# Patient Record
Sex: FEMALE | Race: WHITE | NOT HISPANIC OR LATINO | Employment: FULL TIME | URBAN - METROPOLITAN AREA
[De-identification: names, ages, dates, MRNs, and addresses within clinical notes are randomized per-mention and may not be internally consistent; named-entity substitution may affect disease eponyms.]

---

## 2017-01-04 RX ORDER — LORAZEPAM 1 MG/1
TABLET ORAL
Qty: 30 TABLET | Refills: 0 | Status: SHIPPED | OUTPATIENT
Start: 2017-01-04 | End: 2017-03-05 | Stop reason: SDUPTHER

## 2017-03-03 DIAGNOSIS — Z12.31 OTHER SCREENING MAMMOGRAM: ICD-10-CM

## 2017-03-06 RX ORDER — LORAZEPAM 1 MG/1
TABLET ORAL
Qty: 30 TABLET | Refills: 0 | Status: SHIPPED | OUTPATIENT
Start: 2017-03-06 | End: 2017-04-18 | Stop reason: SDUPTHER

## 2017-03-06 RX ORDER — CITALOPRAM 20 MG/1
TABLET, FILM COATED ORAL
Qty: 90 TABLET | Refills: 1 | Status: SHIPPED | OUTPATIENT
Start: 2017-03-06 | End: 2017-05-30 | Stop reason: DRUGHIGH

## 2017-03-09 ENCOUNTER — PATIENT MESSAGE (OUTPATIENT)
Dept: SPINE | Facility: CLINIC | Age: 52
End: 2017-03-09

## 2017-03-13 ENCOUNTER — HOSPITAL ENCOUNTER (OUTPATIENT)
Dept: RADIOLOGY | Facility: HOSPITAL | Age: 52
Discharge: HOME | End: 2017-03-13
Attending: INTERNAL MEDICINE
Payer: COMMERCIAL

## 2017-03-13 DIAGNOSIS — Z12.31 OTHER SCREENING MAMMOGRAM: ICD-10-CM

## 2017-03-13 PROCEDURE — 77067 SCR MAMMO BI INCL CAD: CPT | Mod: TC

## 2017-03-13 PROCEDURE — 77063 BREAST TOMOSYNTHESIS BI: CPT | Mod: 26,,, | Performed by: RADIOLOGY

## 2017-03-13 PROCEDURE — 77067 SCR MAMMO BI INCL CAD: CPT | Mod: 26,,, | Performed by: RADIOLOGY

## 2017-04-18 RX ORDER — LORAZEPAM 1 MG/1
TABLET ORAL
Qty: 30 TABLET | Refills: 0 | Status: SHIPPED | OUTPATIENT
Start: 2017-04-18 | End: 2017-05-30 | Stop reason: SDUPTHER

## 2017-05-01 ENCOUNTER — TELEPHONE (OUTPATIENT)
Dept: PAIN MEDICINE | Facility: CLINIC | Age: 52
End: 2017-05-01

## 2017-05-01 NOTE — TELEPHONE ENCOUNTER
Left voice message rquesting a return call to reschedule her appointment on 5-11-17 with Dr. Seo due to doctor being in  Surgery that afternoon .

## 2017-05-03 ENCOUNTER — PATIENT MESSAGE (OUTPATIENT)
Dept: INTERNAL MEDICINE | Facility: CLINIC | Age: 52
End: 2017-05-03

## 2017-05-03 NOTE — TELEPHONE ENCOUNTER
Ok to fit patient in a 20 min spot of hospital f/u the week of May 15th. Let her know we can discuss changing her medication at that time

## 2017-05-12 ENCOUNTER — OFFICE VISIT (OUTPATIENT)
Dept: PAIN MEDICINE | Facility: CLINIC | Age: 52
End: 2017-05-12
Attending: ANESTHESIOLOGY
Payer: COMMERCIAL

## 2017-05-12 VITALS
SYSTOLIC BLOOD PRESSURE: 158 MMHG | HEART RATE: 72 BPM | HEIGHT: 65 IN | DIASTOLIC BLOOD PRESSURE: 97 MMHG | WEIGHT: 151.44 LBS | BODY MASS INDEX: 25.23 KG/M2

## 2017-05-12 DIAGNOSIS — Z98.1 H/O SPINAL FUSION: ICD-10-CM

## 2017-05-12 DIAGNOSIS — G89.29 CHRONIC LEFT-SIDED LOW BACK PAIN WITH RIGHT-SIDED SCIATICA: Primary | ICD-10-CM

## 2017-05-12 DIAGNOSIS — M54.16 LUMBAR RADICULOPATHY: ICD-10-CM

## 2017-05-12 DIAGNOSIS — M54.41 CHRONIC LEFT-SIDED LOW BACK PAIN WITH RIGHT-SIDED SCIATICA: Primary | ICD-10-CM

## 2017-05-12 PROCEDURE — 3077F SYST BP >= 140 MM HG: CPT | Mod: S$GLB,,, | Performed by: ANESTHESIOLOGY

## 2017-05-12 PROCEDURE — 3080F DIAST BP >= 90 MM HG: CPT | Mod: S$GLB,,, | Performed by: ANESTHESIOLOGY

## 2017-05-12 PROCEDURE — 99214 OFFICE O/P EST MOD 30 MIN: CPT | Mod: S$GLB,,, | Performed by: ANESTHESIOLOGY

## 2017-05-12 PROCEDURE — 99999 PR PBB SHADOW E&M-EST. PATIENT-LVL III: CPT | Mod: PBBFAC,,, | Performed by: ANESTHESIOLOGY

## 2017-05-12 PROCEDURE — 1160F RVW MEDS BY RX/DR IN RCRD: CPT | Mod: S$GLB,,, | Performed by: ANESTHESIOLOGY

## 2017-05-12 NOTE — MR AVS SNAPSHOT
[facility]  [id number]  [facility] [address]  Phone: [phone]  Fax: [phone]                  Twila Roman   2017 3:30 PM   Office Visit    Description:  Female : 1965   Provider:  Daniel Seo MD   Department:  [facility]                To Do List           Future Appointments        Provider Department Dept Phone    2017 11:00 AM Michelle Farias DO [facility] [phone]    2017 3:20 PM Kathy Rodriguez NP [facility] [phone]      Your Future Surgeries/Procedures     May 23, 2017   Surgery with Daniel Seo MD   [facility] ([facility]    [address] [facility] [address] [phone]            2017   Surgery with Daniel Seo MD   [facility] ([facility]    [address]  [facility] [address] [phone]              Goals (5 Years of Data)     None      [facility]     [facility] Nurse Care Line - 24/7 Assistance  Unless otherwise directed by your provider, please contact [facility], our nurse care line that is available for 24/7 assistance.     Registered nurses in the [facility] provide: appointment scheduling, clinical advisement, health education, and other advisory services.  Call: [phone] (toll free)               Medications           Message regarding Medications     Verify the changes and/or additions to your medication regime listed below are the same as discussed with your clinician today.  If any of these changes or additions are incorrect, please notify your healthcare provider.             Verify that the below list of medications is an accurate representation of the medications you are currently taking.  If none reported, the list may be blank. If incorrect, please contact your healthcare provider. Carry this list with you in  "case of emergency.           Current Medications     biotin 5 mg Cap Take 1 capsule by mouth once daily.    cholecalciferol, vitamin D3, (VITAMIN D3) 2,000 unit Tab Take 1 tablet by mouth once daily.    citalopram (CELEXA) 20 MG tablet TAKE 1 TABLET BY MOUTH ONCE DAILY    inulin-sorbitol (FIBER) 2 gram Chew Take 2 each by mouth once daily.    lorazepam (ATIVAN) 1 MG tablet TAKE 1 TABLET BY MOUTH AT BEDTIME AS NEEDED    metoprolol succinate (TOPROL-XL) 25 MG 24 hr tablet Take 1 tablet (25 mg total) by mouth once daily.    amoxicillin (AMOXIL) 875 MG tablet Take 1 tablet (875 mg total) by mouth 2 (two) times daily.    fluticasone (FLONASE) 50 mcg/actuation nasal spray 2 sprays by Each Nare route once daily.    hydrocodone-acetaminophen 5-325mg (NORCO) 5-325 mg per tablet Take 1 tablet by mouth every 6 (six) hours as needed for Pain.    lactobacillus rhamnosus GG (CULTURELLE) 10 billion cell capsule Take 1 capsule by mouth once daily.    multivitamin (THERAGRAN) per tablet Take 1 tablet by mouth once daily.    pregabalin (LYRICA) 50 MG capsule Take 1 capsule (50 mg total) by mouth 3 (three) times daily.           Clinical Reference Information           Your Vitals Were     BP Pulse Height Weight BMI    158/97 72 5' 5" (1.651 m) 68.7 kg (151 lb 7.3 oz) 25.2 kg/m2      Blood Pressure          Most Recent Value    BP  (!)  158/97      Allergies as of 5/12/2017     No Known Allergies      Immunizations Administered on Date of Encounter - 5/12/2017     None      Language Assistance Services     ATTENTION: Language assistance services are available, free of charge. Please call [phone].      ATENCIÓN: Si habla lamar, tiene a motta disposición servicios gratuitos de asistencia lingüística. Llame al [phone].     [facility] Ý: N?u b?n nói Ti?ng Vi?t, có các d?ch v? h? tr? ngôn ng? mi?n phí dành cho b?n. G?i s? [phone].         [facility] complies with applicable Federal civil rights laws and does not " discriminate on the basis of race, color, national origin, age, disability, or sex.

## 2017-05-12 NOTE — PROGRESS NOTES
Chronic Pain - New Consult    Referring Physician: No ref. provider found    Chief Complaint: No chief complaint on file.       SUBJECTIVE: Disclaimer: This note has been generated using voice-recognition software. There may be typographical errors that have been missed during proof-reading    Initial encounter:    Twila Roman presents to the clinic for the evaluation of right lower back pain with radiculopathy. The pain started years ago following disk herniation and symptoms have been improving after surgery, but gradually returning.      Brief history: Patient has a history of transforaminal epidural steroid injections with significant relief in her lower extremity pain although the patient has been having right lower extremity weakness.    Pain Description:    The pain is located in the bilateral lower back area and radiates to the right lower extremity in the L4 and L5 distribution.      At BEST  5/10     At WORST  5/10 on the WORST day.      On average pain is rated as 5/10.     Today the pain is rated as 5/10    The pain is described as burning, shooting, stabbing and tingling      Symptoms interfere with daily activity, sleeping and work.     Exacerbating factors: Standing, Walking, Lifting and Getting out of bed/chair.      Mitigating factors medications, physical therapy and sitting injections.     Patient denies urinary incontinence and bowel incontinence.  Patient denies any suicidal or homicidal ideations    Pain Medications:  Current:      Tried in Past:  NSAIDs -Never  TCA -Never  SNRI -Never  Anti-convulsants -lyrica 50mg TID  Muscle Relaxants -Never  Opioids-Never    Physical Therapy/Home Exercise: no       report:  Reviewed and consistent with medication use as prescribed.    Pain Procedures:   12/2/2015 -right L4 and L5 TFESI  9/21/2016 - right L4 and L5 TFESI    Chiropractor -never  Acupuncture - never  TENS unit -never  Spinal decompression -previous lumbar surgery L4 and L5  Joint  replacement -never    Imaging:  MRI lumbar spine 2015  MRI lumbar spine with and without contrast.    Findings: 6 mL of gadavist IV contrast was administered for today's examination.    The lumbar spinal alignment and vertebral body heights are satisfactorily preserved.  There are disk prostheses identified L4-5 and L5-S1.  The spinal cord ends at L1.  The terminal cord, conus, and cauda equina have a normal appearance.  There is no   intradural abnormality.  There is no evidence of any acute marrow replacement process such as tumor or infection.  There is no fracture.  The visualized surrounding soft tissues and vascular structures are unremarkable.  There is no abnormal enhancement.    L1-2: Unremarkable.    L2-3: Facet hypertrophy.    L3-4: Facet hypertrophy.    L4-5: Facet hypertrophy.    L5-S1: Facet hypertrophy.   Impression    No significant central canal or neural foraminal stenosis.       Xray lumbar spine 7/2015  Back pain.    Comparison: 4/20/2015.    Technique: Five views of the lumbar spine were obtained, including flexion and extension views.      Findings: There is no acute fracture, dislocation or destructive process.  Postsurgical changes from anterior spinal fusion identified at L4-L5 and L5-S1.  There are inter vertebral disk space there is an through the anterior aspect of the vertebral   bodies.  Hardware appears to be in good positioning without evidence for loosening or fracture.  Vertebral body heights and alignment are maintained.  Spaces appear relatively well preserved.  Mild facet arthrosis of the lower lumbar levels is noted   alignment of the facets is maintained.  Spinous processes are unremarkable.  There appears to be atherosclerosis of the aorta.  SI joints appear intact the visualized sacrum demonstrates no abnormality.  Calcifications are seen projected over the renal   shadows which could relate to nonobstructing stones.  Correlation is advised.   Impression    No interval  worsening or acute finding.  No evidence for translational instability.  Postsurgical changes from anterior spinal fusion at L4-L5 and L5-S1.  No evidence for hardware loosening or fracture.             Past Medical History:   Diagnosis Date    Anxiety     Family history of ovarian cancer     Hypertension      Past Surgical History:   Procedure Laterality Date    BACK SURGERY      spinal fusion    TUMOR REMOVAL      right hand     Social History     Social History    Marital status: Single     Spouse name: N/A    Number of children: N/A    Years of education: N/A     Occupational History     [employer]      Social History Main Topics    Smoking status: Never Smoker    Smokeless tobacco: Never Used    Alcohol use 3.0 oz/week     5 Glasses of wine per week      Comment: one a day- red wine    Drug use: No    Sexual activity: Not Currently     Partners: Male     Other Topics Concern    Not on file     Social History Narrative     Family History   Problem Relation Age of Onset    Cancer Mother      ovarian    Ovarian cancer Mother     Heart disease Father      double bypass    Macular degeneration Father     No Known Problems Brother     No Known Problems Brother     Breast cancer Neg Hx     Colon cancer Neg Hx        Review of patient's allergies indicates:  No Known Allergies    Current Outpatient Prescriptions   Medication Sig    biotin 5 mg Cap Take 1 capsule by mouth once daily.    cholecalciferol, vitamin D3, (VITAMIN D3) 2,000 unit Tab Take 1 tablet by mouth once daily.    citalopram (CELEXA) 20 MG tablet TAKE 1 TABLET BY MOUTH ONCE DAILY    inulin-sorbitol (FIBER) 2 gram Chew Take 2 each by mouth once daily.    lorazepam (ATIVAN) 1 MG tablet TAKE 1 TABLET BY MOUTH AT BEDTIME AS NEEDED    metoprolol succinate (TOPROL-XL) 25 MG 24 hr tablet Take 1 tablet (25 mg total) by mouth once daily.    amoxicillin (AMOXIL) 875 MG tablet Take 1 tablet (875 mg total) by mouth 2  "(two) times daily.    fluticasone (FLONASE) 50 mcg/actuation nasal spray 2 sprays by Each Nare route once daily.    hydrocodone-acetaminophen 5-325mg (NORCO) 5-325 mg per tablet Take 1 tablet by mouth every 6 (six) hours as needed for Pain.    lactobacillus rhamnosus GG (CULTURELLE) 10 billion cell capsule Take 1 capsule by mouth once daily.    multivitamin (THERAGRAN) per tablet Take 1 tablet by mouth once daily.    pregabalin (LYRICA) 50 MG capsule Take 1 capsule (50 mg total) by mouth 3 (three) times daily.     No current facility-administered medications for this visit.        REVIEW OF SYSTEMS:    GENERAL:  No weight loss, malaise or fevers.  HEENT:   No recent changes in vision or hearing  NECK:  Negative for lumps, no difficulty with swallowing.  RESPIRATORY:  Negative for cough, wheezing or shortness of breath, patient denies any recent URI.  CARDIOVASCULAR:  Negative for chest pain, leg swelling or palpitations.  GI:  Negative for abdominal discomfort, blood in stools or black stools or change in bowel habits.  MUSCULOSKELETAL:  See HPI.  SKIN:  Negative for lesions, rash, and itching.  PSYCH:  No mood disorder or recent psychosocial stressors.  Patients sleep is not disturbed secondary to pain.  HEMATOLOGY/LYMPHOLOGY:  Negative for prolonged bleeding, bruising easily or swollen nodes.  Patient is not currently taking any anti-coagulants  ENDO: No history of diabetes or thyroid dysfunction  NEURO:   No history of headaches, syncope, paralysis, seizures or tremors.  All other reviewed and negative other than HPI.    OBJECTIVE:    BP (!) 158/97  Pulse 72  Ht 5' 5" (1.651 m)  Wt 68.7 kg (151 lb 7.3 oz)  BMI 25.2 kg/m2    PHYSICAL EXAMINATION:    GENERAL: Well appearing, in no acute distress, alert and oriented x3.  PSYCH:  Mood and affect appropriate.  SKIN: Skin color, texture, turgor normal, no rashes or lesions.  HEAD/FACE:  Normocephalic, atraumatic. Cranial nerves grossly intact.  CV: RRR with " palpation of the radial artery.  PULM: No evidence of respiratory difficulty, symmetric chest rise.  BACK: Straight leg raising in the sitting and supine positions is negative to radicular pain. There is pain with palpation over the facet joints of the lumbar spine bilaterally. There is decreased range of motion with extension to 15 degrees, and facet loading maneuvers cause reproducible pain.    EXTREMITIES: Peripheral joint ROM is full and pain free without obvious instability or laxity in all four extremities. No deformities, edema, or skin discoloration. Good capillary refill.  MUSCULOSKELETAL: Hip, and knee provocative maneuvers are negative.  There is  pain with palpation over the sacroiliac joints bilaterally.  There is no pain to palpation over the greater trochanteric bursa bilaterally.  FABERs test is positive.  FADIRs test is negative.   5/5 strength in right ankle with plantar and 4/5 dorsiflexion, 5/5 strength in left ankle with plantar and dorsiflexion, 5/5 strength with right knee flexion extension, 5/5 strength with knee flexion extension on the left 3/5 EHL on right 5/5 on left .  No atrophy or tone abnormalities are noted.  NEURO: Bilateral lower extremity coordination and muscle stretch reflexes are physiologic and symmetric.  Plantar response are downgoing. No clonus.  No loss of sensation is noted.  GAIT: Antalgic, ambulates without assistance       ASSESSMENT: 51 y.o. year old female with pain, consistent with     Encounter Diagnoses   Name Primary?    Chronic left-sided low back pain with right-sided sciatica Yes    H/O spinal fusion     Lumbar radiculopathy        PLAN:     I'll schedule the patient for repeat right-sided L4 and L5 transforaminal epidural steroid injection by 2, 2 weeks apart with a two-week follow-up    Follow-up 2 weeks after second injection if there is no improvement we will obtain an MRI of the lumbar spine and an decide on a treatment plant based on imaging.    The  above plan and management options were discussed at length with patient. Patient is in agreement with the above and verbalized understanding. It will be communicated with the referring physician via electronic record, fax, or mail.    Daniel Seo  05/12/2017

## 2017-05-16 ENCOUNTER — TELEPHONE (OUTPATIENT)
Dept: INTERNAL MEDICINE | Facility: CLINIC | Age: 52
End: 2017-05-16

## 2017-05-16 NOTE — TELEPHONE ENCOUNTER
----- Message from Adela Lobo sent at 5/15/2017 12:50 PM CDT -----  Contact: Pt [phone]  Pt called to reschedule for IVET for Tomorrow 05-16-17 for a physical and states this is the second time this has happened.  Pt does not want to wait til July for Physical could you please see what you can do for Pt.

## 2017-05-24 ENCOUNTER — HOSPITAL ENCOUNTER (OUTPATIENT)
Facility: OTHER | Age: 52
Discharge: HOME | End: 2017-05-24
Attending: ANESTHESIOLOGY | Admitting: ANESTHESIOLOGY
Payer: COMMERCIAL

## 2017-05-24 VITALS
SYSTOLIC BLOOD PRESSURE: 135 MMHG | HEIGHT: 65 IN | RESPIRATION RATE: 18 BRPM | OXYGEN SATURATION: 99 % | BODY MASS INDEX: 23.32 KG/M2 | DIASTOLIC BLOOD PRESSURE: 73 MMHG | HEART RATE: 71 BPM | TEMPERATURE: 98 F | WEIGHT: 140 LBS

## 2017-05-24 DIAGNOSIS — M54.16 LUMBAR RADICULOPATHY: Primary | ICD-10-CM

## 2017-05-24 PROCEDURE — 64484 NJX AA&/STRD TFRM EPI L/S EA: CPT | Performed by: ANESTHESIOLOGY

## 2017-05-24 PROCEDURE — 25500020 PHARM REV CODE 255: Performed by: ANESTHESIOLOGY

## 2017-05-24 PROCEDURE — 63600175 PHARM REV CODE 636 W HCPCS: Performed by: ANESTHESIOLOGY

## 2017-05-24 PROCEDURE — 64483 NJX AA&/STRD TFRM EPI L/S 1: CPT | Performed by: ANESTHESIOLOGY

## 2017-05-24 PROCEDURE — 99152 MOD SED SAME PHYS/QHP 5/>YRS: CPT | Mod: ,,, | Performed by: ANESTHESIOLOGY

## 2017-05-24 PROCEDURE — 64484 NJX AA&/STRD TFRM EPI L/S EA: CPT | Mod: LT,,, | Performed by: ANESTHESIOLOGY

## 2017-05-24 PROCEDURE — 25000003 PHARM REV CODE 250: Performed by: ANESTHESIOLOGY

## 2017-05-24 PROCEDURE — 64483 NJX AA&/STRD TFRM EPI L/S 1: CPT | Mod: LT,,, | Performed by: ANESTHESIOLOGY

## 2017-05-24 RX ORDER — LIDOCAINE HYDROCHLORIDE 10 MG/ML
INJECTION INFILTRATION; PERINEURAL
Status: DISCONTINUED | OUTPATIENT
Start: 2017-05-24 | End: 2017-05-24 | Stop reason: HOSPADM

## 2017-05-24 RX ORDER — SODIUM CHLORIDE 9 MG/ML
INJECTION, SOLUTION INTRAVENOUS CONTINUOUS
Status: DISCONTINUED | OUTPATIENT
Start: 2017-05-24 | End: 2017-05-24 | Stop reason: HOSPADM

## 2017-05-24 RX ORDER — MIDAZOLAM HYDROCHLORIDE 1 MG/ML
INJECTION INTRAMUSCULAR; INTRAVENOUS
Status: DISCONTINUED | OUTPATIENT
Start: 2017-05-24 | End: 2017-05-24 | Stop reason: HOSPADM

## 2017-05-24 RX ORDER — BUPIVACAINE HYDROCHLORIDE 2.5 MG/ML
INJECTION, SOLUTION EPIDURAL; INFILTRATION; INTRACAUDAL
Status: DISCONTINUED | OUTPATIENT
Start: 2017-05-24 | End: 2017-05-24 | Stop reason: HOSPADM

## 2017-05-24 RX ORDER — METHYLPREDNISOLONE ACETATE 40 MG/ML
INJECTION, SUSPENSION INTRA-ARTICULAR; INTRALESIONAL; INTRAMUSCULAR; SOFT TISSUE
Status: DISCONTINUED | OUTPATIENT
Start: 2017-05-24 | End: 2017-05-24 | Stop reason: HOSPADM

## 2017-05-24 RX ADMIN — SODIUM CHLORIDE: 0.9 INJECTION, SOLUTION INTRAVENOUS at 02:05

## 2017-05-24 NOTE — DISCHARGE SUMMARY
Discharge Note  Short Stay      SUMMARY     Admit Date: 5/24/2017    Attending Physician: Daniel Seo      Discharge Physician: Daniel Seo      Discharge Date: 5/24/2017 2:43 PM     PROCEDURE:    1)  Right  L4 TRANSFORAMINAL EPIDURAL STEROID INJECTION    2)  Right  L5 TRANSFORAMINAL EPIDURAL STEROID INJECTION    Pre Procedure diagnosis:    Right  L4 and L5  transforaminal stenosis with radiculopathy    Disposition: Home or self care    Patient Instructions:   Current Discharge Medication List      CONTINUE these medications which have NOT CHANGED    Details   biotin 5 mg Cap Take 1 capsule by mouth once daily.      cholecalciferol, vitamin D3, (VITAMIN D3) 2,000 unit Tab Take 1 tablet by mouth once daily.      citalopram (CELEXA) 20 MG tablet TAKE 1 TABLET BY MOUTH ONCE DAILY  Qty: 90 tablet, Refills: 1      inulin-sorbitol (FIBER) 2 gram Chew Take 2 each by mouth once daily.      lorazepam (ATIVAN) 1 MG tablet TAKE 1 TABLET BY MOUTH AT BEDTIME AS NEEDED  Qty: 30 tablet, Refills: 0      metoprolol succinate (TOPROL-XL) 25 MG 24 hr tablet Take 1 tablet (25 mg total) by mouth once daily.  Qty: 30 tablet, Refills: 11      multivitamin (THERAGRAN) per tablet Take 1 tablet by mouth once daily.      amoxicillin (AMOXIL) 875 MG tablet Take 1 tablet (875 mg total) by mouth 2 (two) times daily.  Qty: 20 tablet, Refills: 0      fluticasone (FLONASE) 50 mcg/actuation nasal spray 2 sprays by Each Nare route once daily.  Qty: 1 Bottle, Refills: 6    Associated Diagnoses: Acute sinusitis      hydrocodone-acetaminophen 5-325mg (NORCO) 5-325 mg per tablet Take 1 tablet by mouth every 6 (six) hours as needed for Pain.  Qty: 20 tablet, Refills: 0      lactobacillus rhamnosus GG (CULTURELLE) 10 billion cell capsule Take 1 capsule by mouth once daily.      pregabalin (LYRICA) 50 MG capsule Take 1 capsule (50 mg total) by mouth 3 (three) times daily.  Qty: 90 capsule, Refills: 6             Resume home diet and activity

## 2017-05-24 NOTE — OP NOTE
INFORMED CONSENT: The procedure, risks, benefits and options were discussed with patient. There are no contraindications to the procedure. The patient expressed understanding and agreed to proceed. The personnel performing the procedure was discussed.    05/24/2017    Surgeon: Daniel Seo MD    Assistants: None    PROCEDURE:    1)  Right  L4 TRANSFORAMINAL EPIDURAL STEROID INJECTION    2)  Right  L5 TRANSFORAMINAL EPIDURAL STEROID INJECTION    Pre Procedure diagnosis:    Right  L4 and L5  transforaminal stenosis with radiculopathy    Post-Procedure diagnosis:   same    Complications: None    Specimens: None      DESCRIPTION OF PROCEDURE: The patient was brought to the procedure room. IV access was obtained prior to the procedure. The patient was positioned prone on the fluoroscopy table. Continuous hemodynamic monitoring was initiated including blood pressure, EKG, and pulse oximetry. . The skin was prepped with chlorhexidine and draped in a sterile fashion. Skin anesthesia was achieved using a total of 10mL of lidocaine, 5mL over each respective injection site.     The  L4 and L5  transforaminal spaces were identified with fluoroscopy in the  AP, oblique, and lateral views.  A 22 gauge spinal quinke needle was then advanced into the area of the trans foraminal spaces bilaterally with confirmation of proper needle position using AP, oblique, and lateral fluoroscopic views. Once the needle tip was in the area of the transforaminal space, and there was no blood, CSF or paraesthesias,  1.5 mL of Omnipaque 300mg/ml was injected on each side for a total of 3mL.  Fluoroscopic imaging in the AP and lateral views revealed a clear outline of the spinal nerve with proximal spread of agent through the neural foramen into the epidural space. A total combination of 1 mL of Bupivicaine 0.25% and 40 mg depo medrol was injected on each side for a total of 4mL of injected medications with displacement of the contrast dye  confirming that the medication went into the area of the transforaminal spaces bilaterally. A sterile dressing was applied.   Patient tolerated the procedure well.    Conscious sedation provided by M.D    The patient was monitored with continuous pulse oximetry, EKG, and intermittent blood pressure monitors.  The patient was hemodynamically stable throughout the entire process was responsive to voice, and breathing spontaneously.  Supplemental O2 was provided at 2L/min via nasal cannula.  Patient was comfortable for the duration of the procedure.    There was a total of 3 mg IV Midazolam was titrated for the procedure    Patient was taken back to the recovery room for further observation.     The patient was discharged to home in stable condition

## 2017-05-24 NOTE — DISCHARGE INSTRUCTIONS

## 2017-05-24 NOTE — PLAN OF CARE
Pt tolerated procedure well. Pt reports 2/10 pain after procedure. Assisted pt up for first time. Steady on feet. All discharge instructions given.

## 2017-05-30 ENCOUNTER — OFFICE VISIT (OUTPATIENT)
Dept: INTERNAL MEDICINE | Facility: CLINIC | Age: 52
End: 2017-05-30
Payer: COMMERCIAL

## 2017-05-30 ENCOUNTER — TELEPHONE (OUTPATIENT)
Dept: INTERNAL MEDICINE | Facility: CLINIC | Age: 52
End: 2017-05-30

## 2017-05-30 VITALS
HEART RATE: 61 BPM | HEIGHT: 65 IN | SYSTOLIC BLOOD PRESSURE: 141 MMHG | RESPIRATION RATE: 16 BRPM | WEIGHT: 152.56 LBS | DIASTOLIC BLOOD PRESSURE: 90 MMHG | TEMPERATURE: 98 F | BODY MASS INDEX: 25.42 KG/M2

## 2017-05-30 DIAGNOSIS — Z00.00 ANNUAL PHYSICAL EXAM: Primary | ICD-10-CM

## 2017-05-30 DIAGNOSIS — F41.9 ANXIETY: ICD-10-CM

## 2017-05-30 DIAGNOSIS — Z12.11 SCREEN FOR COLON CANCER: ICD-10-CM

## 2017-05-30 DIAGNOSIS — I15.9 SECONDARY HYPERTENSION: ICD-10-CM

## 2017-05-30 PROCEDURE — 99396 PREV VISIT EST AGE 40-64: CPT | Mod: S$GLB,,, | Performed by: INTERNAL MEDICINE

## 2017-05-30 PROCEDURE — 99999 PR PBB SHADOW E&M-EST. PATIENT-LVL III: CPT | Mod: PBBFAC,,, | Performed by: INTERNAL MEDICINE

## 2017-05-30 RX ORDER — CITALOPRAM 10 MG/1
10 TABLET ORAL DAILY
Qty: 30 TABLET | Refills: 11 | Status: SHIPPED | OUTPATIENT
Start: 2017-05-30 | End: 2017-07-06 | Stop reason: ALTCHOICE

## 2017-05-30 RX ORDER — LORAZEPAM 1 MG/1
1 TABLET ORAL NIGHTLY PRN
Qty: 30 TABLET | Refills: 0 | Status: SHIPPED | OUTPATIENT
Start: 2017-05-30 | End: 2017-07-12 | Stop reason: SDUPTHER

## 2017-05-30 NOTE — PROGRESS NOTES
Subjective:       Patient ID: Twila Roman is a 51 y.o. female.    Chief Complaint: Annual Exam    Patient is a 51 y.o.female who presents today for annual.        Cholesterol: (due now)  Vaccines: Influenza (yearly)   Mammogram: march  Gyn exam: up to date  Colonoscopy: due now      Past Medical History:   Diagnosis Date    Anxiety     Family history of ovarian cancer     Hypertension      Past Surgical History:   Procedure Laterality Date    BACK SURGERY      spinal fusion    TUMOR REMOVAL      right hand     Social History     Social History    Marital status: Single     Spouse name: N/A    Number of children: N/A    Years of education: N/A     Occupational History     [employer]      Social History Main Topics    Smoking status: Never Smoker    Smokeless tobacco: Never Used    Alcohol use 3.0 oz/week     5 Glasses of wine per week      Comment: one a day- red wine    Drug use: No    Sexual activity: Not Currently     Partners: Male     Other Topics Concern    Not on file     Social History Narrative    No narrative on file     Review of patient's allergies indicates:  No Known Allergies  Ms. Roman had no medications administered during this visit.    Review of Systems   Constitutional: Negative for appetite change, chills, diaphoresis, fatigue and fever.   HENT: Negative for congestion, dental problem, ear discharge, ear pain, hearing loss, postnasal drip, sinus pressure and sore throat.    Eyes: Negative for discharge, redness and itching.   Respiratory: Negative for cough, chest tightness, shortness of breath and wheezing.    Cardiovascular: Negative for chest pain, palpitations and leg swelling.   Gastrointestinal: Negative for abdominal pain, constipation, diarrhea, nausea and vomiting.   Endocrine: Negative for cold intolerance and heat intolerance.   Genitourinary: Negative for difficulty urinating, frequency, hematuria and urgency.   Musculoskeletal: Negative for  arthralgias, back pain, gait problem, myalgias and neck pain.   Skin: Negative for color change and rash.   Neurological: Negative for dizziness, syncope and headaches.   Hematological: Negative for adenopathy.   Psychiatric/Behavioral: Negative for behavioral problems and sleep disturbance. The patient is not nervous/anxious.        Objective:      Physical Exam   Constitutional: She is oriented to person, place, and time. She appears well-developed and well-nourished. No distress.   HENT:   Head: Normocephalic and atraumatic.   Right Ear: External ear normal.   Left Ear: External ear normal.   Nose: Nose normal.   Mouth/Throat: Oropharynx is clear and moist. No oropharyngeal exudate.   Eyes: Conjunctivae and EOM are normal. Pupils are equal, round, and reactive to light. Right eye exhibits no discharge. Left eye exhibits no discharge. No scleral icterus.   Neck: Normal range of motion. Neck supple. No JVD present. No thyromegaly present.   Cardiovascular: Normal rate, regular rhythm, normal heart sounds and intact distal pulses.  Exam reveals no gallop and no friction rub.    No murmur heard.  Pulmonary/Chest: Effort normal and breath sounds normal. No stridor. No respiratory distress. She has no wheezes. She has no rales. She exhibits no tenderness.   Abdominal: Soft. Bowel sounds are normal. She exhibits no distension. There is no tenderness. There is no rebound.   Musculoskeletal: Normal range of motion. She exhibits no edema or tenderness.   Lymphadenopathy:     She has no cervical adenopathy.   Neurological: She is alert and oriented to person, place, and time. No cranial nerve deficit.   Skin: Skin is warm. No rash noted. She is not diaphoretic. No erythema.   Psychiatric: She has a normal mood and affect. Her behavior is normal.   Nursing note and vitals reviewed.      Assessment and Plan:       1. Annual physical exam  - CBC auto differential; Future  - Comprehensive metabolic panel; Future  - TSH;  Future  - Lipid panel; Future  - Vitamin D; Future  - Urinalysis; Future    2. Screen for colon cancer  - Case request GI: COLONOSCOPY    3. Secondary hypertension  - on metoprolol  - low salt diet  - monitor bp at home and email readings in one week    4. Anxiety  - titrate off celexa; may consider wellbutrin if needed          No Follow-up on file.

## 2017-06-07 ENCOUNTER — HOSPITAL ENCOUNTER (OUTPATIENT)
Facility: OTHER | Age: 52
Discharge: HOME | End: 2017-06-07
Attending: ANESTHESIOLOGY | Admitting: ANESTHESIOLOGY
Payer: COMMERCIAL

## 2017-06-07 ENCOUNTER — SURGERY (OUTPATIENT)
Age: 52
End: 2017-06-07

## 2017-06-07 VITALS
DIASTOLIC BLOOD PRESSURE: 68 MMHG | WEIGHT: 140 LBS | TEMPERATURE: 99 F | RESPIRATION RATE: 18 BRPM | OXYGEN SATURATION: 99 % | BODY MASS INDEX: 23.32 KG/M2 | SYSTOLIC BLOOD PRESSURE: 123 MMHG | HEART RATE: 66 BPM | HEIGHT: 65 IN

## 2017-06-07 DIAGNOSIS — M54.16 LUMBAR RADICULOPATHY: Primary | ICD-10-CM

## 2017-06-07 PROCEDURE — 63600175 PHARM REV CODE 636 W HCPCS: Performed by: ANESTHESIOLOGY

## 2017-06-07 PROCEDURE — 64484 NJX AA&/STRD TFRM EPI L/S EA: CPT | Performed by: ANESTHESIOLOGY

## 2017-06-07 PROCEDURE — 99152 MOD SED SAME PHYS/QHP 5/>YRS: CPT | Mod: ,,, | Performed by: ANESTHESIOLOGY

## 2017-06-07 PROCEDURE — 64484 NJX AA&/STRD TFRM EPI L/S EA: CPT | Mod: RT,,, | Performed by: ANESTHESIOLOGY

## 2017-06-07 PROCEDURE — 25500020 PHARM REV CODE 255: Performed by: ANESTHESIOLOGY

## 2017-06-07 PROCEDURE — 25000003 PHARM REV CODE 250: Performed by: ANESTHESIOLOGY

## 2017-06-07 PROCEDURE — 64483 NJX AA&/STRD TFRM EPI L/S 1: CPT | Mod: RT,,, | Performed by: ANESTHESIOLOGY

## 2017-06-07 PROCEDURE — 64483 NJX AA&/STRD TFRM EPI L/S 1: CPT | Performed by: ANESTHESIOLOGY

## 2017-06-07 RX ORDER — LIDOCAINE HYDROCHLORIDE 10 MG/ML
INJECTION INFILTRATION; PERINEURAL
Status: DISCONTINUED | OUTPATIENT
Start: 2017-06-07 | End: 2017-06-07 | Stop reason: HOSPADM

## 2017-06-07 RX ORDER — METHYLPREDNISOLONE ACETATE 40 MG/ML
INJECTION, SUSPENSION INTRA-ARTICULAR; INTRALESIONAL; INTRAMUSCULAR; SOFT TISSUE
Status: DISCONTINUED | OUTPATIENT
Start: 2017-06-07 | End: 2017-06-07 | Stop reason: HOSPADM

## 2017-06-07 RX ORDER — BUPIVACAINE HYDROCHLORIDE 2.5 MG/ML
INJECTION, SOLUTION EPIDURAL; INFILTRATION; INTRACAUDAL
Status: DISCONTINUED | OUTPATIENT
Start: 2017-06-07 | End: 2017-06-07 | Stop reason: HOSPADM

## 2017-06-07 RX ORDER — MIDAZOLAM HYDROCHLORIDE 1 MG/ML
INJECTION INTRAMUSCULAR; INTRAVENOUS
Status: DISCONTINUED | OUTPATIENT
Start: 2017-06-07 | End: 2017-06-07 | Stop reason: HOSPADM

## 2017-06-07 RX ORDER — SODIUM CHLORIDE 9 MG/ML
INJECTION, SOLUTION INTRAVENOUS CONTINUOUS
Status: DISCONTINUED | OUTPATIENT
Start: 2017-06-07 | End: 2017-06-07 | Stop reason: HOSPADM

## 2017-06-07 RX ADMIN — IOHEXOL 50 ML: 300 INJECTION, SOLUTION INTRAVENOUS at 03:06

## 2017-06-07 RX ADMIN — BUPIVACAINE HYDROCHLORIDE 10 ML: 2.5 INJECTION, SOLUTION EPIDURAL; INFILTRATION; INTRACAUDAL; PERINEURAL at 03:06

## 2017-06-07 RX ADMIN — MIDAZOLAM HYDROCHLORIDE 2 MG: 1 INJECTION, SOLUTION INTRAMUSCULAR; INTRAVENOUS at 03:06

## 2017-06-07 RX ADMIN — METHYLPREDNISOLONE ACETATE 80 MG: 40 INJECTION, SUSPENSION INTRA-ARTICULAR; INTRALESIONAL; INTRAMUSCULAR; SOFT TISSUE at 03:06

## 2017-06-07 RX ADMIN — LIDOCAINE HYDROCHLORIDE 10 ML: 10 INJECTION, SOLUTION INFILTRATION; PERINEURAL at 03:06

## 2017-06-07 RX ADMIN — MIDAZOLAM HYDROCHLORIDE 1 MG: 1 INJECTION, SOLUTION INTRAMUSCULAR; INTRAVENOUS at 03:06

## 2017-06-07 NOTE — DISCHARGE INSTRUCTIONS

## 2017-06-07 NOTE — DISCHARGE SUMMARY
Discharge Note  Short Stay      SUMMARY     Admit Date: 6/7/2017    Attending Physician: Daniel Seo      Discharge Physician: Daniel Seo      Discharge Date: 6/7/2017 4:01 PM     PROCEDURE:    1)  Right  L4 TRANSFORAMINAL EPIDURAL STEROID INJECTION    2)  Right  L5 TRANSFORAMINAL EPIDURAL STEROID INJECTION    Pre Procedure diagnosis:    Right  L4 and L5  transforaminal stenosis with radiculopathy    Disposition: Home or self care    Patient Instructions:   Current Discharge Medication List      CONTINUE these medications which have NOT CHANGED    Details   biotin 5 mg Cap Take 1 capsule by mouth once daily.      cholecalciferol, vitamin D3, (VITAMIN D3) 2,000 unit Tab Take 1 tablet by mouth once daily.      citalopram (CELEXA) 10 MG tablet Take 1 tablet (10 mg total) by mouth once daily.  Qty: 30 tablet, Refills: 11      inulin-sorbitol (FIBER) 2 gram Chew Take 2 each by mouth once daily.      lactobacillus rhamnosus GG (CULTURELLE) 10 billion cell capsule Take 1 capsule by mouth once daily.      lorazepam (ATIVAN) 1 MG tablet Take 1 tablet (1 mg total) by mouth nightly as needed.  Qty: 30 tablet, Refills: 0      multivitamin (THERAGRAN) per tablet Take 1 tablet by mouth once daily.      fluticasone (FLONASE) 50 mcg/actuation nasal spray 2 sprays by Each Nare route once daily.  Qty: 1 Bottle, Refills: 6    Associated Diagnoses: Acute sinusitis      hydrocodone-acetaminophen 5-325mg (NORCO) 5-325 mg per tablet Take 1 tablet by mouth every 6 (six) hours as needed for Pain.  Qty: 20 tablet, Refills: 0      metoprolol succinate (TOPROL-XL) 25 MG 24 hr tablet Take 1 tablet (25 mg total) by mouth once daily.  Qty: 30 tablet, Refills: 11             Resume home diet and activity

## 2017-06-07 NOTE — OP NOTE
INFORMED CONSENT: The procedure, risks, benefits and options were discussed with patient. There are no contraindications to the procedure. The patient expressed understanding and agreed to proceed. The personnel performing the procedure was discussed.    06/07/2017    Surgeon: Daniel Seo MD    Assistants: None    PROCEDURE:    1)  Right  L4 TRANSFORAMINAL EPIDURAL STEROID INJECTION    2)  Right  L5 TRANSFORAMINAL EPIDURAL STEROID INJECTION    Pre Procedure diagnosis:    Right  L4 and L5  transforaminal stenosis with radiculopathy    Post-Procedure diagnosis:   same    Complications: None    Specimens: None      DESCRIPTION OF PROCEDURE: The patient was brought to the procedure room. IV access was obtained prior to the procedure. The patient was positioned prone on the fluoroscopy table. Continuous hemodynamic monitoring was initiated including blood pressure, EKG, and pulse oximetry. . The skin was prepped with chlorhexidine and draped in a sterile fashion. Skin anesthesia was achieved using a total of 10mL of lidocaine, 5mL over each respective injection site.     The  L4 and L5  transforaminal spaces were identified with fluoroscopy in the  AP, oblique, and lateral views.  A 22 gauge spinal quinke needle was then advanced into the area of the trans foraminal spaces bilaterally with confirmation of proper needle position using AP, oblique, and lateral fluoroscopic views. Once the needle tip was in the area of the transforaminal space, and there was no blood, CSF or paraesthesias,  1.5 mL of Omnipaque 300mg/ml was injected on each side for a total of 3mL.  Fluoroscopic imaging in the AP and lateral views revealed a clear outline of the spinal nerve with proximal spread of agent through the neural foramen into the epidural space. A total combination of 1 mL of Bupivicaine 0.25% and 40 mg depo medrol was injected on each side for a total of 4mL of injected medications with displacement of the contrast dye  confirming that the medication went into the area of the transforaminal spaces bilaterally. A sterile dressing was applied.   Patient tolerated the procedure well.    Conscious sedation provided by M.D    The patient was monitored with continuous pulse oximetry, EKG, and intermittent blood pressure monitors.  The patient was hemodynamically stable throughout the entire process was responsive to voice, and breathing spontaneously.  Supplemental O2 was provided at 2L/min via nasal cannula.  Patient was comfortable for the duration of the procedure.    There was a total of 3 mg IV Midazolam was titrated for the procedure    Patient was taken back to the recovery room for further observation.     The patient was discharged to home in stable condition

## 2017-06-08 ENCOUNTER — PATIENT MESSAGE (OUTPATIENT)
Dept: INTERNAL MEDICINE | Facility: CLINIC | Age: 52
End: 2017-06-08

## 2017-06-14 ENCOUNTER — PATIENT MESSAGE (OUTPATIENT)
Dept: INTERNAL MEDICINE | Facility: CLINIC | Age: 52
End: 2017-06-14

## 2017-06-14 RX ORDER — BUPROPION HYDROCHLORIDE 150 MG/1
150 TABLET ORAL DAILY
Qty: 30 TABLET | Refills: 11 | Status: SHIPPED | OUTPATIENT
Start: 2017-06-14 | End: 2017-08-24 | Stop reason: DRUGHIGH

## 2017-06-20 ENCOUNTER — PATIENT MESSAGE (OUTPATIENT)
Dept: INTERNAL MEDICINE | Facility: CLINIC | Age: 52
End: 2017-06-20

## 2017-06-20 RX ORDER — BENZONATATE 200 MG/1
200 CAPSULE ORAL 2 TIMES DAILY PRN
Qty: 20 CAPSULE | Refills: 0 | Status: SHIPPED | OUTPATIENT
Start: 2017-06-20 | End: 2017-06-27

## 2017-06-20 RX ORDER — AZITHROMYCIN 250 MG/1
TABLET, FILM COATED ORAL
Qty: 6 TABLET | Refills: 0 | Status: SHIPPED | OUTPATIENT
Start: 2017-06-20 | End: 2017-07-06

## 2017-07-06 ENCOUNTER — OFFICE VISIT (OUTPATIENT)
Dept: INTERNAL MEDICINE | Facility: CLINIC | Age: 52
End: 2017-07-06
Payer: COMMERCIAL

## 2017-07-06 ENCOUNTER — HOSPITAL ENCOUNTER (OUTPATIENT)
Dept: RADIOLOGY | Facility: HOSPITAL | Age: 52
Discharge: HOME | End: 2017-07-06
Attending: INTERNAL MEDICINE
Payer: COMMERCIAL

## 2017-07-06 VITALS
HEART RATE: 103 BPM | WEIGHT: 151.25 LBS | TEMPERATURE: 99 F | BODY MASS INDEX: 25.2 KG/M2 | OXYGEN SATURATION: 98 % | RESPIRATION RATE: 16 BRPM | SYSTOLIC BLOOD PRESSURE: 138 MMHG | DIASTOLIC BLOOD PRESSURE: 88 MMHG | HEIGHT: 65 IN

## 2017-07-06 DIAGNOSIS — R05.9 COUGH: ICD-10-CM

## 2017-07-06 DIAGNOSIS — Z11.59 NEED FOR HEPATITIS B SCREENING TEST: ICD-10-CM

## 2017-07-06 DIAGNOSIS — Z11.1 SCREENING-PULMONARY TB: ICD-10-CM

## 2017-07-06 DIAGNOSIS — R05.9 COUGH: Primary | ICD-10-CM

## 2017-07-06 PROCEDURE — 71020 XR CHEST PA AND LATERAL: CPT | Mod: TC,PO

## 2017-07-06 PROCEDURE — 71020 XR CHEST PA AND LATERAL: CPT | Mod: 26,,, | Performed by: RADIOLOGY

## 2017-07-06 PROCEDURE — 99214 OFFICE O/P EST MOD 30 MIN: CPT | Mod: S$GLB,,, | Performed by: INTERNAL MEDICINE

## 2017-07-06 PROCEDURE — 99999 PR PBB SHADOW E&M-EST. PATIENT-LVL IV: CPT | Mod: PBBFAC,,, | Performed by: INTERNAL MEDICINE

## 2017-07-06 PROCEDURE — 96372 THER/PROPH/DIAG INJ SC/IM: CPT | Mod: S$GLB,,, | Performed by: INTERNAL MEDICINE

## 2017-07-06 RX ORDER — HYDROCODONE BITARTRATE AND HOMATROPINE METHYLBROMIDE ORAL SOLUTION 5; 1.5 MG/5ML; MG/5ML
5 LIQUID ORAL NIGHTLY PRN
Qty: 180 ML | Refills: 0 | Status: SHIPPED | OUTPATIENT
Start: 2017-07-06 | End: 2018-02-22

## 2017-07-06 RX ORDER — TRIAMCINOLONE ACETONIDE 40 MG/ML
40 INJECTION, SUSPENSION INTRA-ARTICULAR; INTRAMUSCULAR
Status: COMPLETED | OUTPATIENT
Start: 2017-07-06 | End: 2017-07-06

## 2017-07-06 RX ORDER — IPRATROPIUM BROMIDE AND ALBUTEROL SULFATE 2.5; .5 MG/3ML; MG/3ML
3 SOLUTION RESPIRATORY (INHALATION)
Status: COMPLETED | OUTPATIENT
Start: 2017-07-06 | End: 2017-07-06

## 2017-07-06 RX ORDER — LEVOFLOXACIN 750 MG/1
750 TABLET ORAL DAILY
Qty: 7 TABLET | Refills: 0 | Status: SHIPPED | OUTPATIENT
Start: 2017-07-06 | End: 2017-07-13

## 2017-07-06 RX ORDER — ALBUTEROL SULFATE 90 UG/1
1-2 AEROSOL, METERED RESPIRATORY (INHALATION) EVERY 6 HOURS PRN
Qty: 1 EACH | Refills: 11 | Status: SHIPPED | OUTPATIENT
Start: 2017-07-06 | End: 2021-09-30

## 2017-07-06 RX ORDER — METHYLPREDNISOLONE 4 MG/1
TABLET ORAL
Qty: 1 PACKAGE | Refills: 0 | Status: SHIPPED | OUTPATIENT
Start: 2017-07-06 | End: 2017-08-24 | Stop reason: ALTCHOICE

## 2017-07-06 RX ADMIN — TRIAMCINOLONE ACETONIDE 40 MG: 40 INJECTION, SUSPENSION INTRA-ARTICULAR; INTRAMUSCULAR at 03:07

## 2017-07-06 RX ADMIN — IPRATROPIUM BROMIDE AND ALBUTEROL SULFATE 3 ML: 2.5; .5 SOLUTION RESPIRATORY (INHALATION) at 03:07

## 2017-07-06 NOTE — PROGRESS NOTES
Subjective:       Patient ID: Twila Roman is a 51 y.o. female.    Chief Complaint: Immunizations (needs titers for school) and URI (x3 weeks ABX and tessalon perles did not work; pt reports body aches, coughing, low grade fever, nausea, short of breath, gags when coughs)    Patient is a 51 y.o.female who presents today for two things.    1. Cough: took a zpak one week ago. Cough improved but boyfriend got sick and she has a cough again. The cough is productive with white sputum; mild fever at  Home. Causing her to get short of breath due to excessive coughing.     2. 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  Review of Systems   Constitutional: Negative for appetite change, chills, diaphoresis, fatigue and fever.   HENT: Negative for congestion, dental problem, ear discharge, ear pain, hearing loss, postnasal drip, sinus pressure and sore throat.    Eyes: Negative for discharge, redness and itching.   Respiratory: Positive for cough and shortness of breath. Negative for chest tightness and wheezing.    Cardiovascular: Negative for chest pain, palpitations and leg swelling.   Gastrointestinal: Negative for abdominal pain, constipation, diarrhea, nausea and vomiting.   Endocrine: Negative for cold intolerance and heat intolerance.   Genitourinary: Negative for difficulty urinating, frequency, hematuria and urgency.   Musculoskeletal: Negative for arthralgias, back pain, gait problem, myalgias and neck pain.   Skin: Negative for color change and rash.   Neurological: Negative for dizziness, syncope and headaches.   Hematological: Negative for adenopathy.   Psychiatric/Behavioral: Negative for behavioral problems and sleep disturbance. The patient is not nervous/anxious.        Objective:      Physical Exam   Constitutional: She is oriented to person, place, and time. She appears well-developed and well-nourished. No distress.   HENT:   Head: Normocephalic and atraumatic.   Right Ear: External ear  normal.   Left Ear: External ear normal.   Nose: Nose normal.   Mouth/Throat: Oropharynx is clear and moist. No oropharyngeal exudate.   Eyes: Conjunctivae and EOM are normal. Pupils are equal, round, and reactive to light. Right eye exhibits no discharge. Left eye exhibits no discharge. No scleral icterus.   Neck: Normal range of motion. Neck supple. No JVD present. No thyromegaly present.   Cardiovascular: Normal rate, regular rhythm, normal heart sounds and intact distal pulses.  Exam reveals no gallop and no friction rub.    No murmur heard.  Pulmonary/Chest: Effort normal and breath sounds normal. No stridor. No respiratory distress. She has no wheezes. She has no rales. She exhibits no tenderness.   Bilateral coarse breath sounds   Abdominal: Soft. Bowel sounds are normal. She exhibits no distension. There is no tenderness. There is no rebound.   Musculoskeletal: Normal range of motion. She exhibits no edema or tenderness.   Lymphadenopathy:     She has no cervical adenopathy.   Neurological: She is alert and oriented to person, place, and time. No cranial nerve deficit.   Skin: Skin is warm. No rash noted. She is not diaphoretic. No erythema.   Psychiatric: She has a normal mood and affect. Her behavior is normal.   Nursing note and vitals reviewed.      Assessment and Plan:       1. Cough  - X-Ray Chest PA And Lateral; Future  - levoFLOXacin (LEVAQUIN) 750 MG tablet; Take 1 tablet (750 mg total) by mouth once daily.  Dispense: 7 tablet; Refill: 0  - triamcinolone acetonide injection 40 mg; Inject 1 mL (40 mg total) into the muscle one time.  - methylPREDNISolone (MEDROL DOSEPACK) 4 mg tablet; Take as directed  Dispense: 1 Package; Refill: 0; start tomorrow  - hydrocodone-homatropine 5-1.5 mg/5 ml (HYCODAN) 5-1.5 mg/5 mL Syrp; Take 5 mLs by mouth nightly as needed (cough).  Dispense: 180 mL; Refill: 0  - albuterol 90 mcg/actuation inhaler; Inhale 1-2 puffs into the lungs every 6 (six) hours as needed for  Wheezing.  Dispense: 1 each; Refill: 11  - albuterol-ipratropium 2.5mg-0.5mg/3mL nebulizer solution 3 mL; Take 3 mLs by nebulization one time.  - Notify clinic if symptoms change, worsen or do not improve    2. Need for hepatitis B screening test  - Varicella zoster antibody, IgG; Future  - Hepatitis B surface antibody; Future  - Rubeola antibody IgG; Future  - Rubella antibody, IgG; Future  - Mumps Virus Antibodies, IgG and IgM; Future    3. Screening-pulmonary TB  - Varicella zoster antibody, IgG; Future  - Hepatitis B surface antibody; Future  - Rubeola antibody IgG; Future  - Rubella antibody, IgG; Future  - Mumps Virus Antibodies, IgG and IgM; Future          No Follow-up on file.

## 2017-07-12 RX ORDER — LORAZEPAM 1 MG/1
TABLET ORAL
Qty: 30 TABLET | Refills: 0 | Status: SHIPPED | OUTPATIENT
Start: 2017-07-12 | End: 2017-09-11 | Stop reason: SDUPTHER

## 2017-07-14 RX ORDER — METOPROLOL SUCCINATE 25 MG/1
TABLET, EXTENDED RELEASE ORAL
Qty: 90 TABLET | Refills: 3 | Status: SHIPPED | OUTPATIENT
Start: 2017-07-14 | End: 2018-07-06 | Stop reason: SDUPTHER

## 2017-07-26 ENCOUNTER — PATIENT MESSAGE (OUTPATIENT)
Dept: INTERNAL MEDICINE | Facility: CLINIC | Age: 52
End: 2017-07-26

## 2017-07-27 ENCOUNTER — OFFICE VISIT (OUTPATIENT)
Dept: INTERNAL MEDICINE | Facility: CLINIC | Age: 52
End: 2017-07-27
Payer: COMMERCIAL

## 2017-07-27 VITALS
WEIGHT: 148.81 LBS | BODY MASS INDEX: 24.79 KG/M2 | DIASTOLIC BLOOD PRESSURE: 83 MMHG | TEMPERATURE: 99 F | HEART RATE: 97 BPM | SYSTOLIC BLOOD PRESSURE: 122 MMHG | HEIGHT: 65 IN

## 2017-07-27 DIAGNOSIS — J01.90 ACUTE SINUSITIS, RECURRENCE NOT SPECIFIED, UNSPECIFIED LOCATION: Primary | ICD-10-CM

## 2017-07-27 DIAGNOSIS — M94.0 COSTOCHONDRITIS: ICD-10-CM

## 2017-07-27 PROCEDURE — 99999 PR PBB SHADOW E&M-EST. PATIENT-LVL III: CPT | Mod: PBBFAC,,, | Performed by: INTERNAL MEDICINE

## 2017-07-27 PROCEDURE — 99213 OFFICE O/P EST LOW 20 MIN: CPT | Mod: S$GLB,,, | Performed by: INTERNAL MEDICINE

## 2017-07-27 RX ORDER — AMOXICILLIN AND CLAVULANATE POTASSIUM 875; 125 MG/1; MG/1
1 TABLET, FILM COATED ORAL 2 TIMES DAILY
Qty: 14 TABLET | Refills: 0 | Status: SHIPPED | OUTPATIENT
Start: 2017-07-27 | End: 2017-08-03

## 2017-07-27 RX ORDER — MELOXICAM 15 MG/1
15 TABLET ORAL DAILY
Qty: 14 TABLET | Refills: 0 | Status: SHIPPED | OUTPATIENT
Start: 2017-07-27 | End: 2017-08-26

## 2017-07-27 RX ORDER — PROMETHAZINE HYDROCHLORIDE AND DEXTROMETHORPHAN HYDROBROMIDE 6.25; 15 MG/5ML; MG/5ML
SYRUP ORAL
Qty: 180 ML | Refills: 0 | Status: SHIPPED | OUTPATIENT
Start: 2017-07-27 | End: 2018-02-22

## 2017-07-27 NOTE — PROGRESS NOTES
Subjective:       Patient ID: Twila Roman is a 52 y.o. female.    Chief Complaint: Sinus Problem    Patient is a 52 y.o.female who presents today for follow up. She go over the bronchial infection. However, now, she feels like she has a sinus infection. She is blowing out bloody yellow mucus. She is very congested and has low grade fever at home. She did not take anything over the counter. Ribs are hurting from her bronchitis.    Review of Systems   Constitutional: Negative for appetite change, chills, diaphoresis, fatigue and fever.   HENT: Positive for congestion, postnasal drip, rhinorrhea and sinus pressure. Negative for dental problem, ear discharge, ear pain, hearing loss and sore throat.    Eyes: Negative for discharge, redness and itching.   Respiratory: Negative for cough, chest tightness, shortness of breath and wheezing.    Cardiovascular: Negative for chest pain, palpitations and leg swelling.   Gastrointestinal: Negative for abdominal pain, constipation, diarrhea, nausea and vomiting.   Endocrine: Negative for cold intolerance and heat intolerance.   Genitourinary: Negative for difficulty urinating, frequency, hematuria and urgency.   Musculoskeletal: Negative for arthralgias, back pain, gait problem, myalgias and neck pain.   Skin: Negative for color change and rash.   Neurological: Negative for dizziness, syncope and headaches.   Hematological: Negative for adenopathy.   Psychiatric/Behavioral: Negative for behavioral problems and sleep disturbance. The patient is not nervous/anxious.        Objective:      Physical Exam   Constitutional: She is oriented to person, place, and time. She appears well-developed and well-nourished. No distress.   HENT:   Head: Normocephalic and atraumatic.   Right Ear: Tympanic membrane and external ear normal.   Left Ear: Tympanic membrane and external ear normal.   Nose: Mucosal edema and rhinorrhea present.   Mouth/Throat: Uvula is midline, oropharynx is clear and  moist and mucous membranes are normal. No oropharyngeal exudate, posterior oropharyngeal edema, posterior oropharyngeal erythema or tonsillar abscesses.   Eyes: Conjunctivae and EOM are normal. Pupils are equal, round, and reactive to light. Right eye exhibits no discharge. Left eye exhibits no discharge. No scleral icterus.   Neck: Normal range of motion. Neck supple. No JVD present. No thyromegaly present.   Cardiovascular: Normal rate, regular rhythm, normal heart sounds and intact distal pulses.  Exam reveals no gallop and no friction rub.    No murmur heard.  Pulmonary/Chest: Effort normal and breath sounds normal. No stridor. No respiratory distress. She has no wheezes. She has no rales. She exhibits no tenderness.   Abdominal: Soft. Bowel sounds are normal. She exhibits no distension. There is no tenderness. There is no rebound.   Musculoskeletal: Normal range of motion. She exhibits no edema or tenderness.   Lymphadenopathy:     She has no cervical adenopathy.   Neurological: She is alert and oriented to person, place, and time. No cranial nerve deficit.   Skin: Skin is warm. No rash noted. She is not diaphoretic. No erythema.   Psychiatric: She has a normal mood and affect. Her behavior is normal.   Nursing note and vitals reviewed.      Assessment and Plan:       1. Acute sinusitis, recurrence not specified, unspecified location  - augmentin 1 po bid x 7 days      2. Costochondritis  - mobic daily x 2 weeks    Notify clinic if symptoms change, worsen or do not improve          No Follow-up on file.

## 2017-08-08 ENCOUNTER — OFFICE VISIT (OUTPATIENT)
Dept: URGENT CARE | Facility: CLINIC | Age: 52
End: 2017-08-08
Payer: COMMERCIAL

## 2017-08-08 VITALS
WEIGHT: 140 LBS | BODY MASS INDEX: 23.32 KG/M2 | HEIGHT: 65 IN | HEART RATE: 79 BPM | OXYGEN SATURATION: 99 % | SYSTOLIC BLOOD PRESSURE: 126 MMHG | TEMPERATURE: 99 F | DIASTOLIC BLOOD PRESSURE: 93 MMHG | RESPIRATION RATE: 16 BRPM

## 2017-08-08 DIAGNOSIS — S90.32XA CONTUSION OF LEFT FOOT, INITIAL ENCOUNTER: Primary | ICD-10-CM

## 2017-08-08 PROCEDURE — 3080F DIAST BP >= 90 MM HG: CPT | Mod: S$GLB,,, | Performed by: NURSE PRACTITIONER

## 2017-08-08 PROCEDURE — 3008F BODY MASS INDEX DOCD: CPT | Mod: S$GLB,,, | Performed by: NURSE PRACTITIONER

## 2017-08-08 PROCEDURE — 3074F SYST BP LT 130 MM HG: CPT | Mod: S$GLB,,, | Performed by: NURSE PRACTITIONER

## 2017-08-08 PROCEDURE — 99213 OFFICE O/P EST LOW 20 MIN: CPT | Mod: S$GLB,,, | Performed by: NURSE PRACTITIONER

## 2017-08-08 NOTE — PATIENT INSTRUCTIONS
Understanding Bone Bruise (Bone Contusion)  A bone bruise is an injury to a bone that is less severe than a bone fracture. Bone bruises are fairly common. They can happen to people of all ages. Any type of bone in your body can get a bone bruise. Other injuries often happen along with a bone bruise, such as damage to nearby ligaments.  What happens when a bone is bruised?  Bone is made of different kinds of tissue. The periosteum is a thin layer of tissue that covers most of a bone. Where bones come together, there is usually a layer of cartilage at the edges. The bone here is called subchondral bone. Deep inside the bone is an area called the medulla. It contains the bone marrow and fibrous tissue called trabeculae.  With a bone fracture, all of the trabeculae in a region of bone have broken. But with a bone bruise, an injury only damages some of these trabeculae. An injury might cause blood to build up in the area beneath the periosteum. This causes a subperiosteal hematoma, a type of bone bruise. An injury might also cause bleeding and swelling in the area between your cartilage and the bone beneath it. This causes a subchondral bone bruise. Or bleeding and swelling can occur in the medulla of your bone. This is called an interosseous bone bruise.  What causes a bone bruise?  Injury of any kind can cause a bone bruise. Sports injuries, motor vehicle accidents, or falls from a height can cause them. Twisting injuries that cause joint sprains can also cause a bone bruise. Health conditions like arthritis may also lead to a bone bruise. This is because arthritis causes bone surfaces to grind against each other. Child abuse is another cause of bone bruises.  Symptoms of a bone bruise  Symptoms of a bone bruise can include:  · Pain and soreness in the injured area  · Swelling in the area and soft tissues around it  · Change in color of the injured area  · Swelling or stiffness of an injured joint  This pain is often  more severe and lasts longer than a soft tissue injury. How severe your symptoms are and how long they last depends on how severe the bone bruise is.  Diagnosing a bone bruise  Your healthcare provider will ask you about your medical history and symptoms. He or she will ask how you got your injury. Your provider will examine the injured area to check for pain, bruising, and swelling. After the exam, your health care provider may be able to tell if you have a bone bruise.  A bone bruise doesnt show up on an X-ray. But you may be given an X-ray to rule out a bone fracture. A fracture may need a different kind of treatment. An MRI can confirm a bone bruise. But your healthcare provider will likely only give you an MRI if your symptoms dont get better.  Date Last Reviewed: 3/3/2015  © 0956-6386 [employer]. [address] [place] [address]. All rights reserved. This information is not intended as a substitute for professional medical care. Always follow your healthcare professional's instructions.

## 2017-08-08 NOTE — PROGRESS NOTES
"Subjective:       Patient ID: Twila Roman is a 52 y.o. female.    Vitals:  height is 5' 5" (1.651 m) and weight is 63.5 kg (140 lb). Her tympanic temperature is 98.5 °F (36.9 °C). Her blood pressure is 126/93 (abnormal) and her pulse is 79. Her respiration is 16 and oxygen saturation is 99%.     Chief Complaint: Foot Injury    Foot Injury    The incident occurred 3 to 5 days ago. Incident location: [place] bar. The injury mechanism was a fall. The pain is present in the left foot. The quality of the pain is described as stabbing, aching and burning. The pain is at a severity of 6/10. The pain is moderate. The pain has been constant since onset. Associated symptoms include an inability to bear weight. Pertinent negatives include no numbness or tingling. She reports no foreign bodies present. The symptoms are aggravated by movement and weight bearing. She has tried elevation and rest for the symptoms. The treatment provided no relief.     Review of Systems   Constitution: Negative for chills and fever.   HENT: Negative for headaches and sore throat.    Eyes: Negative for blurred vision.   Cardiovascular: Negative for chest pain.   Respiratory: Negative for shortness of breath.    Skin: Negative for rash.   Musculoskeletal: Positive for joint pain and stiffness. Negative for back pain and joint swelling.   Gastrointestinal: Negative for abdominal pain, diarrhea, nausea and vomiting.   Neurological: Negative for numbness and tingling.   Psychiatric/Behavioral: The patient is not nervous/anxious.    All other systems reviewed and are negative.      Objective:      Physical Exam   Constitutional: She is oriented to person, place, and time. She appears well-developed and well-nourished.   HENT:   Head: Normocephalic and atraumatic.   Right Ear: Hearing normal.   Left Ear: Hearing normal.   Nose: Nose normal.   Eyes: EOM are normal.   Neck: Normal range of motion. Neck supple.   Cardiovascular: Normal rate, regular " rhythm, S1 normal, S2 normal and normal heart sounds.    Pulses:       Dorsalis pedis pulses are 2+ on the right side, and 2+ on the left side.   Pulmonary/Chest: Effort normal and breath sounds normal. No respiratory distress.   Musculoskeletal:        Right foot: There is tenderness. There is normal range of motion, no swelling and normal capillary refill.        Feet:    Ecchymosis dorsal aspect, left foot   Neurological: She is alert and oriented to person, place, and time.   Skin: Skin is warm and dry.   Nursing note and vitals reviewed.      Assessment:       1. Contusion of left foot, initial encounter        Plan:         Contusion of left foot, initial encounter  -     X-Ray Foot Complete Left; Future; Expected date: 08/08/2017      Imaging Results    None

## 2017-08-23 ENCOUNTER — PATIENT MESSAGE (OUTPATIENT)
Dept: INTERNAL MEDICINE | Facility: CLINIC | Age: 52
End: 2017-08-23

## 2017-08-24 ENCOUNTER — OFFICE VISIT (OUTPATIENT)
Dept: INTERNAL MEDICINE | Facility: CLINIC | Age: 52
End: 2017-08-24
Payer: COMMERCIAL

## 2017-08-24 VITALS
WEIGHT: 152.31 LBS | HEIGHT: 65 IN | DIASTOLIC BLOOD PRESSURE: 88 MMHG | RESPIRATION RATE: 16 BRPM | HEART RATE: 68 BPM | SYSTOLIC BLOOD PRESSURE: 118 MMHG | BODY MASS INDEX: 25.38 KG/M2 | TEMPERATURE: 99 F

## 2017-08-24 DIAGNOSIS — F41.1 GAD (GENERALIZED ANXIETY DISORDER): ICD-10-CM

## 2017-08-24 DIAGNOSIS — J01.90 ACUTE SINUSITIS, RECURRENCE NOT SPECIFIED, UNSPECIFIED LOCATION: Primary | ICD-10-CM

## 2017-08-24 PROCEDURE — 3074F SYST BP LT 130 MM HG: CPT | Mod: S$GLB,,, | Performed by: INTERNAL MEDICINE

## 2017-08-24 PROCEDURE — 3079F DIAST BP 80-89 MM HG: CPT | Mod: S$GLB,,, | Performed by: INTERNAL MEDICINE

## 2017-08-24 PROCEDURE — 3008F BODY MASS INDEX DOCD: CPT | Mod: S$GLB,,, | Performed by: INTERNAL MEDICINE

## 2017-08-24 PROCEDURE — 99999 PR PBB SHADOW E&M-EST. PATIENT-LVL III: CPT | Mod: PBBFAC,,, | Performed by: INTERNAL MEDICINE

## 2017-08-24 PROCEDURE — 99214 OFFICE O/P EST MOD 30 MIN: CPT | Mod: S$GLB,,, | Performed by: INTERNAL MEDICINE

## 2017-08-24 RX ORDER — MONTELUKAST SODIUM 10 MG/1
10 TABLET ORAL NIGHTLY
Qty: 30 TABLET | Refills: 0 | Status: SHIPPED | OUTPATIENT
Start: 2017-08-24 | End: 2017-09-20 | Stop reason: SDUPTHER

## 2017-08-24 RX ORDER — ALBUTEROL SULFATE 90 UG/1
1-2 AEROSOL, METERED RESPIRATORY (INHALATION) EVERY 6 HOURS PRN
Qty: 1 EACH | Refills: 11 | Status: SHIPPED | OUTPATIENT
Start: 2017-08-24 | End: 2019-06-28 | Stop reason: SDUPTHER

## 2017-08-24 RX ORDER — BUPROPION HYDROCHLORIDE 300 MG/1
300 TABLET ORAL DAILY
Qty: 30 TABLET | Refills: 11 | Status: SHIPPED | OUTPATIENT
Start: 2017-08-24 | End: 2018-08-29 | Stop reason: SDUPTHER

## 2017-08-24 RX ORDER — AZITHROMYCIN 250 MG/1
TABLET, FILM COATED ORAL
Qty: 6 TABLET | Refills: 0 | Status: SHIPPED | OUTPATIENT
Start: 2017-08-24 | End: 2018-02-22

## 2017-08-24 NOTE — PROGRESS NOTES
Subjective:       Patient ID: Twila Roman is a 52 y.o. female.    Chief Complaint: Sinusitis and Sore Throat    Patient is a 52 y.o.female who presents today for follow up. She complains of a sore throat, sinus pressure and nasal congestion. No fever or chills. Feels a tickle in the back of her throat. Some ear discomfort.     She would like to increase her wellbutrin dosage as well.  Review of Systems   Constitutional: Negative for appetite change, chills, diaphoresis, fatigue and fever.   HENT: Positive for congestion, postnasal drip, rhinorrhea and sinus pressure. Negative for dental problem, ear discharge, ear pain, hearing loss and sore throat.    Eyes: Negative for discharge, redness and itching.   Respiratory: Negative for cough, chest tightness, shortness of breath and wheezing.    Cardiovascular: Negative for chest pain, palpitations and leg swelling.   Gastrointestinal: Negative for abdominal pain, constipation, diarrhea, nausea and vomiting.   Endocrine: Negative for cold intolerance and heat intolerance.   Genitourinary: Negative for difficulty urinating, frequency, hematuria and urgency.   Musculoskeletal: Negative for arthralgias, back pain, gait problem, myalgias and neck pain.   Skin: Negative for color change and rash.   Neurological: Negative for dizziness, syncope and headaches.   Hematological: Negative for adenopathy.   Psychiatric/Behavioral: Negative for behavioral problems and sleep disturbance. The patient is not nervous/anxious.        Objective:      Physical Exam   Constitutional: She is oriented to person, place, and time. She appears well-developed and well-nourished. No distress.   HENT:   Head: Normocephalic and atraumatic.   Right Ear: Tympanic membrane and external ear normal.   Left Ear: Tympanic membrane and external ear normal.   Nose: No mucosal edema.   Mouth/Throat: Uvula is midline and mucous membranes are normal. No oropharyngeal exudate, posterior oropharyngeal edema,  posterior oropharyngeal erythema or tonsillar abscesses.   Eyes: Conjunctivae and EOM are normal. Pupils are equal, round, and reactive to light. Right eye exhibits no discharge. Left eye exhibits no discharge. No scleral icterus.   Neck: Normal range of motion. Neck supple. No JVD present. No thyromegaly present.   Cardiovascular: Normal rate, regular rhythm, normal heart sounds and intact distal pulses.  Exam reveals no gallop and no friction rub.    No murmur heard.  Pulmonary/Chest: Effort normal and breath sounds normal. No stridor. No respiratory distress. She has no wheezes. She has no rales. She exhibits no tenderness.   Abdominal: Soft. Bowel sounds are normal. She exhibits no distension. There is no tenderness. There is no rebound.   Musculoskeletal: Normal range of motion. She exhibits no edema or tenderness.   Lymphadenopathy:     She has no cervical adenopathy.   Neurological: She is alert and oriented to person, place, and time. No cranial nerve deficit.   Skin: Skin is warm. No rash noted. She is not diaphoretic. No erythema.   Psychiatric: She has a normal mood and affect. Her behavior is normal.   Nursing note and vitals reviewed.      Assessment and Plan:       1. Acute sinusitis, recurrence not specified, unspecified location  - CT Maxillofacial Without Contrast; Future  - azithromycin (ZITHROMAX Z-BUZZ) 250 MG tablet; Take 2 tabs the first day and 1 tab qd thereafter.  Dispense: 6 tablet; Refill: 0  - montelukast (SINGULAIR) 10 mg tablet; Take 1 tablet (10 mg total) by mouth every evening.  Dispense: 30 tablet; Refill: 0  - albuterol 90 mcg/actuation inhaler; Inhale 1-2 puffs into the lungs every 6 (six) hours as needed for Wheezing.  Dispense: 1 each; Refill: 11    2. WINSTON (generalized anxiety disorder)  - increase wellbutrin to 300 mg daily          No Follow-up on file.

## 2017-08-28 ENCOUNTER — PATIENT MESSAGE (OUTPATIENT)
Dept: INTERNAL MEDICINE | Facility: CLINIC | Age: 52
End: 2017-08-28

## 2017-08-28 ENCOUNTER — HOSPITAL ENCOUNTER (OUTPATIENT)
Dept: RADIOLOGY | Facility: HOSPITAL | Age: 52
Discharge: HOME | End: 2017-08-28
Attending: INTERNAL MEDICINE
Payer: COMMERCIAL

## 2017-08-28 DIAGNOSIS — J01.90 ACUTE SINUSITIS, RECURRENCE NOT SPECIFIED, UNSPECIFIED LOCATION: ICD-10-CM

## 2017-08-28 PROCEDURE — 70486 CT MAXILLOFACIAL W/O DYE: CPT | Mod: TC

## 2017-08-28 PROCEDURE — 70486 CT MAXILLOFACIAL W/O DYE: CPT | Mod: 26,,, | Performed by: RADIOLOGY

## 2017-09-11 RX ORDER — LORAZEPAM 1 MG/1
TABLET ORAL
Qty: 30 TABLET | Refills: 0 | Status: SHIPPED | OUTPATIENT
Start: 2017-09-11 | End: 2017-10-16 | Stop reason: SDUPTHER

## 2017-09-20 DIAGNOSIS — J01.90 ACUTE SINUSITIS, RECURRENCE NOT SPECIFIED, UNSPECIFIED LOCATION: ICD-10-CM

## 2017-09-20 RX ORDER — MONTELUKAST SODIUM 10 MG/1
TABLET ORAL
Qty: 30 TABLET | Refills: 6 | Status: SHIPPED | OUTPATIENT
Start: 2017-09-20 | End: 2018-04-29 | Stop reason: SDUPTHER

## 2017-10-16 RX ORDER — LORAZEPAM 1 MG/1
TABLET ORAL
Qty: 30 TABLET | Refills: 0 | Status: SHIPPED | OUTPATIENT
Start: 2017-10-16 | End: 2017-11-22 | Stop reason: SDUPTHER

## 2017-10-23 ENCOUNTER — PATIENT MESSAGE (OUTPATIENT)
Dept: INTERNAL MEDICINE | Facility: CLINIC | Age: 52
End: 2017-10-23

## 2017-10-24 ENCOUNTER — PATIENT MESSAGE (OUTPATIENT)
Dept: INTERNAL MEDICINE | Facility: CLINIC | Age: 52
End: 2017-10-24

## 2017-10-24 NOTE — TELEPHONE ENCOUNTER
Pt scheduled for nurse apt tomorrow. Please advise if all 3 can be given together or if need to schedule separately.

## 2017-10-25 ENCOUNTER — CLINICAL SUPPORT (OUTPATIENT)
Dept: INTERNAL MEDICINE | Facility: CLINIC | Age: 52
End: 2017-10-25
Payer: COMMERCIAL

## 2017-10-25 PROCEDURE — 90744 HEPB VACC 3 DOSE PED/ADOL IM: CPT | Mod: S$GLB,,, | Performed by: ALLERGY & IMMUNOLOGY

## 2017-10-25 PROCEDURE — 90472 IMMUNIZATION ADMIN EACH ADD: CPT | Mod: S$GLB,,, | Performed by: ALLERGY & IMMUNOLOGY

## 2017-10-25 PROCEDURE — 99999 PR PBB SHADOW E&M-EST. PATIENT-LVL I: CPT | Mod: PBBFAC,,,

## 2017-10-25 PROCEDURE — 90707 MMR VACCINE SC: CPT | Mod: S$GLB,,, | Performed by: ALLERGY & IMMUNOLOGY

## 2017-10-25 PROCEDURE — 90471 IMMUNIZATION ADMIN: CPT | Mod: S$GLB,,, | Performed by: ALLERGY & IMMUNOLOGY

## 2017-10-25 NOTE — PROGRESS NOTES
Pt here for Hep B and MMR vaccines. Injections given, one in each deltoid. Pt advised to wait in lobby for 15 minutes to monitor for a reaction. 1 month apt made for 2nd injection of both. Pt received TD in 2015, not due for another.

## 2017-11-22 RX ORDER — LORAZEPAM 1 MG/1
TABLET ORAL
Qty: 30 TABLET | Refills: 0 | Status: SHIPPED | OUTPATIENT
Start: 2017-11-22 | End: 2017-12-22 | Stop reason: SDUPTHER

## 2017-11-28 ENCOUNTER — CLINICAL SUPPORT (OUTPATIENT)
Dept: INTERNAL MEDICINE | Facility: CLINIC | Age: 52
End: 2017-11-28
Payer: COMMERCIAL

## 2017-11-28 PROCEDURE — 90471 IMMUNIZATION ADMIN: CPT | Mod: S$GLB,,, | Performed by: INTERNAL MEDICINE

## 2017-11-28 PROCEDURE — 99999 PR PBB SHADOW E&M-EST. PATIENT-LVL I: CPT | Mod: PBBFAC,,,

## 2017-11-28 PROCEDURE — 90707 MMR VACCINE SC: CPT | Mod: S$GLB,,, | Performed by: INTERNAL MEDICINE

## 2017-11-28 PROCEDURE — 90744 HEPB VACC 3 DOSE PED/ADOL IM: CPT | Mod: S$GLB,,, | Performed by: INTERNAL MEDICINE

## 2017-11-28 PROCEDURE — 90472 IMMUNIZATION ADMIN EACH ADD: CPT | Mod: S$GLB,,, | Performed by: INTERNAL MEDICINE

## 2017-11-28 NOTE — PROGRESS NOTES
Pt here today for Hep B #2 and MMR #2. Pt tolerated well. Pt waited 15 minutes to monitor for a reaction.

## 2017-12-07 ENCOUNTER — CLINICAL SUPPORT (OUTPATIENT)
Dept: INTERNAL MEDICINE | Facility: CLINIC | Age: 52
End: 2017-12-07
Payer: COMMERCIAL

## 2017-12-07 PROCEDURE — 90715 TDAP VACCINE 7 YRS/> IM: CPT | Mod: S$GLB,,, | Performed by: INTERNAL MEDICINE

## 2017-12-07 PROCEDURE — 90472 IMMUNIZATION ADMIN EACH ADD: CPT | Mod: S$GLB,,, | Performed by: INTERNAL MEDICINE

## 2017-12-07 PROCEDURE — 90471 IMMUNIZATION ADMIN: CPT | Mod: S$GLB,,, | Performed by: INTERNAL MEDICINE

## 2017-12-07 PROCEDURE — 90686 IIV4 VACC NO PRSV 0.5 ML IM: CPT | Mod: S$GLB,,, | Performed by: INTERNAL MEDICINE

## 2017-12-07 NOTE — PROGRESS NOTES
Pt here today for tdap and flu injection. Injections given, pt tolerated well. Pt advised to wait 15 minutes to monitor for a reaction.

## 2017-12-22 RX ORDER — LORAZEPAM 1 MG/1
TABLET ORAL
Qty: 30 TABLET | Refills: 0 | Status: SHIPPED | OUTPATIENT
Start: 2017-12-22 | End: 2018-02-01 | Stop reason: SDUPTHER

## 2018-01-08 ENCOUNTER — PATIENT MESSAGE (OUTPATIENT)
Dept: INTERNAL MEDICINE | Facility: CLINIC | Age: 53
End: 2018-01-08

## 2018-02-01 RX ORDER — LORAZEPAM 1 MG/1
TABLET ORAL
Qty: 30 TABLET | Refills: 0 | Status: SHIPPED | OUTPATIENT
Start: 2018-02-01 | End: 2018-03-08 | Stop reason: SDUPTHER

## 2018-02-22 ENCOUNTER — OFFICE VISIT (OUTPATIENT)
Dept: OBSTETRICS AND GYNECOLOGY | Facility: CLINIC | Age: 53
End: 2018-02-22
Attending: OBSTETRICS & GYNECOLOGY
Payer: COMMERCIAL

## 2018-02-22 VITALS
WEIGHT: 148.56 LBS | HEIGHT: 65 IN | BODY MASS INDEX: 24.75 KG/M2 | SYSTOLIC BLOOD PRESSURE: 110 MMHG | DIASTOLIC BLOOD PRESSURE: 60 MMHG

## 2018-02-22 DIAGNOSIS — Z12.39 BREAST CANCER SCREENING: ICD-10-CM

## 2018-02-22 DIAGNOSIS — Z80.41 FAMILY HISTORY OF OVARIAN CANCER: ICD-10-CM

## 2018-02-22 DIAGNOSIS — Z01.419 WELL FEMALE EXAM WITH ROUTINE GYNECOLOGICAL EXAM: Primary | ICD-10-CM

## 2018-02-22 PROCEDURE — 88175 CYTOPATH C/V AUTO FLUID REDO: CPT

## 2018-02-22 PROCEDURE — 99999 PR PBB SHADOW E&M-EST. PATIENT-LVL IV: CPT | Mod: PBBFAC,,, | Performed by: OBSTETRICS & GYNECOLOGY

## 2018-02-22 PROCEDURE — 99386 PREV VISIT NEW AGE 40-64: CPT | Mod: S$GLB,,, | Performed by: OBSTETRICS & GYNECOLOGY

## 2018-02-22 RX ORDER — CITALOPRAM 10 MG/1
TABLET ORAL
Refills: 11 | COMMUNITY
Start: 2018-02-02 | End: 2019-06-28 | Stop reason: SDUPTHER

## 2018-02-23 NOTE — PROGRESS NOTES
SUBJECTIVE:   52 y.o. female   for routine gyn exam. Patient's last menstrual period was 2015 (approximate)..  She has no unusual complaints.  No PMB.  No HRT.         Past Medical History:   Diagnosis Date    Anxiety     Family history of ovarian cancer     Hypertension      Past Surgical History:   Procedure Laterality Date    BACK SURGERY      spinal fusion    TUMOR REMOVAL      right hand     Social History     Social History    Marital status: Single     Spouse name: N/A    Number of children: N/A    Years of education: N/A     Occupational History     [employer]      Social History Main Topics    Smoking status: Never Smoker    Smokeless tobacco: Never Used    Alcohol use 3.0 oz/week     5 Glasses of wine per week      Comment: one a day- red wine    Drug use: No    Sexual activity: Yes     Partners: Male      Comment: Boyfriend      Other Topics Concern    Not on file     Social History Narrative    No narrative on file     Family History   Problem Relation Age of Onset    Ovarian cancer Mother 66    Heart disease Father      double bypass    Macular degeneration Father     No Known Problems Brother     No Known Problems Brother     Breast cancer Paternal Cousin 20    Colon cancer Neg Hx      OB History    Para Term  AB Living   0 0 0 0 0 0   SAB TAB Ectopic Multiple Live Births   0 0 0 0                 Current Outpatient Prescriptions   Medication Sig Dispense Refill    albuterol 90 mcg/actuation inhaler Inhale 1-2 puffs into the lungs every 6 (six) hours as needed for Wheezing. 1 each 11    albuterol 90 mcg/actuation inhaler Inhale 1-2 puffs into the lungs every 6 (six) hours as needed for Wheezing. 1 each 11    biotin 5,000 mcg TbDL Take 1 capsule by mouth once daily.      buPROPion (WELLBUTRIN XL) 300 MG 24 hr tablet Take 1 tablet (300 mg total) by mouth once daily. 30 tablet 11    cholecalciferol, vitamin D3, (VITAMIN D3) 2,000  unit Tab Take 1 tablet by mouth once daily.      citalopram (CELEXA) 10 MG tablet   11    fluticasone (FLONASE) 50 mcg/actuation nasal spray 2 sprays by Each Nare route once daily. 1 Bottle 6    inulin-sorbitol (FIBER) 2 gram Chew Take 2 each by mouth once daily.      LORazepam (ATIVAN) 1 MG tablet TAKE 1 TABLET BY MOUTH EVERY NIGHT AT BEDTIME AS NEEDED FOR ANXIETY 30 tablet 0    metoprolol succinate (TOPROL-XL) 25 MG 24 hr tablet TAKE 1 TABLET(25 MG) BY MOUTH EVERY DAY 90 tablet 3    montelukast (SINGULAIR) 10 mg tablet TAKE 1 TABLET(10 MG) BY MOUTH EVERY EVENING 30 tablet 6    multivitamin (THERAGRAN) per tablet Take 1 tablet by mouth once daily.       No current facility-administered medications for this visit.      Allergies: Patient has no known allergies.     ROS:  Constitutional: no weight loss, weight gain, fever, fatigue  Eyes:  No vision changes, glasses/contacts  ENT/Mouth: No ulcers, sinus problems, ears ringing, headache  Cardiovascular: No inability to lie flat, chest pain, exercise intolerance, swelling, heart palpitations  Respiratory: No wheezing, coughing blood, shortness of breath, or cough  Gastrointestinal: No diarrhea, bloody stool, nausea/vomiting, constipation, gas, hemorrhoids  Genitourinary: No blood in urine, painful urination, urgency of urination, frequency of urination, incomplete emptying, incontinence, abnormal bleeding, painful periods, heavy periods, vaginal discharge, vaginal odor, painful intercourse, sexual problems, bleeding after intercourse.  Musculoskeletal: No muscle weakness  Skin/Breast: No painful breasts, nipple discharge, masses, rash, ulcers  Neurological: No passing out, seizures, numbness, headache  Endocrine: No diabetes, hypothyroid, hyperthyroid, hot flashes, hair loss, abnormal hair growth, ance  Psychiatric: No depression, crying  Hematologic: No bruises, bleeding, swollen lymph nodes, anemia.      OBJECTIVE:   The patient appears well, alert, oriented x  "3, in no distress.  /60   Ht 5' 5" (1.651 m)   Wt 67.4 kg (148 lb 9.4 oz)   LMP 04/19/2015 (Approximate)   BMI 24.73 kg/m²   NECK: no thyromegaly, trachea midline  SKIN: no acne, striae, hirsutism  CHEST: CTAB  CV: RRR  BREAST EXAM: breasts appear normal, no suspicious masses, no skin or nipple changes or axillary nodes  ABDOMEN: no hernias, masses, or hepatosplenomegaly  GENITALIA: normal external genitalia, no erythema, no discharge  URETHRA: normal urethra, normal urethral meatus  VAGINA: Normal  CERVIX: no lesions or cervical motion tenderness  UTERUS: normal size, contour, position, consistency, mobility, non-tender  ADNEXA: no mass, fullness, tenderness      ASSESSMENT:   1. Well female exam with routine gynecological exam  Liquid-based pap smear, screening   2. Family history of ovarian cancer  Ambulatory Referral to [facility] Survivorship   3. Breast cancer screening  Mammo Digital Screening Bilat with Tomosynthesis_CAD       PLAN:   Orders Placed This Encounter    Mammo Digital Screening Bilat with Tomosynthesis_CAD    Ambulatory Referral to [facility]'s Wellness and Survivorship    Liquid-based pap smear, screening     Return to clinic in 1 year  "

## 2018-03-08 RX ORDER — LORAZEPAM 1 MG/1
TABLET ORAL
Qty: 30 TABLET | Refills: 0 | Status: SHIPPED | OUTPATIENT
Start: 2018-03-08 | End: 2018-04-24 | Stop reason: SDUPTHER

## 2018-03-19 ENCOUNTER — HOSPITAL ENCOUNTER (OUTPATIENT)
Dept: RADIOLOGY | Facility: HOSPITAL | Age: 53
Discharge: HOME | End: 2018-03-19
Attending: OBSTETRICS & GYNECOLOGY
Payer: COMMERCIAL

## 2018-03-19 DIAGNOSIS — Z12.39 BREAST CANCER SCREENING: ICD-10-CM

## 2018-03-19 PROCEDURE — 77063 BREAST TOMOSYNTHESIS BI: CPT | Mod: 26,,, | Performed by: RADIOLOGY

## 2018-03-19 PROCEDURE — 77067 SCR MAMMO BI INCL CAD: CPT | Mod: TC,PO

## 2018-03-19 PROCEDURE — 77067 SCR MAMMO BI INCL CAD: CPT | Mod: 26,,, | Performed by: RADIOLOGY

## 2018-04-04 ENCOUNTER — PATIENT MESSAGE (OUTPATIENT)
Dept: INTERNAL MEDICINE | Facility: CLINIC | Age: 53
End: 2018-04-04

## 2018-04-04 DIAGNOSIS — M25.569 KNEE PAIN, UNSPECIFIED CHRONICITY, UNSPECIFIED LATERALITY: Primary | ICD-10-CM

## 2018-04-09 ENCOUNTER — PATIENT MESSAGE (OUTPATIENT)
Dept: INTERNAL MEDICINE | Facility: CLINIC | Age: 53
End: 2018-04-09

## 2018-04-12 RX ORDER — LORAZEPAM 1 MG/1
TABLET ORAL
Qty: 30 TABLET | Refills: 0 | OUTPATIENT
Start: 2018-04-12

## 2018-04-17 ENCOUNTER — PATIENT MESSAGE (OUTPATIENT)
Dept: INTERNAL MEDICINE | Facility: CLINIC | Age: 53
End: 2018-04-17

## 2018-04-19 ENCOUNTER — OFFICE VISIT (OUTPATIENT)
Dept: ORTHOPEDICS | Facility: CLINIC | Age: 53
End: 2018-04-19
Payer: COMMERCIAL

## 2018-04-19 ENCOUNTER — HOSPITAL ENCOUNTER (OUTPATIENT)
Dept: RADIOLOGY | Facility: HOSPITAL | Age: 53
Discharge: HOME | End: 2018-04-19
Attending: ORTHOPAEDIC SURGERY
Payer: COMMERCIAL

## 2018-04-19 VITALS — WEIGHT: 148 LBS | BODY MASS INDEX: 24.66 KG/M2 | HEIGHT: 65 IN

## 2018-04-19 DIAGNOSIS — M23.301 LATERAL MENISCUS DERANGEMENT, LEFT: Primary | ICD-10-CM

## 2018-04-19 DIAGNOSIS — M25.562 LEFT KNEE PAIN, UNSPECIFIED CHRONICITY: ICD-10-CM

## 2018-04-19 DIAGNOSIS — M25.562 LEFT KNEE PAIN, UNSPECIFIED CHRONICITY: Primary | ICD-10-CM

## 2018-04-19 PROCEDURE — 73560 X-RAY EXAM OF KNEE 1 OR 2: CPT | Mod: TC,PO,RT

## 2018-04-19 PROCEDURE — 99999 PR PBB SHADOW E&M-EST. PATIENT-LVL III: CPT | Mod: PBBFAC,,, | Performed by: ORTHOPAEDIC SURGERY

## 2018-04-19 PROCEDURE — 73562 X-RAY EXAM OF KNEE 3: CPT | Mod: 26,LT,, | Performed by: RADIOLOGY

## 2018-04-19 PROCEDURE — 73560 X-RAY EXAM OF KNEE 1 OR 2: CPT | Mod: 26,59,RT, | Performed by: RADIOLOGY

## 2018-04-19 PROCEDURE — 99203 OFFICE O/P NEW LOW 30 MIN: CPT | Mod: S$GLB,,, | Performed by: ORTHOPAEDIC SURGERY

## 2018-04-19 RX ORDER — NAPROXEN 500 MG/1
500 TABLET ORAL 2 TIMES DAILY WITH MEALS
Qty: 60 TABLET | Refills: 1 | Status: SHIPPED | OUTPATIENT
Start: 2018-04-19 | End: 2018-07-06 | Stop reason: SDUPTHER

## 2018-04-19 NOTE — LETTER
April 19, 2018      Shayy Villegas MD  [address] [facility] [address]           [facility]  [address] [facility] [address]  Phone: [phone]          Patient: Twila Roman   MR Number: [id number]   YOB: 1965   Date of Visit: 4/19/2018       Dear Dr. Shayy Villegas:    Thank you for referring Twila Roman to me for evaluation. Attached you will find relevant portions of my assessment and plan of care.    If you have questions, please do not hesitate to call me. I look forward to following Twila Roman along with you.    Sincerely,    Handy Elkins MD    Enclosure  CC:  No Recipients    If you would like to receive this communication electronically, please contact [email or web address] ([email or web address] or [phone] to request more information on [employer] Link access.    For providers and/or their staff who would like to refer a patient to [facility], please contact us through our one-stop-shop provider referral line, [facility] , at [phone].    If you feel you have received this communication in error or would no longer like to receive these types of communications, please e-mail [email or web address]

## 2018-04-19 NOTE — PROGRESS NOTES
Subjective:      Patient ID: Twila Roman is a 52 y.o. female.    Chief Complaint: Pain of the Left Knee    HPI     They have experienced problems with their left knee over the past 6 weeks. The patient reports relevant history of injury/aggravation.Slip and fall of curb.  Pain is located laterally Associated symptoms include NA. They have been treated with NA.   Symptoms have recently worsened. Ambulation reportedly has not been impaired. Self care ADLs are not painful.     Review of Systems   Constitution: Negative for fever and weight loss.   HENT: Negative for congestion.    Eyes: Negative for visual disturbance.   Cardiovascular: Negative for chest pain.   Respiratory: Negative for shortness of breath.    Hematologic/Lymphatic: Negative for bleeding problem. Does not bruise/bleed easily.   Skin: Negative for poor wound healing.   Musculoskeletal: Positive for joint pain.   Gastrointestinal: Negative for abdominal pain.   Genitourinary: Negative for dysuria.   Neurological: Negative for seizures.   Psychiatric/Behavioral: Negative for altered mental status.   Allergic/Immunologic: Negative for persistent infections.         Objective:            Ortho/SPM Exam        There were no vitals filed for this visit.        Left Knee      There were no vitals filed for this visit.    The patient is not in acute distress.   Body habitus is normal.   The patient walks without a limp.  Resisted SLR negative.   The skin over the knee is intact.  Knee effusion 0  Tendernes is located presentlateral  Range of motion- Flexion 145 deg, Extension 0 deg,   Ligament exam:   MCL intact   Lachman intact              Post sag intact    LCL intact  Patellar apprehension negative.  Popliteal cyst negative  Patellar crepitation absent.  Flexion/pinch positive.  Pulses DP present, PT present.  Motor normal 5/5 strength in all tested muscle groups.   Sensory normal.    I reviewed the relevant radiographic images for the patient's  condition: Recent films show very slight medial narrowing.  No acute abnormalities        Assessment:       No diagnosis found.       Probable low-grade meniscus tear  Plan:       There are no diagnoses linked to this encounter.    I explained my diagnostic impression and the reasoning behind it in detail, using layman's terms.      Natural history explained  Naprosyn with usual precautions  Physiotherapy  Activity modification explained

## 2018-04-21 ENCOUNTER — OFFICE VISIT (OUTPATIENT)
Dept: INTERNAL MEDICINE | Facility: CLINIC | Age: 53
End: 2018-04-21
Payer: COMMERCIAL

## 2018-04-21 VITALS
SYSTOLIC BLOOD PRESSURE: 122 MMHG | BODY MASS INDEX: 24.87 KG/M2 | OXYGEN SATURATION: 98 % | HEIGHT: 65 IN | RESPIRATION RATE: 18 BRPM | WEIGHT: 149.25 LBS | TEMPERATURE: 98 F | DIASTOLIC BLOOD PRESSURE: 80 MMHG | HEART RATE: 63 BPM

## 2018-04-21 DIAGNOSIS — B96.89 ACUTE BACTERIAL BRONCHITIS: ICD-10-CM

## 2018-04-21 DIAGNOSIS — J20.8 ACUTE BACTERIAL BRONCHITIS: ICD-10-CM

## 2018-04-21 DIAGNOSIS — J01.11 ACUTE RECURRENT FRONTAL SINUSITIS: Primary | ICD-10-CM

## 2018-04-21 DIAGNOSIS — J45.21 MILD INTERMITTENT ASTHMATIC BRONCHITIS WITH ACUTE EXACERBATION: ICD-10-CM

## 2018-04-21 PROCEDURE — 99213 OFFICE O/P EST LOW 20 MIN: CPT | Mod: 25,S$GLB,, | Performed by: INTERNAL MEDICINE

## 2018-04-21 PROCEDURE — 96372 THER/PROPH/DIAG INJ SC/IM: CPT | Mod: S$GLB,,, | Performed by: INTERNAL MEDICINE

## 2018-04-21 PROCEDURE — 99999 PR PBB SHADOW E&M-EST. PATIENT-LVL III: CPT | Mod: PBBFAC,,, | Performed by: INTERNAL MEDICINE

## 2018-04-21 PROCEDURE — 3074F SYST BP LT 130 MM HG: CPT | Mod: CPTII,S$GLB,, | Performed by: INTERNAL MEDICINE

## 2018-04-21 PROCEDURE — 3079F DIAST BP 80-89 MM HG: CPT | Mod: CPTII,S$GLB,, | Performed by: INTERNAL MEDICINE

## 2018-04-21 RX ORDER — TRIAMCINOLONE ACETONIDE 40 MG/ML
40 INJECTION, SUSPENSION INTRA-ARTICULAR; INTRAMUSCULAR ONCE
Status: COMPLETED | OUTPATIENT
Start: 2018-04-21 | End: 2018-04-21

## 2018-04-21 RX ORDER — AZITHROMYCIN 250 MG/1
TABLET, FILM COATED ORAL
Qty: 6 TABLET | Refills: 0 | Status: SHIPPED | OUTPATIENT
Start: 2018-04-21 | End: 2018-07-31

## 2018-04-21 RX ADMIN — TRIAMCINOLONE ACETONIDE 40 MG: 40 INJECTION, SUSPENSION INTRA-ARTICULAR; INTRAMUSCULAR at 09:04

## 2018-04-21 NOTE — PROGRESS NOTES
HISTORY OF PRESENT ILLNESS:  This 52-year-old white female patient comes in with   sinus congestion, drainage, sore throat and coughing with some wheezing.  She   has had generalized achiness.  She has had a change in her voice.  The patient   has developed some chest discomfort from her frequent and intense coughing.    PHYSICAL EXAMINATION:  VITAL SIGNS:  Normal including temperature.  SKIN:  Fair and healthy.  HEENT:  Clear.  NECK:  Shows no stiffness or adenopathy.  CHEST:  Clear except for a rare wheeze with deep inspiration.  HEART:  There are no rales and no dullness.    IMPRESSION AND PLAN:  1.  Recurrent sinusitis.  2.  Asthmatic bronchitis.  3.  Sore throat secondary to the above.  The patient was given a Z-BUZZ, Kenalog   40 mg IM shot at her request, told to drink plenty of fluids, flush with nasal   saline, Mucinex and she did not want anything additional for coughing.  She   already had an albuterol inhaler.      JDC/IN  dd: 04/22/2018 22:13:46 (CDT)  td: 04/23/2018 18:42:44 (CDT)  Doc ID   #[id number]  Job ID #[id number]    CC:     Subjective:       Patient ID: Twila Roman is a 52 y.o. female.    Chief Complaint: Sinus Problem; Cough; Sore Throat; and Chest Congestion  Dictation #2  MRN:[id number]  CSN:[id number]  Dict [id number]  HPI  Review of Systems   Constitutional: Negative.    HENT: Positive for congestion, postnasal drip, sinus pressure, sore throat and voice change. Negative for hearing loss and sneezing.    Eyes: Negative for visual disturbance.   Respiratory: Positive for cough and wheezing. Negative for chest tightness and shortness of breath.    Cardiovascular: Negative.  Negative for chest pain, palpitations and leg swelling.   Gastrointestinal: Negative.    Genitourinary: Negative for difficulty urinating, dysuria, flank pain, frequency, hematuria, menstrual problem, pelvic pain, urgency, vaginal bleeding and vaginal discharge.   Musculoskeletal: Positive for myalgias. Negative for neck pain and  neck stiffness.   Skin: Negative.    Neurological: Negative.  Negative for dizziness, seizures, light-headedness, numbness and headaches.   Psychiatric/Behavioral: Negative for agitation, behavioral problems, confusion and sleep disturbance. The patient is not nervous/anxious.        Objective:      Physical Exam   Constitutional: She is oriented to person, place, and time. She appears well-developed and well-nourished.   Eyes: EOM are normal. Pupils are equal, round, and reactive to light.   Neck: Normal range of motion. Neck supple. No JVD present. No thyromegaly present.   Cardiovascular: Normal rate, regular rhythm, normal heart sounds and intact distal pulses.  Exam reveals no gallop.    No murmur heard.  Pulmonary/Chest: She has wheezes. She has no rales.   Abdominal: Soft. Bowel sounds are normal. She exhibits no mass. There is no tenderness.   Musculoskeletal: Normal range of motion.   Lymphadenopathy:     She has no cervical adenopathy.   Neurological: She is alert and oriented to person, place, and time. She has normal reflexes. No cranial nerve deficit.   Skin: No rash noted. No erythema.   Psychiatric: She has a normal mood and affect. Judgment normal.       Assessment:       1. Acute recurrent frontal sinusitis    2. Mild intermittent asthmatic bronchitis with acute exacerbation    3. Acute bacterial bronchitis        Plan:

## 2018-04-24 ENCOUNTER — OFFICE VISIT (OUTPATIENT)
Dept: INTERNAL MEDICINE | Facility: CLINIC | Age: 53
End: 2018-04-24
Payer: COMMERCIAL

## 2018-04-24 VITALS
DIASTOLIC BLOOD PRESSURE: 78 MMHG | WEIGHT: 148.56 LBS | BODY MASS INDEX: 24.75 KG/M2 | OXYGEN SATURATION: 98 % | HEART RATE: 75 BPM | HEIGHT: 65 IN | RESPIRATION RATE: 16 BRPM | TEMPERATURE: 99 F | SYSTOLIC BLOOD PRESSURE: 118 MMHG

## 2018-04-24 DIAGNOSIS — F41.9 ANXIETY: ICD-10-CM

## 2018-04-24 DIAGNOSIS — I15.9 SECONDARY HYPERTENSION: ICD-10-CM

## 2018-04-24 DIAGNOSIS — Z12.11 COLON CANCER SCREENING: ICD-10-CM

## 2018-04-24 DIAGNOSIS — Z09 FOLLOW-UP EXAM, 3-6 MONTHS SINCE PREVIOUS EXAM: Primary | ICD-10-CM

## 2018-04-24 DIAGNOSIS — J01.40 ACUTE PANSINUSITIS, RECURRENCE NOT SPECIFIED: ICD-10-CM

## 2018-04-24 PROCEDURE — 3074F SYST BP LT 130 MM HG: CPT | Mod: CPTII,S$GLB,, | Performed by: FAMILY MEDICINE

## 2018-04-24 PROCEDURE — 99999 PR PBB SHADOW E&M-EST. PATIENT-LVL III: CPT | Mod: PBBFAC,,, | Performed by: FAMILY MEDICINE

## 2018-04-24 PROCEDURE — 99214 OFFICE O/P EST MOD 30 MIN: CPT | Mod: S$GLB,,, | Performed by: FAMILY MEDICINE

## 2018-04-24 PROCEDURE — 3078F DIAST BP <80 MM HG: CPT | Mod: CPTII,S$GLB,, | Performed by: FAMILY MEDICINE

## 2018-04-24 RX ORDER — BENZONATATE 200 MG/1
200 CAPSULE ORAL 3 TIMES DAILY PRN
Qty: 30 CAPSULE | Refills: 0 | Status: SHIPPED | OUTPATIENT
Start: 2018-04-24 | End: 2018-05-04

## 2018-04-24 RX ORDER — LORAZEPAM 1 MG/1
1 TABLET ORAL NIGHTLY PRN
Qty: 30 TABLET | Refills: 0 | Status: SHIPPED | OUTPATIENT
Start: 2018-04-24 | End: 2018-05-31 | Stop reason: SDUPTHER

## 2018-04-24 RX ORDER — PROMETHAZINE HYDROCHLORIDE AND CODEINE PHOSPHATE 6.25; 1 MG/5ML; MG/5ML
5 SOLUTION ORAL NIGHTLY PRN
Qty: 240 ML | Refills: 0 | Status: SHIPPED | OUTPATIENT
Start: 2018-04-24 | End: 2018-05-04

## 2018-04-24 NOTE — PROGRESS NOTES
Subjective:       Patient ID: Twila Roman is a 52 y.o. female.    Chief Complaint: Follow-up (6 month )    HPI 52-year-old white female patient of Dr. Sanon presents to clinic today for follow-up of her general medical conditions also secondary to concerns of continued cough, nasal congestion, and postnasal drip.  She continues to have well-controlled hypertension on metoprolol 25 mg daily.  Anxiety continues to be well-controlled on Wellbutrin 300 mg daily and as needed Ativan.  She has currently been seen for sinusitis and has completed azithromycin.  She is also using Flonase nasal spray and Singulair with mild relief.  She has been using over-the-counter Mucinex but feels that this is making her cough worse.  Review of Systems   Constitutional: Positive for fatigue. Negative for appetite change, chills and fever.   HENT: Positive for congestion, postnasal drip and rhinorrhea. Negative for ear pain, hearing loss, sinus pressure, sore throat and tinnitus.    Eyes: Negative for redness, itching and visual disturbance.   Respiratory: Positive for cough. Negative for chest tightness and shortness of breath.    Cardiovascular: Negative for chest pain and palpitations.   Gastrointestinal: Negative for abdominal pain, constipation, diarrhea, nausea and vomiting.   Genitourinary: Negative for decreased urine volume, difficulty urinating, dysuria, frequency, hematuria and urgency.   Musculoskeletal: Positive for myalgias. Negative for back pain, neck pain and neck stiffness.   Skin: Negative for rash.   Neurological: Negative for dizziness, light-headedness and headaches.   Psychiatric/Behavioral: Negative.        Objective:      Physical Exam   Constitutional: She is oriented to person, place, and time. She appears well-developed and well-nourished. No distress.   HENT:   Head: Normocephalic and atraumatic.   Right Ear: External ear normal. A middle ear effusion is present.   Left Ear: External ear normal. A middle  ear effusion is present.   Nose: Mucosal edema and rhinorrhea present.   Mouth/Throat: Oropharynx is clear and moist. No oropharyngeal exudate.   Eyes: Conjunctivae and EOM are normal. Pupils are equal, round, and reactive to light. Right eye exhibits no discharge. Left eye exhibits no discharge. No scleral icterus.   Neck: Normal range of motion. Neck supple. No JVD present. No tracheal deviation present. No thyromegaly present.   Cardiovascular: Normal rate, regular rhythm, normal heart sounds and intact distal pulses.  Exam reveals no gallop and no friction rub.    No murmur heard.  Pulmonary/Chest: Effort normal and breath sounds normal. No stridor. No respiratory distress. She has no wheezes. She has no rales.   Abdominal: Soft. Bowel sounds are normal. She exhibits no distension and no mass. There is no tenderness. There is no rebound and no guarding.   Musculoskeletal: Normal range of motion. She exhibits no edema or tenderness.   Lymphadenopathy:     She has no cervical adenopathy.   Neurological: She is alert and oriented to person, place, and time.   Skin: Skin is warm and dry. No rash noted. She is not diaphoretic. No erythema. No pallor.   Psychiatric: She has a normal mood and affect. Her behavior is normal. Judgment and thought content normal.   Nursing note and vitals reviewed.      Assessment:       1. Follow-up exam, 3-6 months since previous exam    2. Colon cancer screening    3. Anxiety    4. Secondary hypertension    5. Acute pansinusitis, recurrence not specified        Plan:       1.  Continue metoprolol 25 mg daily.  Hypertension is welcome.  2.  Continue Wellbutrin 300 mg daily and refill of Ativan has been given.  3.  Continue Flonase nasal spray and Singulair as prescribed and start Tessalon Perles 200 mg by mouth 3 times a day when necessary cough and Phenergan with codeine cough syrup at that time.  4.  Screening colonoscopy.  5.  Return to clinic as needed or as previous he scheduled  for physical exam with PCP.

## 2018-04-29 DIAGNOSIS — J01.90 ACUTE SINUSITIS, RECURRENCE NOT SPECIFIED, UNSPECIFIED LOCATION: ICD-10-CM

## 2018-04-29 RX ORDER — MONTELUKAST SODIUM 10 MG/1
TABLET ORAL
Qty: 30 TABLET | Refills: 0 | Status: SHIPPED | OUTPATIENT
Start: 2018-04-29 | End: 2018-05-31 | Stop reason: SDUPTHER

## 2018-05-07 ENCOUNTER — CLINICAL SUPPORT (OUTPATIENT)
Dept: REHABILITATION | Facility: HOSPITAL | Age: 53
End: 2018-05-07
Attending: ORTHOPAEDIC SURGERY
Payer: COMMERCIAL

## 2018-05-07 DIAGNOSIS — M25.669 DECREASED RANGE OF MOTION (ROM) OF KNEE: ICD-10-CM

## 2018-05-07 DIAGNOSIS — R29.898 DECREASED STRENGTH OF LOWER EXTREMITY: ICD-10-CM

## 2018-05-07 PROCEDURE — 97161 PT EVAL LOW COMPLEX 20 MIN: CPT | Mod: PO

## 2018-05-07 PROCEDURE — 97110 THERAPEUTIC EXERCISES: CPT | Mod: PO

## 2018-05-07 NOTE — PATIENT INSTRUCTIONS
Strengthening: Quadriceps Set    Tighten muscles on top of thighs by pushing knees down into surface. Hold 5 seconds.  Repeat 10 times per set each side. Do 2 sets per session. Do 2 sessions per day.      Heel Slides    With towel around left heel, gently pull knee up with towel until stretch is felt. Hold 30 seconds.  Repeat 2 times per set each leg. Do 2 sets per session. Do 2 sessions per day.

## 2018-05-07 NOTE — PLAN OF CARE
OUTPATIENT PHYSICAL THERAPY  PHYSICAL THERAPY EVALUATION    Name: Twila Roman  Clinic Number: [id number]    Evaluation Date: 05/07/2018  Visit #: 1 / 20  Authorization period Expiration: 12/31/2018  Plan of Care Expiration: 07/07/2018  Precautions: Standard     Diagnosis:   Encounter Diagnoses   Name Primary?    Decreased range of motion (ROM) of knee     Decreased strength of lower extremity      Physician: Handy Elkins MD  Treatment Orders: PT Eval and Treat  Past Medical History:   Diagnosis Date    Anxiety     Family history of ovarian cancer     Hypertension      Current Outpatient Prescriptions   Medication Sig    albuterol 90 mcg/actuation inhaler Inhale 1-2 puffs into the lungs every 6 (six) hours as needed for Wheezing.    albuterol 90 mcg/actuation inhaler Inhale 1-2 puffs into the lungs every 6 (six) hours as needed for Wheezing.    azithromycin (Z-BUZZ) 250 MG tablet Take 2 tablets by mouth on day 1; Take 1 tablet by mouth on days 2-5    biotin 5,000 mcg TbDL Take 1 capsule by mouth once daily.    buPROPion (WELLBUTRIN XL) 300 MG 24 hr tablet Take 1 tablet (300 mg total) by mouth once daily.    cholecalciferol, vitamin D3, (VITAMIN D3) 2,000 unit Tab Take 1 tablet by mouth once daily.    citalopram (CELEXA) 10 MG tablet     fluticasone (FLONASE) 50 mcg/actuation nasal spray 2 sprays by Each Nare route once daily.    inulin-sorbitol (FIBER) 2 gram Chew Take 2 each by mouth once daily.    LORazepam (ATIVAN) 1 MG tablet Take 1 tablet (1 mg total) by mouth nightly as needed.    metoprolol succinate (TOPROL-XL) 25 MG 24 hr tablet TAKE 1 TABLET(25 MG) BY MOUTH EVERY DAY    montelukast (SINGULAIR) 10 mg tablet TAKE 1 TABLET(10 MG) BY MOUTH EVERY EVENING    multivitamin (THERAGRAN) per tablet Take 1 tablet by mouth once daily.    naproxen (NAPROSYN) 500 MG tablet Take 1 tablet (500 mg total) by mouth 2 (two) times daily with meals.     No current facility-administered medications for  "this visit.      Review of patient's allergies indicates:  No Known Allergies    History   Prior Therapy: Prior PT follow back surgery   Social History: Lives in [facility] with boyfriend, 2 dogs   Previous functional status: Prior to incident, pt independent in all ADLs and physical activity   Current functional status: Difficulty bending over lifting objects from floor   Work: [employer]     Subjective   History of Present Illness: Pt presents to [facility] with complaints of L knee pain after a fall approximately 2 - 3 months ago. Pt reports she fell off of a curb and landed on her L knee. Pt reports she did not hear any popping, but was preoccupied by a laceration to her knee. Pt reports she knelt down following the injury and felt a burning pain in her knee. Pt reports symptoms are gradually becoming worse. Pt unable to squat down, kneel, or go down stairs without pain. Pt denies any sensations of buckling, catching, or joint sounds. Pt with RHD.      DOI: 2 - 3 months ago   Imaging, bone scan films: 04/19/2018 "There is slight loss of joint space in the medial tibial femoral compartment of each knee on AP standing view.  There is mild spurring of intra-articular eminences.  Alignment is maintained."  Pain: current 2/10, worst 10/10, best 2/10, Burning and Hot, constant  Radicular symptoms: none   Aggravating factors: kneeling, squatting, bending knee to get in bed   Easing factors: activity modification , rest, medication   Pts goals: Reduce pain and return to PLOF    Objective   Mental status: alert, interactive, oriented x3  Posture/ Alignment: Good    Movement Analysis: Pt negotiates stairs using reciprocal stepping and 1 HR on descent. Pt with decreased weight bearing to LLE throughout stair climbing. Pt with increased guarding to L knee throughout static and dynamic activity.     GAIT DEVIATIONS: Twila amb with decreased ernie and decreased weight-shifting ability.    ROM:     PROM Right Left Comment   Knee " "Extension: 4 - 0 degrees 4-0 degrees    Knee Flexion: 140 degrees 130 degrees    *pain        Strength:      Right Left Comment   Hip Flexion: 5/5 5/5    Knee Extension: 5/5 4/5    Knee Flexion: 5/5 4+/5    Ankle DF: 5/5 5/5        Special Tests:  - Anterior Drawer: left negative  - Posterior Drawer: left negative  - Varus: left positive for pain (-) for laxity   - Valgus: left negative  - Skylers: left positive    Neurovascular:  - Sensation: Grossly intact to light touch across BLE    Palpation:  Pt TTP along lateral L tibiofemoral joint line. No signs of swelling, erythema or warmth.     Joint Play:  Normal L tibiofemoral mobility in AP and PA direction   Normal L patellofemoral mobility in all planes       Pt/family was provided educational information, including: role of PT, goals for PT, scheduling - pt verbalized understanding. Discussed insurance plan with pt.     TREATMENT   Time In: 9:18 AM  Time Out: 10:00 AM    Discussed Plan of Care with patient: Yes    Twila received 10 minutes of therapeutic exercises including:   Tibial distraction with 4# and knee flex/ext x 3 min   Quad sets 20 x 5" hold   Heel slides to tolerance x 20     (next visit: tibial distraction, SAQ, SLR, clams, upright bike)         Written Home Exercises Provided: See Patient Instructions   Exercises were reviewed and Twila was able to demonstrate them prior to the end of the session. Pt received a written copy of exercises to perform at home. Twila demonstrated good  understanding of the education provided.     Assessment   Twila is a 52 y.o. female referred to outpatient physical therapy with a medical diagnosis of left lateral meniscus derangement. Pt demonstrates decreased strength and AROM to L knee, increased joint line tenderness, and positive tissue provocation testing. Pt demonstrates signs and symptoms consistent with L lateral meniscus pathology. Pt is a good candidate for skilled therapy services at this time to " restore strength and AROM to R knee, restore normal gait, and improve L tibiofemoral joint integrity; so she may return to PLOF. Pt prognosis is Fair. Positive prognostic factors include motivation for therapy services, active lifestyle. Negative prognostic factors include severity of symptoms. Pt will benefit from skilled outpatient physical therapy to address the above stated deficits, provide pt/family education, and to maximize pt's level of independence.     History  Co-morbidities and personal factors that may impact the plan of care Examination  Body Structures and Functions, activity limitations and participation restrictions that may impact the plan of care    Clinical Presentation   Co-morbidities:   prior lumbar surgery        Personal Factors:   no deficits Body Regions:   lower extremities    Body Systems:   ROM  strength  gait        Participation Restrictions:   Pt unable to squat or go down stairs      Activity limitations:   Learning and applying knowledge  no deficits    General Tasks and Commands  no deficits    Communication  no deficits    Mobility  no deficits    Self care  no deficits    Domestic Life  no deficits    Interactions/Relationships  no deficits    Life Areas  no deficits    Community and Social Life  no deficits         stable and uncomplicated                      low   moderate  low Decision Making/ Complexity Score:  low     Pt's spiritual, cultural and educational needs considered and pt agreeable to plan of care and goals as stated below:     Anticipated Barriers for physical therapy: none     Short Term GOALS:  In 4 weeks, pt. will:  Report decreased L knee pain < / = 4 /10 at worst to increase tolerance for walking  Pt will be able to perform 10 mini squats without increase in B knee pain to improve ability to perform ADLs  Pt to report > / = 20% reduction in difficulty w stair climbing in order to improve functional activities.  Pt to tolerate HEP to improve independence  with ADL's    Long Term GOALS:  In 8 weeks, pt. Will:  Report decreased L knee pain < / = 2 /10 at worst to increase tolerance for walking  Pt will ascend/descend 4 steps with 1 rail, reciprocal pattern, mod I without increased pain.  Pt will increase L knee flexion AROM > / = R knee in order to improve ability to ambulate community distances   Pt will demonstrate minimal to no gait deviations with ambulation in order to tolerate functional exercises.   - be independent with HEP and SX management     Plan   Outpatient physical therapy 1 - 2 times weekly to include: pt ed, HEP, therapeutic exercises, therapeutic activities, neuromuscular re-education/ balance exercises, manual therapy, dry needling, and modalities prn. Cont PT for 6 -8 weeks. Pt may be seen by PTA as part of the rehabilitation team.     I certify the need for these services furnished under this plan of treatment and while under my care.    Suma Ledesma, PT

## 2018-05-14 ENCOUNTER — CLINICAL SUPPORT (OUTPATIENT)
Dept: REHABILITATION | Facility: HOSPITAL | Age: 53
End: 2018-05-14
Attending: ORTHOPAEDIC SURGERY
Payer: COMMERCIAL

## 2018-05-14 DIAGNOSIS — M25.669 DECREASED RANGE OF MOTION (ROM) OF KNEE: ICD-10-CM

## 2018-05-14 DIAGNOSIS — R29.898 DECREASED STRENGTH OF LOWER EXTREMITY: ICD-10-CM

## 2018-05-14 PROCEDURE — 97110 THERAPEUTIC EXERCISES: CPT | Mod: PO

## 2018-05-14 NOTE — PROGRESS NOTES
"OUTPATIENT PHYSICAL THERAPY  PHYSICAL THERAPY PROGRESS NOTE     Name: Twila Roman  Clinic Number: [id number]     Evaluation Date: 05/07/2018  Visit #: 2 / 20  Authorization period Expiration: 12/31/2018  Plan of Care Expiration: 07/07/2018  Precautions: Standard      Diagnosis:        Encounter Diagnoses   Name Primary?    Decreased range of motion (ROM) of knee      Decreased strength of lower extremity        Physician: Handy Elkins MD  Treatment Orders: PT Eval and Treat       Past Medical History:   Diagnosis Date    Anxiety      Family history of ovarian cancer      Hypertension             History     History of Present Illness: Pt presents to [facility] with complaints of L knee pain after a fall approximately 2 - 3 months ago. Pt reports she fell off of a curb and landed on her L knee. Pt reports she did not hear any popping, but was preoccupied by a laceration to her knee. Pt reports she knelt down following the injury and felt a burning pain in her knee. Pt reports symptoms are gradually becoming worse. Pt unable to squat down, kneel, or go down stairs without pain. Pt denies any sensations of buckling, catching, or joint sounds. Pt with RHD.     Subjective     Pt reports her pain is always the lateral knee.  Pt reports during the week last week, her dog was sick and threw up so she had to kneel for awhile and since then, her pain has been consistently more since then.  Pt reports the knee actually felt better following distraction.     Pain: 2/10 L knee     Objective        Time In: 2:12 PM * pt with late arrivial  Time Out: 3:00 PM       Twila received 48 minutes of therapeutic exercises including:     Upright Bike x 3 minutes, halted secondary to pain  Tibial distraction with 4# and knee flex/ext x 3 min x 2 trials  Quad sets 20 x 5" hold   Heel slides to tolerance x 20   SAQ x 20  Clamshells with orange theraband, 20 reps  SLR, 20 reps  Prone hip extension, 20 reps  SL hip abduction, " 20 reps  LAQ, 20 reps         Written Home Exercises Provided: prone hip extension, SL hip abduction, SL clamshells, SLR  Exercises were reviewed and Twila was able to demonstrate them prior to the end of the session. Pt received a written copy of exercises to perform at home. Twila demonstrated good  understanding of the education provided.      Assessment     Pt tolerated treatment well with no increase in pain.  Pt was unable to perform the bike secondary to pain.  Will try and progress within limits of pain. Pt will benefit from skilled outpatient physical therapy to address the above stated deficits, provide pt/family education, and to maximize pt's level of independence.              History  Co-morbidities and personal factors that may impact the plan of care Examination  Body Structures and Functions, activity limitations and participation restrictions that may impact the plan of care      Clinical Presentation   Co-morbidities:   prior lumbar surgery           Personal Factors:   no deficits Body Regions:   lower extremities     Body Systems:   ROM  strength  gait           Participation Restrictions:   Pt unable to squat or go down stairs        Activity limitations:   Learning and applying knowledge  no deficits     General Tasks and Commands  no deficits     Communication  no deficits     Mobility  no deficits     Self care  no deficits     Domestic Life  no deficits     Interactions/Relationships  no deficits     Life Areas  no deficits     Community and Social Life  no deficits             stable and uncomplicated                                low   moderate   low Decision Making/ Complexity Score:  low      Pt's spiritual, cultural and educational needs considered and pt agreeable to plan of care and goals as stated below:      Anticipated Barriers for physical therapy: none      Short Term GOALS:  In 4 weeks, pt. will:  Report decreased L knee pain < / = 4 /10 at worst to increase tolerance for  walking  Pt will be able to perform 10 mini squats without increase in B knee pain to improve ability to perform ADLs  Pt to report > / = 20% reduction in difficulty w stair climbing in order to improve functional activities.  Pt to tolerate HEP to improve independence with ADL's     Long Term GOALS:  In 8 weeks, pt. Will:  Report decreased L knee pain < / = 2 /10 at worst to increase tolerance for walking  Pt will ascend/descend 4 steps with 1 rail, reciprocal pattern, mod I without increased pain.  Pt will increase L knee flexion AROM > / = R knee in order to improve ability to ambulate community distances   Pt will demonstrate minimal to no gait deviations with ambulation in order to tolerate functional exercises.   - be independent with HEP and SX management      Plan   Outpatient physical therapy 1 - 2 times weekly to include: pt ed, HEP, therapeutic exercises, therapeutic activities, neuromuscular re-education/ balance exercises, manual therapy, dry needling, and modalities prn. Cont PT for 6 -8 weeks. Pt may be seen by PTA as part of the rehabilitation team.      I certify the need for these services furnished under this plan of treatment and while under my care.

## 2018-05-16 ENCOUNTER — CLINICAL SUPPORT (OUTPATIENT)
Dept: REHABILITATION | Facility: HOSPITAL | Age: 53
End: 2018-05-16
Attending: ORTHOPAEDIC SURGERY
Payer: COMMERCIAL

## 2018-05-16 DIAGNOSIS — R29.898 DECREASED STRENGTH OF LOWER EXTREMITY: ICD-10-CM

## 2018-05-16 DIAGNOSIS — M25.669 DECREASED RANGE OF MOTION (ROM) OF KNEE: ICD-10-CM

## 2018-05-16 PROCEDURE — 97110 THERAPEUTIC EXERCISES: CPT | Mod: PO

## 2018-05-16 NOTE — PROGRESS NOTES
"Name: Twila Roman  Clinic Number: [id number]  Date of Treatment: 05/16/2018   Diagnosis:   Encounter Diagnoses   Name Primary?    Decreased range of motion (ROM) of knee     Decreased strength of lower extremity        Physician: Handy Elkins MD    Time in: 2:05 PM  Time Out: 2:55  Total Treatment Time: 50 min   Last PN: NA  Evaluation Date: 05/07/2018  Visit #: 3 / 20  Authorization period Expiration: 12/31/2018  Plan of Care Expiration: 07/07/2018  Precautions: Standard        Subjective     Twila reports she is feeling about the same overall. Pt reports she was sore after last treatment session.  Patient reports their pain to be 3/10 on a 0-10 scale with 0 being no pain and 10 being the worst pain imaginable.    Objective     Twila received therapeutic exercises to develop strength, endurance and ROM for 45 minutes including:   Upright Bike x 3 minutes, halted secondary to pain (NP)  Tibial distraction with 4# and knee flex/ext x 3 min x 2 trials  Quad sets 20 x 5" hold   Heel slides to tolerance x 20 (NP)  SAQ 1# 2 x 10   Clamshells with orange theraband, 20 reps  SLR 1# 2 x 10   Prone hip extension 1# 2 x 10   SL hip abduction 1# 2 x 10   LAQ, 20 reps (NP)  Shuttle 1.5 bands 2 x 10   Step ups 4" step x 20   Lateral walks YTB 2 x 10 ft     Twila received the following manual therapy techniques: Joint mobilizations were applied to the: L knee for 5 minutes.   Tibial Distraction Gr II   PA tibiofemoral mobilization Gr II  Patellofemoral mobilization all planes Gr II    Pt received cold pack to L knee following treatment for pain control x 5 minutes. Pt skin intact following removal of ice.     Written Home Exercises Provided: No, pt instructed to continue with previously issued HEP     Pt educated on new therex, soreness after therapy. Pt demo good understanding of the education provided. Twila demonstrated good return demonstration of activities.     Assessment   Twila participated in today's " treatment session with min  symptom provocation or adverse effects. Pt demonstrated good quad activation with SLR and quad sets. Pt with improved tolerance to therex compared to previous treatment session. Pt able to progress therex slightly to include CKC exercises. Pt with mild discomfort during lateral walking exercise, and it was modified to include decreased knee flexion. Will continue to monitor and progress as tolerated. Pt will continue to benefit from skilled PT intervention. Medical Necessity is demonstrated by:  Pain limits function of effected part for some activities, Unable to participate fully in daily activities and Weakness.    Patient is making good progress towards established goals.    New/Revised goals: none at this time    Short Term GOALS:  In 4 weeks, pt. will:  Report decreased L knee pain < / = 4 /10 at worst to increase tolerance for walking  Pt will be able to perform 10 mini squats without increase in B knee pain to improve ability to perform ADLs  Pt to report > / = 20% reduction in difficulty w stair climbing in order to improve functional activities.  Pt to tolerate HEP to improve independence with ADL's     Long Term GOALS:  In 8 weeks, pt. Will:  Report decreased L knee pain < / = 2 /10 at worst to increase tolerance for walking  Pt will ascend/descend 4 steps with 1 rail, reciprocal pattern, mod I without increased pain.  Pt will increase L knee flexion AROM > / = R knee in order to improve ability to ambulate community distances   Pt will demonstrate minimal to no gait deviations with ambulation in order to tolerate functional exercises.   - be independent with HEP and SX management      Plan   Continue with established Plan of Care towards PT goals.     Suma Ledesma, PT

## 2018-05-21 ENCOUNTER — CLINICAL SUPPORT (OUTPATIENT)
Dept: REHABILITATION | Facility: HOSPITAL | Age: 53
End: 2018-05-21
Attending: ORTHOPAEDIC SURGERY
Payer: COMMERCIAL

## 2018-05-21 DIAGNOSIS — R29.898 DECREASED STRENGTH OF LOWER EXTREMITY: ICD-10-CM

## 2018-05-21 DIAGNOSIS — M25.669 DECREASED RANGE OF MOTION (ROM) OF KNEE: ICD-10-CM

## 2018-05-21 PROCEDURE — 97110 THERAPEUTIC EXERCISES: CPT | Mod: PO

## 2018-05-21 NOTE — PROGRESS NOTES
"Name: Twila Roman  Clinic Number: [id number]  Date of Treatment: 05/21/2018   Diagnosis:   Encounter Diagnoses   Name Primary?    Decreased range of motion (ROM) of knee     Decreased strength of lower extremity        Physician: Handy Elkins MD    Time in: 2:05 PM  Time Out: 2:55  Total Treatment Time: 50 min   Last PN: NA  Evaluation Date: 05/07/2018  Visit #: 4 / 20  Authorization period Expiration: 12/31/2018  Plan of Care Expiration: 07/07/2018  Precautions: Standard        Subjective     Twila reports she spent a lot of time walking around yesterday, and is now feeling it today. Pt reports she wasn't very sore following previous treatment session. Patient reports their pain to be 3/10 on a 0-10 scale with 0 being no pain and 10 being the worst pain imaginable.    Objective     Twila received therapeutic exercises to develop strength, endurance and ROM for 45 minutes including:   Upright Bike x 3 minutes, halted secondary to pain (NP)  Tibial distraction with 4# and knee flex/ext x 3 min x 2 trials  Quad sets 20 x 5" hold   Heel slides to tolerance x 20 (NP)  SAQ 1# 2 x 10   Clamshells with orange theraband, 20 reps  SLR 1# 2 x 10   Prone hip extension 1# 2 x 10   SL hip abduction 1# 2 x 10   LAQ, 20 reps (NP)  Shuttle 1.5 bands 2 x 10   Uni Shuttle .5 band x 20   Step ups 4" step x 20   Lateral walks YTB 2 x 10 ft (NP)  Mini wall squats with ball x 10   TKE GTB x 20       Twila received the following manual therapy techniques: Joint mobilizations were applied to the: L knee for 5 minutes.   Tibial Distraction Gr II   PA tibiofemoral mobilization Gr II  Patellofemoral mobilization all planes Gr II    Pt received cold pack to L knee following treatment for pain control x 5 minutes. Pt skin intact following removal of ice.     Written Home Exercises Provided: No, pt instructed to continue with previously issued HEP     Pt educated on new therex, soreness after therapy. Pt demo good understanding of " the education provided. Twila demonstrated good return demonstration of activities.     Assessment   Twila participated in today's treatment session with min  symptom provocation or adverse effects. Pt continues to demonstrate discomfort with closed chain activities, as expected. However, pt demonstrates good technique and tolerance to CKC exercises.  Will continue to monitor and progress as tolerated. Pt will continue to benefit from skilled PT intervention. Medical Necessity is demonstrated by:  Pain limits function of effected part for some activities, Unable to participate fully in daily activities and Weakness.    Patient is making good progress towards established goals.    New/Revised goals: none at this time    Short Term GOALS:  In 4 weeks, pt. will:  Report decreased L knee pain < / = 4 /10 at worst to increase tolerance for walking  Pt will be able to perform 10 mini squats without increase in B knee pain to improve ability to perform ADLs  Pt to report > / = 20% reduction in difficulty w stair climbing in order to improve functional activities.  Pt to tolerate HEP to improve independence with ADL's     Long Term GOALS:  In 8 weeks, pt. Will:  Report decreased L knee pain < / = 2 /10 at worst to increase tolerance for walking  Pt will ascend/descend 4 steps with 1 rail, reciprocal pattern, mod I without increased pain.  Pt will increase L knee flexion AROM > / = R knee in order to improve ability to ambulate community distances   Pt will demonstrate minimal to no gait deviations with ambulation in order to tolerate functional exercises.   - be independent with HEP and SX management      Plan   Continue with established Plan of Care towards PT goals.     Suma Ledesma, PT

## 2018-05-23 ENCOUNTER — CLINICAL SUPPORT (OUTPATIENT)
Dept: REHABILITATION | Facility: HOSPITAL | Age: 53
End: 2018-05-23
Attending: ORTHOPAEDIC SURGERY
Payer: COMMERCIAL

## 2018-05-23 DIAGNOSIS — M25.669 DECREASED RANGE OF MOTION (ROM) OF KNEE: ICD-10-CM

## 2018-05-23 DIAGNOSIS — R29.898 DECREASED STRENGTH OF LOWER EXTREMITY: ICD-10-CM

## 2018-05-23 PROCEDURE — 97110 THERAPEUTIC EXERCISES: CPT | Mod: PO

## 2018-05-23 NOTE — PROGRESS NOTES
"Name: Twila Roman  Clinic Number: [id number]  Date of Treatment: 05/23/2018   Diagnosis:   Encounter Diagnoses   Name Primary?    Decreased range of motion (ROM) of knee     Decreased strength of lower extremity        Physician: Handy Elkins MD    Time in: 2:05 PM  Time Out: 2:55  Total Treatment Time: 50 min   Last PN: NA  Evaluation Date: 05/07/2018  Visit #: 5 / 20  Authorization period Expiration: 12/31/2018  Plan of Care Expiration: 07/07/2018  Precautions: Standard        Subjective     Twila reports she was very sore following previous treatment session. Pt reports minimal change in symptoms since beginning therapy services. Patient reports their pain to be 3/10 on a 0-10 scale with 0 being no pain and 10 being the worst pain imaginable.    Objective     Twila received therapeutic exercises to develop strength, endurance and ROM for 45 minutes including:   Upright Bike x 4 minutes   Tibial distraction with 4# and knee flex/ext x 4  Quad sets 20 x 5" hold   Heel slides to tolerance x 20 (NP)  SAQ 1# 2 x 10   Clamshells with orange theraband, 20 reps  SLR 1# 2 x 10   Prone HS curls 1# x 20   Prone hip extension 1# 2 x 10   SL hip abduction 1# 2 x 10   LAQ, 20 reps   Shuttle 1.5 bands 2 x 10   Uni Shuttle .5 band x 20 (NP)  Step ups 4" step x 20  (NP)  Lateral walks YTB 2 x 10 ft (NP)  Mini wall squats with ball x 10 (NP)  TKE GTB x 20 (NP)    Twila received the following manual therapy techniques: Joint mobilizations were applied to the: L knee for 5 minutes.   Tibial Distraction Gr II   PA tibiofemoral mobilization Gr II  Patellofemoral mobilization all planes Gr II    Pt received cold pack to L knee following treatment for pain control x 5 minutes. Pt skin intact following removal of ice.     Written Home Exercises Provided: No, pt instructed to continue with previously issued HEP     Pt educated on new therex, soreness after therapy. Pt demo good understanding of the education provided. Twila " demonstrated good return demonstration of activities.     Assessment   Twila participated in today's treatment session with min  symptom provocation or adverse effects. Pt unable to tolerate full 5 minutes on bike due to increased pain in R knee. Pt's CKC therex avoided today due to increased symptoms following prior treatment session. Pt without significant change in symptoms since beginning therapy services.   Will continue to monitor and progress as tolerated. Pt will continue to benefit from skilled PT intervention. Medical Necessity is demonstrated by:  Pain limits function of effected part for some activities, Unable to participate fully in daily activities and Weakness.    Patient is making good progress towards established goals.    New/Revised goals: none at this time    Short Term GOALS:  In 4 weeks, pt. will:  Report decreased L knee pain < / = 4 /10 at worst to increase tolerance for walking  Pt will be able to perform 10 mini squats without increase in B knee pain to improve ability to perform ADLs  Pt to report > / = 20% reduction in difficulty w stair climbing in order to improve functional activities.  Pt to tolerate HEP to improve independence with ADL's     Long Term GOALS:  In 8 weeks, pt. Will:  Report decreased L knee pain < / = 2 /10 at worst to increase tolerance for walking  Pt will ascend/descend 4 steps with 1 rail, reciprocal pattern, mod I without increased pain.  Pt will increase L knee flexion AROM > / = R knee in order to improve ability to ambulate community distances   Pt will demonstrate minimal to no gait deviations with ambulation in order to tolerate functional exercises.   - be independent with HEP and SX management      Plan   Continue with established Plan of Care towards PT goals.     Suma Ledesma, PT

## 2018-05-31 ENCOUNTER — OFFICE VISIT (OUTPATIENT)
Dept: ORTHOPEDICS | Facility: CLINIC | Age: 53
End: 2018-05-31
Payer: COMMERCIAL

## 2018-05-31 VITALS
WEIGHT: 148 LBS | SYSTOLIC BLOOD PRESSURE: 124 MMHG | DIASTOLIC BLOOD PRESSURE: 76 MMHG | BODY MASS INDEX: 24.66 KG/M2 | HEIGHT: 65 IN

## 2018-05-31 DIAGNOSIS — M23.92 INTERNAL DERANGEMENT OF LEFT KNEE: Primary | ICD-10-CM

## 2018-05-31 DIAGNOSIS — J01.90 ACUTE SINUSITIS, RECURRENCE NOT SPECIFIED, UNSPECIFIED LOCATION: ICD-10-CM

## 2018-05-31 PROCEDURE — 3008F BODY MASS INDEX DOCD: CPT | Mod: CPTII,S$GLB,, | Performed by: ORTHOPAEDIC SURGERY

## 2018-05-31 PROCEDURE — 99213 OFFICE O/P EST LOW 20 MIN: CPT | Mod: 25,S$GLB,, | Performed by: ORTHOPAEDIC SURGERY

## 2018-05-31 PROCEDURE — 99999 PR PBB SHADOW E&M-EST. PATIENT-LVL III: CPT | Mod: PBBFAC,,, | Performed by: ORTHOPAEDIC SURGERY

## 2018-05-31 PROCEDURE — 3074F SYST BP LT 130 MM HG: CPT | Mod: CPTII,S$GLB,, | Performed by: ORTHOPAEDIC SURGERY

## 2018-05-31 PROCEDURE — 20610 DRAIN/INJ JOINT/BURSA W/O US: CPT | Mod: LT,S$GLB,, | Performed by: ORTHOPAEDIC SURGERY

## 2018-05-31 PROCEDURE — 3078F DIAST BP <80 MM HG: CPT | Mod: CPTII,S$GLB,, | Performed by: ORTHOPAEDIC SURGERY

## 2018-05-31 RX ORDER — MONTELUKAST SODIUM 10 MG/1
TABLET ORAL
Qty: 30 TABLET | Refills: 0 | Status: SHIPPED | OUTPATIENT
Start: 2018-05-31 | End: 2018-06-29 | Stop reason: SDUPTHER

## 2018-05-31 RX ORDER — TRIAMCINOLONE ACETONIDE 40 MG/ML
40 INJECTION, SUSPENSION INTRA-ARTICULAR; INTRAMUSCULAR
Status: COMPLETED | OUTPATIENT
Start: 2018-05-31 | End: 2018-05-31

## 2018-05-31 RX ADMIN — TRIAMCINOLONE ACETONIDE 40 MG: 40 INJECTION, SUSPENSION INTRA-ARTICULAR; INTRAMUSCULAR at 01:05

## 2018-05-31 NOTE — LETTER
May 31, 2018      Shayy Villegas MD  [address] [address]           [facility]  [address] [address]  Phone: [phone]          Patient: Twila Roman   MR Number: [id number]   YOB: 1965   Date of Visit: 5/31/2018       Dear Dr. Shayy Villegas:    Thank you for referring Twila Roman to me for evaluation. Attached you will find relevant portions of my assessment and plan of care.    If you have questions, please do not hesitate to call me. I look forward to following Twila Roman along with you.    Sincerely,    Handy Elkins MD    Enclosure  CC:  No Recipients    If you would like to receive this communication electronically, please contact [email or web address] or [phone] to request more information on [employer] Link access.    For providers and/or their staff who would like to refer a patient to [facility], please contact us through our one-stop-shop provider referral line, [facility] , at [phone].    If you feel you have received this communication in error or would no longer like to receive these types of communications, please e-mail [email or web address]

## 2018-05-31 NOTE — PROCEDURES
Procedures     After obtaining verbal informed consent the patient's left knee was prepped aseptically and injected through an inferior lateral approach using 40 mg of triamcinolone and 1 cc of 1% plain Xylocaine.  The patient was warned about postinjection flare and how to manage it with ice, rest and over-the-counter analgesics.  They're advised to contact me for any severe, uncontrolled pain.

## 2018-05-31 NOTE — PROGRESS NOTES
Subjective:      Patient ID: Twila Roman is a 52 y.o. female.    Chief Complaint: Pain of the Left Knee    HPI     Follow-up for left knee pain. Treated with oral medication physiotherapy.  Patient denies any real change.  She has joint line pain with any vigorous activity.  Mechanical symptoms denied    Review of Systems   Constitution: Negative for fever and weight loss.   HENT: Negative for congestion.    Eyes: Negative for visual disturbance.   Cardiovascular: Negative for chest pain.   Respiratory: Negative for shortness of breath.    Hematologic/Lymphatic: Negative for bleeding problem. Does not bruise/bleed easily.   Skin: Negative for poor wound healing.   Musculoskeletal: Positive for joint pain.   Gastrointestinal: Negative for abdominal pain.   Genitourinary: Negative for dysuria.   Neurological: Negative for seizures.   Psychiatric/Behavioral: Negative for altered mental status.   Allergic/Immunologic: Negative for persistent infections.         Objective:            Ortho/SPM Exam          Vitals:    05/31/18 1311   BP: 124/76         Left Knee      Vitals:    05/31/18 1311   BP: 124/76       The patient is not in acute distress.   Body habitus is normal.   The patient walks without a limp.  Resisted SLR negative.   The skin over the knee is intact.  Knee effusion 0  Tendernes is located absent.  Range of motion- Flexion full, Extension full.   Ligament exam:   MCL intact   Lachman intact              Post sag intact    LCL intact  Patellar apprehension negative.  Popliteal cyst negative  Patellar crepitation absent.  Flexion/pinch negative.  Pulses DP present, PT present.  Motor normal 5/5 strength in all tested muscle groups.   Sensory normal.        Assessment:       Encounter Diagnosis   Name Primary?    Internal derangement of left knee Yes          Cannot rule out low-grade osteoarthritis  Plan:       Twila was seen today for pain.    Diagnoses and all orders for this visit:    Internal  derangement of left knee     I explained my diagnostic impression and the reasoning behind it in detail, using layman's terms.  Models and/or pictures were used to help in the explanation.    Injection recommended and consent given    If symptoms are not controlled MRI would be recommended.

## 2018-06-01 RX ORDER — LORAZEPAM 1 MG/1
TABLET ORAL
Qty: 30 TABLET | Refills: 0 | Status: SHIPPED | OUTPATIENT
Start: 2018-06-01 | End: 2018-07-06 | Stop reason: SDUPTHER

## 2018-06-11 RX ORDER — CITALOPRAM 10 MG/1
TABLET ORAL
Qty: 30 TABLET | Refills: 0 | Status: SHIPPED | OUTPATIENT
Start: 2018-06-11 | End: 2018-07-06 | Stop reason: SDUPTHER

## 2018-06-21 ENCOUNTER — OFFICE VISIT (OUTPATIENT)
Dept: ORTHOPEDICS | Facility: CLINIC | Age: 53
End: 2018-06-21
Payer: COMMERCIAL

## 2018-06-21 VITALS
BODY MASS INDEX: 24.66 KG/M2 | HEIGHT: 65 IN | SYSTOLIC BLOOD PRESSURE: 112 MMHG | WEIGHT: 148 LBS | DIASTOLIC BLOOD PRESSURE: 71 MMHG

## 2018-06-21 DIAGNOSIS — M23.92 INTERNAL DERANGEMENT OF LEFT KNEE: Primary | ICD-10-CM

## 2018-06-21 PROCEDURE — 3074F SYST BP LT 130 MM HG: CPT | Mod: CPTII,S$GLB,, | Performed by: ORTHOPAEDIC SURGERY

## 2018-06-21 PROCEDURE — 3008F BODY MASS INDEX DOCD: CPT | Mod: CPTII,S$GLB,, | Performed by: ORTHOPAEDIC SURGERY

## 2018-06-21 PROCEDURE — 99213 OFFICE O/P EST LOW 20 MIN: CPT | Mod: S$GLB,,, | Performed by: ORTHOPAEDIC SURGERY

## 2018-06-21 PROCEDURE — 99999 PR PBB SHADOW E&M-EST. PATIENT-LVL III: CPT | Mod: PBBFAC,,, | Performed by: ORTHOPAEDIC SURGERY

## 2018-06-21 PROCEDURE — 3078F DIAST BP <80 MM HG: CPT | Mod: CPTII,S$GLB,, | Performed by: ORTHOPAEDIC SURGERY

## 2018-06-21 NOTE — PROGRESS NOTES
Subjective:      Patient ID: Twila Roman is a 52 y.o. female.    Chief Complaint: Pain of the Left Knee    HPI     Follow-up for left knee pain. The patient has been treated for internal derangement with injection, oral medication and modification of activity.  She still has intermittent episodes of significant pain at the lateral joint line. She denies locking  Review of Systems   Constitution: Negative for fever and weight loss.   HENT: Negative for congestion.    Eyes: Negative for visual disturbance.   Cardiovascular: Negative for chest pain.   Respiratory: Negative for shortness of breath.    Hematologic/Lymphatic: Negative for bleeding problem. Does not bruise/bleed easily.   Skin: Negative for poor wound healing.   Musculoskeletal: Positive for joint pain.   Gastrointestinal: Negative for abdominal pain.   Genitourinary: Negative for dysuria.   Neurological: Negative for seizures.   Psychiatric/Behavioral: Negative for altered mental status.   Allergic/Immunologic: Negative for persistent infections.         Objective:            Ortho/SPM Exam    Left Knee    Vitals:    06/21/18 1514   BP: 112/71       The patient is not in acute distress.   Body habitus is normal.   The patient walks without a limp.  Resisted SLR negative.   The skin over the knee is intact.  Knee effusion 0  Tendernes is located presentlateral  Range of motion- Flexion 145 deg, Extension 0 deg,   Ligament exam:   MCL intact   Lachman intact              Post sag intact    LCL intact  Patellar apprehension negative.  Popliteal cyst negative  Patellar crepitation present.  Flexion/pinch positive.  Pulses DP present, PT present.  Motor normal 5/5 strength in all tested muscle groups.   Sensory normal.      Vitals:    06/21/18 1514   BP: 112/71               Assessment:       Encounter Diagnosis   Name Primary?    Internal derangement of left knee Yes    possible bucket-handle tear      Plan:       Twila was seen today for  pain.    Diagnoses and all orders for this visit:    Internal derangement of left knee      I explained my diagnostic impression and the reasoning behind it in detail, using layman's terms.  Models and/or pictures were used to help in the explanation.    MRI recommended.  Process of explained and patient agrees-further treatment will be based on MRI result

## 2018-06-27 ENCOUNTER — HOSPITAL ENCOUNTER (OUTPATIENT)
Dept: RADIOLOGY | Facility: HOSPITAL | Age: 53
Discharge: HOME | End: 2018-06-27
Attending: ORTHOPAEDIC SURGERY
Payer: COMMERCIAL

## 2018-06-27 DIAGNOSIS — M23.92 INTERNAL DERANGEMENT OF LEFT KNEE: ICD-10-CM

## 2018-06-27 PROCEDURE — 73721 MRI JNT OF LWR EXTRE W/O DYE: CPT | Mod: 26,LT,, | Performed by: RADIOLOGY

## 2018-06-27 PROCEDURE — 73721 MRI JNT OF LWR EXTRE W/O DYE: CPT | Mod: TC,LT

## 2018-06-29 DIAGNOSIS — J01.90 ACUTE SINUSITIS, RECURRENCE NOT SPECIFIED, UNSPECIFIED LOCATION: ICD-10-CM

## 2018-06-29 RX ORDER — MONTELUKAST SODIUM 10 MG/1
TABLET ORAL
Qty: 30 TABLET | Refills: 0 | Status: SHIPPED | OUTPATIENT
Start: 2018-06-29 | End: 2018-07-26 | Stop reason: SDUPTHER

## 2018-07-06 DIAGNOSIS — M23.301 LATERAL MENISCUS DERANGEMENT, LEFT: ICD-10-CM

## 2018-07-06 RX ORDER — LORAZEPAM 1 MG/1
TABLET ORAL
Qty: 30 TABLET | Refills: 0 | Status: SHIPPED | OUTPATIENT
Start: 2018-07-06 | End: 2018-08-06 | Stop reason: SDUPTHER

## 2018-07-06 RX ORDER — METOPROLOL SUCCINATE 25 MG/1
TABLET, EXTENDED RELEASE ORAL
Qty: 90 TABLET | Refills: 1 | Status: SHIPPED | OUTPATIENT
Start: 2018-07-06 | End: 2019-01-25 | Stop reason: SDUPTHER

## 2018-07-06 RX ORDER — CITALOPRAM 10 MG/1
TABLET ORAL
Qty: 30 TABLET | Refills: 3 | Status: SHIPPED | OUTPATIENT
Start: 2018-07-06 | End: 2018-10-27 | Stop reason: SDUPTHER

## 2018-07-09 RX ORDER — NAPROXEN 500 MG/1
TABLET ORAL
Qty: 60 TABLET | Refills: 0 | Status: SHIPPED | OUTPATIENT
Start: 2018-07-09 | End: 2021-03-05

## 2018-07-17 ENCOUNTER — OFFICE VISIT (OUTPATIENT)
Dept: ORTHOPEDICS | Facility: CLINIC | Age: 53
End: 2018-07-17
Payer: COMMERCIAL

## 2018-07-17 VITALS — WEIGHT: 148 LBS | BODY MASS INDEX: 24.66 KG/M2 | HEIGHT: 65 IN

## 2018-07-17 DIAGNOSIS — M23.92 INTERNAL DERANGEMENT OF LEFT KNEE: Primary | ICD-10-CM

## 2018-07-17 PROCEDURE — 3008F BODY MASS INDEX DOCD: CPT | Mod: CPTII,S$GLB,, | Performed by: ORTHOPAEDIC SURGERY

## 2018-07-17 PROCEDURE — 99214 OFFICE O/P EST MOD 30 MIN: CPT | Mod: S$GLB,,, | Performed by: ORTHOPAEDIC SURGERY

## 2018-07-17 PROCEDURE — 99999 PR PBB SHADOW E&M-EST. PATIENT-LVL III: CPT | Mod: PBBFAC,,, | Performed by: ORTHOPAEDIC SURGERY

## 2018-07-17 RX ORDER — MUPIROCIN 20 MG/G
OINTMENT TOPICAL
Status: CANCELLED | OUTPATIENT
Start: 2018-07-17

## 2018-07-17 RX ORDER — SODIUM CHLORIDE 0.9 % (FLUSH) 0.9 %
3 SYRINGE (ML) INJECTION
Status: DISCONTINUED | OUTPATIENT
Start: 2018-07-17 | End: 2018-08-15 | Stop reason: HOSPADM

## 2018-07-17 NOTE — PROGRESS NOTES
Subjective:      Patient ID: Twila Roman is a 52 y.o. female.    Chief Complaint: Pain of the Left Knee    HPI     The patient has been treated with NSAIDs, physical therapy and injection for her left knee pain.  Symptoms are worsening.  She reports catching pain and anterior pain with flexion loading.     Review of Systems   Constitution: Negative for fever and weight loss.   HENT: Negative for congestion.    Eyes: Negative for visual disturbance.   Cardiovascular: Negative for chest pain.   Respiratory: Negative for shortness of breath.    Hematologic/Lymphatic: Negative for bleeding problem. Does not bruise/bleed easily.   Skin: Negative for poor wound healing.   Musculoskeletal: Positive for joint pain.   Gastrointestinal: Negative for abdominal pain.   Genitourinary: Negative for dysuria.   Neurological: Negative for seizures.   Psychiatric/Behavioral: Negative for altered mental status.   Allergic/Immunologic: Negative for persistent infections.         Objective:            Ortho/SPM Exam      There were no vitals filed for this visit.        Left Knee      There were no vitals filed for this visit.    The patient is not in acute distress.   Body habitus is normal.   The patient walks without a limp.  Resisted SLR negative.   The skin over the knee is intact.  Knee effusion 0  Tendernes is located:  NA  Range of motion- Flexion 145  deg, Extension 0 deg,   Ligament exam:   MCL intact   Lachman intact              Post sag intact    LCL intact  Patellar apprehension negative.  Popliteal cyst negative  Patellar crepitation absent.  Flexion/pinch negative.  Pulses DP present, PT present.  Motor normal 5/5 strength in all tested muscle groups.   Sensory normal    I reviewed the relevant radiographic images for the patient's condition:  There is a medial meniscus tear based on MRI findings as well as mild to moderate chondromalacia patella.            Assessment:       Encounter Diagnosis   Name Primary?     Internal derangement of left knee Yes        there is a mixture of meniscal and patellofemoral symptoms.  There is no improvement with exhaustive nonsurgical measures  Plan:       Twila was seen today for pain.    Diagnoses and all orders for this visit:    Internal derangement of left knee  -     Vital signs; Standing  -     Cleanse with Chlorhexidine (CHG); Standing  -     Diet NPO; Standing  -     Place BALWINDER hose; Standing  -     Place sequential compression device; Standing  -     Chlorohexidine Gluconate Bath; Standing  -     Case Request Operating Room: ARTHROSCOPY, KNEE  -     Place in Outpatient; Standing  -     Cleanse with Chlorhexidine (CHG)  -     Diet NPO  -     Place BALWINDER hose  -     Place sequential compression device    Other orders  -     sodium chloride 0.9% flush 3 mL; Inject 3 mLs into the vein as needed for Line Care.  -     IP VTE LOW RISK PATIENT; Standing  -     mupirocin 2 % ointment; by Nasal route On call Procedure (surgery).  -     ceFAZolin (ANCEF) 2 g in dextrose 5 % 50 mL IVPB; Inject 2 g into the vein On call Procedure (Surgery).  -     IP VTE LOW RISK PATIENT        I explained my diagnostic impression and the reasoning behind it in detail, using layman's terms.  Models and/or pictures were used to help in the explanation.    Arthroscopic partial meniscectomy was discussed.  The risks including serious anesthetic reactions, thromboembolic events, infection, and failure to relieve symptoms were all discussed. The possibility of nonsurgical management was also reviewed.  The patient understands and wishes to proceed with arthroscopic surgery

## 2018-07-26 DIAGNOSIS — J01.90 ACUTE SINUSITIS, RECURRENCE NOT SPECIFIED, UNSPECIFIED LOCATION: ICD-10-CM

## 2018-07-26 RX ORDER — MONTELUKAST SODIUM 10 MG/1
TABLET ORAL
Qty: 30 TABLET | Refills: 0 | Status: SHIPPED | OUTPATIENT
Start: 2018-07-26 | End: 2018-08-29 | Stop reason: SDUPTHER

## 2018-07-31 ENCOUNTER — OFFICE VISIT (OUTPATIENT)
Dept: ORTHOPEDICS | Facility: CLINIC | Age: 53
End: 2018-07-31
Payer: COMMERCIAL

## 2018-07-31 VITALS — BODY MASS INDEX: 24.66 KG/M2 | HEIGHT: 65 IN | WEIGHT: 148 LBS

## 2018-07-31 DIAGNOSIS — M23.92 INTERNAL DERANGEMENT OF LEFT KNEE: Primary | ICD-10-CM

## 2018-07-31 PROCEDURE — 99499 UNLISTED E&M SERVICE: CPT | Mod: S$GLB,,, | Performed by: ORTHOPAEDIC SURGERY

## 2018-07-31 PROCEDURE — 99999 PR PBB SHADOW E&M-EST. PATIENT-LVL II: CPT | Mod: PBBFAC,,, | Performed by: ORTHOPAEDIC SURGERY

## 2018-07-31 NOTE — H&P (VIEW-ONLY)
Subjective:       Patient ID: Twila Roman is a 53 y.o. female.    Chief Complaint: Pre-op Exam of the Left Knee      Twila Roman is a 53 y.o. female with PMH significant for hypertension, anxiety, history of spinal fusion, and adult onset asthma.  She is here today for a pre-operative visit in preparation for a Left knee arthroscopy and partial meniscectomy be performed by  Dr. Elkins on 08/15/2018.  Twila Roman has a 5 months history of Left knee pain which is progressing.  Pain and internal clicking is worse with activity and kneeling.  Associated symptoms include gelling.  Patient has experienced interference of ADLs due to increased pain and decreased range of motion. Patient has failed non-operative treatment including NSAIDs, activity modification, corticosteroid injections, and physical therapy. Twila Roman ambulates independently.  There has been no significant change in medical status since last visit.      Past Medical History:   Diagnosis Date    Anxiety     Family history of ovarian cancer     Hypertension      Past Surgical History:   Procedure Laterality Date    BACK SURGERY      spinal fusion    TUMOR REMOVAL      right hand     Family History   Problem Relation Age of Onset    Ovarian cancer Mother 66    Heart disease Father         double bypass    Macular degeneration Father     No Known Problems Brother     No Known Problems Brother     Breast cancer Paternal Cousin 20    Colon cancer Neg Hx      Social History     Social History    Marital status: Single     Spouse name: N/A    Number of children: N/A    Years of education: N/A     Occupational History     [employer]      Social History Main Topics    Smoking status: Never Smoker    Smokeless tobacco: Never Used    Alcohol use 3.0 oz/week     5 Glasses of wine per week      Comment: one a day- red wine    Drug use: No    Sexual activity: Yes     Partners: Male      Comment: Boyfriend       Other Topics Concern    None     Social History Narrative    None       Current Outpatient Prescriptions   Medication Sig Dispense Refill    albuterol 90 mcg/actuation inhaler Inhale 1-2 puffs into the lungs every 6 (six) hours as needed for Wheezing. 1 each 11    albuterol 90 mcg/actuation inhaler Inhale 1-2 puffs into the lungs every 6 (six) hours as needed for Wheezing. 1 each 11    biotin 5,000 mcg TbDL Take 1 capsule by mouth once daily.      buPROPion (WELLBUTRIN XL) 300 MG 24 hr tablet Take 1 tablet (300 mg total) by mouth once daily. 30 tablet 11    cholecalciferol, vitamin D3, (VITAMIN D3) 2,000 unit Tab Take 1 tablet by mouth once daily.      citalopram (CELEXA) 10 MG tablet   11    citalopram (CELEXA) 10 MG tablet TAKE 1 TABLET BY MOUTH EVERY DAY 30 tablet 3    LORazepam (ATIVAN) 1 MG tablet TAKE 1 TABLET BY MOUTH EVERY NIGHT AS NEEDED 30 tablet 0    metoprolol succinate (TOPROL-XL) 25 MG 24 hr tablet TAKE 1 TABLET(25 MG) BY MOUTH EVERY DAY 90 tablet 1    montelukast (SINGULAIR) 10 mg tablet TAKE 1 TABLET BY MOUTH EVERY EVENING 30 tablet 0    multivitamin (THERAGRAN) per tablet Take 1 tablet by mouth once daily.      naproxen (NAPROSYN) 500 MG tablet TAKE 1 TABLET BY MOUTH TWICE DAILY WITH MEALS 60 tablet 0     Current Facility-Administered Medications   Medication Dose Route Frequency Provider Last Rate Last Dose    sodium chloride 0.9% flush 3 mL  3 mL Intravenous PRN Handy Elkins MD         Review of patient's allergies indicates:  No Known Allergies    Review of Systems   Constitutional: Negative for appetite change, chills, fatigue and fever.   HENT: Negative for tinnitus and trouble swallowing.    Respiratory: Negative for chest tightness and shortness of breath.    Cardiovascular: Negative for chest pain, palpitations and leg swelling.   Gastrointestinal: Negative for abdominal distention, blood in stool, diarrhea and nausea.   Genitourinary: Negative for dysuria and  "hematuria.   Musculoskeletal: Positive for arthralgias. Negative for joint swelling.   Skin: Negative for pallor, rash and wound.   Allergic/Immunologic: Negative for immunocompromised state.   Neurological: Negative for dizziness, seizures, syncope and weakness.   Psychiatric/Behavioral: Negative for agitation and behavioral problems.       Objective:      Vitals:    07/31/18 1525   Weight: 67.1 kg (148 lb)   Height: 5' 5" (1.651 m)     Physical Exam   Constitutional: She is oriented to person, place, and time. She appears well-developed and well-nourished.   HENT:   Head: Normocephalic and atraumatic.   Eyes: Pupils are equal, round, and reactive to light.   Cardiovascular: Normal rate.  Exam reveals no gallop and no friction rub.    No murmur heard.  Pulmonary/Chest: Effort normal. No respiratory distress. She has no wheezes. She has no rales.   Abdominal: Soft. She exhibits no distension. There is no tenderness.   Neurological: She is alert and oriented to person, place, and time.   Skin: Skin is warm. Capillary refill takes less than 2 seconds. No rash noted.   Psychiatric: Her behavior is normal.       Left KNEE:  The patient walks with nonantalgic gait  Resisted SLR negative.  The skin over the knee is intact.  Knee effusion none.  Tendernes is located medial and over patella tendon.  Range of motion- Flexion 140 deg, Extension 0 deg,   Ligament exam:              MCL 1+ laxity              Lachman intact              Post sag intact              LCL intact  Patellar crepitation present.  Pulses DP present, PT present  Motor normal 5/5  strength in all tested muscle groups.   Sensory normal  Lab Review:   CBC:   Lab Results   Component Value Date    WBC 8.19 06/07/2017    RBC 4.53 06/07/2017    HGB 14.2 06/07/2017    HCT 43.1 06/07/2017     06/07/2017     BMP:   Lab Results   Component Value Date    GLU 97 06/07/2017     06/07/2017    K 4.0 06/07/2017     06/07/2017    CO2 27 06/07/2017    " BUN 27 (H) 06/07/2017    CREATININE 0.9 06/07/2017    CALCIUM 9.5 06/07/2017     Diagnostics Review: X-Ray: Reviewed     Assessment:       1. Internal derangement of left knee        Plan:       Healthy female, planned for left knee arthroscopy and partial meniscectomy on 08/15/2018     Pre, mary, and post operative procedures and expectations discussed. All questions were answered.   Twila Roman will contact us if there are any questions, concerns, or changes in medical status prior to surgery.

## 2018-08-03 ENCOUNTER — DOCUMENTATION ONLY (OUTPATIENT)
Dept: REHABILITATION | Facility: HOSPITAL | Age: 53
End: 2018-08-03

## 2018-08-03 NOTE — PROGRESS NOTES
PHYSICAL THERAPY DISCHARGE SUMMARY     Name: Twila Roman  Clinic Number: [id number]    Diagnosis:   Encounter Diagnoses   Name Primary?    Decreased range of motion (ROM) of knee      Decreased strength of lower extremity           Physician: Handy Elkins MD    Treatment Orders: PT Eval and Treat  Past Medical History:   Diagnosis Date    Anxiety     Family history of ovarian cancer     Hypertension        Initial visit: 05/07/2018  Date of Last visit: 05/23/2018  Date of Discharge Note:  08/03/2018  Total Visits Received: 5  Missed Visits: 2  ASSESSMENT   Status Towards Goals Met:  Pt did not return to clinic for re-assessment. Pt did not attend therapy for appropriate frequency to achieve goals.     Goals Not achieved and why:  Pt has not scheduled any additional visits and has not returned to therapy.     Discharge reason : No benefit from P.T. noted and Pt has not re-scheduled further follow-up sessions    Short Term GOALS:  In 4 weeks, pt. will:  Report decreased L knee pain < / = 4 /10 at worst to increase tolerance for walking  Pt will be able to perform 10 mini squats without increase in B knee pain to improve ability to perform ADLs  Pt to report > / = 20% reduction in difficulty w stair climbing in order to improve functional activities.  Pt to tolerate HEP to improve independence with ADL's     Long Term GOALS:  In 8 weeks, pt. Will:  Report decreased L knee pain < / = 2 /10 at worst to increase tolerance for walking  Pt will ascend/descend 4 steps with 1 rail, reciprocal pattern, mod I without increased pain.  Pt will increase L knee flexion AROM > / = R knee in order to improve ability to ambulate community distances   Pt will demonstrate minimal to no gait deviations with ambulation in order to tolerate functional exercises.   - be independent with HEP and SX management       PLAN   This patient is discharged from Physical Therapy Services.       Suma Ledesma, PT

## 2018-08-06 RX ORDER — LORAZEPAM 1 MG/1
TABLET ORAL
Qty: 30 TABLET | Refills: 0 | Status: SHIPPED | OUTPATIENT
Start: 2018-08-06 | End: 2018-09-11 | Stop reason: SDUPTHER

## 2018-08-08 ENCOUNTER — TELEPHONE (OUTPATIENT)
Dept: ORTHOPEDICS | Facility: CLINIC | Age: 53
End: 2018-08-08

## 2018-08-09 ENCOUNTER — ANESTHESIA EVENT (OUTPATIENT)
Dept: SURGERY | Facility: HOSPITAL | Age: 53
End: 2018-08-09
Payer: COMMERCIAL

## 2018-08-09 ENCOUNTER — HOSPITAL ENCOUNTER (OUTPATIENT)
Dept: PREADMISSION TESTING | Facility: HOSPITAL | Age: 53
Discharge: HOME | End: 2018-08-09
Attending: ORTHOPAEDIC SURGERY
Payer: COMMERCIAL

## 2018-08-09 VITALS
BODY MASS INDEX: 25.38 KG/M2 | OXYGEN SATURATION: 98 % | HEART RATE: 70 BPM | WEIGHT: 152.31 LBS | SYSTOLIC BLOOD PRESSURE: 144 MMHG | TEMPERATURE: 100 F | DIASTOLIC BLOOD PRESSURE: 86 MMHG | RESPIRATION RATE: 20 BRPM | HEIGHT: 65 IN

## 2018-08-09 RX ORDER — SODIUM CHLORIDE, SODIUM LACTATE, POTASSIUM CHLORIDE, CALCIUM CHLORIDE 600; 310; 30; 20 MG/100ML; MG/100ML; MG/100ML; MG/100ML
INJECTION, SOLUTION INTRAVENOUS CONTINUOUS
Status: CANCELLED | OUTPATIENT
Start: 2018-08-09

## 2018-08-09 RX ORDER — LIDOCAINE HYDROCHLORIDE 10 MG/ML
1 INJECTION, SOLUTION EPIDURAL; INFILTRATION; INTRACAUDAL; PERINEURAL ONCE
Status: CANCELLED | OUTPATIENT
Start: 2018-08-09 | End: 2018-08-09

## 2018-08-09 NOTE — DISCHARGE INSTRUCTIONS
· Arrive on 8/15/2018  at  07:15 AM .  · Report to the 2nd floor Procedural Check In Room .   · Nothing to eat or drink after midnight the night before your procedure.  · Take the  medications the morning of surgery with a small sip of water.                                                         · Please remove all body piercings and leave all your jewelery and valuables at home .  · Please remove your contact lenses.   · Wear loose comfortable clothing.  · You will not be able to drive that day, please make arrangement for transportation to and from your procedure.  · You cannot be alone for 24 hours after anesthesia. Make arrangements as needed.  · Shower the night before your procedure and the morning of your procedure with Hibiclens antibacterial solution.  · No lotions, creams or powders  · Stop Aspirin, Ibuprofen, Advil, Motrin, Aleve, Nuprin, Naprosyn (all NSAID Medication) or any other blood thinners 5 days before your procedure unless directed by your physician.  Also hold all over the counter vitamins and herbal supplements for 5 days prior to your procedure.  · You may take Tylenol or Acetaminophen up the day of surgery for any pain.        Call [phone] with any questions.        Pre-Op Bathing Instructions    Before surgery, you can play an important role in your own health.    Because skin is not sterile, we need to be sure that your skin is as free of germs as possible. By following the instructions below, you can reduce the number of germs on your skin before surgery.    IMPORTANT: You will need to shower with a special soap called Hibiclens*, available at any pharmacy, over the counter usually in the first aid isle.  If you are allergic to Chlorhexidine (the antiseptic in Hibiclens), use an antibacterial soap such as Dial Soap for your preoperative showers.  You will shower with Hibiclens the night before and the morning of surgery. Both the night before your surgery and the morning of your  surgery see steps 2 and 3.   Do not use Hibiclens on the head, face or genitals to avoid injury to those areas.    STEP #1  1.  Shower with Hibiclens solution night before and the morning of surgery.      STEP #2: THE NIGHT BEFORE YOUR SURGERY     1. Do not shave the area of your body where your surgery will be performed.  2. Wash your hair as usual with your normal  Shampoo. Wash body shoulder to toes with your normal soap.  3. Squeeze Hibiclens into hand apply to surgical site.   4. Wash the site gently for five (5) minutes. Do not scrub your skin too hard.   5. Do not wash with your regular soap after Hibiclens is used.  6. Rinse your body thoroughly.  7. Pat yourself dry with a clean, soft towel.  8. Do not use lotion, cream, or powder.  9. Wear clean clothes.    STEP #3: THE MORNING OF YOUR SURGERY     1. Repeat Step #2.    * Not to be used by people allergic to Chlorhexidine.              Anesthesia: General Anesthesia     You are watched continuously during your procedure by your anesthesia provider.     Youre due to have surgery. During surgery, youll be given medicine called anesthesia or anesthetic. This will keep you comfortable and pain-free. Your anesthesia provider will use general anesthesia.  What is general anesthesia?  General anesthesia puts you into a state like deep sleep. It goes into the bloodstream (IV anesthetics), into the lungs (gas anesthetics), or both. You feel nothing during the procedure. You will not remember it. During the procedure, the anesthesia provider monitors you continuously. He or she checks your heart rate and rhythm, blood pressure, breathing, and blood oxygen.  · IV anesthetics. IV anesthetics are given through an IV line in your arm. Theyre often given first. This is so you are asleep before a gas anesthetic is started. Some kinds of IV anesthetics relieve pain. Others relax you. Your doctor will decide which kind is best in your case.  · Gas anesthetics. Gas  anesthetics are breathed into the lungs. They are often used to keep you asleep. They can be given through a facemask or a tube placed in your larynx or trachea (breathing tube).  ¨ If you have a facemask, your anesthesia provider will most likely place it over your nose and mouth while youre still awake. Youll breathe oxygen through the mask as your IV anesthetic is started. Gas anesthetic may be added through the mask.  ¨ If you have a tube in the larynx or trachea, it will be inserted into your throat after youre asleep.  Anesthesia tools and medicines  You will likely have:  · IV anesthetics. These are put into an IV line into your bloodstream.  · Gas anesthetics. You breathe these anesthetics into your lungs, where they pass into your bloodstream.  · Pulse oximeter. This is a small clip that is attached to the end of your finger. This measures your blood oxygen level.  · Electrocardiography leads (electrodes). These are small sticky pads that are placed on your chest. They record your heart rate and rhythm.  · Blood pressure cuff. This reads your blood pressure.  Risks and possible complications  General anesthesia has some risks. These include:  · Breathing problems  · Nausea and vomiting  · Sore throat or hoarseness (usually temporary)  · Allergic reaction to the anesthetic  · Irregular heartbeat (rare)  · Cardiac arrest (rare)   Anesthesia safety  · Follow all instructions you are given for how long not to eat or drink before your procedure.  · Be sure your doctor knows what medicines and drugs you take. This includes over-the-counter medicines, herbs, supplements, alcohol or other drugs. You will be asked when those were last taken.  · Have an adult family member or friend drive you home after the procedure.  · For the first 24 hours after your surgery:  ¨ Do not drive or use heavy equipment.  ¨ Do not make important decisions or sign legal documents. If important decisions or signing legal documents is  necessary during the first 24 hours after surgery, have a trusted family member or spouse act on your behalf.  ¨ Avoid alcohol.  ¨ Have a responsible adult stay with you. He or she can watch for problems and help keep you safe.  Date Last Reviewed: 12/1/2016  © 6425-3371 [employer]. [address] [place] [address], [place] [address]. All rights reserved. This information is not intended as a substitute for professional medical care. Always follow your healthcare professional's instructions.
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
## 2018-08-15 ENCOUNTER — HOSPITAL ENCOUNTER (OUTPATIENT)
Facility: HOSPITAL | Age: 53
Discharge: HOME | End: 2018-08-15
Attending: ORTHOPAEDIC SURGERY | Admitting: ORTHOPAEDIC SURGERY
Payer: COMMERCIAL

## 2018-08-15 ENCOUNTER — ANESTHESIA (OUTPATIENT)
Dept: SURGERY | Facility: HOSPITAL | Age: 53
End: 2018-08-15
Payer: COMMERCIAL

## 2018-08-15 DIAGNOSIS — I10 HYPERTENSION: ICD-10-CM

## 2018-08-15 DIAGNOSIS — M23.92 INTERNAL DERANGEMENT OF LEFT KNEE: ICD-10-CM

## 2018-08-15 PROCEDURE — 27201423 OPTIME MED/SURG SUP & DEVICES STERILE SUPPLY: Performed by: ORTHOPAEDIC SURGERY

## 2018-08-15 PROCEDURE — G8979 MOBILITY GOAL STATUS: HCPCS | Mod: CJ

## 2018-08-15 PROCEDURE — G8980 MOBILITY D/C STATUS: HCPCS | Mod: CJ

## 2018-08-15 PROCEDURE — S0020 INJECTION, BUPIVICAINE HYDRO: HCPCS | Performed by: ORTHOPAEDIC SURGERY

## 2018-08-15 PROCEDURE — 37000008 HC ANESTHESIA 1ST 15 MINUTES: Performed by: ORTHOPAEDIC SURGERY

## 2018-08-15 PROCEDURE — 36000711: Performed by: ORTHOPAEDIC SURGERY

## 2018-08-15 PROCEDURE — 63600175 PHARM REV CODE 636 W HCPCS: Performed by: ANESTHESIOLOGY

## 2018-08-15 PROCEDURE — 25000003 PHARM REV CODE 250: Performed by: ORTHOPAEDIC SURGERY

## 2018-08-15 PROCEDURE — G8978 MOBILITY CURRENT STATUS: HCPCS | Mod: CJ

## 2018-08-15 PROCEDURE — 36000710: Performed by: ORTHOPAEDIC SURGERY

## 2018-08-15 PROCEDURE — 71000016 HC POSTOP RECOV ADDL HR: Performed by: ORTHOPAEDIC SURGERY

## 2018-08-15 PROCEDURE — 71000015 HC POSTOP RECOV 1ST HR: Performed by: ORTHOPAEDIC SURGERY

## 2018-08-15 PROCEDURE — 97116 GAIT TRAINING THERAPY: CPT

## 2018-08-15 PROCEDURE — 71000033 HC RECOVERY, INTIAL HOUR: Performed by: ORTHOPAEDIC SURGERY

## 2018-08-15 PROCEDURE — 93005 ELECTROCARDIOGRAM TRACING: CPT

## 2018-08-15 PROCEDURE — 93010 ELECTROCARDIOGRAM REPORT: CPT | Mod: ,,, | Performed by: INTERNAL MEDICINE

## 2018-08-15 PROCEDURE — 25000003 PHARM REV CODE 250: Performed by: STUDENT IN AN ORGANIZED HEALTH CARE EDUCATION/TRAINING PROGRAM

## 2018-08-15 PROCEDURE — 63600175 PHARM REV CODE 636 W HCPCS: Performed by: STUDENT IN AN ORGANIZED HEALTH CARE EDUCATION/TRAINING PROGRAM

## 2018-08-15 PROCEDURE — 63600175 PHARM REV CODE 636 W HCPCS: Performed by: ORTHOPAEDIC SURGERY

## 2018-08-15 PROCEDURE — 29881 ARTHRS KNE SRG MNISECTMY M/L: CPT | Mod: LT,,, | Performed by: ORTHOPAEDIC SURGERY

## 2018-08-15 PROCEDURE — 37000009 HC ANESTHESIA EA ADD 15 MINS: Performed by: ORTHOPAEDIC SURGERY

## 2018-08-15 PROCEDURE — 97161 PT EVAL LOW COMPLEX 20 MIN: CPT

## 2018-08-15 RX ORDER — CEFAZOLIN SODIUM 2 G/50ML
2 SOLUTION INTRAVENOUS
Status: DISCONTINUED | OUTPATIENT
Start: 2018-08-15 | End: 2018-08-15 | Stop reason: HOSPADM

## 2018-08-15 RX ORDER — CEFAZOLIN SODIUM 1 G/3ML
INJECTION, POWDER, FOR SOLUTION INTRAMUSCULAR; INTRAVENOUS
Status: DISCONTINUED | OUTPATIENT
Start: 2018-08-15 | End: 2018-08-15

## 2018-08-15 RX ORDER — SODIUM CHLORIDE, SODIUM LACTATE, POTASSIUM CHLORIDE, CALCIUM CHLORIDE 600; 310; 30; 20 MG/100ML; MG/100ML; MG/100ML; MG/100ML
INJECTION, SOLUTION INTRAVENOUS CONTINUOUS
Status: DISCONTINUED | OUTPATIENT
Start: 2018-08-15 | End: 2018-08-15 | Stop reason: HOSPADM

## 2018-08-15 RX ORDER — ROCURONIUM BROMIDE 10 MG/ML
INJECTION, SOLUTION INTRAVENOUS
Status: DISCONTINUED | OUTPATIENT
Start: 2018-08-15 | End: 2018-08-15

## 2018-08-15 RX ORDER — LIDOCAINE HYDROCHLORIDE 10 MG/ML
1 INJECTION, SOLUTION EPIDURAL; INFILTRATION; INTRACAUDAL; PERINEURAL ONCE
Status: DISCONTINUED | OUTPATIENT
Start: 2018-08-15 | End: 2018-08-15 | Stop reason: HOSPADM

## 2018-08-15 RX ORDER — ACETAMINOPHEN 10 MG/ML
1000 INJECTION, SOLUTION INTRAVENOUS ONCE
Status: COMPLETED | OUTPATIENT
Start: 2018-08-15 | End: 2018-08-15

## 2018-08-15 RX ORDER — HYDROCODONE BITARTRATE AND ACETAMINOPHEN 5; 325 MG/1; MG/1
1 TABLET ORAL EVERY 6 HOURS PRN
Qty: 20 TABLET | Refills: 0 | Status: SHIPPED | OUTPATIENT
Start: 2018-08-15 | End: 2019-05-10 | Stop reason: ALTCHOICE

## 2018-08-15 RX ORDER — SODIUM CHLORIDE 0.9 % (FLUSH) 0.9 %
3 SYRINGE (ML) INJECTION
Status: DISCONTINUED | OUTPATIENT
Start: 2018-08-15 | End: 2018-08-15 | Stop reason: HOSPADM

## 2018-08-15 RX ORDER — HYDROMORPHONE HYDROCHLORIDE 2 MG/ML
0.5 INJECTION, SOLUTION INTRAMUSCULAR; INTRAVENOUS; SUBCUTANEOUS EVERY 5 MIN PRN
Status: DISCONTINUED | OUTPATIENT
Start: 2018-08-15 | End: 2018-08-15 | Stop reason: HOSPADM

## 2018-08-15 RX ORDER — NEOSTIGMINE METHYLSULFATE 1 MG/ML
INJECTION, SOLUTION INTRAVENOUS
Status: DISCONTINUED | OUTPATIENT
Start: 2018-08-15 | End: 2018-08-15

## 2018-08-15 RX ORDER — KETOROLAC TROMETHAMINE 30 MG/ML
15 INJECTION, SOLUTION INTRAMUSCULAR; INTRAVENOUS ONCE
Status: COMPLETED | OUTPATIENT
Start: 2018-08-15 | End: 2018-08-15

## 2018-08-15 RX ORDER — ONDANSETRON 2 MG/ML
INJECTION INTRAMUSCULAR; INTRAVENOUS
Status: DISCONTINUED | OUTPATIENT
Start: 2018-08-15 | End: 2018-08-15

## 2018-08-15 RX ORDER — LIDOCAINE HCL/PF 100 MG/5ML
SYRINGE (ML) INTRAVENOUS
Status: DISCONTINUED | OUTPATIENT
Start: 2018-08-15 | End: 2018-08-15

## 2018-08-15 RX ORDER — PROPOFOL 10 MG/ML
VIAL (ML) INTRAVENOUS
Status: DISCONTINUED | OUTPATIENT
Start: 2018-08-15 | End: 2018-08-15

## 2018-08-15 RX ORDER — ONDANSETRON 2 MG/ML
INJECTION INTRAMUSCULAR; INTRAVENOUS
Status: DISCONTINUED
Start: 2018-08-15 | End: 2018-08-15 | Stop reason: HOSPADM

## 2018-08-15 RX ORDER — ONDANSETRON 2 MG/ML
4 INJECTION INTRAMUSCULAR; INTRAVENOUS ONCE
Status: COMPLETED | OUTPATIENT
Start: 2018-08-15 | End: 2018-08-15

## 2018-08-15 RX ORDER — HYDROCODONE BITARTRATE AND ACETAMINOPHEN 5; 325 MG/1; MG/1
1 TABLET ORAL EVERY 4 HOURS PRN
Status: DISCONTINUED | OUTPATIENT
Start: 2018-08-15 | End: 2018-08-15 | Stop reason: HOSPADM

## 2018-08-15 RX ORDER — MIDAZOLAM HYDROCHLORIDE 1 MG/ML
INJECTION, SOLUTION INTRAMUSCULAR; INTRAVENOUS
Status: DISCONTINUED | OUTPATIENT
Start: 2018-08-15 | End: 2018-08-15

## 2018-08-15 RX ORDER — BUPIVACAINE HYDROCHLORIDE 2.5 MG/ML
INJECTION, SOLUTION INFILTRATION; PERINEURAL
Status: DISCONTINUED | OUTPATIENT
Start: 2018-08-15 | End: 2018-08-15 | Stop reason: HOSPADM

## 2018-08-15 RX ORDER — ONDANSETRON 2 MG/ML
4 INJECTION INTRAMUSCULAR; INTRAVENOUS DAILY PRN
Status: DISCONTINUED | OUTPATIENT
Start: 2018-08-15 | End: 2018-08-15 | Stop reason: HOSPADM

## 2018-08-15 RX ORDER — TRIAMCINOLONE ACETONIDE 40 MG/ML
INJECTION, SUSPENSION INTRA-ARTICULAR; INTRAMUSCULAR
Status: DISCONTINUED | OUTPATIENT
Start: 2018-08-15 | End: 2018-08-15 | Stop reason: HOSPADM

## 2018-08-15 RX ORDER — HYDROCODONE BITARTRATE AND ACETAMINOPHEN 5; 325 MG/1; MG/1
2 TABLET ORAL EVERY 4 HOURS PRN
Status: DISCONTINUED | OUTPATIENT
Start: 2018-08-15 | End: 2018-08-15 | Stop reason: HOSPADM

## 2018-08-15 RX ORDER — FENTANYL CITRATE 50 UG/ML
INJECTION, SOLUTION INTRAMUSCULAR; INTRAVENOUS
Status: DISCONTINUED | OUTPATIENT
Start: 2018-08-15 | End: 2018-08-15

## 2018-08-15 RX ORDER — GLYCOPYRROLATE 0.2 MG/ML
INJECTION INTRAMUSCULAR; INTRAVENOUS
Status: DISCONTINUED | OUTPATIENT
Start: 2018-08-15 | End: 2018-08-15

## 2018-08-15 RX ORDER — MUPIROCIN 20 MG/G
OINTMENT TOPICAL
Status: DISCONTINUED | OUTPATIENT
Start: 2018-08-15 | End: 2018-08-15 | Stop reason: HOSPADM

## 2018-08-15 RX ORDER — DIPHENHYDRAMINE HYDROCHLORIDE 50 MG/ML
12.5 INJECTION INTRAMUSCULAR; INTRAVENOUS EVERY 6 HOURS PRN
Status: DISCONTINUED | OUTPATIENT
Start: 2018-08-15 | End: 2018-08-15 | Stop reason: HOSPADM

## 2018-08-15 RX ADMIN — PROPOFOL 160 MG: 10 INJECTION, EMULSION INTRAVENOUS at 09:08

## 2018-08-15 RX ADMIN — HYDROMORPHONE HYDROCHLORIDE 0.5 MG: 2 INJECTION INTRAMUSCULAR; INTRAVENOUS; SUBCUTANEOUS at 11:08

## 2018-08-15 RX ADMIN — GLYCOPYRROLATE 0.6 MG: 0.2 INJECTION, SOLUTION INTRAMUSCULAR; INTRAVENOUS at 10:08

## 2018-08-15 RX ADMIN — SODIUM CHLORIDE, SODIUM LACTATE, POTASSIUM CHLORIDE, AND CALCIUM CHLORIDE: .6; .31; .03; .02 INJECTION, SOLUTION INTRAVENOUS at 09:08

## 2018-08-15 RX ADMIN — ONDANSETRON 4 MG: 2 INJECTION INTRAMUSCULAR; INTRAVENOUS at 11:08

## 2018-08-15 RX ADMIN — CEFAZOLIN 2 G: 330 INJECTION, POWDER, FOR SOLUTION INTRAMUSCULAR; INTRAVENOUS at 09:08

## 2018-08-15 RX ADMIN — ROCURONIUM BROMIDE 35 MG: 10 INJECTION, SOLUTION INTRAVENOUS at 09:08

## 2018-08-15 RX ADMIN — LIDOCAINE HYDROCHLORIDE 80 MG: 20 INJECTION, SOLUTION INTRAVENOUS at 09:08

## 2018-08-15 RX ADMIN — FENTANYL CITRATE 100 MCG: 50 INJECTION, SOLUTION INTRAMUSCULAR; INTRAVENOUS at 09:08

## 2018-08-15 RX ADMIN — KETOROLAC TROMETHAMINE 15 MG: 30 INJECTION, SOLUTION INTRAMUSCULAR at 01:08

## 2018-08-15 RX ADMIN — ACETAMINOPHEN 1000 MG: 10 INJECTION, SOLUTION INTRAVENOUS at 01:08

## 2018-08-15 RX ADMIN — ONDANSETRON 4 MG: 2 INJECTION, SOLUTION INTRAMUSCULAR; INTRAVENOUS at 10:08

## 2018-08-15 RX ADMIN — NEOSTIGMINE METHYLSULFATE 4 MG: 1 INJECTION INTRAVENOUS at 10:08

## 2018-08-15 RX ADMIN — MIDAZOLAM 2 MG: 1 INJECTION INTRAMUSCULAR; INTRAVENOUS at 09:08

## 2018-08-15 RX ADMIN — MUPIROCIN: 20 OINTMENT TOPICAL at 08:08

## 2018-08-15 NOTE — OP NOTE
DATE OF PROCEDURE:  08/15/2018.    PREOPERATIVE DIAGNOSIS:  Internal derangement of the left knee.    POSTOPERATIVE DIAGNOSIS:  Internal derangement of the left knee.    PROCEDURE:  Arthroscopic partial medial meniscectomy of the left knee.    SURGEON:  Handy Elkins M.D.    INDICATIONS:  The patient has persistent mechanical symptoms refractory to   intensive nonsurgical care.    FINDINGS AT THE TIME OF SURGERY:  The patient was found to have one area of   limited grade II to III chondromalacia of the lateral facet of her patella.    There was a degenerative horizontal cleavage tear of the posterior horn of the   medial meniscus.    DESCRIPTION OF OPERATION:  The patient was taken to the Operating Room and given   general anesthesia.  Intravenous Ancef was given.  A tourniquet was placed on   the left thigh, but not inflated.  The left lower extremity was placed in a leg   ferrer.  Limbs were carefully padded and supported.  The left knee was prepped   and draped in the usual sterile fashion.  A standard inferolateral portal was   established and the scope was introduced into the patellofemoral joint.  The   joint was distended with the pump.  Suprapatellar pouch and gutters had no   synovitis or loose bodies.  The articular surface of the patella was inspected.    There was a 5 to 10 mm area of grade II to III chondromalacia of the lateral   facet of the patella.  The trochlea was intact.  Tracking was central.  The   scope was swung to the medial compartment and a needle was used to localize a   medial portal which was then established.  A probe was introduced.  Probing the medial meniscus revealed   that there was moderately extensive horizontal cleavage tear of the posterior   horn of the medial meniscus.  The articular surface had minimal chondromalacia   in the medial compartment.  The notch was inspected.  The ACL was normal.  The   scope was swung to the lateral compartment.  The articular cartilage was  intact   and the meniscus was inspected and probed and found to be intact.  The scope was   then swung back to the medial compartment.  Using hand punches and a shaver,   partial medial meniscectomy was performed back to a smooth stable rim, which was   balanced.  This was inspected with the probe and found to be satisfactory.  The   joint was irrigated and suctioned dry.  It was injected with Marcaine and   Kenalog.  The instruments were removed.  The portals were closed with Dermabond   and Steri-Strips.  Tegaderm was placed over the portals and Ace wrap dressing   was applied.  The patient was awakened and transported to Recovery Room in   stable condition.  There was no blood loss and no known complications.      MALIA/IN  dd: 08/15/2018 10:51:10 (CDT)  td: 08/15/2018 12:53:22 (CD)  Doc ID   #[id number]  Job ID #[id number]    CC:

## 2018-08-15 NOTE — TRANSFER OF CARE
"Anesthesia Transfer of Care Note    Patient: Twila Roman    Procedure(s) Performed: Procedure(s) (LRB):  ARTHROSCOPY, KNEE (Left)    Patient location: PACU    Anesthesia Type: general    Transport from OR: Transported from OR on 2-3 L/min O2 by NC with adequate spontaneous ventilation. Upon arrival to PACU/ICU, patient attached to 100% O2 by T-piece with adequate spontaneous ventilation    Post pain: adequate analgesia    Post assessment: no apparent anesthetic complications    Post vital signs: stable    Level of consciousness: awake and alert    Nausea/Vomiting: no nausea/vomiting    Complications: none    Transfer of care protocol was followed      Last vitals:   Visit Vitals  /77 (BP Location: Right arm, Patient Position: Lying)   Pulse 77   Temp 36.4 °C (97.5 °F) (Skin)   Resp 18   Ht 5' 5" (1.651 m)   Wt 68 kg (150 lb)   LMP 04/19/2015 (Approximate)   SpO2 98%   Breastfeeding? No   BMI 24.96 kg/m²     "

## 2018-08-15 NOTE — BRIEF OP NOTE
[facility]  Brief Operative Note     SUMMARY     Surgery Date: 8/15/2018     Surgeon(s) and Role:     * Handy Elkins MD - Primary    Assisting Surgeon: None    Pre-op Diagnosis:  Internal derangement of left knee [M23.92]    Post-op Diagnosis:  Post-Op Diagnosis Codes:     * Internal derangement of left knee [M23.92]    Procedure(s) (LRB):  ARTHROSCOPY, KNEE (Left)    Anesthesia: Choice    Description of the findings of the procedure: MM tear. DJD patella    Findings/Key Components: Partial medial menisectomy    Estimated Blood Loss: * No values recorded between 8/15/2018 10:04 AM and 8/15/2018 10:46 AM *         Specimens:   Specimen (12h ago, onward)    None          Discharge Note    SUMMARY     Admit Date: 8/15/2018    Discharge Date and Time:  08/15/2018 10:46 AM    Hospital Course (synopsis of major diagnoses, care, treatment, and services provided during the course of the hospital stay): Standard arthroscopic partial MM     Final Diagnosis: Post-Op Diagnosis Codes:     * Internal derangement of left knee [M23.92]    Disposition: Home or Self Care    Follow Up/Patient Instructions:     Medications:  Reconciled Home Medications:      Medication List      ASK your doctor about these medications    * albuterol 90 mcg/actuation inhaler  Inhale 1-2 puffs into the lungs every 6 (six) hours as needed for Wheezing.     * albuterol 90 mcg/actuation inhaler  Inhale 1-2 puffs into the lungs every 6 (six) hours as needed for Wheezing.     biotin 5,000 mcg Tbdl  Take 1 capsule by mouth once daily.     buPROPion 300 MG 24 hr tablet  Commonly known as:  WELLBUTRIN XL  Take 1 tablet (300 mg total) by mouth once daily.     * citalopram 10 MG tablet  Commonly known as:  CELEXA     * citalopram 10 MG tablet  Commonly known as:  CELEXA  TAKE 1 TABLET BY MOUTH EVERY DAY     FLAXSEED OIL MISC  by Misc.(Non-Drug; Combo Route) route.     LORazepam 1 MG tablet  Commonly known as:  ATIVAN  TAKE 1 TABLET BY MOUTH  EVERY NIGHT AS NEEDED     metoprolol succinate 25 MG 24 hr tablet  Commonly known as:  TOPROL-XL  TAKE 1 TABLET(25 MG) BY MOUTH EVERY DAY     montelukast 10 mg tablet  Commonly known as:  SINGULAIR  TAKE 1 TABLET BY MOUTH EVERY EVENING     multivitamin per tablet  Commonly known as:  THERAGRAN  Take 1 tablet by mouth once daily.     naproxen 500 MG tablet  Commonly known as:  NAPROSYN  TAKE 1 TABLET BY MOUTH TWICE DAILY WITH MEALS     VITAMIN D3 2,000 unit Tab  Generic drug:  cholecalciferol (vitamin D3)  Take 1 tablet by mouth once daily.         * This list has 4 medication(s) that are the same as other medications prescribed for you. Read the directions carefully, and ask your doctor or other care provider to review them with you.              No discharge procedures on file.  Follow-up Information     Follow up In 2 weeks.

## 2018-08-15 NOTE — PLAN OF CARE
"VSS. States pain getting better @ this time. Denies nausea. "Ok to release to room", per Dr Caraballo. Report given to SIERRA Dickerson.   "

## 2018-08-15 NOTE — DISCHARGE INSTRUCTIONS
May remove dressing in 48 hours and shower normally  May bear weight with crutches as needed  Ice and elevate left knee until swelling resolves    Discharge Instructions for Knee Arthroscopy  You had knee arthroscopy. This surgical procedure uses small incisions to locate, identify, and treat problems inside the knee. These problems include loose bodies, meniscal tears, bone spurs, osteochondritis dissecans (OCD), and synovitis. Below are tips to help speed your recovery from surgery.  Activity  · Dont drive until your doctor says its OK. And never drive while taking opioid pain medicine.  · Remember to take pain medicines as directed; dont wait for the pain to get bad. And don't drink alcohol while taking pain medicines.  · Follow weight-bearing instructions given by your doctor. He or she may require you to use crutches to keep weight off your knee.  · Unless your doctor tells you otherwise, begin using the affected knee as much as you can tolerate 3 days after surgery.  · Slowly bend and straighten your affected leg as far as you can, unless your doctor tells you otherwise. Do this several times a day.  · Rest your knee by lying down and putting pillows under it for the first 3 days after surgery. Keep your ankle elevated above the level of your heart. This helps keep swelling down.  · Follow your doctors instructions about wearing and caring for a brace, immobilizer, or elastic dressing.  · Point and flex your foot, and rotate your ankle as much as possible during the first few weeks following surgery. Also, wiggle your toes as much as possible.  Incision care  · Check your incision daily for redness, tenderness, or drainage.  · Dont be alarmed if there is some bruising, slight swelling of the knee, or a small amount of blood on the bandage.  · Adjust the bandage or brace as needed. It should feel supportive on your knee, but not too tight.   · Dont soak your incision in water (no hot tubs, bathtubs,  swimming pools) until your doctor says its OK.  · Wait 2 day(s) after your surgery to begin showering. Then shower as needed. Cover your knee with plastic to keep the dressing or brace dry. Once your dressing is removed, follow your doctors instructions for care of the wound. And sit on a shower stool so that you dont fall while showering.  · Use an ice pack or bag of frozen peas--or something similar--wrapped in a thin towel to reduce the swelling. Keep the foot elevated while you ice the knee. Apply the ice pack for 20 minutes; then remove it for 20 minutes. Repeat as needed. Icing helps reduce swelling.  Other precautions  · Arrange your household to keep the items you need within reach.  · Remove throw rugs, electrical cords, and anything else that may cause you to fall.  · Use nonslip bath mats, grab bars, an elevated toilet seat, and a shower chair in your bathroom.  · Use a cane, crutches, a walker, or handrails until your balance, flexibility, and strength improve, and you can put weight on your leg. And remember to ask for help from others when you need it.  · Free up your hands so that you can use them to keep balance. Use a nan pack, apron, or pockets to carry things.  Follow-up  Make a follow-up appointment as directed by your doctor.     When to seek medical attention  Call 911 right away if you have any of the following:  · Chest pain  · Shortness of breath  · Severe nausea  Otherwise, call your doctor immediately if you have any of the following:  · Pain that is not relieved by medicine or rest  · Continued bleeding through the bandage  · Tingling, numbness, or coldness in your foot or leg  · Fever above 100.4°F (38.0°C) or shaking chills  · Excessive swelling, increased redness, or any drainage around the incision  · Swelling, tenderness, or pain in your leg      Date Last Reviewed: 11/16/2015  © 1782-8023 [employer]. [address] [place] [address], [place] [address]. All rights reserved.  This information is not intended as a substitute for professional medical care. Always follow your healthcare professional's instructions.  ANESTHESIA  -For the first 24 hours after surgery:  Do not drive, use heavy equipment, make important decisions, or drink alcohol  -It is normal to feel sleepy for several hours.  Rest until you are more awake.  -Have someone stay with you, if needed.  They can watch for problems and help keep you safe.  -Some possible post anesthesia side effects include: nausea and vomiting, sore throat and hoarseness, sleepiness, and dizziness.    PAIN  -If you have pain after surgery, pain medicine will help you feel better.  Take it as directed, before pain becomes severe.  Most pain relievers taken by mouth need at least 20-30 minutes to start working.  -Do not drive or drink alcohol while taking pain medicine.  -Pain medication can upset your stomach.  Taking them with a little food may help.  -Other ways to help control pain: elevation, ice, and relaxation  -Call your surgeon if still having unmanageable pain an hour after taking pain medicine.  -Pain medicine can cause constipation.  Taking an over-the counter stool softener while on prescription pain medicine and drinking plenty of fluids can prevent this side effect.  -Call your surgeon if you have severe side effects like: breathing problems, trouble waking up, dizziness, confusion, or severe constipation.    NAUSEA  -Some people have nausea after surgery.  This is often because of anesthesia, pain, pain medicine, or the stress of surgery.  -Do not push yourself to eat.  Start off with clear liquids and soup.  Slowly move to solid foods.  Don't eat fatty, rich, spicy foods at first.  Eat smaller amounts.  -If you develop persistent nausea and vomiting please notify your surgeon immediately.    BLEEDING  -Different types of surgery require different types of care and dressing changes.  It is important to follow all instructions and  advice from your surgeon.  Change dressing as directed.  Call your surgeon for any concerns regarding postop bleeding.    SIGNS OF INFECTION  -Signs of infection include: fever, swelling, drainage, and redness  -Notify your surgeon if you have a fever of 100.4 F (38.0 C) or higher.  -Notify your surgeon if you notice redness, swelling, increased pain, pus, or a foul smell at the incision site.               Managing Your Opioid Medication at Home         At [facility] we want to provide you with quality care and to help you feel comfortable. You have an important role in managing your pain. With your health care provider, pain may be reduced safely and effectively. Opioid medication may help reduce your pain but it is important to understand the benefits and risks. Please talk to your health care provider about the questions you have concerning opioids.          What are Opioids?    Opioids are a type of narcotic medication that help reduce pain. Opioids are typically prescribed for short term pain management after surgery or injury.   Common names of opioids are:   Hydrocodone (e.g.Vicodin and Norco)   Hydromorphone(e.g. Dilaudid)    Oxycodone(e.g. Percocet and OxyContin)   Morphine Sulfate(e.g. MS Contin)   Tramadol (e.g. Ultram)   Fentanyl Patch(e.g. Duragesic patch)    Side effects may include:Constipation, severe sleepiness, nausea, respiratory problems or slow breathing, sweating, itching.  Opioids are called controlled substances because their use is highly regulated by laws. [place] law only allows up to 7 days of opioid medicine to be prescribed for acute pain.   [facility] will obey all laws for prescribing opioids. [facility] follows the Center for Disease Control and Prevention (CDC) Guidelines for prescribing opioids. To learn more about these guidelines, visit www.CDC.gov and search opioid.       Benefits and Risks with Opioids     Opioids may reduce pain in the short term.      When acute pain is well controlled, you can be more active, sleep and eat better, feel more positive, and recover faster.     There are serious risks of drug misuse, opioid use disorder, and overdose with prolonged use of opioid medications. Opioid medications can cause addiction and/or death if not used properly. Risks may also include the following:    o Tolerance is the need for higher doses to achieve the same amount of pain control.    o Dependence means you have withdrawals when the medication is suddenly stopped. Withdrawal symptoms include sweating, watery eyes, runny nose, anxiety, tremors, achy muscles, hot and cold flashes, gooseflesh, abdominal cramping, or diarrhea.    o Addiction happens when taking the medication without a medical reason causing harm to self. Addiction is a drug-induced brain disease that can happen with repeated, irregular opioid use.    o Drug misuse is the use of prescription drugs without a prescription or in a manner other than as directed by a health care provider.        Short term use of opioids is not likely to induce addiction and long periods of abstinence reduce your risk of addiction. Long-term opioid use often begins with treatment of acute pain.     Carefully Follow These Instructions     Only take the opioid medications provided by your health care provider.     Take opioid medication only as prescribed. DO NOT increase your dose. If your pain increases, you must report this to your provider. It is important to follow the medication dosage and times prescribed by your health care provider.     Get your opioid medication prescription filled at only one pharmacy.     Protect your prescription. Your opioid prescription will not be replaced if lost or stolen.     Provide a complete list of all medications you take. Tell your health care provider if you take sedatives, Xanax, Ativan, Valium, seizure medications, muscle relaxers, or psychiatric medications.  Combining these medications with opioids can cause life threatening side effects when not monitored properly by your health care provider.     It is important for your safety to tell your provider if you are pregnant, 65 years or older, have a history of or currently have a substance use disorder, medication overdose, obstructive sleep apnea, or mental health conditions such as depression. Tell your provider of current or past abuse of prescription drugs, illegal drugs, or alcohol.     DO NOT use alcohol, sleeping pills, or illegal drugs (including marijuana) with opioids as they may cause serious harm to your body.     DO NOT cut pills or tablets in half or chew them in your mouth, especially long-acting opioids.     Your body may undergo physical and/or mental changes from your surgery, anesthesia, bedrest and/or change in medication after a hospital stay. Opioids may impair the ability to drive or operate machinery. Do not perform these activities until you become familiar with how this medication affects you.     It is not safe to take opioid medications while pregnant. If you are pregnant while taking opioids, your unborn child may experience withdrawal symptoms after birth. It is important to take precautions against becoming pregnant when taking opioids. Talk to your health care provider about additional precautions to prevent pregnancy while taking opioids.     Inform your primary care provider immediately if you become pregnant while taking opioids.     Let your primary care provider know of any side effects from your pain medication.      Other Important Information about Opioids     Make an appointment with your primary care provider if your opioid pain medication is not managing your pain and if you think you need a different medication.     It is against the law to give or sell opioids to another person.     Keep this medicine safely locked and away from children.     If you need  to get a refill of an opioid medication, you must have one primary care provider, sign a pain contract with your primary care provider, and be willing to have periodic urine testing at your expense.     Are There Other Ways You Can Reduce Pain?   There are a variety of methods that do not involve medicine that can be used in combination with medicine or instead of medicine to help reduce pain, such as distracting activities (like watching TV or actively listening to music), deep breathing, relaxation exercises, meditation, hot and cold therapy.   Ask your nurse and health care provider for more information about managing pain.     Acetaminophen; Hydrocodone tablets or capsules  What is this medicine?  ACETAMINOPHEN; HYDROCODONE (a set a DANY vale fen; melita droe KOE done) is a pain reliever. It is used to treat moderate to severe pain.  How should I use this medicine?  Take this medicine by mouth with a glass of water. Follow the directions on the prescription label. You can take it with or without food. If it upsets your stomach, take it with food. Do not take your medicine more often than directed.  A special MedGuide will be given to you by the pharmacist with each prescription and refill. Be sure to read this information carefully each time.  Talk to your pediatrician regarding the use of this medicine in children. Special care may be needed.  What side effects may I notice from receiving this medicine?  Side effects that you should report to your doctor or health care professional as soon as possible:  · allergic reactions like skin rash, itching or hives, swelling of the face, lips, or tongue  · breathing problems  · confusion  · redness, blistering, peeling or loosening of the skin, including inside the mouth  · signs and symptoms of low blood pressure like dizziness; feeling faint or lightheaded, falls; unusually weak or tired  · trouble passing urine or change in the amount of urine  · yellowing of the eyes or  skin  Side effects that usually do not require medical attention (report to your doctor or health care professional if they continue or are bothersome):  · constipation  · dry mouth  · nausea, vomiting  · tiredness  What may interact with this medicine?  This medicine may interact with the following medications:  · alcohol  · antiviral medicines for HIV or AIDS  · atropine  · antihistamines for allergy, cough and cold  · certain antibiotics like erythromycin, clarithromycin  · certain medicines for anxiety or sleep  · certain medicines for bladder problems like oxybutynin, tolterodine  · certain medicines for depression like amitriptyline, fluoxetine, sertraline  · certain medicines for fungal infections like ketoconazole and itraconazole  · certain medicines for Parkinson's disease like benztropine, trihexyphenidyl  · certain medicines for seizures like carbamazepine, phenobarbital, phenytoin, primidone  · certain medicines for stomach problems like dicyclomine, hyoscyamine  · certain medicines for travel sickness like scopolamine  · general anesthetics like halothane, isoflurane, methoxyflurane, propofol  · ipratropium  · local anesthetics like lidocaine, pramoxine, tetracaine  · MAOIs like Carbex, Eldepryl, Marplan, Nardil, and Parnate  · medicines that relax muscles for surgery  · other medicines with acetaminophen  · other narcotic medicines for pain or cough  · phenothiazines like chlorpromazine, mesoridazine, prochlorperazine, thioridazine  · rifampin  What if I miss a dose?  If you miss a dose, take it as soon as you can. If it is almost time for your next dose, take only that dose. Do not take double or extra doses.  Where should I keep my medicine?  Keep out of the reach of children. This medicine can be abused. Keep your medicine in a safe place to protect it from theft. Do not share this medicine with anyone. Selling or giving away this medicine is dangerous and against the law.  This medicine may cause  accidental overdose and death if it taken by other adults, children, or pets. Mix any unused medicine with a substance like cat litter or coffee grounds. Then throw the medicine away in a sealed container like a sealed bag or a coffee can with a lid. Do not use the medicine after the expiration date.  Store at room temperature between 15 and 30 degrees C (59 and 86 degrees F).  What should I tell my health care provider before I take this medicine?  They need to know if you have any of these conditions:  · brain tumor  · Crohn's disease, inflammatory bowel disease, or ulcerative colitis  · drug abuse or addiction  · head injury  · heart or circulation problems  · if you often drink alcohol  · kidney disease or problems going to the bathroom  · liver disease  · lung disease, asthma, or breathing problems  · an unusual or allergic reaction to acetaminophen, hydrocodone, other opioid analgesics, other medicines, foods, dyes, or preservatives  · pregnant or trying to get pregnant  · breast-feeding  What should I watch for while using this medicine?  Tell your doctor or health care professional if your pain does not go away, if it gets worse, or if you have new or a different type of pain. You may develop tolerance to the medicine. Tolerance means that you will need a higher dose of the medicine for pain relief. Tolerance is normal and is expected if you take the medicine for a long time.  Do not suddenly stop taking your medicine because you may develop a severe reaction. Your body becomes used to the medicine. This does NOT mean you are addicted. Addiction is a behavior related to getting and using a drug for a non-medical reason. If you have pain, you have a medical reason to take pain medicine. Your doctor will tell you how much medicine to take. If your doctor wants you to stop the medicine, the dose will be slowly lowered over time to avoid any side effects.  There are different types of narcotic medicines  (opiates). If you take more than one type at the same time or if you are taking another medicine that also causes drowsiness, you may have more side effects. Give your health care provider a list of all medicines you use. Your doctor will tell you how much medicine to take. Do not take more medicine than directed. Call emergency for help if you have problems breathing or unusual sleepiness.  Do not take other medicines that contain acetaminophen with this medicine. Always read labels carefully. If you have questions, ask your doctor or pharmacist.  If you take too much acetaminophen get medical help right away. Too much acetaminophen can be very dangerous and cause liver damage. Even if you do not have symptoms, it is important to get help right away.  You may get drowsy or dizzy. Do not drive, use machinery, or do anything that needs mental alertness until you know how this medicine affects you. Do not stand or sit up quickly, especially if you are an older patient. This reduces the risk of dizzy or fainting spells. Alcohol may interfere with the effect of this medicine. Avoid alcoholic drinks.  The medicine will cause constipation. Try to have a bowel movement at least every 2 to 3 days. If you do not have a bowel movement for 3 days, call your doctor or health care professional.  Your mouth may get dry. Chewing sugarless gum or sucking hard candy, and drinking plenty of water may help. Contact your doctor if the problem does not go away or is severe.  NOTE:This sheet is a summary. It may not cover all possible information. If you have questions about this medicine, talk to your doctor, pharmacist, or health care provider. Copyright© 2017 [employer]
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
## 2018-08-15 NOTE — PLAN OF CARE
VSS. Pt nauseated. Dr Caraballo notified. States to order zofran 4mg IV x1 now. TORB. Cont to monitor.

## 2018-08-15 NOTE — INTERVAL H&P NOTE
The patient has been examined and the H&P has been reviewed:    I concur with the findings and no changes have occurred since H&P was written.    Anesthesia/Surgery risks, benefits and alternative options discussed and understood by patient/family.          Active Hospital Problems    Diagnosis  POA    Internal derangement of left knee [M23.92]  Yes      Resolved Hospital Problems   No resolved problems to display.

## 2018-08-15 NOTE — ANESTHESIA POSTPROCEDURE EVALUATION
"Anesthesia Post Evaluation    Patient: Twila Roman    Procedure(s) Performed: Procedure(s) (LRB):  ARTHROSCOPY, KNEE (Left)    Final Anesthesia Type: general  Patient location during evaluation: PACU  Patient participation: Yes- Able to Participate  Level of consciousness: awake and alert  Post-procedure vital signs: reviewed and stable  Pain management: adequate  Airway patency: patent  PONV status at discharge: No PONV  Anesthetic complications: no      Cardiovascular status: blood pressure returned to baseline  Respiratory status: unassisted  Hydration status: euvolemic  Follow-up not needed.        Visit Vitals  /71   Pulse 68   Temp 36.4 °C (97.6 °F) (Temporal)   Resp 15   Ht 5' 5" (1.651 m)   Wt 68 kg (150 lb)   LMP 04/19/2015 (Approximate)   SpO2 95%   Breastfeeding? No   BMI 24.96 kg/m²       Pain/Micah Score: Pain Assessment Performed: Yes (8/15/2018 11:37 AM)  Presence of Pain: complains of pain/discomfort (8/15/2018 11:37 AM)  Pain Rating Prior to Med Admin: 6 (8/15/2018 11:32 AM)  Micah Score: 10 (8/15/2018 11:37 AM)        "

## 2018-08-15 NOTE — PLAN OF CARE
Problem: Physical Therapy Goal  Goal: Physical Therapy Goal  Outcome: Outcome(s) achieved Date Met: 08/15/18  PT evaluation, gait training, and crutch training completed. Pt ambulates with B axillary crutches at supervision/mod I level. Plan to d/c home today.

## 2018-08-16 VITALS
HEIGHT: 65 IN | HEART RATE: 74 BPM | BODY MASS INDEX: 24.99 KG/M2 | OXYGEN SATURATION: 97 % | SYSTOLIC BLOOD PRESSURE: 107 MMHG | WEIGHT: 150 LBS | TEMPERATURE: 98 F | DIASTOLIC BLOOD PRESSURE: 69 MMHG | RESPIRATION RATE: 18 BRPM

## 2018-08-29 DIAGNOSIS — J01.90 ACUTE SINUSITIS, RECURRENCE NOT SPECIFIED, UNSPECIFIED LOCATION: ICD-10-CM

## 2018-08-29 RX ORDER — MONTELUKAST SODIUM 10 MG/1
TABLET ORAL
Qty: 30 TABLET | Refills: 6 | Status: SHIPPED | OUTPATIENT
Start: 2018-08-29 | End: 2019-04-16 | Stop reason: SDUPTHER

## 2018-08-29 RX ORDER — BUPROPION HYDROCHLORIDE 300 MG/1
TABLET ORAL
Qty: 30 TABLET | Refills: 6 | Status: SHIPPED | OUTPATIENT
Start: 2018-08-29 | End: 2019-04-16 | Stop reason: SDUPTHER

## 2018-08-31 ENCOUNTER — OFFICE VISIT (OUTPATIENT)
Dept: ORTHOPEDICS | Facility: CLINIC | Age: 53
End: 2018-08-31
Payer: COMMERCIAL

## 2018-08-31 VITALS — HEIGHT: 65 IN | WEIGHT: 150 LBS | BODY MASS INDEX: 24.99 KG/M2

## 2018-08-31 DIAGNOSIS — M23.92 INTERNAL DERANGEMENT OF LEFT KNEE: Primary | ICD-10-CM

## 2018-08-31 PROCEDURE — 99999 PR PBB SHADOW E&M-EST. PATIENT-LVL III: CPT | Mod: PBBFAC,,, | Performed by: ORTHOPAEDIC SURGERY

## 2018-08-31 PROCEDURE — 99024 POSTOP FOLLOW-UP VISIT: CPT | Mod: S$GLB,,, | Performed by: ORTHOPAEDIC SURGERY

## 2018-08-31 NOTE — PROGRESS NOTES
"Subjective:      Patient ID: Twila Roman is a 53 y.o. female.    Chief Complaint: Pain of the Left Knee      HPI:  Follow-up for left knee arthroscopy, patient reports that she is pretty comfortable.  No specific complaints      Current Outpatient Medications:     albuterol 90 mcg/actuation inhaler, Inhale 1-2 puffs into the lungs every 6 (six) hours as needed for Wheezing., Disp: 1 each, Rfl: 11    albuterol 90 mcg/actuation inhaler, Inhale 1-2 puffs into the lungs every 6 (six) hours as needed for Wheezing., Disp: 1 each, Rfl: 11    biotin 5,000 mcg TbDL, Take 1 capsule by mouth once daily., Disp: , Rfl:     buPROPion (WELLBUTRIN XL) 300 MG 24 hr tablet, TAKE 1 TABLET BY MOUTH ONCE DAILY, Disp: 30 tablet, Rfl: 6    cholecalciferol, vitamin D3, (VITAMIN D3) 2,000 unit Tab, Take 1 tablet by mouth once daily., Disp: , Rfl:     citalopram (CELEXA) 10 MG tablet, , Disp: , Rfl: 11    citalopram (CELEXA) 10 MG tablet, TAKE 1 TABLET BY MOUTH EVERY DAY, Disp: 30 tablet, Rfl: 3    FLAXSEED OIL MISC, by Misc.(Non-Drug; Combo Route) route., Disp: , Rfl:     HYDROcodone-acetaminophen (NORCO) 5-325 mg per tablet, Take 1 tablet by mouth every 6 (six) hours as needed for Pain., Disp: 20 tablet, Rfl: 0    LORazepam (ATIVAN) 1 MG tablet, TAKE 1 TABLET BY MOUTH EVERY NIGHT AS NEEDED, Disp: 30 tablet, Rfl: 0    metoprolol succinate (TOPROL-XL) 25 MG 24 hr tablet, TAKE 1 TABLET(25 MG) BY MOUTH EVERY DAY, Disp: 90 tablet, Rfl: 1    montelukast (SINGULAIR) 10 mg tablet, TAKE 1 TABLET BY MOUTH EVERY EVENING, Disp: 30 tablet, Rfl: 6    multivitamin (THERAGRAN) per tablet, Take 1 tablet by mouth once daily., Disp: , Rfl:     naproxen (NAPROSYN) 500 MG tablet, TAKE 1 TABLET BY MOUTH TWICE DAILY WITH MEALS, Disp: 60 tablet, Rfl: 0  Review of patient's allergies indicates:  No Known Allergies    Ht 5' 5" (1.651 m)   Wt 68 kg (150 lb)   LMP 04/19/2015 (Approximate)   BMI 24.96 kg/m²     ROS        Objective:          "     Appears comfortable  No limp  Portals healed nicely  Minimal swelling of the left knee  Left knee is nontender with full range of motion  Neurovascular exam is normal    Assessment:     Imaging:  I reviewed the patient's arthroscopic images and explained them in detail        1. Internal derangement of left knee        Status post partial meniscectomy        Plan:          No Follow-up on file.    I explained my diagnostic impression and the reasoning behind it in detail, using layman's terms.  Models and/or pictures were used to help in the explanation.    Appropriate activities explained    Physical therapy

## 2018-08-31 NOTE — LETTER
08/31/2018    To whom it may concern:    Twila Roman is under my medical care. She was seen today and may return to work/school on September 10, 2018, with the following restrictions: full duty.    Yours truly,        Handy Elkins MD

## 2018-09-11 RX ORDER — LORAZEPAM 1 MG/1
TABLET ORAL
Qty: 30 TABLET | Refills: 0 | Status: SHIPPED | OUTPATIENT
Start: 2018-09-11 | End: 2018-10-27 | Stop reason: SDUPTHER

## 2018-10-28 RX ORDER — LORAZEPAM 1 MG/1
TABLET ORAL
Qty: 30 TABLET | Refills: 0 | Status: SHIPPED | OUTPATIENT
Start: 2018-10-28 | End: 2018-11-30 | Stop reason: SDUPTHER

## 2018-10-28 RX ORDER — CITALOPRAM 10 MG/1
TABLET ORAL
Qty: 30 TABLET | Refills: 6 | Status: SHIPPED | OUTPATIENT
Start: 2018-10-28 | End: 2019-07-11 | Stop reason: SDUPTHER

## 2018-11-15 ENCOUNTER — PATIENT MESSAGE (OUTPATIENT)
Dept: PAIN MEDICINE | Facility: CLINIC | Age: 53
End: 2018-11-15

## 2018-11-16 ENCOUNTER — OFFICE VISIT (OUTPATIENT)
Dept: URGENT CARE | Facility: CLINIC | Age: 53
End: 2018-11-16
Payer: COMMERCIAL

## 2018-11-16 VITALS
DIASTOLIC BLOOD PRESSURE: 93 MMHG | OXYGEN SATURATION: 97 % | HEIGHT: 65 IN | TEMPERATURE: 98 F | BODY MASS INDEX: 24.99 KG/M2 | WEIGHT: 150 LBS | SYSTOLIC BLOOD PRESSURE: 134 MMHG | HEART RATE: 67 BPM | RESPIRATION RATE: 19 BRPM

## 2018-11-16 DIAGNOSIS — S01.112A LEFT EYELID LACERATION, INITIAL ENCOUNTER: Primary | ICD-10-CM

## 2018-11-16 PROCEDURE — 3008F BODY MASS INDEX DOCD: CPT | Mod: CPTII,S$GLB,, | Performed by: NURSE PRACTITIONER

## 2018-11-16 PROCEDURE — 99214 OFFICE O/P EST MOD 30 MIN: CPT | Mod: 25,S$GLB,, | Performed by: NURSE PRACTITIONER

## 2018-11-16 PROCEDURE — 12011 RPR F/E/E/N/L/M 2.5 CM/<: CPT | Mod: S$GLB,,, | Performed by: NURSE PRACTITIONER

## 2018-11-16 PROCEDURE — 3075F SYST BP GE 130 - 139MM HG: CPT | Mod: CPTII,S$GLB,, | Performed by: NURSE PRACTITIONER

## 2018-11-16 PROCEDURE — 3080F DIAST BP >= 90 MM HG: CPT | Mod: CPTII,S$GLB,, | Performed by: NURSE PRACTITIONER

## 2018-11-16 NOTE — PROGRESS NOTES
"Subjective:       Patient ID: Twila Roman is a 53 y.o. female.    Vitals:  height is 5' 5" (1.651 m) and weight is 68 kg (150 lb). Her oral temperature is 97.7 °F (36.5 °C). Her blood pressure is 134/93 (abnormal) and her pulse is 67. Her respiration is 19 and oxygen saturation is 97%.     Chief Complaint: Eye Injury (left)      The patient presents to clinic today complaints an upper left eyelid laceration that occurred this morning.  She claims that she was trying to get the removed from her upper night seen her directly days.  Denies any LOC.  Any headaches.  Denies nausea or vomiting.       Eye Injury    The left eye is affected. This is a new problem. The current episode started today. The problem occurs constantly. The problem has been unchanged. The injury mechanism was a direct trauma. The pain is at a severity of 7/10. The pain is moderate. There is no known exposure to pink eye. She does not wear contacts. Pertinent negatives include no blurred vision, eye discharge, double vision, eye redness, fever, foreign body sensation, itching, nausea, photophobia, recent URI, vomiting or weakness. She has tried nothing for the symptoms. The treatment provided no relief.       Constitution: Negative for fatigue and fever.   HENT: Negative for facial swelling and facial trauma.    Neck: Negative for neck stiffness.   Cardiovascular: Negative for chest trauma.   Eyes: Negative for eye trauma, eye discharge, eye itching, eye redness, photophobia, double vision and blurred vision.   Gastrointestinal: Negative for abdominal trauma, abdominal pain, nausea, vomiting and rectal bleeding.   Genitourinary: Negative for hematuria, missed menses, genital trauma and pelvic pain.   Musculoskeletal: Negative for pain, trauma, joint swelling and abnormal ROM of joint.   Skin: Negative for color change, wound, abrasion, laceration, erythema and bruising.   Neurological: Negative for dizziness, history of vertigo, " light-headedness, coordination disturbances, altered mental status and loss of consciousness.   Hematologic/Lymphatic: Negative for history of bleeding disorder.   Psychiatric/Behavioral: Negative for altered mental status.       Objective:      Physical Exam   Constitutional: She is oriented to person, place, and time. She appears well-developed and well-nourished.   HENT:   Head: Normocephalic and atraumatic. Head is without abrasion, without contusion and without laceration.   Right Ear: External ear normal.   Left Ear: External ear normal.   Nose: Nose normal.   Mouth/Throat: Oropharynx is clear and moist.   Eyes: Conjunctivae, EOM and lids are normal. Pupils are equal, round, and reactive to light.   Neck: Trachea normal, full passive range of motion without pain and phonation normal. Neck supple.   Cardiovascular: Normal rate, regular rhythm and normal heart sounds.   Pulmonary/Chest: Effort normal and breath sounds normal. No stridor. No respiratory distress.   Musculoskeletal: Normal range of motion.   Neurological: She is alert and oriented to person, place, and time.   Skin: Skin is warm and dry. Capillary refill takes less than 2 seconds. Laceration (0.5 cm superfical small laceration to the left eyelid) noted. No abrasion, no bruising, no burn, no ecchymosis, no lesion and no rash noted. No erythema.     Small superficial laceration to the left upper eyelid.  Bleeding is controlled this time.  No foreign bodies present.  Cleaned with sterile water.   Psychiatric: She has a normal mood and affect. Her speech is normal and behavior is normal. Judgment and thought content normal. Cognition and memory are normal.   Nursing note and vitals reviewed.        Laceration Repair  Date/Time: 11/16/2018 10:26 AM  Performed by: Mary Rogel NP  Authorized by: Mary Rogel NP   Consent Done: Yes  Consent: Verbal consent obtained.  Risks and benefits: risks, benefits and alternatives were  discussed  Consent given by: patient  Required items: required blood products, implants, devices, and special equipment available  Body area: head/neck  Location details: left eyelid  Laceration length: 1 cm  Foreign bodies: no foreign bodies  Tendon involvement: none  Nerve involvement: none  Irrigation solution: tap water  Irrigation method: syringe  Amount of cleaning: standard  Debridement: none  Degree of undermining: none  Skin closure: glue  Approximation: close  Patient tolerance: Patient tolerated the procedure well with no immediate complications  Comments:  Dermabond applied to small superficial laceration to the left eyelid.          Assessment:       1. Left eyelid laceration, initial encounter        Plan:         Left eyelid laceration, initial encounter  -     Laceration Repair      Patient Instructions     Laceration: Skin Adhesive  A laceration is a cut through the skin. You have a laceration that your healthcare provider has closed with skin adhesive, a type of skin glue.  Home care  You may take acetaminophen or ibuprofen for pain, unless your provider prescribed another pain medicine.  If you have chronic liver or kidney disease or ever had a stomach ulcer or gastrointestinal bleeding, talk with your healthcare provider before using these medicines.  General care  · Keep the wound clean and dry. You may shower or bathe as usual, but do not use soaps, lotions, or ointments on the wound area. Do not scrub the wound. After bathing, pat the wound dry with a soft towel.  · Do not scratch, rub, or pick at the adhesive film. Do not place tape directly over the film.  · Do not apply liquids (such as peroxide), ointments, or creams to the wound while the film is in place.  · Most skin wounds heal without problems. However, an infection sometimes occurs despite proper treatment. Therefore, watch for the signs of infection listed below.  Follow-up care  Follow up with your healthcare provider, or as  advised. The adhesive film will fall off in 5 to 7 days.  When to seek medical advice  Call your healthcare provider right away if any of these occur:  · Signs of infection:  ¨ Fever of 100.4ºF (38ºC) or higher, or as advised by your healthcare provider  ¨ Increasing pain in the wound  ¨ Increasing redness or swelling  ¨ Pus coming from the wound  · Wound bleeds more than a small amount or bleeding doesnt stop  · Glue comes off earlier than expected and the wound edges come apart  · You feel numbness or weakness in the wound area that doesnt go away  Date Last Reviewed: 6/1/2016 © 2000-2017 [employer]. [address] [place] [address], [place] [address]. All rights reserved. This information is not intended as a substitute for professional medical care. Always follow your healthcare professional's instructions.        Face Laceration: Skin Glue  A laceration is a cut through the skin. A laceration on your face has been closed with skin glue. This is used on cuts that have smooth edges and are not infected. In some cases, a lower layer of skin may be sutured before skin glue is put on. The skin glue closes the cut within a few minutes. It also provides a water-resistant cover. No bandage is needed. Skin glue peels off on its own within 5 to 10 days.     Home care  · Your healthcare provider may prescribe an antibiotic. This is to help prevent infection. Follow all instructions for taking this medicine. Take the medicine every day until it is gone or you are told to stop. You should not have any left over.  · The healthcare provider may prescribe medicines for pain. Follow instructions for taking them.  · Follow the healthcare providers instructions on how to care for the cut.  · Keep the wound clean and dry. You may shower or bathe as usual, but do not use soaps, lotions, or ointments on the wound area. Do not scrub the wound. After bathing, pat the wound dry with a soft towel. Avoid soaking the cut in  water.  · Do not scratch, rub, or pick at the film. Do not place tape directly over the film.  · Do not apply liquids (such as peroxide), ointments, or creams to the wound while the film is in place.  · Most facial skin wounds heal without problems. However, an infection sometimes occurs despite proper treatment. Therefore, watch for the signs of infection listed below.  Follow-up care  Follow up with your health care provider as advised. Stitches should be removed from the face within 5 days. Stitches and staples should be removed from other parts of the body within 7-14 days. If dissolving stitches were used in the mouth, these will fall out or dissolve without the need for removal. If tape closures were used, remove them yourself if they have not fallen off after 7 days. If skin glue was used, the film will fall off by itself in 5-10 days. Notify your healthcare provider if you notice persistent numbness or weakness in the injured hand.  When to seek medical advice  Call your health care provider right away if any of these occur:  · Wound bleeds more than a small amount or bleeding doesn't stop  · Signs of infection:  ¨ Increasing pain in the wound  ¨ Increasing wound redness or swelling  ¨ Pus or bad odor coming from the wound  ¨ Fever of 100.4°F (38.ºC) or as directed by your healthcare provider  · Wound edges re-open  Date Last Reviewed: 6/14/2015  © 3001-4825 [employer]. [address] [place] [address], [place] [address]. All rights reserved. This information is not intended as a substitute for professional medical care. Always follow your healthcare professional's instructions.      -Keep the site clean and dry.  -See above education.  -Do not apply any ointments to the site.  -The glue will fall off in 10-14 days.  Please follow up with your Primary care provider within 2-5 days if your signs and symptoms have not resolved or worsen.     If your condition worsens or fails to improve we recommend that  you receive another evaluation at the emergency room immediately or contact your primary medical clinic to discuss your concerns.   You must understand that you have received an Urgent Care treatment only and that you may be released before all of your medical problems are known or treated. You, the patient, will arrange for follow up care as instructed.

## 2018-11-16 NOTE — PATIENT INSTRUCTIONS
Laceration: Skin Adhesive  A laceration is a cut through the skin. You have a laceration that your healthcare provider has closed with skin adhesive, a type of skin glue.  Home care  You may take acetaminophen or ibuprofen for pain, unless your provider prescribed another pain medicine.  If you have chronic liver or kidney disease or ever had a stomach ulcer or gastrointestinal bleeding, talk with your healthcare provider before using these medicines.  General care  · Keep the wound clean and dry. You may shower or bathe as usual, but do not use soaps, lotions, or ointments on the wound area. Do not scrub the wound. After bathing, pat the wound dry with a soft towel.  · Do not scratch, rub, or pick at the adhesive film. Do not place tape directly over the film.  · Do not apply liquids (such as peroxide), ointments, or creams to the wound while the film is in place.  · Most skin wounds heal without problems. However, an infection sometimes occurs despite proper treatment. Therefore, watch for the signs of infection listed below.  Follow-up care  Follow up with your healthcare provider, or as advised. The adhesive film will fall off in 5 to 7 days.  When to seek medical advice  Call your healthcare provider right away if any of these occur:  · Signs of infection:  ¨ Fever of 100.4ºF (38ºC) or higher, or as advised by your healthcare provider  ¨ Increasing pain in the wound  ¨ Increasing redness or swelling  ¨ Pus coming from the wound  · Wound bleeds more than a small amount or bleeding doesnt stop  · Glue comes off earlier than expected and the wound edges come apart  · You feel numbness or weakness in the wound area that doesnt go away  Date Last Reviewed: 6/1/2016  © 8189-4579 [employer]. [address] [place] [address], [place] [address]. All rights reserved. This information is not intended as a substitute for professional medical care. Always follow your healthcare professional's  instructions.        Face Laceration: Skin Glue  A laceration is a cut through the skin. A laceration on your face has been closed with skin glue. This is used on cuts that have smooth edges and are not infected. In some cases, a lower layer of skin may be sutured before skin glue is put on. The skin glue closes the cut within a few minutes. It also provides a water-resistant cover. No bandage is needed. Skin glue peels off on its own within 5 to 10 days.     Home care  · Your healthcare provider may prescribe an antibiotic. This is to help prevent infection. Follow all instructions for taking this medicine. Take the medicine every day until it is gone or you are told to stop. You should not have any left over.  · The healthcare provider may prescribe medicines for pain. Follow instructions for taking them.  · Follow the healthcare providers instructions on how to care for the cut.  · Keep the wound clean and dry. You may shower or bathe as usual, but do not use soaps, lotions, or ointments on the wound area. Do not scrub the wound. After bathing, pat the wound dry with a soft towel. Avoid soaking the cut in water.  · Do not scratch, rub, or pick at the film. Do not place tape directly over the film.  · Do not apply liquids (such as peroxide), ointments, or creams to the wound while the film is in place.  · Most facial skin wounds heal without problems. However, an infection sometimes occurs despite proper treatment. Therefore, watch for the signs of infection listed below.  Follow-up care  Follow up with your health care provider as advised. Stitches should be removed from the face within 5 days. Stitches and staples should be removed from other parts of the body within 7-14 days. If dissolving stitches were used in the mouth, these will fall out or dissolve without the need for removal. If tape closures were used, remove them yourself if they have not fallen off after 7 days. If skin glue was used, the film will  fall off by itself in 5-10 days. Notify your healthcare provider if you notice persistent numbness or weakness in the injured hand.  When to seek medical advice  Call your health care provider right away if any of these occur:  · Wound bleeds more than a small amount or bleeding doesn't stop  · Signs of infection:  ¨ Increasing pain in the wound  ¨ Increasing wound redness or swelling  ¨ Pus or bad odor coming from the wound  ¨ Fever of 100.4°F (38.ºC) or as directed by your healthcare provider  · Wound edges re-open  Date Last Reviewed: 6/14/2015  © 8134-6510 [employer]. [address] [place] [address], [place] [address]. All rights reserved. This information is not intended as a substitute for professional medical care. Always follow your healthcare professional's instructions.      -Keep the site clean and dry.  -See above education.  -Do not apply any ointments to the site.  -The glue will fall off in 10-14 days.  Please follow up with your Primary care provider within 2-5 days if your signs and symptoms have not resolved or worsen.     If your condition worsens or fails to improve we recommend that you receive another evaluation at the emergency room immediately or contact your primary medical clinic to discuss your concerns.   You must understand that you have received an Urgent Care treatment only and that you may be released before all of your medical problems are known or treated. You, the patient, will arrange for follow up care as instructed.

## 2018-11-19 ENCOUNTER — OFFICE VISIT (OUTPATIENT)
Dept: PAIN MEDICINE | Facility: CLINIC | Age: 53
End: 2018-11-19
Payer: COMMERCIAL

## 2018-11-19 VITALS
SYSTOLIC BLOOD PRESSURE: 134 MMHG | HEIGHT: 65 IN | HEART RATE: 65 BPM | DIASTOLIC BLOOD PRESSURE: 88 MMHG | TEMPERATURE: 98 F | BODY MASS INDEX: 26.3 KG/M2 | WEIGHT: 157.88 LBS

## 2018-11-19 DIAGNOSIS — Z98.1 H/O SPINAL FUSION: ICD-10-CM

## 2018-11-19 DIAGNOSIS — M54.16 LUMBAR RADICULOPATHY: Primary | ICD-10-CM

## 2018-11-19 PROCEDURE — 3075F SYST BP GE 130 - 139MM HG: CPT | Mod: CPTII,S$GLB,, | Performed by: NURSE PRACTITIONER

## 2018-11-19 PROCEDURE — 99213 OFFICE O/P EST LOW 20 MIN: CPT | Mod: S$GLB,,, | Performed by: NURSE PRACTITIONER

## 2018-11-19 PROCEDURE — 3008F BODY MASS INDEX DOCD: CPT | Mod: CPTII,S$GLB,, | Performed by: NURSE PRACTITIONER

## 2018-11-19 PROCEDURE — 99999 PR PBB SHADOW E&M-EST. PATIENT-LVL III: CPT | Mod: PBBFAC,,, | Performed by: NURSE PRACTITIONER

## 2018-11-19 PROCEDURE — 3079F DIAST BP 80-89 MM HG: CPT | Mod: CPTII,S$GLB,, | Performed by: NURSE PRACTITIONER

## 2018-11-19 NOTE — H&P (VIEW-ONLY)
Chronic Pain - Established Visit    Referring Physician: No ref. provider found    Chief Complaint:   Chief Complaint   Patient presents with    Follow-up        SUBJECTIVE: Disclaimer: This note has been generated using voice-recognition software. There may be typographical errors that have been missed during proof-reading    Interval History 11/19/2018:  The patient presents for follow up of back and right leg pain.  Since her last OV, she underwent right L4 and L5 TF JEROMY x2 completed on 6/7/17 with 90% pain relief for 16 months.  Her pain started to return last month and has been worsening.  She has been trying home PT and OTC medications without benefit.  She is interested in a repeat procedure.  The pain starts across the lower back with radiation down the side of the right leg to her anterior foot.  She has numbness to the right foot.  She has some left sided back pain but denies radiation.  Her pain today is 5/10.    Initial encounter:    Twila Roman presents to the clinic for the evaluation of right lower back pain with radiculopathy. The pain started years ago following disk herniation and symptoms have been improving after surgery, but gradually returning.      Brief history: Patient has a history of transforaminal epidural steroid injections with significant relief in her lower extremity pain although the patient has been having right lower extremity weakness.    Pain Description:    The pain is located in the bilateral lower back area and radiates to the right lower extremity in the L4 and L5 distribution.      At BEST  5/10     At WORST  5/10 on the WORST day.      On average pain is rated as 5/10.     Today the pain is rated as 5/10    The pain is described as burning, shooting, stabbing and tingling      Symptoms interfere with daily activity, sleeping and work.     Exacerbating factors: Standing, Walking, Lifting and Getting out of bed/chair.      Mitigating factors medications, physical  therapy and sitting injections.     Patient denies urinary incontinence and bowel incontinence.  Patient denies any suicidal or homicidal ideations    Pain Medications:  Current:  Naproxen    Tried in Past:  NSAIDs - Naproxen  TCA -Never  SNRI -Never  Anti-convulsants -lyrica 50mg TID  Muscle Relaxants -Never  Opioids- [place]    Physical Therapy/Home Exercise: no       report:  Reviewed and consistent with medication use as prescribed.    Pain Procedures:   12/2/2015 -right L4 and L5 TFESI  9/21/2016 - right L4 and L5 TFESI  5/24/17 right L4 and L5 TFESI  6/7/17 right L4 and L5 TFESI- 90% relief    Chiropractor -never  Acupuncture - never  TENS unit -never  Spinal decompression -previous lumbar surgery L4 and L5  Joint replacement -never    Imaging:  MRI lumbar spine 2015  MRI lumbar spine with and without contrast.    Findings: 6 mL of gadavist IV contrast was administered for today's examination.    The lumbar spinal alignment and vertebral body heights are satisfactorily preserved.  There are disk prostheses identified L4-5 and L5-S1.  The spinal cord ends at L1.  The terminal cord, conus, and cauda equina have a normal appearance.  There is no   intradural abnormality.  There is no evidence of any acute marrow replacement process such as tumor or infection.  There is no fracture.  The visualized surrounding soft tissues and vascular structures are unremarkable.  There is no abnormal enhancement.    L1-2: Unremarkable.    L2-3: Facet hypertrophy.    L3-4: Facet hypertrophy.    L4-5: Facet hypertrophy.    L5-S1: Facet hypertrophy.   Impression    No significant central canal or neural foraminal stenosis.       Xray lumbar spine 7/2015  Back pain.    Comparison: 4/20/2015.    Technique: Five views of the lumbar spine were obtained, including flexion and extension views.      Findings: There is no acute fracture, dislocation or destructive process.  Postsurgical changes from anterior spinal fusion identified at  L4-L5 and L5-S1.  There are inter vertebral disk space there is an through the anterior aspect of the vertebral   bodies.  Hardware appears to be in good positioning without evidence for loosening or fracture.  Vertebral body heights and alignment are maintained.  Spaces appear relatively well preserved.  Mild facet arthrosis of the lower lumbar levels is noted   alignment of the facets is maintained.  Spinous processes are unremarkable.  There appears to be atherosclerosis of the aorta.  SI joints appear intact the visualized sacrum demonstrates no abnormality.  Calcifications are seen projected over the renal   shadows which could relate to nonobstructing stones.  Correlation is advised.   Impression    No interval worsening or acute finding.  No evidence for translational instability.  Postsurgical changes from anterior spinal fusion at L4-L5 and L5-S1.  No evidence for hardware loosening or fracture.             Past Medical History:   Diagnosis Date    Anxiety     Bronchitis     Family history of ovarian cancer     Hypertension      Past Surgical History:   Procedure Laterality Date    ARTHROSCOPY, KNEE, WITH MENISCECTOMY Left 8/15/2018    [facility]    BACK SURGERY      spinal fusion    JEROMY-TRANSFORAMINAL Right 9/21/2016    [facility]    JEROMY-TRANSFORAMINAL Right 12/2/2015    [facility]    INJECTION-STEROID-EPIDURAL-TRANSFORAMINAL Right 6/7/2017    [facility]    INJECTION-STEROID-EPIDURAL-TRANSFORAMINAL Right 5/24/2017    [facility]    KNEE ARTHROSCOPY W/ MENISCECTOMY Left 8/15/2018    Procedure: ARTHROSCOPY, KNEE, WITH MENISCECTOMY;  Surgeon: Handy Elkins MD;  Location: [facility] OR;  Service: Orthopedics;  Laterality: Left;  Video    TUMOR REMOVAL      right hand     Social History     Socioeconomic  History    Marital status: Single     Spouse name: Not on file    Number of children: Not on file    Years of education: Not on file    Highest education level: Not on file   Social Needs    Financial resource strain: Not on file    Food insecurity - worry: Not on file    Food insecurity - inability: Not on file    Transportation needs - medical: Not on file    Transportation needs - non-medical: Not on file   Occupational History    Occupation:      Employer: [employer]    Tobacco Use    Smoking status: Never Smoker    Smokeless tobacco: Never Used   Substance and Sexual Activity    Alcohol use: Yes     Alcohol/week: 3.0 oz     Types: 5 Glasses of wine per week     Comment: one a day- red wine    Drug use: No    Sexual activity: Yes     Partners: Male     Comment: Boyfriend    Other Topics Concern    Not on file   Social History Narrative    Not on file     Family History   Problem Relation Age of Onset    Ovarian cancer Mother 66    Heart disease Father         double bypass    Macular degeneration Father     No Known Problems Brother     No Known Problems Brother     Breast cancer Paternal Cousin 20    Colon cancer Neg Hx        Review of patient's allergies indicates:  No Known Allergies    Current Outpatient Medications   Medication Sig    biotin 5,000 mcg TbDL Take 1 capsule by mouth once daily.    buPROPion (WELLBUTRIN XL) 300 MG 24 hr tablet TAKE 1 TABLET BY MOUTH ONCE DAILY    cholecalciferol, vitamin D3, (VITAMIN D3) 2,000 unit Tab Take 1 tablet by mouth once daily.    citalopram (CELEXA) 10 MG tablet     citalopram (CELEXA) 10 MG tablet TAKE 1 TABLET BY MOUTH EVERY DAY    FLAXSEED OIL MISC by Misc.(Non-Drug; Combo Route) route.    LORazepam (ATIVAN) 1 MG tablet TAKE 1 TABLET BY MOUTH EVERY NIGHT AS NEEDED    metoprolol succinate (TOPROL-XL) 25 MG 24 hr tablet TAKE 1 TABLET(25 MG) BY MOUTH EVERY DAY    montelukast (SINGULAIR) 10 mg tablet TAKE 1 TABLET BY  "MOUTH EVERY EVENING    multivitamin (THERAGRAN) per tablet Take 1 tablet by mouth once daily.    naproxen (NAPROSYN) 500 MG tablet TAKE 1 TABLET BY MOUTH TWICE DAILY WITH MEALS    albuterol 90 mcg/actuation inhaler Inhale 1-2 puffs into the lungs every 6 (six) hours as needed for Wheezing.    albuterol 90 mcg/actuation inhaler Inhale 1-2 puffs into the lungs every 6 (six) hours as needed for Wheezing.    HYDROcodone-acetaminophen (NORCO) 5-325 mg per tablet Take 1 tablet by mouth every 6 (six) hours as needed for Pain.     No current facility-administered medications for this visit.        REVIEW OF SYSTEMS:    GENERAL:  No weight loss, malaise or fevers.  HEENT:   No recent changes in vision or hearing  NECK:  Negative for lumps, no difficulty with swallowing.  RESPIRATORY:  Negative for cough, wheezing or shortness of breath, patient denies any recent URI.  CARDIOVASCULAR:  Negative for chest pain, leg swelling or palpitations. Hypertension.  GI:  Negative for abdominal discomfort, blood in stools or black stools or change in bowel habits.  MUSCULOSKELETAL:  See HPI.  SKIN:  Negative for lesions, rash, and itching.  PSYCH:  No mood disorder or recent psychosocial stressors.  Patients sleep is not disturbed secondary to pain.  HEMATOLOGY/LYMPHOLOGY:  Negative for prolonged bleeding, bruising easily or swollen nodes.  Patient is not currently taking any anti-coagulants  ENDO: No history of diabetes or thyroid dysfunction  NEURO:   No history of headaches, syncope, paralysis, seizures or tremors.  All other reviewed and negative other than HPI.    OBJECTIVE:    /88   Pulse 65   Temp 98.2 °F (36.8 °C)   Ht 5' 5" (1.651 m)   Wt 71.6 kg (157 lb 13.6 oz)   LMP 04/19/2015 (Approximate)   BMI 26.27 kg/m²     PHYSICAL EXAMINATION:    GENERAL: Well appearing, in no acute distress, alert and oriented x3.  PSYCH:  Mood and affect appropriate.  SKIN: Skin color, texture, turgor normal, no rashes or " lesions.  HEAD/FACE:  Normocephalic, atraumatic. Cranial nerves grossly intact.  CV: RRR with palpation of the radial artery.  PULM: No evidence of respiratory difficulty, symmetric chest rise.  BACK: Straight leg raising in the sitting and supine positions is positive to radicular pain at right L5 distribution. There is pain with palpation over the facet joints of the lumbar spine bilaterally. Negative facet loading.  EXTREMITIES: Peripheral joint ROM is full and pain free without obvious instability or laxity in all four extremities. No deformities, edema, or skin discoloration. Good capillary refill.  MUSCULOSKELETAL: Hip, and knee provocative maneuvers are negative.  There is pain with palpation over the sacroiliac joints bilaterally.  There is no pain to palpation over the greater trochanteric bursa bilaterally.  FABERs test is negative.  5/5 strength in right ankle with plantar and 4/5 dorsiflexion, 5/5 strength in left ankle with plantar and dorsiflexion, 5/5 strength with right knee flexion extension, 5/5 strength with knee flexion and extension; left EHL is 5/5 and right is 4/5.  No atrophy or tone abnormalities are noted.  NEURO: Bilateral lower extremity coordination and muscle stretch reflexes are physiologic and symmetric.  Plantar response are downgoing. No clonus.  Decreased sensation to right L4 and L5 distribution.  GAIT: Antalgic, ambulates without assistance       ASSESSMENT: 53 y.o. year old female with lower back and right leg pain, consistent with the following diagnoses:    Encounter Diagnoses   Name Primary?    Lumbar radiculopathy Yes    H/O spinal fusion        PLAN:     - Previous imaging was reviewed and discussed with the patient today.    - I will schedule the patient for repeat right-sided L4 and L5 TF JEROMY.      - Consider repeat MRI if limited improvement.    - The patient will continue a home exercise routine to help with pain and strengthening.      - RTC 3 weeks after  procedure.      The above plan and management options were discussed at length with patient. Patient is in agreement with the above and verbalized understanding.    Jory Taylor  11/19/2018

## 2018-11-19 NOTE — PROGRESS NOTES
Chronic Pain - Established Visit    Referring Physician: No ref. provider found    Chief Complaint:   Chief Complaint   Patient presents with    Follow-up        SUBJECTIVE: Disclaimer: This note has been generated using voice-recognition software. There may be typographical errors that have been missed during proof-reading    Interval History 11/19/2018:  The patient presents for follow up of back and right leg pain.  Since her last OV, she underwent right L4 and L5 TF JEROMY x2 completed on 6/7/17 with 90% pain relief for 16 months.  Her pain started to return last month and has been worsening.  She has been trying home PT and OTC medications without benefit.  She is interested in a repeat procedure.  The pain starts across the lower back with radiation down the side of the right leg to her anterior foot.  She has numbness to the right foot.  She has some left sided back pain but denies radiation.  Her pain today is 5/10.    Initial encounter:    Twila Roman presents to the clinic for the evaluation of right lower back pain with radiculopathy. The pain started years ago following disk herniation and symptoms have been improving after surgery, but gradually returning.      Brief history: Patient has a history of transforaminal epidural steroid injections with significant relief in her lower extremity pain although the patient has been having right lower extremity weakness.    Pain Description:    The pain is located in the bilateral lower back area and radiates to the right lower extremity in the L4 and L5 distribution.      At BEST  5/10     At WORST  5/10 on the WORST day.      On average pain is rated as 5/10.     Today the pain is rated as 5/10    The pain is described as burning, shooting, stabbing and tingling      Symptoms interfere with daily activity, sleeping and work.     Exacerbating factors: Standing, Walking, Lifting and Getting out of bed/chair.      Mitigating factors medications, physical  therapy and sitting injections.     Patient denies urinary incontinence and bowel incontinence.  Patient denies any suicidal or homicidal ideations    Pain Medications:  Current:  Naproxen    Tried in Past:  NSAIDs - Naproxen  TCA -Never  SNRI -Never  Anti-convulsants -lyrica 50mg TID  Muscle Relaxants -Never  Opioids- [place]    Physical Therapy/Home Exercise: no       report:  Reviewed and consistent with medication use as prescribed.    Pain Procedures:   12/2/2015 -right L4 and L5 TFESI  9/21/2016 - right L4 and L5 TFESI  5/24/17 right L4 and L5 TFESI  6/7/17 right L4 and L5 TFESI- 90% relief    Chiropractor -never  Acupuncture - never  TENS unit -never  Spinal decompression -previous lumbar surgery L4 and L5  Joint replacement -never    Imaging:  MRI lumbar spine 2015  MRI lumbar spine with and without contrast.    Findings: 6 mL of gadavist IV contrast was administered for today's examination.    The lumbar spinal alignment and vertebral body heights are satisfactorily preserved.  There are disk prostheses identified L4-5 and L5-S1.  The spinal cord ends at L1.  The terminal cord, conus, and cauda equina have a normal appearance.  There is no   intradural abnormality.  There is no evidence of any acute marrow replacement process such as tumor or infection.  There is no fracture.  The visualized surrounding soft tissues and vascular structures are unremarkable.  There is no abnormal enhancement.    L1-2: Unremarkable.    L2-3: Facet hypertrophy.    L3-4: Facet hypertrophy.    L4-5: Facet hypertrophy.    L5-S1: Facet hypertrophy.   Impression    No significant central canal or neural foraminal stenosis.       Xray lumbar spine 7/2015  Back pain.    Comparison: 4/20/2015.    Technique: Five views of the lumbar spine were obtained, including flexion and extension views.      Findings: There is no acute fracture, dislocation or destructive process.  Postsurgical changes from anterior spinal fusion identified at  L4-L5 and L5-S1.  There are inter vertebral disk space there is an through the anterior aspect of the vertebral   bodies.  Hardware appears to be in good positioning without evidence for loosening or fracture.  Vertebral body heights and alignment are maintained.  Spaces appear relatively well preserved.  Mild facet arthrosis of the lower lumbar levels is noted   alignment of the facets is maintained.  Spinous processes are unremarkable.  There appears to be atherosclerosis of the aorta.  SI joints appear intact the visualized sacrum demonstrates no abnormality.  Calcifications are seen projected over the renal   shadows which could relate to nonobstructing stones.  Correlation is advised.   Impression    No interval worsening or acute finding.  No evidence for translational instability.  Postsurgical changes from anterior spinal fusion at L4-L5 and L5-S1.  No evidence for hardware loosening or fracture.             Past Medical History:   Diagnosis Date    Anxiety     Bronchitis     Family history of ovarian cancer     Hypertension      Past Surgical History:   Procedure Laterality Date    ARTHROSCOPY, KNEE, WITH MENISCECTOMY Left 8/15/2018    [facility]    BACK SURGERY      spinal fusion    JEROMY-TRANSFORAMINAL Right 9/21/2016    [facility]    JEROMY-TRANSFORAMINAL Right 12/2/2015    [facility]    INJECTION-STEROID-EPIDURAL-TRANSFORAMINAL Right 6/7/2017    [facility]    INJECTION-STEROID-EPIDURAL-TRANSFORAMINAL Right 5/24/2017    [facility]    KNEE ARTHROSCOPY W/ MENISCECTOMY Left 8/15/2018    Procedure: ARTHROSCOPY, KNEE, WITH MENISCECTOMY;  Surgeon: Handy Elkins MD;  Location: [facility] OR;  Service: Orthopedics;  Laterality: Left;  Video    TUMOR REMOVAL      right hand     Social History     Socioeconomic  History    Marital status: Single     Spouse name: Not on file    Number of children: Not on file    Years of education: Not on file    Highest education level: Not on file   Social Needs    Financial resource strain: Not on file    Food insecurity - worry: Not on file    Food insecurity - inability: Not on file    Transportation needs - medical: Not on file    Transportation needs - non-medical: Not on file   Occupational History    Occupation:      Employer: [employer]    Tobacco Use    Smoking status: Never Smoker    Smokeless tobacco: Never Used   Substance and Sexual Activity    Alcohol use: Yes     Alcohol/week: 3.0 oz     Types: 5 Glasses of wine per week     Comment: one a day- red wine    Drug use: No    Sexual activity: Yes     Partners: Male     Comment: Boyfriend    Other Topics Concern    Not on file   Social History Narrative    Not on file     Family History   Problem Relation Age of Onset    Ovarian cancer Mother 66    Heart disease Father         double bypass    Macular degeneration Father     No Known Problems Brother     No Known Problems Brother     Breast cancer Paternal Cousin 20    Colon cancer Neg Hx        Review of patient's allergies indicates:  No Known Allergies    Current Outpatient Medications   Medication Sig    biotin 5,000 mcg TbDL Take 1 capsule by mouth once daily.    buPROPion (WELLBUTRIN XL) 300 MG 24 hr tablet TAKE 1 TABLET BY MOUTH ONCE DAILY    cholecalciferol, vitamin D3, (VITAMIN D3) 2,000 unit Tab Take 1 tablet by mouth once daily.    citalopram (CELEXA) 10 MG tablet     citalopram (CELEXA) 10 MG tablet TAKE 1 TABLET BY MOUTH EVERY DAY    FLAXSEED OIL MISC by Misc.(Non-Drug; Combo Route) route.    LORazepam (ATIVAN) 1 MG tablet TAKE 1 TABLET BY MOUTH EVERY NIGHT AS NEEDED    metoprolol succinate (TOPROL-XL) 25 MG 24 hr tablet TAKE 1 TABLET(25 MG) BY MOUTH EVERY DAY    montelukast (SINGULAIR) 10 mg tablet TAKE 1 TABLET BY  "MOUTH EVERY EVENING    multivitamin (THERAGRAN) per tablet Take 1 tablet by mouth once daily.    naproxen (NAPROSYN) 500 MG tablet TAKE 1 TABLET BY MOUTH TWICE DAILY WITH MEALS    albuterol 90 mcg/actuation inhaler Inhale 1-2 puffs into the lungs every 6 (six) hours as needed for Wheezing.    albuterol 90 mcg/actuation inhaler Inhale 1-2 puffs into the lungs every 6 (six) hours as needed for Wheezing.    HYDROcodone-acetaminophen (NORCO) 5-325 mg per tablet Take 1 tablet by mouth every 6 (six) hours as needed for Pain.     No current facility-administered medications for this visit.        REVIEW OF SYSTEMS:    GENERAL:  No weight loss, malaise or fevers.  HEENT:   No recent changes in vision or hearing  NECK:  Negative for lumps, no difficulty with swallowing.  RESPIRATORY:  Negative for cough, wheezing or shortness of breath, patient denies any recent URI.  CARDIOVASCULAR:  Negative for chest pain, leg swelling or palpitations. Hypertension.  GI:  Negative for abdominal discomfort, blood in stools or black stools or change in bowel habits.  MUSCULOSKELETAL:  See HPI.  SKIN:  Negative for lesions, rash, and itching.  PSYCH:  No mood disorder or recent psychosocial stressors.  Patients sleep is not disturbed secondary to pain.  HEMATOLOGY/LYMPHOLOGY:  Negative for prolonged bleeding, bruising easily or swollen nodes.  Patient is not currently taking any anti-coagulants  ENDO: No history of diabetes or thyroid dysfunction  NEURO:   No history of headaches, syncope, paralysis, seizures or tremors.  All other reviewed and negative other than HPI.    OBJECTIVE:    /88   Pulse 65   Temp 98.2 °F (36.8 °C)   Ht 5' 5" (1.651 m)   Wt 71.6 kg (157 lb 13.6 oz)   LMP 04/19/2015 (Approximate)   BMI 26.27 kg/m²     PHYSICAL EXAMINATION:    GENERAL: Well appearing, in no acute distress, alert and oriented x3.  PSYCH:  Mood and affect appropriate.  SKIN: Skin color, texture, turgor normal, no rashes or " lesions.  HEAD/FACE:  Normocephalic, atraumatic. Cranial nerves grossly intact.  CV: RRR with palpation of the radial artery.  PULM: No evidence of respiratory difficulty, symmetric chest rise.  BACK: Straight leg raising in the sitting and supine positions is positive to radicular pain at right L5 distribution. There is pain with palpation over the facet joints of the lumbar spine bilaterally. Negative facet loading.  EXTREMITIES: Peripheral joint ROM is full and pain free without obvious instability or laxity in all four extremities. No deformities, edema, or skin discoloration. Good capillary refill.  MUSCULOSKELETAL: Hip, and knee provocative maneuvers are negative.  There is pain with palpation over the sacroiliac joints bilaterally.  There is no pain to palpation over the greater trochanteric bursa bilaterally.  FABERs test is negative.  5/5 strength in right ankle with plantar and 4/5 dorsiflexion, 5/5 strength in left ankle with plantar and dorsiflexion, 5/5 strength with right knee flexion extension, 5/5 strength with knee flexion and extension; left EHL is 5/5 and right is 4/5.  No atrophy or tone abnormalities are noted.  NEURO: Bilateral lower extremity coordination and muscle stretch reflexes are physiologic and symmetric.  Plantar response are downgoing. No clonus.  Decreased sensation to right L4 and L5 distribution.  GAIT: Antalgic, ambulates without assistance       ASSESSMENT: 53 y.o. year old female with lower back and right leg pain, consistent with the following diagnoses:    Encounter Diagnoses   Name Primary?    Lumbar radiculopathy Yes    H/O spinal fusion        PLAN:     - Previous imaging was reviewed and discussed with the patient today.    - I will schedule the patient for repeat right-sided L4 and L5 TF JEROMY.      - Consider repeat MRI if limited improvement.    - The patient will continue a home exercise routine to help with pain and strengthening.      - RTC 3 weeks after  procedure.      The above plan and management options were discussed at length with patient. Patient is in agreement with the above and verbalized understanding.    Jory Taylor  11/19/2018

## 2018-11-30 RX ORDER — LORAZEPAM 1 MG/1
TABLET ORAL
Qty: 30 TABLET | Refills: 0 | Status: SHIPPED | OUTPATIENT
Start: 2018-11-30 | End: 2018-12-28 | Stop reason: SDUPTHER

## 2018-12-04 ENCOUNTER — HOSPITAL ENCOUNTER (OUTPATIENT)
Facility: OTHER | Age: 53
Discharge: HOME | End: 2018-12-04
Attending: ANESTHESIOLOGY | Admitting: ANESTHESIOLOGY
Payer: COMMERCIAL

## 2018-12-04 ENCOUNTER — TELEPHONE (OUTPATIENT)
Dept: ENDOSCOPY | Facility: HOSPITAL | Age: 53
End: 2018-12-04

## 2018-12-04 VITALS
HEART RATE: 77 BPM | RESPIRATION RATE: 16 BRPM | HEIGHT: 65 IN | SYSTOLIC BLOOD PRESSURE: 123 MMHG | DIASTOLIC BLOOD PRESSURE: 79 MMHG | BODY MASS INDEX: 24.16 KG/M2 | WEIGHT: 145 LBS | OXYGEN SATURATION: 100 % | TEMPERATURE: 98 F

## 2018-12-04 DIAGNOSIS — G89.29 CHRONIC PAIN: ICD-10-CM

## 2018-12-04 DIAGNOSIS — Z12.11 SPECIAL SCREENING FOR MALIGNANT NEOPLASMS, COLON: Primary | ICD-10-CM

## 2018-12-04 DIAGNOSIS — M54.16 LUMBAR RADICULOPATHY: Primary | ICD-10-CM

## 2018-12-04 PROCEDURE — 99152 MOD SED SAME PHYS/QHP 5/>YRS: CPT | Mod: ,,, | Performed by: ANESTHESIOLOGY

## 2018-12-04 PROCEDURE — 64483 NJX AA&/STRD TFRM EPI L/S 1: CPT | Mod: RT,,, | Performed by: ANESTHESIOLOGY

## 2018-12-04 PROCEDURE — 25000003 PHARM REV CODE 250: Performed by: STUDENT IN AN ORGANIZED HEALTH CARE EDUCATION/TRAINING PROGRAM

## 2018-12-04 PROCEDURE — 64484 NJX AA&/STRD TFRM EPI L/S EA: CPT | Mod: RT,,, | Performed by: ANESTHESIOLOGY

## 2018-12-04 PROCEDURE — 25500020 PHARM REV CODE 255: Performed by: ANESTHESIOLOGY

## 2018-12-04 PROCEDURE — 63600175 PHARM REV CODE 636 W HCPCS: Performed by: ANESTHESIOLOGY

## 2018-12-04 PROCEDURE — 64484 NJX AA&/STRD TFRM EPI L/S EA: CPT | Performed by: ANESTHESIOLOGY

## 2018-12-04 PROCEDURE — 25000003 PHARM REV CODE 250: Performed by: ANESTHESIOLOGY

## 2018-12-04 PROCEDURE — 64483 NJX AA&/STRD TFRM EPI L/S 1: CPT | Performed by: ANESTHESIOLOGY

## 2018-12-04 RX ORDER — METHYLPREDNISOLONE ACETATE 40 MG/ML
INJECTION, SUSPENSION INTRA-ARTICULAR; INTRALESIONAL; INTRAMUSCULAR; SOFT TISSUE
Status: DISCONTINUED | OUTPATIENT
Start: 2018-12-04 | End: 2018-12-04 | Stop reason: HOSPADM

## 2018-12-04 RX ORDER — SODIUM, POTASSIUM,MAG SULFATES 17.5-3.13G
SOLUTION, RECONSTITUTED, ORAL ORAL
Qty: 1 KIT | Refills: 0 | Status: SHIPPED | OUTPATIENT
Start: 2018-12-04 | End: 2019-05-10

## 2018-12-04 RX ORDER — FENTANYL CITRATE 50 UG/ML
INJECTION, SOLUTION INTRAMUSCULAR; INTRAVENOUS
Status: DISCONTINUED | OUTPATIENT
Start: 2018-12-04 | End: 2018-12-04 | Stop reason: HOSPADM

## 2018-12-04 RX ORDER — BUPIVACAINE HYDROCHLORIDE 2.5 MG/ML
INJECTION, SOLUTION EPIDURAL; INFILTRATION; INTRACAUDAL
Status: DISCONTINUED | OUTPATIENT
Start: 2018-12-04 | End: 2018-12-04 | Stop reason: HOSPADM

## 2018-12-04 RX ORDER — MIDAZOLAM HYDROCHLORIDE 1 MG/ML
INJECTION INTRAMUSCULAR; INTRAVENOUS
Status: DISCONTINUED | OUTPATIENT
Start: 2018-12-04 | End: 2018-12-04 | Stop reason: HOSPADM

## 2018-12-04 RX ORDER — SODIUM CHLORIDE 9 MG/ML
500 INJECTION, SOLUTION INTRAVENOUS CONTINUOUS
Status: DISCONTINUED | OUTPATIENT
Start: 2018-12-04 | End: 2018-12-04 | Stop reason: HOSPADM

## 2018-12-04 RX ORDER — LIDOCAINE HYDROCHLORIDE 10 MG/ML
INJECTION INFILTRATION; PERINEURAL
Status: DISCONTINUED | OUTPATIENT
Start: 2018-12-04 | End: 2018-12-04 | Stop reason: HOSPADM

## 2018-12-04 NOTE — OP NOTE
INFORMED CONSENT: The procedure, risks, benefits and options were discussed with patient. There are no contraindications to the procedure. The patient expressed understanding and agreed to proceed. The personnel performing the procedure was discussed.    12/04/2018    Surgeon: Daniel Seo MD    Assistants: Zoraida Bermudez DO      PROCEDURE:    1)  Right  L4/5 TRANSFORAMINAL EPIDURAL STEROID INJECTION    2)  Right  L5/S1 TRANSFORAMINAL EPIDURAL STEROID INJECTION    Pre Procedure diagnosis:    Right  L4 and L5  Lumbar radiculopathy [M54.16]    Post-Procedure diagnosis:   same    Complications: None    Specimens: None      Sedation: Versed 2mg; Fentanyl 50mcg    DESCRIPTION OF PROCEDURE: The patient was brought to the procedure room. IV access was obtained prior to the procedure. The patient was positioned prone on the fluoroscopy table. Continuous hemodynamic monitoring was initiated including blood pressure, EKG, and pulse oximetry. . The skin was prepped with chlorhexidine and draped in a sterile fashion. Skin anesthesia was achieved using a total of 10mL of lidocaine, 5mL over each respective injection site.     The  L4 and L5  transforaminal spaces were identified with fluoroscopy in the  AP, oblique, and lateral views.  A 22 gauge 5 inch spinal quinke needle was then advanced into the area of the trans foraminal spaces at the above levels with confirmation of proper needle position using AP, oblique, and lateral fluoroscopic views. Once the needle tip was in the area of the transforaminal space, and there was no blood, CSF or paraesthesias,  1 mL of Omnipaque 300mg/ml was injected on each side for a total of 2 mL.  Fluoroscopic imaging in the AP and lateral views revealed a clear outline of the spinal nerve with proximal spread of agent through the neural foramen into the epidural space. A total combination of 2 mL of Bupivicaine 0.25% and 40 mg depo medrol was injected into each level for a total of 4mL  of injected medications with displacement of the contrast dye confirming that the medication went into the area of the transforaminal spaces at each level. A sterile dressing was applied.   Patient tolerated the procedure well.    Patient was taken back to the recovery room for further observation.     The patient was discharged to home in stable condition  Attending physician was present throughout the entire case and all critical portions.

## 2018-12-04 NOTE — DISCHARGE SUMMARY
Discharge Note  Short Stay      SUMMARY     Admit Date: 12/4/2018    Attending Physician: Zoraida Bermudez      Discharge Physician: Zoraida Bermudez      Discharge Date: 12/4/2018 2:57 PM    Procedure(s) (LRB):  Injection,steroid,epidural,transforaminal approach RIGHT L4 AND L5 TF JEROMY (Right)    Final Diagnosis: Lumbar radiculopathy [M54.16]    Disposition: Home or self care    Patient Instructions:   Current Discharge Medication List      CONTINUE these medications which have NOT CHANGED    Details   !! albuterol 90 mcg/actuation inhaler Inhale 1-2 puffs into the lungs every 6 (six) hours as needed for Wheezing.  Qty: 1 each, Refills: 11    Associated Diagnoses: Cough      !! albuterol 90 mcg/actuation inhaler Inhale 1-2 puffs into the lungs every 6 (six) hours as needed for Wheezing.  Qty: 1 each, Refills: 11    Associated Diagnoses: Acute sinusitis, recurrence not specified, unspecified location      biotin 5,000 mcg TbDL Take 1 capsule by mouth once daily.      buPROPion (WELLBUTRIN XL) 300 MG 24 hr tablet TAKE 1 TABLET BY MOUTH ONCE DAILY  Qty: 30 tablet, Refills: 6      cholecalciferol, vitamin D3, (VITAMIN D3) 2,000 unit Tab Take 1 tablet by mouth once daily.      !! citalopram (CELEXA) 10 MG tablet Refills: 11      !! citalopram (CELEXA) 10 MG tablet TAKE 1 TABLET BY MOUTH EVERY DAY  Qty: 30 tablet, Refills: 6      FLAXSEED OIL MISC by Misc.(Non-Drug; Combo Route) route.      HYDROcodone-acetaminophen (NORCO) 5-325 mg per tablet Take 1 tablet by mouth every 6 (six) hours as needed for Pain.  Qty: 20 tablet, Refills: 0      LORazepam (ATIVAN) 1 MG tablet TAKE 1 TABLET BY MOUTH EVERY NIGHT AS NEEDED  Qty: 30 tablet, Refills: 0      metoprolol succinate (TOPROL-XL) 25 MG 24 hr tablet TAKE 1 TABLET(25 MG) BY MOUTH EVERY DAY  Qty: 90 tablet, Refills: 1      montelukast (SINGULAIR) 10 mg tablet TAKE 1 TABLET BY MOUTH EVERY EVENING  Qty: 30 tablet, Refills: 6    Associated Diagnoses: Acute sinusitis, recurrence  not specified, unspecified location      multivitamin (THERAGRAN) per tablet Take 1 tablet by mouth once daily.      naproxen (NAPROSYN) 500 MG tablet TAKE 1 TABLET BY MOUTH TWICE DAILY WITH MEALS  Qty: 60 tablet, Refills: 0    Associated Diagnoses: Lateral meniscus derangement, left      sodium,potassium,mag sulfates (SUPREP BOWEL PREP KIT) 17.5-3.13-1.6 gram SolR As directed.  Dispense 1 kit.  Qty: 1 kit, Refills: 0    Associated Diagnoses: Special screening for malignant neoplasms, colon       !! - Potential duplicate medications found. Please discuss with provider.              Discharge Diagnosis: Lumbar radiculopathy [M54.16]  Condition on Discharge: Stable with no complications to procedure   Diet on Discharge: Same as before.  Activity: as per instruction sheet.  Discharge to: Home with a responsible adult.  Follow up: 2-4 weeks

## 2018-12-04 NOTE — DISCHARGE INSTRUCTIONS
Thank you for allowing us to care for you today. You may receive a survey about the care we provided. Your feedback is valuable and helps us provide excellent care throughout the community.     Home Care Instructions for Pain Management:    1. DIET:   You may resume your normal diet today.   2. BATHING:   You may shower with luke warm water. No tub baths or anything that will soak injection sites under water for the next 24 hours.  3. DRESSING:   You may remove your bandage today.   4. ACTIVITY LEVEL:   You may resume your normal activities 24 hrs after your procedure. Nothing strenuous today.  5. MEDICATIONS:   You may resume your normal medications today. To restart blood thinners, ask your doctor.  6. DRIVING    If you have received any sedatives by mouth today, you may not drive for 12 hours.    If you have received any sedation through your IV, you may not drive for 24 hrs.   7. SPECIAL INSTRUCTIONS:   No heat to the injection site for 24 hrs including, hot bath or shower, heating pad, moist heat, or hot tubs.    Use ice pack to injection site for any pain or discomfort.  Apply ice packs for 20 minute intervals as needed.    IF you have diabetes, be sure to monitor your blood sugar more closely. IF your injection contained steroids your blood sugar levels may become higher than normal.    If you are still having pain upon discharge:  Your pain may improve over the next 48 hours. The anesthetic (numbing medication) works immediately to 48 hours. IF your injection contained a steroid (anti-inflammatory medication), it takes approximately 3 days to start feeling relief and 7-10 days to see your greatest results from the medication. It is possible you may need subsequent injections. This would be discussed at your follow up appointment with pain management or your referring doctor.      PLEASE CALL YOUR DOCTOR IF:  1. Redness or swelling around the injection site.  2. Fever of 101 degrees or more  3. Drainage  (pus) from the injection site.  4. For any continuous bleeding (some dried blood over the incision is normal.)    FOR EMERGENCIES:   If any unusual problems or difficulties occur during clinic hours, call [phone] or 329. Adult Procedural Sedation Instructions    Recovery After Procedural Sedation (Adult)  You have been given medicine by vein to make you sleep during your surgery. This may have included both a pain medicine and sleeping medicine. Most of the effects have worn off. But you may still have some drowsiness for the next 6 to 8 hours.  Home care  Follow these guidelines when you get home:  · For the next 8 hours, you should be watched by a responsible adult. This person should make sure your condition is not getting worse.  · Don't drink any alcohol for the next 24 hours.  · Don't drive, operate dangerous machinery, or make important business or personal decisions during the next 24 hours.  Note: Your healthcare provider may tell you not to take any medicine by mouth for pain or sleep in the next 4 hours. These medicines may react with the medicines you were given in the hospital. This could cause a much stronger response than usual.  Follow-up care  Follow up with your healthcare provider if you are not alert and back to your usual level of activity within 12 hours.  When to seek medical advice  Call your healthcare provider right away if any of these occur:  · Drowsiness gets worse  · Weakness or dizziness gets worse  · Repeated vomiting  · You can't be awakened   Date Last Reviewed: 10/18/2016  © 4262-4650 [employer]. [address] [place] [address], [place] [address]. All rights reserved. This information is not intended as a substitute for professional medical care. Always follow your healthcare professional's instructions.

## 2018-12-14 ENCOUNTER — PATIENT MESSAGE (OUTPATIENT)
Dept: INTERNAL MEDICINE | Facility: CLINIC | Age: 53
End: 2018-12-14

## 2018-12-15 ENCOUNTER — OFFICE VISIT (OUTPATIENT)
Dept: URGENT CARE | Facility: CLINIC | Age: 53
End: 2018-12-15
Payer: COMMERCIAL

## 2018-12-15 VITALS
HEIGHT: 65 IN | DIASTOLIC BLOOD PRESSURE: 88 MMHG | OXYGEN SATURATION: 96 % | RESPIRATION RATE: 18 BRPM | BODY MASS INDEX: 24.83 KG/M2 | SYSTOLIC BLOOD PRESSURE: 132 MMHG | TEMPERATURE: 99 F | WEIGHT: 149 LBS | HEART RATE: 80 BPM

## 2018-12-15 DIAGNOSIS — R09.81 SINUS CONGESTION: ICD-10-CM

## 2018-12-15 DIAGNOSIS — R05.9 COUGH: ICD-10-CM

## 2018-12-15 DIAGNOSIS — J06.9 UPPER RESPIRATORY TRACT INFECTION, UNSPECIFIED TYPE: Primary | ICD-10-CM

## 2018-12-15 LAB
CTP QC/QA: YES
FLUAV AG NPH QL: NEGATIVE
FLUBV AG NPH QL: NEGATIVE

## 2018-12-15 PROCEDURE — 3008F BODY MASS INDEX DOCD: CPT | Mod: CPTII,S$GLB,, | Performed by: FAMILY MEDICINE

## 2018-12-15 PROCEDURE — 99214 OFFICE O/P EST MOD 30 MIN: CPT | Mod: 25,S$GLB,, | Performed by: FAMILY MEDICINE

## 2018-12-15 PROCEDURE — 3075F SYST BP GE 130 - 139MM HG: CPT | Mod: CPTII,S$GLB,, | Performed by: FAMILY MEDICINE

## 2018-12-15 PROCEDURE — 3079F DIAST BP 80-89 MM HG: CPT | Mod: CPTII,S$GLB,, | Performed by: FAMILY MEDICINE

## 2018-12-15 PROCEDURE — 87804 INFLUENZA ASSAY W/OPTIC: CPT | Mod: 59,QW,S$GLB, | Performed by: FAMILY MEDICINE

## 2018-12-15 PROCEDURE — 96372 THER/PROPH/DIAG INJ SC/IM: CPT | Mod: S$GLB,,, | Performed by: FAMILY MEDICINE

## 2018-12-15 RX ORDER — CODEINE PHOSPHATE AND GUAIFENESIN 10; 100 MG/5ML; MG/5ML
5 SOLUTION ORAL 3 TIMES DAILY PRN
Qty: 120 ML | Refills: 0 | Status: SHIPPED | OUTPATIENT
Start: 2018-12-15 | End: 2018-12-22

## 2018-12-15 RX ORDER — AZELASTINE 1 MG/ML
1 SPRAY, METERED NASAL 2 TIMES DAILY
Qty: 30 ML | Refills: 0 | Status: SHIPPED | OUTPATIENT
Start: 2018-12-15 | End: 2019-06-13

## 2018-12-15 RX ORDER — BETAMETHASONE SODIUM PHOSPHATE AND BETAMETHASONE ACETATE 3; 3 MG/ML; MG/ML
9 INJECTION, SUSPENSION INTRA-ARTICULAR; INTRALESIONAL; INTRAMUSCULAR; SOFT TISSUE
Status: COMPLETED | OUTPATIENT
Start: 2018-12-15 | End: 2018-12-15

## 2018-12-15 RX ADMIN — BETAMETHASONE SODIUM PHOSPHATE AND BETAMETHASONE ACETATE 9 MG: 3; 3 INJECTION, SUSPENSION INTRA-ARTICULAR; INTRALESIONAL; INTRAMUSCULAR; SOFT TISSUE at 05:12

## 2018-12-15 NOTE — PATIENT INSTRUCTIONS
Preventing Common Respiratory Infections  Respiratory infections such as colds and influenza (the flu) are common in winter. These infections are often caused by viruses. They may share some symptoms, but not all respiratory infections are the same. Some make you more sick than others. You can take steps to prevent common respiratory infections. And if you get sick, you can take care of yourself to keep the infection from getting worse.    What is a cold?  · Symptoms include runny nose, coughing and sneezing, and sore throat. Cold symptoms tend to be milder than flu symptoms.  · Symptoms tend to come on slowly. They last for a few days to about a week.  · With a cold, you can still do most of the things you usually do.  What is the flu?  · Symptoms include fever, headache, fatigue, cough, sore throat, runny nose, and muscle aches. Children may have upset stomach and vomiting, but adults usually dont.  · Symptoms tend to come on quickly. Some, such as fatigue and cough, can last a few weeks.  · With the flu, you may feel worn out and not able to do normal activities.  · Its most likely NOT the flu if an adult has vomiting or diarrhea for a day or two. This so-called stomach flu is probably a GI (gastrointestinal) infection.  When the infection gets worse  Without proper care, a respiratory infection can get worse. It can lead to serious complications and death. If you arent getting better, call your healthcare provider. Complications can include:  · Bronchitis (infection of the airways that leads to shortness of breath and coughing up thick yellow or green mucus)  · Pneumonia (infection of the lungs in which fluid and mucus settle in the lungs, making breathing difficult)  · Worsening of chronic conditions such as heart failure, chronic lung disease, asthma, or diabetes  · Severe dehydration (loss of fluids)  · Sinus problems  · Ear infections   Get a flu vaccine  A flu vaccine protects you from influenza  (but not other colds or infections). Get a vaccine each fall, before flu season starts. This can be done at a clinic, healthcare providers office, drugstore, senior center, or through your workplace.  Get pneumococcal vaccines  Pneumonia can be a complication of influenza. There are 2 pneumococcal pneumonia vaccines that protect against many types of pneumonia. Talk with your healthcare provider about these important vaccines.   Keep germs from spreading  No one likes getting sick. To protect yourself and others from cold and flu germs:  · Wash your hands often. Use alcohol-based hand  when you dont have access to soap and water.  · Dont touch your eyes, nose, and mouth. This may help you keep germs out of your body.  · Try to avoid people with respiratory infections. You may want to stay out of crowds during flu season (winter).  · Ask your healthcare provider if you should get a pneumonia vaccination.  How to wash your hands  · Use warm water and plenty of soap. Work up a good lather.  · Clean your whole hand, under your nails, between your fingers, and up your wrists. Wash for at least 15 to 20 seconds. Dont just wipe--rub well.  · Rinse. Let the water run down your fingertips, not up your wrists.  · In a public restroom, use a paper towel to turn off the faucet and open the door.   Date Last Reviewed: 12/1/2016  © 6705-7002 [employer]. [address] [place] [address], [place] [address]. All rights reserved. This information is not intended as a substitute for professional medical care. Always follow your healthcare professional's instructions.    Follow up with your doctor in a few days as needed.  Return to the urgent care or go to the ER if symptoms get worse.    Chaz Sarkar MD

## 2018-12-15 NOTE — PROGRESS NOTES
"Subjective:       Patient ID: Twila Roman is a 53 y.o. female.    Vitals:  height is 5' 5" (1.651 m) and weight is 67.6 kg (149 lb). Her oral temperature is 98.5 °F (36.9 °C). Her blood pressure is 132/88 and her pulse is 80. Her respiration is 18 and oxygen saturation is 96%.     Chief Complaint: URI    URI    This is a new problem. The current episode started in the past 7 days (started 3 days ago). The problem has been gradually worsening. There has been no fever. Associated symptoms include congestion, coughing, ear pain, headaches, a plugged ear sensation, rhinorrhea, sinus pain, sneezing, a sore throat and swollen glands. Pertinent negatives include no abdominal pain, chest pain, diarrhea, dysuria, joint pain, joint swelling, nausea, neck pain, rash, vomiting or wheezing. Treatments tried: Dayquil, Nyquil, Mucinex, Vitamin c. The treatment provided no relief.       Constitution: Negative for chills, sweating, fatigue and fever.   HENT: Positive for ear pain, congestion, postnasal drip, sinus pain, sinus pressure, sore throat and voice change. Negative for trouble swallowing.    Neck: Negative for neck pain and painful lymph nodes.   Cardiovascular: Negative for chest pain.   Eyes: Negative for eye redness.   Respiratory: Positive for cough and sputum production. Negative for chest tightness, bloody sputum, COPD, shortness of breath, stridor, wheezing and asthma.    Gastrointestinal: Negative for abdominal pain, nausea, vomiting, constipation and diarrhea.   Genitourinary: Negative for dysuria.   Musculoskeletal: Negative for muscle ache.   Skin: Negative for rash and erythema.   Allergic/Immunologic: Positive for sneezing. Negative for seasonal allergies and asthma.   Neurological: Positive for headaches.   Hematologic/Lymphatic: Negative for swollen lymph nodes.       Objective:      Physical Exam   Constitutional: She is oriented to person, place, and time. She appears well-developed and well-nourished. "   HENT:   Head: Normocephalic and atraumatic.   Right Ear: External ear normal.   Left Ear: External ear normal.   Mouth/Throat: Oropharynx is clear and moist.   Eyes: EOM are normal. Pupils are equal, round, and reactive to light.   Neck: Normal range of motion. Neck supple. No JVD present. No tracheal deviation present. No thyromegaly present.   Cardiovascular: Normal rate, regular rhythm and normal heart sounds. Exam reveals no gallop and no friction rub.   No murmur heard.  Pulmonary/Chest: Breath sounds normal. No respiratory distress. She has no wheezes. She has no rales. She exhibits no tenderness.   Abdominal: Soft. Bowel sounds are normal. She exhibits no distension and no mass. There is no tenderness. There is no rebound and no guarding. No hernia.   Musculoskeletal: Normal range of motion. She exhibits no edema, tenderness or deformity.   Lymphadenopathy:     She has no cervical adenopathy.   Neurological: She is alert and oriented to person, place, and time. She displays normal reflexes. No cranial nerve deficit. She exhibits normal muscle tone. Coordination normal.   Skin: Skin is warm. Capillary refill takes less than 2 seconds. No rash noted. No erythema. No pallor.   Psychiatric: She has a normal mood and affect. Her behavior is normal. Judgment and thought content normal.   Vitals reviewed.      Assessment:       1. Upper respiratory tract infection, unspecified type    2. Cough    3. Sinus congestion        Plan:         Upper respiratory tract infection, unspecified type    Cough  -     POCT Influenza A/B  -     betamethasone acetate-betamethasone sodium phosphate injection 9 mg  -     guaifenesin-codeine 100-10 mg/5 ml (CHERATUSSIN AC)  mg/5 mL syrup; Take 5 mLs by mouth 3 (three) times daily as needed for Cough.  Dispense: 120 mL; Refill: 0    Sinus congestion  -     betamethasone acetate-betamethasone sodium phosphate injection 9 mg  -     azelastine (ASTELIN) 137 mcg (0.1 %) nasal spray;  1 spray (137 mcg total) by Nasal route 2 (two) times daily.  Dispense: 30 mL; Refill: 0          Patient Instructions       Preventing Common Respiratory Infections  Respiratory infections such as colds and influenza (the flu) are common in winter. These infections are often caused by viruses. They may share some symptoms, but not all respiratory infections are the same. Some make you more sick than others. You can take steps to prevent common respiratory infections. And if you get sick, you can take care of yourself to keep the infection from getting worse.    What is a cold?  · Symptoms include runny nose, coughing and sneezing, and sore throat. Cold symptoms tend to be milder than flu symptoms.  · Symptoms tend to come on slowly. They last for a few days to about a week.  · With a cold, you can still do most of the things you usually do.  What is the flu?  · Symptoms include fever, headache, fatigue, cough, sore throat, runny nose, and muscle aches. Children may have upset stomach and vomiting, but adults usually dont.  · Symptoms tend to come on quickly. Some, such as fatigue and cough, can last a few weeks.  · With the flu, you may feel worn out and not able to do normal activities.  · Its most likely NOT the flu if an adult has vomiting or diarrhea for a day or two. This so-called stomach flu is probably a GI (gastrointestinal) infection.  When the infection gets worse  Without proper care, a respiratory infection can get worse. It can lead to serious complications and death. If you arent getting better, call your healthcare provider. Complications can include:  · Bronchitis (infection of the airways that leads to shortness of breath and coughing up thick yellow or green mucus)  · Pneumonia (infection of the lungs in which fluid and mucus settle in the lungs, making breathing difficult)  · Worsening of chronic conditions such as heart failure, chronic lung disease, asthma, or diabetes  · Severe  dehydration (loss of fluids)  · Sinus problems  · Ear infections   Get a flu vaccine  A flu vaccine protects you from influenza (but not other colds or infections). Get a vaccine each fall, before flu season starts. This can be done at a clinic, healthcare providers office, drugstore, senior center, or through your workplace.  Get pneumococcal vaccines  Pneumonia can be a complication of influenza. There are 2 pneumococcal pneumonia vaccines that protect against many types of pneumonia. Talk with your healthcare provider about these important vaccines.   Keep germs from spreading  No one likes getting sick. To protect yourself and others from cold and flu germs:  · Wash your hands often. Use alcohol-based hand  when you dont have access to soap and water.  · Dont touch your eyes, nose, and mouth. This may help you keep germs out of your body.  · Try to avoid people with respiratory infections. You may want to stay out of crowds during flu season (winter).  · Ask your healthcare provider if you should get a pneumonia vaccination.  How to wash your hands  · Use warm water and plenty of soap. Work up a good lather.  · Clean your whole hand, under your nails, between your fingers, and up your wrists. Wash for at least 15 to 20 seconds. Dont just wipe--rub well.  · Rinse. Let the water run down your fingertips, not up your wrists.  · In a public restroom, use a paper towel to turn off the faucet and open the door.   Date Last Reviewed: 12/1/2016  © 4810-0857 [employer]. [address] [place] [address], [place] [address]. All rights reserved. This information is not intended as a substitute for professional medical care. Always follow your healthcare professional's instructions.    Follow up with your doctor in a few days as needed.  Return to the urgent care or go to the ER if symptoms get worse.    Chaz Sarkar MD

## 2018-12-17 ENCOUNTER — PATIENT MESSAGE (OUTPATIENT)
Dept: INTERNAL MEDICINE | Facility: CLINIC | Age: 53
End: 2018-12-17

## 2018-12-17 RX ORDER — AMOXICILLIN AND CLAVULANATE POTASSIUM 875; 125 MG/1; MG/1
1 TABLET, FILM COATED ORAL 2 TIMES DAILY
Qty: 14 TABLET | Refills: 0 | Status: SHIPPED | OUTPATIENT
Start: 2018-12-17 | End: 2018-12-24

## 2018-12-28 RX ORDER — LORAZEPAM 1 MG/1
TABLET ORAL
Qty: 30 TABLET | Refills: 0 | Status: SHIPPED | OUTPATIENT
Start: 2018-12-28 | End: 2019-02-07 | Stop reason: SDUPTHER

## 2019-01-14 ENCOUNTER — TELEPHONE (OUTPATIENT)
Dept: ENDOSCOPY | Facility: HOSPITAL | Age: 54
End: 2019-01-14

## 2019-01-14 ENCOUNTER — PATIENT MESSAGE (OUTPATIENT)
Dept: ENDOSCOPY | Facility: HOSPITAL | Age: 54
End: 2019-01-14

## 2019-01-25 RX ORDER — METOPROLOL SUCCINATE 25 MG/1
TABLET, EXTENDED RELEASE ORAL
Qty: 90 TABLET | Refills: 3 | Status: SHIPPED | OUTPATIENT
Start: 2019-01-25 | End: 2019-12-29

## 2019-02-07 ENCOUNTER — TELEPHONE (OUTPATIENT)
Dept: INTERNAL MEDICINE | Facility: CLINIC | Age: 54
End: 2019-02-07

## 2019-02-07 RX ORDER — LORAZEPAM 0.5 MG/1
1 TABLET ORAL NIGHTLY PRN
Qty: 60 TABLET | Refills: 0 | Status: SHIPPED | OUTPATIENT
Start: 2019-02-07 | End: 2019-03-09

## 2019-02-07 RX ORDER — LORAZEPAM 1 MG/1
TABLET ORAL
Qty: 30 TABLET | Refills: 0 | Status: SHIPPED | OUTPATIENT
Start: 2019-02-07 | End: 2019-04-16 | Stop reason: SDUPTHER

## 2019-02-07 NOTE — TELEPHONE ENCOUNTER
----- Message from Beatris Palacios sent at 2/7/2019  8:52 AM CST -----  Contact: [facility] [id number] [place] [facility] - [address] [facility] [address] [phone] (Phone)  Pharmacy is calling to clarify an RX.  RX name:  LORazepam (ATIVAN) 1 MG tablet 30 tablet   What do they need to clarify:    Comments: Pharmacy says that the 1MG is on back order and would like to know if they can change to .05MG or 2 MG, please call to advise.    [facility] [id number] - [place] - [address] [facility] AT [facility] [phone] (Phone)  [phone] (Fax)

## 2019-04-16 DIAGNOSIS — J01.90 ACUTE SINUSITIS, RECURRENCE NOT SPECIFIED, UNSPECIFIED LOCATION: ICD-10-CM

## 2019-04-17 RX ORDER — LORAZEPAM 1 MG/1
TABLET ORAL
Qty: 30 TABLET | Refills: 0 | Status: SHIPPED | OUTPATIENT
Start: 2019-04-17 | End: 2019-06-05 | Stop reason: SDUPTHER

## 2019-04-17 RX ORDER — BUPROPION HYDROCHLORIDE 300 MG/1
TABLET ORAL
Qty: 30 TABLET | Refills: 11 | Status: SHIPPED | OUTPATIENT
Start: 2019-04-17 | End: 2020-02-13

## 2019-04-17 RX ORDER — MONTELUKAST SODIUM 10 MG/1
TABLET ORAL
Qty: 30 TABLET | Refills: 11 | Status: SHIPPED | OUTPATIENT
Start: 2019-04-17 | End: 2020-05-03

## 2019-05-10 ENCOUNTER — TELEPHONE (OUTPATIENT)
Dept: SURGERY | Facility: CLINIC | Age: 54
End: 2019-05-10

## 2019-05-10 ENCOUNTER — OFFICE VISIT (OUTPATIENT)
Dept: OBSTETRICS AND GYNECOLOGY | Facility: CLINIC | Age: 54
End: 2019-05-10
Payer: COMMERCIAL

## 2019-05-10 VITALS
BODY MASS INDEX: 26.23 KG/M2 | HEIGHT: 65 IN | SYSTOLIC BLOOD PRESSURE: 110 MMHG | DIASTOLIC BLOOD PRESSURE: 70 MMHG | WEIGHT: 157.44 LBS

## 2019-05-10 DIAGNOSIS — Z01.419 WELL FEMALE EXAM WITH ROUTINE GYNECOLOGICAL EXAM: Primary | ICD-10-CM

## 2019-05-10 DIAGNOSIS — Z80.41 FAMILY HISTORY OF OVARIAN CANCER: ICD-10-CM

## 2019-05-10 DIAGNOSIS — Z12.39 BREAST CANCER SCREENING: ICD-10-CM

## 2019-05-10 PROCEDURE — 3078F DIAST BP <80 MM HG: CPT | Mod: CPTII,S$GLB,, | Performed by: OBSTETRICS & GYNECOLOGY

## 2019-05-10 PROCEDURE — 99396 PR PREVENTIVE VISIT,EST,40-64: ICD-10-PCS | Mod: S$GLB,,, | Performed by: OBSTETRICS & GYNECOLOGY

## 2019-05-10 PROCEDURE — 99396 PREV VISIT EST AGE 40-64: CPT | Mod: S$GLB,,, | Performed by: OBSTETRICS & GYNECOLOGY

## 2019-05-10 PROCEDURE — 3078F PR MOST RECENT DIASTOLIC BLOOD PRESSURE < 80 MM HG: ICD-10-PCS | Mod: CPTII,S$GLB,, | Performed by: OBSTETRICS & GYNECOLOGY

## 2019-05-10 PROCEDURE — 99999 PR PBB SHADOW E&M-EST. PATIENT-LVL III: CPT | Mod: PBBFAC,,, | Performed by: OBSTETRICS & GYNECOLOGY

## 2019-05-10 PROCEDURE — 3074F PR MOST RECENT SYSTOLIC BLOOD PRESSURE < 130 MM HG: ICD-10-PCS | Mod: CPTII,S$GLB,, | Performed by: OBSTETRICS & GYNECOLOGY

## 2019-05-10 PROCEDURE — 99999 PR PBB SHADOW E&M-EST. PATIENT-LVL III: ICD-10-PCS | Mod: PBBFAC,,, | Performed by: OBSTETRICS & GYNECOLOGY

## 2019-05-10 PROCEDURE — 3074F SYST BP LT 130 MM HG: CPT | Mod: CPTII,S$GLB,, | Performed by: OBSTETRICS & GYNECOLOGY

## 2019-05-10 NOTE — PROGRESS NOTES
SUBJECTIVE:   53 y.o. female   for routine gyn exam. Patient's last menstrual period was 2015 (approximate)..  She has no unusual complaints. No PMB.  No HRT.  Has vaginal dryness.         Past Medical History:   Diagnosis Date    Anxiety     Bronchitis     Family history of ovarian cancer     Hypertension      Past Surgical History:   Procedure Laterality Date    ARTHROSCOPY, KNEE, WITH MENISCECTOMY Left 8/15/2018    Performed by Handy Elkins MD at [facility]    BACK SURGERY      spinal fusion    JEROMY-TRANSFORAMINAL Right 2016    [facility]formed by [facility]o [facility] at [facility]    JEROMY-TRANSFORAMINAL Right 2015    [facility] at [facility]    Injection,steroid,epidural,transforaminal approach RIGHT L4 AND L5 TF JEROMY Right 2018    [facility] at [facility]    INJECTION-STEROID-EPIDURAL-TRANSFORAMINAL Right 2017    [facility]    INJECTION-STEROID-EPIDURAL-TRANSFORAMINAL Right 2017    [facility] at [facility]    TUMOR REMOVAL      right hand     Social History     Socioeconomic History    Marital status: Single     Spouse name: Not on file    Number of children: Not on file    Years of education: Not on file    Highest education level: Not on file   Occupational History    Occupation:      Employer: [employer]    Social [employer]    Financial resource strain: Not on file    Food insecurity:     Worry: Not on file     Inability: Not on file    Transportation needs:     Medical: Not on file     Non-medical: Not on file   Tobacco Use    Smoking status: Never Smoker    Smokeless tobacco: Never Used   Substance and Sexual Activity    Alcohol use: Yes     Alcohol/week: 3.0 oz     Types: 5 Glasses of wine per week     Comment: one a day- red wine    Drug use: No    Sexual activity: Yes     Partners: Male     Comment: Boyfriend     Lifestyle    Physical activity:     Days per week: Not on file     Minutes per session: Not on file    Stress: Not on file   Relationships    Social connections:     Talks on phone: Not on file     Gets together: Not on file     Attends Synagogue service: Not on file     Active member of club or organization: Not on file     Attends meetings of clubs or organizations: Not on file     Relationship status: Not on file   Other Topics Concern    Not on file   Social History Narrative    Not on file     Family History   Problem Relation Age of Onset    Ovarian cancer Mother 66    Heart disease Father         double bypass    Macular degeneration Father     No Known Problems Brother     No Known Problems Brother     Breast cancer Paternal Cousin 20    Colon cancer Neg Hx      OB History    Para Term  AB Living   0 0 0 0 0 0   SAB TAB Ectopic Multiple Live Births   0 0 0 0           Current Outpatient Medications   Medication Sig Dispense Refill    albuterol 90 mcg/actuation inhaler Inhale 1-2 puffs into the lungs every 6 (six) hours as needed for Wheezing. 1 each 11    albuterol 90 mcg/actuation inhaler Inhale 1-2 puffs into the lungs every 6 (six) hours as needed for Wheezing. 1 each 11    azelastine (ASTELIN) 137 mcg (0.1 %) nasal spray 1 spray (137 mcg total) by Nasal route 2 (two) times daily. 30 mL 0    biotin 5,000 mcg TbDL Take 1 capsule by mouth once daily.      buPROPion (WELLBUTRIN XL) 300 MG 24 hr tablet TAKE 1 TABLET BY MOUTH EVERY DAY 30 tablet 11    cholecalciferol, vitamin D3, (VITAMIN D3) 2,000 unit Tab Take 1 tablet by mouth once daily.      citalopram (CELEXA) 10 MG tablet   11    citalopram (CELEXA) 10 MG tablet TAKE 1 TABLET BY MOUTH EVERY DAY 30 tablet 6    FLAXSEED OIL MISC by Misc.(Non-Drug; Combo Route) route.      LORazepam (ATIVAN) 1 MG tablet TAKE 1 TABLET BY MOUTH EVERY NIGHT AS NEEDED 30 tablet 0    metoprolol succinate (TOPROL-XL) 25 MG 24 hr tablet TAKE  "1 TABLET(25 MG) BY MOUTH EVERY DAY 90 tablet 3    montelukast (SINGULAIR) 10 mg tablet TAKE 1 TABLET BY MOUTH EVERY EVENING 30 tablet 11    multivitamin (THERAGRAN) per tablet Take 1 tablet by mouth once daily.      naproxen (NAPROSYN) 500 MG tablet TAKE 1 TABLET BY MOUTH TWICE DAILY WITH MEALS 60 tablet 0    ospemifene (OSPHENA) 60 mg Tab Take 60 mg by mouth once daily. 90 tablet 3     No current facility-administered medications for this visit.      Allergies: Patient has no known allergies.     ROS:  Constitutional: no weight loss, weight gain, fever, fatigue  Eyes:  No vision changes, glasses/contacts  ENT/Mouth: No ulcers, sinus problems, ears ringing, headache  Cardiovascular: No inability to lie flat, chest pain, exercise intolerance, swelling, heart palpitations  Respiratory: No wheezing, coughing blood, shortness of breath, or cough  Gastrointestinal: No diarrhea, bloody stool, nausea/vomiting, constipation, gas, hemorrhoids  Genitourinary: No blood in urine, painful urination, urgency of urination, frequency of urination, incomplete emptying, incontinence, abnormal bleeding, painful periods, heavy periods, vaginal discharge, vaginal odor, +painful intercourse, sexual problems, bleeding after intercourse.  Musculoskeletal: No muscle weakness  Skin/Breast: No painful breasts, nipple discharge, masses, rash, ulcers  Neurological: No passing out, seizures, numbness, headache  Endocrine: No diabetes, hypothyroid, hyperthyroid, hot flashes, hair loss, abnormal hair growth, ance  Psychiatric: No depression, crying  Hematologic: No bruises, bleeding, swollen lymph nodes, anemia.      OBJECTIVE:   The patient appears well, alert, oriented x 3, in no distress.  /70 (BP Location: Left arm, Patient Position: Sitting, BP Method: Medium (Manual))   Ht 5' 5" (1.651 m)   Wt 71.4 kg (157 lb 6.5 oz)   LMP 04/19/2015 (Approximate)   BMI 26.19 kg/m²   NECK: no thyromegaly, trachea midline  SKIN: no acne, striae, " hirsutism  CHEST: CTAB  CV: RRR  BREAST EXAM: breasts appear normal, no suspicious masses, no skin or nipple changes or axillary nodes  ABDOMEN: no hernias, masses, or hepatosplenomegaly  GENITALIA: normal external genitalia, no erythema, no discharge  URETHRA: normal urethra, normal urethral meatus  VAGINA: Normal  CERVIX: no lesions or cervical motion tenderness  UTERUS: normal size, contour, position, consistency, mobility, non-tender  ADNEXA: no mass, fullness, tenderness      ASSESSMENT:   1. Well female exam with routine gynecological exam     2. Family history of ovarian cancer  Ambulatory Referral to [facility]   3. Breast cancer screening  Mammo Digital Screening Bilat w/ Selvin       PLAN:   Orders Placed This Encounter    Mammo Digital Screening Bilat w/ Selvin    Ambulatory Referral to [facility]    ospemifene (OSPHENA) 60 mg Tab     Discussed vaginal dryness and mgt options.  Desires trial of Osphena  Desires genetic testing  Return to clinic in 1 year

## 2019-05-10 NOTE — TELEPHONE ENCOUNTER
Contacted the patient regarding the message below.  The patient did not answer, message left with my name and direct number for the patient to contact our office regarding this matter.

## 2019-05-10 NOTE — TELEPHONE ENCOUNTER
----- Message from Angelica Dorman MA sent at 5/10/2019  3:09 PM CDT -----  Contact: Maxine Armstrong/Angelica Armstrong and patient is requesting a consult for genetic testing. She ia aware she will be waiting for a call      Thanks  Angelica

## 2019-05-13 ENCOUNTER — TELEPHONE (OUTPATIENT)
Dept: SURGERY | Facility: CLINIC | Age: 54
End: 2019-05-13

## 2019-05-13 NOTE — TELEPHONE ENCOUNTER
The patient returned my call regarding scheduling appointment for genetic counseling/testing ([facility]).  The patient is scheduled to see Neri Hanson NP on Wednesday 6/5/19 at 9:55 am.  The patient voiced understanding of appointment date, time, and location.  Reminder letter mailed to the patient.

## 2019-05-13 NOTE — TELEPHONE ENCOUNTER
2nd attempt to contact the patient regarding scheduling appointment for genetic counseling and testing.  The patient did not answer, message left with my name and direct number for the patient to contact our office regarding this matter.

## 2019-05-13 NOTE — TELEPHONE ENCOUNTER
3rd attempt to contact the patient regarding scheduling appointment.  The patient did not answer, message left with my name and direct number for the patient to contact our office regarding this matter.

## 2019-05-21 ENCOUNTER — TELEPHONE (OUTPATIENT)
Dept: SURGERY | Facility: CLINIC | Age: 54
End: 2019-05-21

## 2019-05-21 NOTE — TELEPHONE ENCOUNTER
Message was left for MsPedroAbel that Neri has had a change in her schedule on 5/24/19 . Please call Marielos del toro @ [phone] so we can reschedule this apt .

## 2019-05-23 ENCOUNTER — TELEPHONE (OUTPATIENT)
Dept: SURGERY | Facility: CLINIC | Age: 54
End: 2019-05-23

## 2019-05-23 NOTE — TELEPHONE ENCOUNTER
Spoke with patient regarding her apt with Neri on 5/24/19. Rescheduled patient apt  Due to patient having the flu.

## 2019-05-28 ENCOUNTER — TELEPHONE (OUTPATIENT)
Dept: SURGERY | Facility: CLINIC | Age: 54
End: 2019-05-28

## 2019-05-28 NOTE — TELEPHONE ENCOUNTER
Tried to return patient's call regarding needing to reschedule her missed apt today with Neri Hanson NP. Unable to reach patient due to No Answer on phone number listed.

## 2019-05-30 ENCOUNTER — TELEPHONE (OUTPATIENT)
Dept: SURGERY | Facility: CLINIC | Age: 54
End: 2019-05-30

## 2019-05-30 NOTE — TELEPHONE ENCOUNTER
Returned call to  regarding rescheduling her missed High Risk apt with Neri Hanson NP . Scheduled apt for 6/13/19 @ 3 pm. Patient was informed of this apt date and time apt slip was mailed out this morning.

## 2019-06-04 ENCOUNTER — HOSPITAL ENCOUNTER (OUTPATIENT)
Dept: RADIOLOGY | Facility: HOSPITAL | Age: 54
Discharge: HOME | End: 2019-06-04
Attending: OBSTETRICS & GYNECOLOGY
Payer: COMMERCIAL

## 2019-06-04 DIAGNOSIS — Z12.39 BREAST CANCER SCREENING: ICD-10-CM

## 2019-06-04 PROCEDURE — 77063 MAMMO DIGITAL SCREENING BILAT WITH TOMOSYNTHESIS_CAD: ICD-10-PCS | Mod: 26,,, | Performed by: RADIOLOGY

## 2019-06-04 PROCEDURE — 77063 BREAST TOMOSYNTHESIS BI: CPT | Mod: 26,,, | Performed by: RADIOLOGY

## 2019-06-04 PROCEDURE — 77067 MAMMO DIGITAL SCREENING BILAT WITH TOMOSYNTHESIS_CAD: ICD-10-PCS | Mod: 26,,, | Performed by: RADIOLOGY

## 2019-06-04 PROCEDURE — 77067 SCR MAMMO BI INCL CAD: CPT | Mod: 26,,, | Performed by: RADIOLOGY

## 2019-06-04 PROCEDURE — 77067 SCR MAMMO BI INCL CAD: CPT | Mod: TC,PO

## 2019-06-06 RX ORDER — LORAZEPAM 1 MG/1
TABLET ORAL
Qty: 30 TABLET | Refills: 0 | Status: SHIPPED | OUTPATIENT
Start: 2019-06-06 | End: 2019-07-11 | Stop reason: SDUPTHER

## 2019-06-13 ENCOUNTER — OFFICE VISIT (OUTPATIENT)
Dept: SURGERY | Facility: CLINIC | Age: 54
End: 2019-06-13
Payer: COMMERCIAL

## 2019-06-13 VITALS
WEIGHT: 156 LBS | TEMPERATURE: 98 F | SYSTOLIC BLOOD PRESSURE: 135 MMHG | DIASTOLIC BLOOD PRESSURE: 79 MMHG | HEART RATE: 70 BPM | HEIGHT: 65 IN | BODY MASS INDEX: 25.99 KG/M2

## 2019-06-13 DIAGNOSIS — Z80.3 FAMILY HISTORY OF BREAST CANCER: ICD-10-CM

## 2019-06-13 DIAGNOSIS — Z91.89 AT HIGH RISK FOR BREAST CANCER: Primary | ICD-10-CM

## 2019-06-13 DIAGNOSIS — Z80.41 FAMILY HISTORY OF OVARIAN CANCER: ICD-10-CM

## 2019-06-13 DIAGNOSIS — R92.30 DENSE BREAST TISSUE ON MAMMOGRAM: ICD-10-CM

## 2019-06-13 PROCEDURE — 3075F PR MOST RECENT SYSTOLIC BLOOD PRESS GE 130-139MM HG: ICD-10-PCS | Mod: CPTII,S$GLB,, | Performed by: NURSE PRACTITIONER

## 2019-06-13 PROCEDURE — 3008F BODY MASS INDEX DOCD: CPT | Mod: CPTII,S$GLB,, | Performed by: NURSE PRACTITIONER

## 2019-06-13 PROCEDURE — 99202 PR OFFICE/OUTPT VISIT, NEW, LEVL II, 15-29 MIN: ICD-10-PCS | Mod: S$GLB,,, | Performed by: NURSE PRACTITIONER

## 2019-06-13 PROCEDURE — 3078F PR MOST RECENT DIASTOLIC BLOOD PRESSURE < 80 MM HG: ICD-10-PCS | Mod: CPTII,S$GLB,, | Performed by: NURSE PRACTITIONER

## 2019-06-13 PROCEDURE — 3008F PR BODY MASS INDEX (BMI) DOCUMENTED: ICD-10-PCS | Mod: CPTII,S$GLB,, | Performed by: NURSE PRACTITIONER

## 2019-06-13 PROCEDURE — 99999 PR PBB SHADOW E&M-EST. PATIENT-LVL IV: ICD-10-PCS | Mod: PBBFAC,,, | Performed by: NURSE PRACTITIONER

## 2019-06-13 PROCEDURE — 3078F DIAST BP <80 MM HG: CPT | Mod: CPTII,S$GLB,, | Performed by: NURSE PRACTITIONER

## 2019-06-13 PROCEDURE — 99202 OFFICE O/P NEW SF 15 MIN: CPT | Mod: S$GLB,,, | Performed by: NURSE PRACTITIONER

## 2019-06-13 PROCEDURE — 99999 PR PBB SHADOW E&M-EST. PATIENT-LVL IV: CPT | Mod: PBBFAC,,, | Performed by: NURSE PRACTITIONER

## 2019-06-13 PROCEDURE — 3075F SYST BP GE 130 - 139MM HG: CPT | Mod: CPTII,S$GLB,, | Performed by: NURSE PRACTITIONER

## 2019-06-13 NOTE — LETTER
June 13, 2019      Yodit Randolph MD  [address]  [address]  [facility] [address]           [facility]  [address]  [facility] [address]  Phone: [phone]  Fax: [phone]          Patient: Twila Roman   MR Number: [id number]   YOB: 1965   Date of Visit: 6/13/2019       Dear Dr. Yodit Randolph:    Thank you for referring Twila Roman to me for evaluation. Attached you will find relevant portions of my assessment and plan of care.    If you have questions, please do not hesitate to call me. I look forward to following Twila Roman along with you.    Sincerely,    Neri Hanson, [facility]    Enclosure  CC:  No Recipients    If you would like to receive this communication electronically, please contact [email or web address] or [phone] to request more information on [place] [place] [employer] [facility] Link access.    For providers and/or their staff who would like to refer a patient to [facility], please contact us through our one-stop-shop provider referral line, [facility], at [phone].    If you feel you have received this communication in error or would no longer like to receive these types of communications, please e-mail [email or web address]

## 2019-06-13 NOTE — PROGRESS NOTES
Patient ID: Twila Roman is a 53 y.o. female.    Chief Complaint: Genetic Evaluation (New Patient High Risk)        HPI:  New patient to the breast center presents today for possible increased risk of breast cancer.    Patient denies palpable breast mass, breast pain, breast-related skin changes, nipple discharge and nipple retraction.  No breast concerns.    Breast History:    Personal history of breast cancer:  no  Personal history of breast biopsy:  no  Personal history of breast surgery:  no    Breast Imaging    19 bilat mmg report reviewed:  heterogeneously dense breast tissue  BI-RADS 1  TC lifetime risk 13.37%    GYN History:  Age of menarche:  15 y/o    Uterus and ovaries:  intact  Menopause:  52 y/o, +menopausal symptoms  HRT usage:  never    Other Oncologic History:  Personal history of other cancer not abovementioned:  no  Personal history of genetic testing:  no    Social History:  Tobacco use:  former smoker  Alcohol use:  5 drinks per week  Exercise:  inconsistent    Family Oncologic History:    Ashkenazi [place] ancestry:  no  History of genetic testing in relatives:  no    Family History   Problem Relation Age of Onset    Ovarian cancer Mother 66         at 72    Cancer Paternal Grandfather     Cancer Paternal Grandfather         lung (pt thinks), possible smoker    Breast cancer Paternal Cousin 20        father's sister's daughter, pt thinks bilateral (bilat mastectomies), cousin is    mat aunt had part of colon removed, pt does not think cancer      Patient's Breast Cancer Risk Assessment Scores:  Taking into account the above information, the patient's breast cancer risk assessment scores were calculated today as follows:  Hammad lifetime risk:  20.0% if pat 1st cousin's breast cancer was bilateral, 16.7% if unilateral  Coretta model 5-year risk:  1.2%      Review of Systems - See HPI.  Objective:   Physical Exam  Last CBE was performed on 5/10/19 by Dr. Armstrong  (OB/GYN) and was documented as WNL, which pt endorses today.  Pt denies any and all breast-related concerns.  Offered pt a CBE today, which she declined, electing to defer until visit with me around 11/10/19.    Assessment:       1. At high risk for breast cancer    2. Dense breast tissue on mammogram    3. Family history of breast cancer    4. Family history of ovarian cancer          Plan:      COUNSELING    Counseled patient regarding her possibly being at increased risk for breast cancer based on risk factors which patient and I discussed today and which were factored into the Tyrer-Cuzick risk assessment model v8.0b, which estimated a lifetime risk of breast cancer of 16.7%-20.0% for this patient as of today.  Discussed with patient that there are several models available for stratifying breast cancer risk, and Tyrer-Cuzick is presently the model utilized by [facility] and is a model recommended per current NCCN guidelines.  Discussed with pt how her personal BRCA1/BRCA2 test would impact her calculated lifetime risk (<20% if negative; potentially >80% if positive).  Counseled patient regarding modifiable risk factors for breast cancer as recommended by NCCN, including exercising, maintaining a healthy BMI, limiting alcohol consumption to less than one drink per day, and refraining from smoking.  Discussed with patient current NCCN guideline recommendations for increased breast cancer screening in individuals with a lifetime risk of breast cancer greater than or equal to 20%, to include semiannual CBE, along with annual mammogram and annual breast MRI, which would alternate.  Risks and benefits of increased screening with breast MRI were discussed.  Discussed with patient recommendation for genetic counseling through [facility].  Discussed with patient option of speaking with Medical Oncology regarding taking a pharmacologic agent such as tamoxifen or an AI to reduce breast cancer risk.      PLAN    After a thorough discussion, patient elects to proceed with a referral to [facility] for genetic counseling and an appointment in Medical Oncology to discuss the possibility of chemoprevention.  Pt would like to proceed with breast MRI if genetic testing is not performed by 2019 (when breast MRI due).  Will see pt back for f/u and CBE around 11/10/19, at which point I will order breast MRI if indicated based on pt's lifetime risk of breast cancer at that point in time.    RTC around 11/10/19 for CBE and follow-up discussion.    Next bilat mmg due around 20.    [facility] paperwork initiated.  Pt will need genetic testing regardless given her mother's hx of ovarian cancer.    [facility] Medical Assistant to facilitate obtainment of appointment in Medical Oncology for patient.    Offered pt referral to [facility] secondary to her possibly increased risk of breast cancer along with reported menopausal symptoms.  Pt desires to proceed with this.  Referral placed.  Provided pt with Dr. Thompson's office number for pt to call to set up appt.    Pt expressed dissatisfaction with her current weight, as she is about to get  and is slightly overweight.  Offered pt referral to Nutrition secondary to her possibly increased risk of breast cancer along with her very slightly elevated BMI.  Pt desires to proceed with this.  Referral placed.  Provided pt with number for pt to call to set up appt.    Encouraged breast awareness, including monthly breast self-exams.  Advised patient to RTC with any interval changes or concerns.  Questions were encouraged and answered to patient's satisfaction, and patient verbalized understanding of information and agreement with the plan.    Time in counselin mins  Total time:  27 mins  100% of time was spent in face-to-face counseling.

## 2019-06-18 ENCOUNTER — PATIENT MESSAGE (OUTPATIENT)
Dept: SURGERY | Facility: CLINIC | Age: 54
End: 2019-06-18

## 2019-06-19 ENCOUNTER — TELEPHONE (OUTPATIENT)
Dept: OBSTETRICS AND GYNECOLOGY | Facility: CLINIC | Age: 54
End: 2019-06-19

## 2019-06-19 NOTE — TELEPHONE ENCOUNTER
..RN Navigator left a voicemail for patient in regards to scheduling an appt with Dr. Thompson and [facility]. RN provided contact information requesting return call, SIERRA Tripathi

## 2019-06-20 ENCOUNTER — TELEPHONE (OUTPATIENT)
Dept: PAIN MEDICINE | Facility: CLINIC | Age: 54
End: 2019-06-20

## 2019-06-20 NOTE — TELEPHONE ENCOUNTER
----- Message from Maxine Antony sent at 6/20/2019 11:08 AM CDT -----  Appointment Request From: Twila Roman  Please advise what's needed for pt.   With Provider: Daniel Seo MD [[facility] [place] [address]]    Preferred Date Range: Any    Preferred Times: Any time    Reason for visit: Existing Patient    Comments:  I would like to schedule another cortisone shot.  Primary Coverage (Effective 3/1/2019)     Payer Plan Group Number Group Name Effective From Effective To  [facility] BLUE SHIELD BCBS ALL OUT OF STATE [id number]  3/1/2019   Patient Demographics     Address  [address] [address] Phone  [phone] (Home) #Preferred#  [phone] (Mobile) E-mail Address  [email or web address]  # of No Show in Current Department:0  Future Appointments   6/20/2019 - 6/19/2020    Date Visit Type Department Provider   6/28/2019  9:30 AM [facility] Nikko Thompson MD

## 2019-06-20 NOTE — TELEPHONE ENCOUNTER
Contacted and spoke with patient wanting to schedule a cortisone shot.    Staff informed patient that since her last office visit was in November 2018, she would need to schedule an office visit to be re-evaluated.    Patient is scheduled for June 21, 2019 with VIC Linda at 2:40 pm.    Patient verbalized understanding.

## 2019-06-21 ENCOUNTER — OFFICE VISIT (OUTPATIENT)
Dept: PAIN MEDICINE | Facility: CLINIC | Age: 54
End: 2019-06-21
Payer: COMMERCIAL

## 2019-06-21 VITALS
SYSTOLIC BLOOD PRESSURE: 119 MMHG | HEART RATE: 80 BPM | DIASTOLIC BLOOD PRESSURE: 82 MMHG | TEMPERATURE: 99 F | BODY MASS INDEX: 26.55 KG/M2 | HEIGHT: 65 IN | WEIGHT: 159.38 LBS

## 2019-06-21 DIAGNOSIS — Z98.1 H/O SPINAL FUSION: ICD-10-CM

## 2019-06-21 DIAGNOSIS — M54.16 LUMBAR RADICULOPATHY: Primary | ICD-10-CM

## 2019-06-21 PROCEDURE — 3079F PR MOST RECENT DIASTOLIC BLOOD PRESSURE 80-89 MM HG: ICD-10-PCS | Mod: CPTII,S$GLB,, | Performed by: NURSE PRACTITIONER

## 2019-06-21 PROCEDURE — 99213 OFFICE O/P EST LOW 20 MIN: CPT | Mod: S$GLB,,, | Performed by: NURSE PRACTITIONER

## 2019-06-21 PROCEDURE — 3079F DIAST BP 80-89 MM HG: CPT | Mod: CPTII,S$GLB,, | Performed by: NURSE PRACTITIONER

## 2019-06-21 PROCEDURE — 99213 PR OFFICE/OUTPT VISIT, EST, LEVL III, 20-29 MIN: ICD-10-PCS | Mod: S$GLB,,, | Performed by: NURSE PRACTITIONER

## 2019-06-21 PROCEDURE — 99999 PR PBB SHADOW E&M-EST. PATIENT-LVL III: CPT | Mod: PBBFAC,,, | Performed by: NURSE PRACTITIONER

## 2019-06-21 PROCEDURE — 3074F SYST BP LT 130 MM HG: CPT | Mod: CPTII,S$GLB,, | Performed by: NURSE PRACTITIONER

## 2019-06-21 PROCEDURE — 99999 PR PBB SHADOW E&M-EST. PATIENT-LVL III: ICD-10-PCS | Mod: PBBFAC,,, | Performed by: NURSE PRACTITIONER

## 2019-06-21 PROCEDURE — 3074F PR MOST RECENT SYSTOLIC BLOOD PRESSURE < 130 MM HG: ICD-10-PCS | Mod: CPTII,S$GLB,, | Performed by: NURSE PRACTITIONER

## 2019-06-21 PROCEDURE — 3008F BODY MASS INDEX DOCD: CPT | Mod: CPTII,S$GLB,, | Performed by: NURSE PRACTITIONER

## 2019-06-21 PROCEDURE — 3008F PR BODY MASS INDEX (BMI) DOCUMENTED: ICD-10-PCS | Mod: CPTII,S$GLB,, | Performed by: NURSE PRACTITIONER

## 2019-06-21 RX ORDER — TOBRAMYCIN/DEXAMETHASONE 0.3 %-0.1%
OINTMENT (GRAM) OPHTHALMIC (EYE)
Refills: 0 | COMMUNITY
Start: 2019-06-13 | End: 2020-10-15 | Stop reason: CLARIF
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
## 2019-06-28 ENCOUNTER — OFFICE VISIT (OUTPATIENT)
Dept: OBSTETRICS AND GYNECOLOGY | Facility: CLINIC | Age: 54
End: 2019-06-28
Attending: OBSTETRICS & GYNECOLOGY
Payer: COMMERCIAL

## 2019-06-28 ENCOUNTER — LAB VISIT (OUTPATIENT)
Dept: LAB | Facility: HOSPITAL | Age: 54
End: 2019-06-28
Attending: OBSTETRICS & GYNECOLOGY
Payer: COMMERCIAL

## 2019-06-28 VITALS
HEIGHT: 65 IN | DIASTOLIC BLOOD PRESSURE: 85 MMHG | SYSTOLIC BLOOD PRESSURE: 130 MMHG | WEIGHT: 158.31 LBS | BODY MASS INDEX: 26.38 KG/M2

## 2019-06-28 DIAGNOSIS — Z80.41 FAMILY HISTORY OF OVARIAN CANCER: ICD-10-CM

## 2019-06-28 DIAGNOSIS — N95.1 MENOPAUSAL SYMPTOM: ICD-10-CM

## 2019-06-28 DIAGNOSIS — N95.1 MENOPAUSAL SYMPTOM: Primary | ICD-10-CM

## 2019-06-28 LAB — ESTRADIOL SERPL-MCNC: 17 PG/ML

## 2019-06-28 PROCEDURE — 84402 ASSAY OF FREE TESTOSTERONE: CPT

## 2019-06-28 PROCEDURE — 3008F PR BODY MASS INDEX (BMI) DOCUMENTED: ICD-10-PCS | Mod: CPTII,S$GLB,, | Performed by: OBSTETRICS & GYNECOLOGY

## 2019-06-28 PROCEDURE — 99214 OFFICE O/P EST MOD 30 MIN: CPT | Mod: S$GLB,,, | Performed by: OBSTETRICS & GYNECOLOGY

## 2019-06-28 PROCEDURE — 99214 PR OFFICE/OUTPT VISIT, EST, LEVL IV, 30-39 MIN: ICD-10-PCS | Mod: S$GLB,,, | Performed by: OBSTETRICS & GYNECOLOGY

## 2019-06-28 PROCEDURE — 36415 COLL VENOUS BLD VENIPUNCTURE: CPT

## 2019-06-28 PROCEDURE — 3075F PR MOST RECENT SYSTOLIC BLOOD PRESS GE 130-139MM HG: ICD-10-PCS | Mod: CPTII,S$GLB,, | Performed by: OBSTETRICS & GYNECOLOGY

## 2019-06-28 PROCEDURE — 99999 PR PBB SHADOW E&M-EST. PATIENT-LVL III: ICD-10-PCS | Mod: PBBFAC,,, | Performed by: OBSTETRICS & GYNECOLOGY

## 2019-06-28 PROCEDURE — 99999 PR PBB SHADOW E&M-EST. PATIENT-LVL III: CPT | Mod: PBBFAC,,, | Performed by: OBSTETRICS & GYNECOLOGY

## 2019-06-28 PROCEDURE — 3008F BODY MASS INDEX DOCD: CPT | Mod: CPTII,S$GLB,, | Performed by: OBSTETRICS & GYNECOLOGY

## 2019-06-28 PROCEDURE — 3075F SYST BP GE 130 - 139MM HG: CPT | Mod: CPTII,S$GLB,, | Performed by: OBSTETRICS & GYNECOLOGY

## 2019-06-28 PROCEDURE — 3079F PR MOST RECENT DIASTOLIC BLOOD PRESSURE 80-89 MM HG: ICD-10-PCS | Mod: CPTII,S$GLB,, | Performed by: OBSTETRICS & GYNECOLOGY

## 2019-06-28 PROCEDURE — 82670 ASSAY OF TOTAL ESTRADIOL: CPT

## 2019-06-28 PROCEDURE — 3079F DIAST BP 80-89 MM HG: CPT | Mod: CPTII,S$GLB,, | Performed by: OBSTETRICS & GYNECOLOGY

## 2019-06-28 NOTE — PROGRESS NOTES
SUBJECTIVE:   53 y.o. female  presents today to discuss menopausal symptoms and high risk for breast cancer. Patient's last menstrual period was 2015 (approximate)..  She she does have a family history significant for a mother with ovarian cancer who did not have genetic testing.  Patient is scheduled for a counseling appointment through [facility] in July .     She reports hot flashes occasional night sweats,  vaginal dryness, moodiness,  and weight gain. She would like to discuss treatment for these symptoms  She is active at work walking but does not do any regular exercise.  She reports that she tries to limit her carbohydrate intake but would not call her diet healthy  Patient's lifetime risk for breast cancer possibly under 20%- per NP note  Past Medical History:   Diagnosis Date    Anxiety     Bronchitis     Family history of ovarian cancer     Hypertension      Past Surgical History:   Procedure Laterality Date    ARTHROSCOPY, KNEE, WITH MENISCECTOMY Left 8/15/2018    [facility]    BACK SURGERY      spinal fusion    JEROMY[facility]TRANSFORAMINAL Right 2016    [facility]    JEROMY-TRANSFORAMINAL Right 2015    [facility]    Injection,steroid,epidural,transforaminal approach RIGHT L4 AND L5 TF JEROMY Right 2018    [facility]    INJECTION-STEROID-EPIDURAL-TRANSFORAMINAL Right 2017    [facility]    INJECTION-STEROID-EPIDURAL-TRANSFORAMINAL Right 2017    [facility]    TUMOR REMOVAL      right hand     Social History     Socioeconomic History    Marital status: Single     Spouse name: Not on file    Number of children: Not on file    Years of education: Not on file    Highest education level: Not on file   Occupational History    Occupation:       Employer: [employer]    Social Needs    Financial resource strain: Not on file    Food insecurity:     Worry: Not on file     Inability: Not on file    Transportation needs:     Medical: Not on file     Non-medical: Not on file   Tobacco Use    Smoking status: Never Smoker    Smokeless tobacco: Never Used   Substance and Sexual Activity    Alcohol use: Yes     Alcohol/week: 3.0 oz     Types: 5 Glasses of wine per week     Comment: one a day- red wine    Drug use: No    Sexual activity: Yes     Partners: Male     Comment: Boyfriend    Lifestyle    Physical activity:     Days per week: Not on file     Minutes per session: Not on file    Stress: Not on file   Relationships    Social connections:     Talks on phone: Not on file     Gets together: Not on file     Attends Episcopal service: Not on file     Active member of club or organization: Not on file     Attends meetings of clubs or organizations: Not on file     Relationship status: Not on file   Other Topics Concern    Not on file   Social History Narrative    Not on file     Family History   Problem Relation Age of Onset    Ovarian cancer Mother 66         at 72    Heart disease Father         double bypass    Macular degeneration Father     No Known Problems Brother     No Known Problems Brother     Cancer Paternal Grandfather         lung (pt thinks), possible smoker    Breast cancer Paternal Cousin 20        father's sister's daughter, pt thinks bilateral, cousin is     Colon cancer Maternal Aunt         mat aunt had part of colon removed, pt does not think cancer     OB History    Para Term  AB Living   0 0 0 0 0 0   SAB TAB Ectopic Multiple Live Births   0 0 0 0             Current Outpatient Medications   Medication Sig Dispense Refill    albuterol 90 mcg/actuation inhaler Inhale 1-2 puffs into the lungs every 6 (six) hours as needed for Wheezing. 1 each 11    biotin 5,000 mcg TbDL  Take 1 capsule by mouth once daily.      buPROPion (WELLBUTRIN XL) 300 MG 24 hr tablet TAKE 1 TABLET BY MOUTH EVERY DAY 30 tablet 11    cholecalciferol, vitamin D3, (VITAMIN D3) 2,000 unit Tab Take 1 tablet by mouth once daily.      citalopram (CELEXA) 10 MG tablet TAKE 1 TABLET BY MOUTH EVERY DAY 30 tablet 6    FLAXSEED OIL MISC by Misc.(Non-Drug; Combo Route) route.      LORazepam (ATIVAN) 1 MG tablet TAKE 1 TABLET BY MOUTH EVERY NIGHT AS NEEDED 30 tablet 0    metoprolol succinate (TOPROL-XL) 25 MG 24 hr tablet TAKE 1 TABLET(25 MG) BY MOUTH EVERY DAY 90 tablet 3    montelukast (SINGULAIR) 10 mg tablet TAKE 1 TABLET BY MOUTH EVERY EVENING 30 tablet 11    multivitamin (THERAGRAN) per tablet Take 1 tablet by mouth once daily.      naproxen (NAPROSYN) 500 MG tablet TAKE 1 TABLET BY MOUTH TWICE DAILY WITH MEALS 60 tablet 0    TOBRADEX 0.3-0.1 % Oint   0     No current facility-administered medications for this visit.    ?  Allergies: Patient has no known allergies.     The ASCVD Risk score (Huyen DC Jr., et al., 2013) failed to calculate for the following reasons:    The valid HDL cholesterol range is 20 to 100 mg/dL      ROS:  Constitutional: no weight loss, weight gain, fever, fatigue  Eyes:  No vision changes, glasses/contacts  ENT/Mouth: No ulcers, sinus problems, ears ringing, headache  Cardiovascular: No inability to lie flat, chest pain, exercise intolerance, swelling, heart palpitations  Respiratory: No wheezing, coughing blood, shortness of breath, or cough  Gastrointestinal: No diarrhea, bloody stool, nausea/vomiting, constipation, gas, hemorrhoids  Genitourinary: No blood in urine, painful urination, urgency of urination, frequency of urination, incomplete emptying, incontinence, abnormal bleeding, painful periods, heavy periods, vaginal discharge, vaginal odor, +, +mood swings, painful intercourse, sexual problems, bleeding after intercourse, +vaginal dryness.  Musculoskeletal: No muscle  weakness  Skin/Breast: No painful breasts, nipple discharge, masses, rash, ulcers  Neurological: No passing out, seizures, numbness, headache  Endocrine: No diabetes, hypothyroid, hyperthyroid, +, hot flashes, hair loss, abnormal hair growth, acne  Psychiatric: No depression, crying, +moodiness  Hematologic: No bruises, bleeding, swollen lymph nodes, anemia.      Physical Exam  deferred    ASSESSMENT:   Menopausal symptoms  Vaginal dryness    Family history of ovarian cancer  PLAN: Face to face time 25 minutes- majority spent in counseling and arranging follow up  A full discussion of the benefit-risk ratio of hormonal replacement therapy was carried out. Improvement in vasomotor and other climacteric symptoms is discussed, including possible improvements in sleep and mood. Reduction of risk for osteoporosis was explained. We discussed the study data showing increased risk of thrombo-embolic events such as myocardial infarction, stroke and also possibly breast cancer with estrogen replacement, and how this might affect her. The range of side effects such as breast tenderness, weight gain and including possible increases in lifetime risk of breast cancer and possible thrombotic complications was discussed. We also discussed ACOG's recommendation to use hormone replacement therapy for the relief of hot flashes alone and to be on the lowest dose possible for the shortest amount of time.   All of her questions about this therapy were answered.  Will start her on Bijuva- oral estradiol and micronized progesterone nightly   Counseled her on risks/benefits of Imvexxy    Follow up for labs

## 2019-07-02 LAB — TESTOST FREE SERPL-MCNC: 0.8 PG/ML

## 2019-07-11 RX ORDER — LORAZEPAM 1 MG/1
TABLET ORAL
Qty: 30 TABLET | Refills: 0 | Status: SHIPPED | OUTPATIENT
Start: 2019-07-11 | End: 2019-09-06 | Stop reason: SDUPTHER

## 2019-07-11 RX ORDER — LORAZEPAM 0.5 MG/1
TABLET ORAL
Qty: 60 TABLET | Refills: 0 | OUTPATIENT
Start: 2019-07-11

## 2019-07-11 RX ORDER — CITALOPRAM 10 MG/1
TABLET ORAL
Qty: 30 TABLET | Refills: 6 | Status: SHIPPED | OUTPATIENT
Start: 2019-07-11 | End: 2020-02-26

## 2019-07-17 ENCOUNTER — DOCUMENTATION ONLY (OUTPATIENT)
Dept: SURGERY | Facility: CLINIC | Age: 54
End: 2019-07-17

## 2019-07-17 NOTE — PROGRESS NOTES
Received report from ADAIR Swann, genetic counselor with [facility], with recommendations from genetic counseling session on this patient.  Per the report, patient meets national guidelines for testing of the BRCA1/BRCA2 genes, with the patient-elected test being the BRCA+MyRisk through [facility].  Diagnosis codes are fam hx breast CA (Z80.3), fam hx ovarian CA (Z80.41).  See the [facility] paperwork, which will be scanned into [employer], for complete report including recommendations and instructions.  [facility] will coordinate having the patient come into clinic to sign the necessary forms and  the test kit.

## 2019-07-18 ENCOUNTER — HOSPITAL ENCOUNTER (OUTPATIENT)
Facility: OTHER | Age: 54
Discharge: HOME | End: 2019-07-18
Attending: ANESTHESIOLOGY | Admitting: ANESTHESIOLOGY
Payer: COMMERCIAL

## 2019-07-18 VITALS
BODY MASS INDEX: 24.99 KG/M2 | RESPIRATION RATE: 18 BRPM | TEMPERATURE: 98 F | WEIGHT: 150 LBS | HEART RATE: 71 BPM | HEIGHT: 65 IN | OXYGEN SATURATION: 94 % | SYSTOLIC BLOOD PRESSURE: 131 MMHG | DIASTOLIC BLOOD PRESSURE: 80 MMHG

## 2019-07-18 DIAGNOSIS — M51.36 DDD (DEGENERATIVE DISC DISEASE), LUMBAR: Primary | ICD-10-CM

## 2019-07-18 DIAGNOSIS — M54.16 LUMBAR RADICULOPATHY: ICD-10-CM

## 2019-07-18 DIAGNOSIS — G89.29 CHRONIC PAIN: ICD-10-CM

## 2019-07-18 PROCEDURE — 99152 PR MOD CONSCIOUS SEDATION, SAME PHYS, 5+ YRS, FIRST 15 MIN: ICD-10-PCS | Mod: ,,, | Performed by: ANESTHESIOLOGY

## 2019-07-18 PROCEDURE — 64483 NJX AA&/STRD TFRM EPI L/S 1: CPT | Performed by: ANESTHESIOLOGY

## 2019-07-18 PROCEDURE — 64483 PR EPIDURAL INJ, ANES/STEROID, TRANSFORAMINAL, LUMB/SACR, SNGL LEVL: ICD-10-PCS | Mod: RT,,, | Performed by: ANESTHESIOLOGY

## 2019-07-18 PROCEDURE — 25000003 PHARM REV CODE 250: Performed by: ANESTHESIOLOGY

## 2019-07-18 PROCEDURE — 64484 PRA INJECT ANES/STEROID FORAMEN LUMBAR/SACRAL W IMG GUIDE ,EA ADD LEVEL: ICD-10-PCS | Mod: RT,,, | Performed by: ANESTHESIOLOGY

## 2019-07-18 PROCEDURE — 63600175 PHARM REV CODE 636 W HCPCS: Performed by: ANESTHESIOLOGY

## 2019-07-18 PROCEDURE — 64484 NJX AA&/STRD TFRM EPI L/S EA: CPT | Performed by: ANESTHESIOLOGY

## 2019-07-18 PROCEDURE — 25500020 PHARM REV CODE 255: Performed by: ANESTHESIOLOGY

## 2019-07-18 PROCEDURE — 99152 MOD SED SAME PHYS/QHP 5/>YRS: CPT | Mod: ,,, | Performed by: ANESTHESIOLOGY

## 2019-07-18 PROCEDURE — 64483 NJX AA&/STRD TFRM EPI L/S 1: CPT | Mod: RT,,, | Performed by: ANESTHESIOLOGY

## 2019-07-18 PROCEDURE — 64484 NJX AA&/STRD TFRM EPI L/S EA: CPT | Mod: RT,,, | Performed by: ANESTHESIOLOGY

## 2019-07-18 PROCEDURE — 25000003 PHARM REV CODE 250: Performed by: STUDENT IN AN ORGANIZED HEALTH CARE EDUCATION/TRAINING PROGRAM

## 2019-07-18 RX ORDER — SODIUM CHLORIDE 9 MG/ML
500 INJECTION, SOLUTION INTRAVENOUS CONTINUOUS
Status: DISCONTINUED | OUTPATIENT
Start: 2019-07-18 | End: 2019-07-18 | Stop reason: HOSPADM

## 2019-07-18 RX ORDER — MIDAZOLAM HYDROCHLORIDE 1 MG/ML
INJECTION INTRAMUSCULAR; INTRAVENOUS
Status: DISCONTINUED | OUTPATIENT
Start: 2019-07-18 | End: 2019-07-18 | Stop reason: HOSPADM

## 2019-07-18 RX ORDER — LIDOCAINE HYDROCHLORIDE 10 MG/ML
INJECTION INFILTRATION; PERINEURAL
Status: DISCONTINUED | OUTPATIENT
Start: 2019-07-18 | End: 2019-07-18 | Stop reason: HOSPADM

## 2019-07-18 RX ORDER — FENTANYL CITRATE 50 UG/ML
INJECTION, SOLUTION INTRAMUSCULAR; INTRAVENOUS
Status: DISCONTINUED | OUTPATIENT
Start: 2019-07-18 | End: 2019-07-18 | Stop reason: HOSPADM

## 2019-07-18 RX ORDER — BUPIVACAINE HYDROCHLORIDE 2.5 MG/ML
INJECTION, SOLUTION EPIDURAL; INFILTRATION; INTRACAUDAL
Status: DISCONTINUED | OUTPATIENT
Start: 2019-07-18 | End: 2019-07-18 | Stop reason: HOSPADM

## 2019-07-18 RX ORDER — METHYLPREDNISOLONE ACETATE 40 MG/ML
INJECTION, SUSPENSION INTRA-ARTICULAR; INTRALESIONAL; INTRAMUSCULAR; SOFT TISSUE
Status: DISCONTINUED | OUTPATIENT
Start: 2019-07-18 | End: 2019-07-18 | Stop reason: HOSPADM

## 2019-07-18 RX ADMIN — SODIUM CHLORIDE 500 ML: 0.9 INJECTION, SOLUTION INTRAVENOUS at 11:07

## 2019-07-18 NOTE — DISCHARGE SUMMARY
Discharge Note  Short Stay      SUMMARY     Admit Date: 7/18/2019    Attending Physician: Adithya Barrios      Discharge Physician: Adithya Barrios      Discharge Date: 7/18/2019 12:04 PM    Procedure(s) (LRB):  INJECTION, STEROID, EPIDURAL, TRANSFORAMINAL APPROACH, L4 AND L5 (Right)    Final Diagnosis: Lumbar radiculopathy [M54.16]    Disposition: Home or self care    Patient Instructions:   Current Discharge Medication List      CONTINUE these medications which have NOT CHANGED    Details   albuterol 90 mcg/actuation inhaler Inhale 1-2 puffs into the lungs every 6 (six) hours as needed for Wheezing.  Qty: 1 each, Refills: 11    Associated Diagnoses: Cough      biotin 5,000 mcg TbDL Take 1 capsule by mouth once daily.      buPROPion (WELLBUTRIN XL) 300 MG 24 hr tablet TAKE 1 TABLET BY MOUTH EVERY DAY  Qty: 30 tablet, Refills: 11      cholecalciferol, vitamin D3, (VITAMIN D3) 2,000 unit Tab Take 1 tablet by mouth once daily.      citalopram (CELEXA) 10 MG tablet TAKE 1 TABLET BY MOUTH EVERY DAY  Qty: 30 tablet, Refills: 6      estradiol (IMVEXXY STARTER PACK) 10 mcg InPk Place 10 mcg vaginally once daily. Place 10 mcg vaginally once daily for two weeks. Then twice weekly thereafter.  Qty: 18 each, Refills: 11      estradiol-progesterone (BIJUVA) 1-100 mg Cap Take 1-100 mg by mouth once daily.  Qty: 30 capsule, Refills: 11      FLAXSEED OIL MISC by Misc.(Non-Drug; Combo Route) route.      LORazepam (ATIVAN) 1 MG tablet TAKE 1 TABLET BY MOUTH EVERY NIGHT AS NEEDED  Qty: 30 tablet, Refills: 0      metoprolol succinate (TOPROL-XL) 25 MG 24 hr tablet TAKE 1 TABLET(25 MG) BY MOUTH EVERY DAY  Qty: 90 tablet, Refills: 3      montelukast (SINGULAIR) 10 mg tablet TAKE 1 TABLET BY MOUTH EVERY EVENING  Qty: 30 tablet, Refills: 11    Associated Diagnoses: Acute sinusitis, recurrence not specified, unspecified location      multivitamin (THERAGRAN) per tablet Take 1 tablet by mouth once daily.      naproxen (NAPROSYN) 500  MG tablet TAKE 1 TABLET BY MOUTH TWICE DAILY WITH MEALS  Qty: 60 tablet, Refills: 0    Associated Diagnoses: Lateral meniscus derangement, left      TOBRADEX 0.3-0.1 % Oint Refills: 0                 Discharge Diagnosis: Lumbar radiculopathy [M54.16]  Condition on Discharge: Stable with no complications to procedure   Diet on Discharge: Same as before.  Activity: as per instruction sheet.  Discharge to: Home with a responsible adult.  Follow up: 2-4 weeks       Please call my office or pager at [phone] if experienced any weakness or loss of sensation, fever > 101.5, pain uncontrolled with oral medications, persistent nausea/vomiting/or diarrhea, redness or drainage from the incisions, or any other worrisome concerns. If physician on call was not reached or could not communicate with our office for any reason please go to the nearest emergency department

## 2019-07-18 NOTE — PLAN OF CARE
PATIENT TOLERATED PROCEDURE WELL. PT COMPLAINS OF1  /10 PAIN. ASSISTED PATIENT UP FOR FIRST TIME. STEADY ON FEET AND DISCHARGE INSTRUCTIONS GIVEN.

## 2019-07-18 NOTE — OP NOTE
INFORMED CONSENT: The procedure, risks, benefits and options were discussed with patient. There are no contraindications to the procedure. The patient expressed understanding and agreed to proceed. The personnel performing the procedure was discussed.    07/18/2019    Surgeon: Daniel Seo MD    Assistants: None    PROCEDURE:    1)  Right  L4 TRANSFORAMINAL EPIDURAL STEROID INJECTION    2)  Right  L5 TRANSFORAMINAL EPIDURAL STEROID INJECTION    Pre Procedure diagnosis:    Right  L4 and L5  Lumbar radiculopathy [M54.16]    Post-Procedure diagnosis:   same    Complications: None    Specimens: None      Sedation: 3 mg Midazolam and 100 mcg Fentanyl    DESCRIPTION OF PROCEDURE: The patient was brought to the procedure room. IV access was obtained prior to the procedure. The patient was positioned prone on the fluoroscopy table. Continuous hemodynamic monitoring was initiated including blood pressure, EKG, and pulse oximetry. . The skin was prepped with chlorhexidine and draped in a sterile fashion. Skin anesthesia was achieved using a total of 10mL of lidocaine, 5mL over each respective injection site.     The  L4 and L5  transforaminal spaces were identified with fluoroscopy in the  AP, oblique, and lateral views.  A 22 gauge spinal quinke needle was then advanced into the area of the trans foraminal spaces at the above levels with confirmation of proper needle position using AP, oblique, and lateral fluoroscopic views. Once the needle tip was in the area of the transforaminal space, and there was no blood, CSF or paraesthesias,  1 mL of Omnipaque 300mg/ml was injected on each side for a total of 2 mL.  Fluoroscopic imaging in the AP and lateral views revealed a clear outline of the spinal nerve with proximal spread of agent through the neural foramen into the epidural space. A total combination of 1 mL of Bupivicaine 0.25% and 40 mg depo medrol was injected into each level for a total of 4mL of injected medications  with displacement of the contrast dye confirming that the medication went into the area of the transforaminal spaces at each level. A sterile dressing was applied.   Patient tolerated the procedure well.    Patient was taken back to the recovery room for further observation.     The patient was discharged to home in stable condition

## 2019-07-18 NOTE — INTERVAL H&P NOTE
"The patient has been examined and the H&P has been reviewed:    I concur with the findings and no changes have occurred since H&P was written.    Anesthesia/Surgery risks, benefits and alternative options discussed and understood by patient/family.          Active Hospital Problems    Diagnosis  POA    Chronic pain [G89.29]  Yes      Resolved Hospital Problems   No resolved problems to display.     HPI  Patient presenting for Procedure(s) (LRB):  INJECTION, STEROID, EPIDURAL, TRANSFORAMINAL APPROACH, L4 AND L5 (Right)     Patient on Anti-coagulation No    No health changes since previous encounter    Past Medical History:   Diagnosis Date    Anxiety     Bronchitis     Family history of ovarian cancer     Hypertension      Past Surgical History:   Procedure Laterality Date    ARTHROSCOPY, KNEE, WITH MENISCECTOMY Left 8/15/2018    Performed by [facility]    BACK SURGERY      spinal fusion    JEROMY-TRANSFORAMINAL Right 9/21/2016    [facility]    JEROMY-TRANSFORAMINAL Right 12/2/2015    [facility]    Injection,steroid,epidural,transforaminal approach RIGHT L4 AND L5 TF JEROMY Right 12/4/2018    [facility]    INJECTION-STEROID-EPIDURAL-TRANSFORAMINAL Right 6/7/2017    [facility]    INJECTION-STEROID-EPIDURAL-TRANSFORAMINAL Right 5/24/2017    [facility]    TUMOR REMOVAL      right hand     Review of patient's allergies indicates:  No Known Allergies   Current Facility-Administered Medications   Medication    0.9%  NaCl infusion       PMHx, PSHx, Allergies, Medications reviewed in epic    ROS negative except pain complaints in HPI    OBJECTIVE:    /80 (BP Location: Right arm, Patient Position: Lying)   Pulse 70   Temp 98.2 °F (36.8 °C) (Oral)   Ht 5' 5" (1.651 m)   Wt 68 kg (150 lb)   " LMP 04/19/2015 (Approximate)   SpO2 96%   BMI 24.96 kg/m²     PHYSICAL EXAMINATION:    GENERAL: Well appearing, in no acute distress, alert and oriented x3.  PSYCH:  Mood and affect appropriate.  SKIN: Skin color, texture, turgor normal, no rashes or lesions which will impact the procedure.  CV: RRR with palpation of the radial artery.  PULM: No evidence of respiratory difficulty, symmetric chest rise. Clear to auscultation.  NEURO: Cranial nerves grossly intact.    Plan:    Proceed with procedure as planned Procedure(s) (LRB):  INJECTION, STEROID, EPIDURAL, TRANSFORAMINAL APPROACH, L4 AND L5 (Right)    Adithya Barrios MD  07/18/2019

## 2019-07-18 NOTE — DISCHARGE INSTRUCTIONS
Adult Procedural Sedation Instructions    Recovery After Procedural Sedation (Adult)  You have been given medicine by vein to make you sleep during your surgery. This may have included both a pain medicine and sleeping medicine. Most of the effects have worn off. But you may still have some drowsiness for the next 6 to 8 hours.  Home care  Follow these guidelines when you get home:  · For the next 8 hours, you should be watched by a responsible adult. This person should make sure your condition is not getting worse.  · Don't drink any alcohol for the next 24 hours.  · Don't drive, operate dangerous machinery, or make important business or personal decisions during the next 24 hours.  Note: Your healthcare provider may tell you not to take any medicine by mouth for pain or sleep in the next 4 hours. These medicines may react with the medicines you were given in the hospital. This could cause a much stronger response than usual.  Follow-up care  Follow up with your healthcare provider if you are not alert and back to your usual level of activity within 12 hours.  When to seek medical advice  Call your healthcare provider right away if any of these occur:  · Drowsiness gets worse  · Weakness or dizziness gets worse  · Repeated vomiting  · You can't be awakened   Date Last Reviewed: 10/18/2016  © 1227-1263 [employer]. [address] [place] [address], [place] [address]. All rights reserved. This information is not intended as a substitute for professional medical care. Always follow your healthcare professional's instructions.       Thank you for allowing us to care for you today. You may receive a survey about the care we provided. Your feedback is valuable and helps us provide excellent care throughout the community.     Home Care Instructions for Pain Management:    1. DIET:   You may resume your normal diet today.   2. BATHING:   You may shower with luke warm water. No tub baths or anything that will soak  injection sites under water for the next 24 hours.  3. DRESSING:   You may remove your bandage today.   4. ACTIVITY LEVEL:   You may resume your normal activities 24 hrs after your procedure. Nothing strenuous today.  5. MEDICATIONS:   You may resume your normal medications today. To restart blood thinners, ask your doctor.  6. DRIVING    If you have received any sedatives by mouth today, you may not drive for 12 hours.    If you have received any sedation through your IV, you may not drive for 24 hrs.   7. SPECIAL INSTRUCTIONS:   No heat to the injection site for 24 hrs including, hot bath or shower, heating pad, moist heat, or hot tubs.    Use ice pack to injection site for any pain or discomfort.  Apply ice packs for 20 minute intervals as needed.    IF you have diabetes, be sure to monitor your blood sugar more closely. IF your injection contained steroids your blood sugar levels may become higher than normal.    If you are still having pain upon discharge:  Your pain may improve over the next 48 hours. The anesthetic (numbing medication) works immediately to 48 hours. IF your injection contained a steroid (anti-inflammatory medication), it takes approximately 3 days to start feeling relief and 7-10 days to see your greatest results from the medication. It is possible you may need subsequent injections. This would be discussed at your follow up appointment with pain management or your referring doctor.      PLEASE CALL YOUR DOCTOR IF:  1. Redness or swelling around the injection site.  2. Fever of 101 degrees or more  3. Drainage (pus) from the injection site.  4. For any continuous bleeding (some dried blood over the incision is normal.)    FOR EMERGENCIES:   If any unusual problems or difficulties occur during clinic hours, call [phone] or 122.

## 2019-07-22 ENCOUNTER — PATIENT MESSAGE (OUTPATIENT)
Dept: SURGERY | Facility: CLINIC | Age: 54
End: 2019-07-22

## 2019-07-24 ENCOUNTER — TELEPHONE (OUTPATIENT)
Dept: SURGERY | Facility: CLINIC | Age: 54
End: 2019-07-24

## 2019-07-24 NOTE — TELEPHONE ENCOUNTER
Called vj regarding scheduling a nurse apt to sign her consents for her Genetic Testing. Message left for patient to please call [facility] [place] [facility] @ [phone] I would be happy to schedule this apt.

## 2019-07-26 ENCOUNTER — CLINICAL SUPPORT (OUTPATIENT)
Dept: SURGERY | Facility: CLINIC | Age: 54
End: 2019-07-26
Payer: COMMERCIAL

## 2019-07-26 DIAGNOSIS — Z80.3 FAMILY HISTORY OF BREAST CANCER: Primary | ICD-10-CM

## 2019-07-26 DIAGNOSIS — Z80.41 FAMILY HISTORY OF OVARIAN CANCER: ICD-10-CM

## 2019-08-12 ENCOUNTER — PATIENT OUTREACH (OUTPATIENT)
Dept: ADMINISTRATIVE | Facility: OTHER | Age: 54
End: 2019-08-12

## 2019-08-16 ENCOUNTER — PATIENT MESSAGE (OUTPATIENT)
Dept: SURGERY | Facility: CLINIC | Age: 54
End: 2019-08-16

## 2019-08-16 ENCOUNTER — DOCUMENTATION ONLY (OUTPATIENT)
Dept: SURGERY | Facility: CLINIC | Age: 54
End: 2019-08-16

## 2019-08-16 RX ORDER — LORAZEPAM 1 MG/1
TABLET ORAL
Qty: 30 TABLET | Refills: 0 | OUTPATIENT
Start: 2019-08-16

## 2019-08-16 NOTE — PROGRESS NOTES
Received report from the post-test genetic counseling session, which was held with [employer] genetic counselor Tanya Swann on 8/10/19.  See Media for full report.  Summary below.     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  Cancer risks associated with HOXB13 gene (for men only):  Prostate cancer (up to 67%).  At this time, HOXB13 mutations are NOT known to be associated with increased cancer risks in women.  Each of pt's 1st-degree relatives also has a 50% chance to carry this same mutation.  Genetic counseling is recommended for her relatives.      Based on the additional cancers in the family, it is possible that there is a hereditary cancer syndrome that this pt did not inherit, as her test was negative for pathogenic mutations in the other genes analyzed on the panel.  Other relatives also meet national guidelines and clinical recommendations for genetic testing of the BRCA1 and BRCA2 genes, in addition to the HOXB13 mutation reported in this pt; therefore, it is recommended that these relatives speak with a cancer genetics expert to discuss all testing options for which they are eligible.    Pt's 5-yr risk of breast cancer was calculated at 2.0% per [employer] (while it was 1.2% as calculated during her clinic visit with me).  She is due to [facility] around 11/10/19 for CBE and follow-up but may return sooner if she desires.    Per [employer], the prostate cancer risk to age 80 with a HOXB13 clinically significant mutation is 22%-52%.  ---    Received notice from [facility] that BRCA was approved but [employer] was denied.  Approved services authorization dates 7/26/19-1/26/20.  Advised pt to notify me if gets charged for testing.    ---    Messaged pt.

## 2019-08-16 NOTE — TELEPHONE ENCOUNTER
Faxed request for refill.  Last saw dr mayo on 8/2017.  She did refill this last month and not sure why.    She last saw a dr in our clinic on 4/24/18.    Refill?    Thanks francine

## 2019-08-26 RX ORDER — LORAZEPAM 1 MG/1
TABLET ORAL
Qty: 30 TABLET | Refills: 0 | OUTPATIENT
Start: 2019-08-26

## 2019-08-27 NOTE — PROGRESS NOTES
Subjective:       Patient ID: Twila Roman is a 54 y.o. female.    Chief Complaint: Annual Exam    Patient is a 54 y.o.female who presents today for annual    Labs due now  Vaccines shingrix printed  Gyn charbonnet  Mammogram June 2019  Colon: due now    Review of Systems   Constitutional: Negative for appetite change, chills, diaphoresis and fever.   HENT: Negative for congestion, ear discharge, ear pain, postnasal drip, tinnitus, trouble swallowing and voice change.    Eyes: Negative for discharge, redness and itching.   Respiratory: Negative for cough, chest tightness, shortness of breath and wheezing.    Cardiovascular: Negative for chest pain, palpitations and leg swelling.   Gastrointestinal: Negative for abdominal pain, constipation, diarrhea, nausea and vomiting.   Endocrine: Negative for cold intolerance and heat intolerance.   Genitourinary: Negative for difficulty urinating, flank pain, hematuria and urgency.   Musculoskeletal: Negative for arthralgias, gait problem, myalgias and neck stiffness.   Skin: Negative for color change and rash.   Neurological: Negative for dizziness, seizures, syncope and headaches.   Hematological: Negative for adenopathy.   Psychiatric/Behavioral: Negative for agitation, behavioral problems, confusion and sleep disturbance.       Objective:      Physical Exam   Constitutional: She is oriented to person, place, and time. She appears well-developed and well-nourished. No distress.   HENT:   Head: Normocephalic and atraumatic.   Right Ear: External ear normal.   Left Ear: External ear normal.   Nose: Nose normal.   Mouth/Throat: Oropharynx is clear and moist. No oropharyngeal exudate.   Eyes: Pupils are equal, round, and reactive to light. Conjunctivae and EOM are normal. Right eye exhibits no discharge. Left eye exhibits no discharge. No scleral icterus.   Neck: Neck supple. No JVD present. No tracheal deviation present. No thyromegaly present.   Cardiovascular: Normal rate,  normal heart sounds and intact distal pulses. Exam reveals no gallop and no friction rub.   No murmur heard.  Pulmonary/Chest: Effort normal and breath sounds normal. No stridor. No respiratory distress. She has no wheezes. She has no rales. She exhibits no tenderness.   Abdominal: Soft. Bowel sounds are normal. She exhibits no distension. There is no tenderness. There is no rebound.   Musculoskeletal: She exhibits no edema or tenderness.   Lymphadenopathy:     She has no cervical adenopathy.   Neurological: She is alert and oriented to person, place, and time.   Skin: Skin is warm and dry. No rash noted. She is not diaphoretic. No erythema.   Psychiatric: She has a normal mood and affect. Her behavior is normal.   Nursing note and vitals reviewed.      Assessment and Plan:       1. Annual physical exam    - CBC auto differential; Future  - Comprehensive metabolic panel; Future  - Lipid panel; Future  - Vitamin D; Future  - Urinalysis; Future  - TSH; Future    2. Secondary hypertension    - CBC auto differential; Future  - Comprehensive metabolic panel; Future  - Lipid panel; Future  - Vitamin D; Future  - Urinalysis; Future  - TSH; Future    3. Anxiety  - stable on ativan prn  - CBC auto differential; Future  - Comprehensive metabolic panel; Future  - Lipid panel; Future  - Vitamin D; Future  - Urinalysis; Future  - TSH; Future    4. Screen for colon cancer    - Case request GI: COLONOSCOPY          No follow-ups on file.

## 2019-09-06 ENCOUNTER — OFFICE VISIT (OUTPATIENT)
Dept: INTERNAL MEDICINE | Facility: CLINIC | Age: 54
End: 2019-09-06
Payer: COMMERCIAL

## 2019-09-06 ENCOUNTER — LAB VISIT (OUTPATIENT)
Dept: LAB | Facility: HOSPITAL | Age: 54
End: 2019-09-06
Attending: INTERNAL MEDICINE
Payer: COMMERCIAL

## 2019-09-06 VITALS
SYSTOLIC BLOOD PRESSURE: 114 MMHG | HEIGHT: 65 IN | RESPIRATION RATE: 16 BRPM | WEIGHT: 155.44 LBS | DIASTOLIC BLOOD PRESSURE: 82 MMHG | TEMPERATURE: 99 F | BODY MASS INDEX: 25.9 KG/M2 | HEART RATE: 76 BPM

## 2019-09-06 DIAGNOSIS — F41.9 ANXIETY: ICD-10-CM

## 2019-09-06 DIAGNOSIS — I15.9 SECONDARY HYPERTENSION: ICD-10-CM

## 2019-09-06 DIAGNOSIS — Z00.00 ANNUAL PHYSICAL EXAM: ICD-10-CM

## 2019-09-06 DIAGNOSIS — Z12.11 SCREEN FOR COLON CANCER: ICD-10-CM

## 2019-09-06 DIAGNOSIS — Z00.00 ANNUAL PHYSICAL EXAM: Primary | ICD-10-CM

## 2019-09-06 LAB
25(OH)D3+25(OH)D2 SERPL-MCNC: 64 NG/ML (ref 30–96)
ALBUMIN SERPL BCP-MCNC: 3.9 G/DL (ref 3.5–5.2)
ALP SERPL-CCNC: 65 U/L (ref 55–135)
ALT SERPL W/O P-5'-P-CCNC: 22 U/L (ref 10–44)
ANION GAP SERPL CALC-SCNC: 9 MMOL/L (ref 8–16)
AST SERPL-CCNC: 27 U/L (ref 10–40)
BASOPHILS # BLD AUTO: 0.08 K/UL (ref 0–0.2)
BASOPHILS NFR BLD: 1 % (ref 0–1.9)
BILIRUB SERPL-MCNC: 0.5 MG/DL (ref 0.1–1)
BUN SERPL-MCNC: 18 MG/DL (ref 6–20)
CALCIUM SERPL-MCNC: 9.6 MG/DL (ref 8.7–10.5)
CHLORIDE SERPL-SCNC: 107 MMOL/L (ref 95–110)
CHOLEST SERPL-MCNC: 204 MG/DL (ref 120–199)
CHOLEST/HDLC SERPL: 2.4 {RATIO} (ref 2–5)
CO2 SERPL-SCNC: 25 MMOL/L (ref 23–29)
CREAT SERPL-MCNC: 0.8 MG/DL (ref 0.5–1.4)
DIFFERENTIAL METHOD: ABNORMAL
EOSINOPHIL # BLD AUTO: 0.2 K/UL (ref 0–0.5)
EOSINOPHIL NFR BLD: 1.9 % (ref 0–8)
ERYTHROCYTE [DISTWIDTH] IN BLOOD BY AUTOMATED COUNT: 13.5 % (ref 11.5–14.5)
EST. GFR  (AFRICAN AMERICAN): >60 ML/MIN/1.73 M^2
EST. GFR  (NON AFRICAN AMERICAN): >60 ML/MIN/1.73 M^2
GLUCOSE SERPL-MCNC: 95 MG/DL (ref 70–110)
HCT VFR BLD AUTO: 41.6 % (ref 37–48.5)
HDLC SERPL-MCNC: 84 MG/DL (ref 40–75)
HDLC SERPL: 41.2 % (ref 20–50)
HGB BLD-MCNC: 13.8 G/DL (ref 12–16)
IMM GRANULOCYTES # BLD AUTO: 0.07 K/UL (ref 0–0.04)
IMM GRANULOCYTES NFR BLD AUTO: 0.9 % (ref 0–0.5)
LDLC SERPL CALC-MCNC: 86 MG/DL (ref 63–159)
LYMPHOCYTES # BLD AUTO: 1.9 K/UL (ref 1–4.8)
LYMPHOCYTES NFR BLD: 23 % (ref 18–48)
MCH RBC QN AUTO: 31.9 PG (ref 27–31)
MCHC RBC AUTO-ENTMCNC: 33.2 G/DL (ref 32–36)
MCV RBC AUTO: 96 FL (ref 82–98)
MONOCYTES # BLD AUTO: 0.7 K/UL (ref 0.3–1)
MONOCYTES NFR BLD: 8.7 % (ref 4–15)
NEUTROPHILS # BLD AUTO: 5.2 K/UL (ref 1.8–7.7)
NEUTROPHILS NFR BLD: 64.5 % (ref 38–73)
NONHDLC SERPL-MCNC: 120 MG/DL
NRBC BLD-RTO: 0 /100 WBC
PLATELET # BLD AUTO: 264 K/UL (ref 150–350)
PMV BLD AUTO: 10 FL (ref 9.2–12.9)
POTASSIUM SERPL-SCNC: 4.6 MMOL/L (ref 3.5–5.1)
PROT SERPL-MCNC: 7.3 G/DL (ref 6–8.4)
RBC # BLD AUTO: 4.33 M/UL (ref 4–5.4)
SODIUM SERPL-SCNC: 141 MMOL/L (ref 136–145)
TRIGL SERPL-MCNC: 170 MG/DL (ref 30–150)
TSH SERPL DL<=0.005 MIU/L-ACNC: 2.19 UIU/ML (ref 0.4–4)
WBC # BLD AUTO: 8.05 K/UL (ref 3.9–12.7)

## 2019-09-06 PROCEDURE — 80053 COMPREHEN METABOLIC PANEL: CPT

## 2019-09-06 PROCEDURE — 84443 ASSAY THYROID STIM HORMONE: CPT

## 2019-09-06 PROCEDURE — 82306 VITAMIN D 25 HYDROXY: CPT

## 2019-09-06 PROCEDURE — 85025 COMPLETE CBC W/AUTO DIFF WBC: CPT

## 2019-09-06 PROCEDURE — 99396 PREV VISIT EST AGE 40-64: CPT | Mod: S$GLB,,, | Performed by: INTERNAL MEDICINE

## 2019-09-06 PROCEDURE — 3074F PR MOST RECENT SYSTOLIC BLOOD PRESSURE < 130 MM HG: ICD-10-PCS | Mod: CPTII,S$GLB,, | Performed by: INTERNAL MEDICINE

## 2019-09-06 PROCEDURE — 36415 COLL VENOUS BLD VENIPUNCTURE: CPT | Mod: PO

## 2019-09-06 PROCEDURE — 99396 PR PREVENTIVE VISIT,EST,40-64: ICD-10-PCS | Mod: S$GLB,,, | Performed by: INTERNAL MEDICINE

## 2019-09-06 PROCEDURE — 99999 PR PBB SHADOW E&M-EST. PATIENT-LVL III: CPT | Mod: PBBFAC,,, | Performed by: INTERNAL MEDICINE

## 2019-09-06 PROCEDURE — 80061 LIPID PANEL: CPT

## 2019-09-06 PROCEDURE — 3079F DIAST BP 80-89 MM HG: CPT | Mod: CPTII,S$GLB,, | Performed by: INTERNAL MEDICINE

## 2019-09-06 PROCEDURE — 3079F PR MOST RECENT DIASTOLIC BLOOD PRESSURE 80-89 MM HG: ICD-10-PCS | Mod: CPTII,S$GLB,, | Performed by: INTERNAL MEDICINE

## 2019-09-06 PROCEDURE — 99999 PR PBB SHADOW E&M-EST. PATIENT-LVL III: ICD-10-PCS | Mod: PBBFAC,,, | Performed by: INTERNAL MEDICINE

## 2019-09-06 PROCEDURE — 3074F SYST BP LT 130 MM HG: CPT | Mod: CPTII,S$GLB,, | Performed by: INTERNAL MEDICINE

## 2019-09-06 RX ORDER — LORAZEPAM 1 MG/1
TABLET ORAL
Qty: 30 TABLET | Refills: 0 | Status: SHIPPED | OUTPATIENT
Start: 2019-09-06 | End: 2019-10-16 | Stop reason: SDUPTHER

## 2019-10-03 ENCOUNTER — TELEPHONE (OUTPATIENT)
Dept: ENDOSCOPY | Facility: HOSPITAL | Age: 54
End: 2019-10-03

## 2019-10-03 NOTE — TELEPHONE ENCOUNTER
Called to confirm appt for Colonoscopy on 10/9.   No answer and message was left with Endoscopy scheduling department to call to confirm or reschedule if needed.

## 2019-10-07 DIAGNOSIS — Z12.11 COLON CANCER SCREENING: Primary | ICD-10-CM

## 2019-10-07 RX ORDER — SODIUM, POTASSIUM,MAG SULFATES 17.5-3.13G
SOLUTION, RECONSTITUTED, ORAL ORAL
Qty: 1 KIT | Refills: 0 | Status: ON HOLD | OUTPATIENT
Start: 2019-10-07 | End: 2019-12-11

## 2019-10-16 ENCOUNTER — PATIENT MESSAGE (OUTPATIENT)
Dept: INTERNAL MEDICINE | Facility: CLINIC | Age: 54
End: 2019-10-16

## 2019-10-16 RX ORDER — LORAZEPAM 1 MG/1
TABLET ORAL
Qty: 30 TABLET | Refills: 0 | Status: SHIPPED | OUTPATIENT
Start: 2019-10-16 | End: 2019-11-25 | Stop reason: SDUPTHER

## 2019-10-22 DIAGNOSIS — Z12.11 SCREENING FOR COLON CANCER: Primary | ICD-10-CM

## 2019-11-25 ENCOUNTER — TELEPHONE (OUTPATIENT)
Dept: INTERNAL MEDICINE | Facility: CLINIC | Age: 54
End: 2019-11-25

## 2019-11-25 RX ORDER — LORAZEPAM 1 MG/1
TABLET ORAL
Qty: 30 TABLET | Refills: 0 | Status: SHIPPED | OUTPATIENT
Start: 2019-11-25 | End: 2019-12-30 | Stop reason: SDUPTHER

## 2019-12-11 ENCOUNTER — HOSPITAL ENCOUNTER (OUTPATIENT)
Facility: HOSPITAL | Age: 54
Discharge: HOME | End: 2019-12-11
Attending: COLON & RECTAL SURGERY | Admitting: COLON & RECTAL SURGERY
Payer: COMMERCIAL

## 2019-12-11 ENCOUNTER — ANESTHESIA (OUTPATIENT)
Dept: ENDOSCOPY | Facility: HOSPITAL | Age: 54
End: 2019-12-11
Payer: COMMERCIAL

## 2019-12-11 ENCOUNTER — ANESTHESIA EVENT (OUTPATIENT)
Dept: ENDOSCOPY | Facility: HOSPITAL | Age: 54
End: 2019-12-11
Payer: COMMERCIAL

## 2019-12-11 VITALS
SYSTOLIC BLOOD PRESSURE: 112 MMHG | BODY MASS INDEX: 24.99 KG/M2 | HEART RATE: 72 BPM | RESPIRATION RATE: 19 BRPM | WEIGHT: 150 LBS | DIASTOLIC BLOOD PRESSURE: 75 MMHG | OXYGEN SATURATION: 98 % | TEMPERATURE: 97 F | HEIGHT: 65 IN

## 2019-12-11 DIAGNOSIS — Z12.11 ENCOUNTER FOR SCREENING COLONOSCOPY: ICD-10-CM

## 2019-12-11 PROCEDURE — 88305 TISSUE EXAM BY PATHOLOGIST: CPT | Mod: 26,,, | Performed by: PATHOLOGY

## 2019-12-11 PROCEDURE — 45380 COLONOSCOPY AND BIOPSY: CPT | Mod: 33,,, | Performed by: COLON & RECTAL SURGERY

## 2019-12-11 PROCEDURE — 27201012 HC FORCEPS, HOT/COLD, DISP: Performed by: COLON & RECTAL SURGERY

## 2019-12-11 PROCEDURE — 37000008 HC ANESTHESIA 1ST 15 MINUTES: Performed by: COLON & RECTAL SURGERY

## 2019-12-11 PROCEDURE — E9220 PRA ENDO ANESTHESIA: ICD-10-PCS | Mod: 33,,, | Performed by: NURSE ANESTHETIST, CERTIFIED REGISTERED

## 2019-12-11 PROCEDURE — 63600175 PHARM REV CODE 636 W HCPCS: Performed by: COLON & RECTAL SURGERY

## 2019-12-11 PROCEDURE — 37000009 HC ANESTHESIA EA ADD 15 MINS: Performed by: COLON & RECTAL SURGERY

## 2019-12-11 PROCEDURE — 25000003 PHARM REV CODE 250: Performed by: NURSE ANESTHETIST, CERTIFIED REGISTERED

## 2019-12-11 PROCEDURE — 88305 TISSUE EXAM BY PATHOLOGIST: CPT | Mod: 59 | Performed by: PATHOLOGY

## 2019-12-11 PROCEDURE — E9220 PRA ENDO ANESTHESIA: HCPCS | Mod: 33,,, | Performed by: NURSE ANESTHETIST, CERTIFIED REGISTERED

## 2019-12-11 PROCEDURE — 88305 TISSUE EXAM BY PATHOLOGIST: ICD-10-PCS | Mod: 26,,, | Performed by: PATHOLOGY

## 2019-12-11 PROCEDURE — 45380 COLONOSCOPY AND BIOPSY: CPT | Performed by: COLON & RECTAL SURGERY

## 2019-12-11 PROCEDURE — 45380 PR COLONOSCOPY,BIOPSY: ICD-10-PCS | Mod: 33,,, | Performed by: COLON & RECTAL SURGERY

## 2019-12-11 PROCEDURE — 63600175 PHARM REV CODE 636 W HCPCS: Performed by: NURSE ANESTHETIST, CERTIFIED REGISTERED

## 2019-12-11 RX ORDER — SODIUM CHLORIDE 9 MG/ML
INJECTION, SOLUTION INTRAVENOUS CONTINUOUS
Status: DISCONTINUED | OUTPATIENT
Start: 2019-12-11 | End: 2019-12-11 | Stop reason: HOSPADM

## 2019-12-11 RX ORDER — LIDOCAINE HCL/PF 100 MG/5ML
SYRINGE (ML) INTRAVENOUS
Status: DISCONTINUED | OUTPATIENT
Start: 2019-12-11 | End: 2019-12-11

## 2019-12-11 RX ORDER — PROPOFOL 10 MG/ML
VIAL (ML) INTRAVENOUS CONTINUOUS PRN
Status: DISCONTINUED | OUTPATIENT
Start: 2019-12-11 | End: 2019-12-11

## 2019-12-11 RX ORDER — GLYCOPYRROLATE 0.2 MG/ML
INJECTION INTRAMUSCULAR; INTRAVENOUS
Status: DISCONTINUED | OUTPATIENT
Start: 2019-12-11 | End: 2019-12-11

## 2019-12-11 RX ORDER — PROPOFOL 10 MG/ML
VIAL (ML) INTRAVENOUS
Status: DISCONTINUED | OUTPATIENT
Start: 2019-12-11 | End: 2019-12-11

## 2019-12-11 RX ADMIN — SODIUM CHLORIDE: 0.9 INJECTION, SOLUTION INTRAVENOUS at 08:12

## 2019-12-11 RX ADMIN — PROPOFOL 100 MG: 10 INJECTION, EMULSION INTRAVENOUS at 08:12

## 2019-12-11 RX ADMIN — GLYCOPYRROLATE 0.1 MG: 0.2 INJECTION, SOLUTION INTRAMUSCULAR; INTRAVENOUS at 08:12

## 2019-12-11 RX ADMIN — PROPOFOL 200 MCG/KG/MIN: 10 INJECTION, EMULSION INTRAVENOUS at 08:12

## 2019-12-11 RX ADMIN — LIDOCAINE HYDROCHLORIDE 80 MG: 20 INJECTION, SOLUTION INTRAVENOUS at 08:12

## 2019-12-11 NOTE — ANESTHESIA PREPROCEDURE EVALUATION
12/11/2019  Twila Roman is a 54 y.o., female.  Past Medical History:   Diagnosis Date    Anxiety     Bronchitis     Family history of ovarian cancer     Hypertension      Past Surgical History:   Procedure Laterality Date    BACK SURGERY      spinal fusion    KNEE ARTHROSCOPY W/ MENISCECTOMY Left 8/15/2018    Procedure: ARTHROSCOPY, KNEE, WITH MENISCECTOMY;  Surgeon: Handy Elkins MD;  Location: [facility];  Service: Orthopedics;  Laterality: Left;  Video    TRANSFORAMINAL EPIDURAL INJECTION OF STEROID Right 7/18/2019    Procedure: INJECTION, STEROID, EPIDURAL, TRANSFORAMINAL APPROACH, L4 AND L5;  Surgeon: Daniel Seo MD;  Location: [facility];  Service: Pain Management;  Laterality: Right;    TUMOR REMOVAL      right hand       Anesthesia Evaluation    I have reviewed the Patient Summary Reports.    I have reviewed the Nursing Notes.   I have reviewed the Medications.     Review of Systems  Anesthesia Hx:  No problems with previous Anesthesia    Hematology/Oncology:  Hematology Normal        EENT/Dental:EENT/Dental Normal   Cardiovascular:   Hypertension    Pulmonary:  Pulmonary Normal    Renal/:  Renal/ Normal     Musculoskeletal:  Musculoskeletal Normal    Neurological:   Neuromuscular Disease,    Endocrine:  Endocrine Normal    Dermatological:  Skin Normal    Psych:  Psychiatric Normal           Physical Exam  General:  Well nourished    Airway/Jaw/Neck:  Airway Findings: Mouth Opening: Normal Mallampati: II  Improves to I with phonation.  TM Distance: Normal, at least 6 cm        Eyes/Ears/Nose:  EYES/EARS/NOSE FINDINGS: Normal     Heart/Vascular:  Heart Findings: Normal Heart murmur: negative Vascular Findings: Normal    Abdomen:  Abdomen Findings: Normal    Musculoskeletal:  Musculoskeletal Findings: Normal   Skin:  Skin Findings: Normal    Mental Status:  Mental Status  Findings: Normal        Anesthesia Plan  Type of Anesthesia, risks & benefits discussed:  Anesthesia Type:  general  Patient's Preference: general  Intra-op Monitoring Plan: standard ASA monitors  Intra-op Monitoring Plan Comments:   Post Op Pain Control Plan: IV/PO Opioids PRN and multimodal analgesia  Post Op Pain Control Plan Comments:   Induction:   IV  Beta Blocker:  Patient is on a Beta-Blocker and has received one dose within the past 24 hours (No further documentation required).       Informed Consent: Patient understands risks and agrees with Anesthesia plan.  Questions answered. Anesthesia consent signed with patient.  ASA Score: 2     Day of Surgery Review of History & Physical: I have interviewed and examined the patient. I have reviewed the patient's H&P dated:  There are no significant changes.  H&P update referred to the surgeon.         Ready For Surgery From Anesthesia Perspective.

## 2019-12-11 NOTE — H&P
COLONOSCOPY HISTORY AND PHYSICAL EXAM    Procedure : Colonoscopy      INDICATIONS: asymptomatic screening exam    Family Hx of CRC: None    Last Colonoscopy:  None  Findings: n/a       Past Medical History:   Diagnosis Date    Anxiety     Bronchitis     Family history of ovarian cancer     Hypertension      Sedation Problems: NO  Family History   Problem Relation Age of Onset    Ovarian cancer Mother 66         at 72    Heart disease Father         double bypass    Macular degeneration Father     No Known Problems Brother     No Known Problems Brother     Cancer Paternal Grandfather         lung (pt thinks), possible smoker    Breast cancer Paternal Cousin 20        father's sister's daughter, pt thinks bilateral, cousin is     Colon cancer Maternal Aunt         mat aunt had part of colon removed, pt does not think cancer     Fam Hx of Sedation Problems: NO  Social History     Socioeconomic History    Marital status: Single     Spouse name: Not on file    Number of children: Not on file    Years of education: Not on file    Highest education level: Not on file   Occupational History    Occupation:      Employer: [employer]    Social Needs    Financial resource strain: Not on file    Food insecurity:     Worry: Not on file     Inability: Not on file    Transportation needs:     Medical: Not on file     Non-medical: Not on file   Tobacco Use    Smoking status: Never Smoker    Smokeless tobacco: Never Used   Substance and Sexual Activity    Alcohol use: Yes     Alcohol/week: 5.0 standard drinks     Types: 5 Glasses of wine per week     Comment: one a day- red wine    Drug use: No    Sexual activity: Yes     Partners: Male     Comment: Boyfriend    Lifestyle    Physical activity:     Days per week: Not on file     Minutes per session: Not on file    Stress: Not on file   Relationships    Social connections:     Talks on phone: Not on file     Gets together:  "Not on file     Attends Sikh service: Not on file     Active member of club or organization: Not on file     Attends meetings of clubs or organizations: Not on file     Relationship status: Not on file   Other Topics Concern    Not on file   Social History Narrative    Not on file       Review of Systems - Negative except   Respiratory ROS: no dyspnea  Cardiovascular ROS: no exertional chest pain  Gastrointestinal ROS: NO abdominal discomfort,  NO rectal bleeding  Musculoskeletal ROS: no muscular pain  Neurological ROS: no recent stroke    Physical Exam:  /79 (BP Location: Left arm, Patient Position: Lying)   Pulse 79   Temp 97.6 °F (36.4 °C) (Temporal)   Resp 18   Ht 5' 5" (1.651 m)   Wt 68 kg (150 lb)   LMP 04/19/2015 (Approximate)   SpO2 98%   BMI 24.96 kg/m²   General: no distress  Head: normocephalic  Mallampati Score   Neck: supple, symmetrical, trachea midline  Lungs:  clear to auscultation bilaterally and normal respiratory effort  Heart: regular rate and rhythm and no murmur  Abdomen: soft, non-tender non-distented; bowel sounds normal; no masses,  no organomegaly  Extremities: no cyanosis or edema, or clubbing    ASA:  II    PLAN  COLONOSCOPY.    SedationPlan :MAC    The details of the procedure, the possible need for biopsy or polypectomy and the potential risks including bleeding, perforation, missed polyps were discussed in detail.    "

## 2019-12-11 NOTE — ANESTHESIA POSTPROCEDURE EVALUATION
Anesthesia Post Evaluation    Patient: Twila Roman    Procedure(s) Performed: Procedure(s) (LRB):  COLONOSCOPY (N/A)    Final Anesthesia Type: general    Patient location during evaluation: GI PACU  Patient participation: Yes- Able to Participate  Level of consciousness: awake and alert  Post-procedure vital signs: reviewed and stable  Pain management: adequate  Airway patency: patent    PONV status at discharge: No PONV  Anesthetic complications: no      Cardiovascular status: hemodynamically stable  Respiratory status: unassisted, spontaneous ventilation and room air  Hydration status: euvolemic  Follow-up not needed.          Vitals Value Taken Time   /75 12/11/2019 10:02 AM   Temp 36.2 °C (97.1 °F) 12/11/2019  9:32 AM   Pulse 72 12/11/2019 10:02 AM   Resp 19 12/11/2019 10:02 AM   SpO2 98 % 12/11/2019 10:02 AM         Event Time     Out of Recovery 10:02:27          Pain/Micah Score: Micah Score: 10 (12/11/2019  9:47 AM)

## 2019-12-11 NOTE — [employer]ATION PATIENT INSTRUCTIONS
Discharge Summary/Instructions after an Endoscopic Procedure  Patient Name: Twila Roman  Patient MRN: [id number]  Patient YOB: 1965 Wednesday, December 11, 2019  Caroline Swartz MD  RESTRICTIONS:  During your procedure today, you received medications for sedation.  These   medications may affect your judgment, balance and coordination.  Therefore,   for 24 hours, you have the following restrictions:   - DO NOT drive a car, operate machinery, make legal/financial decisions,   sign important papers or drink alcohol.    ACTIVITY:  Today: no heavy lifting, straining or running due to procedural   sedation/anesthesia.  The following day: return to full activity including work.  DIET:  Eat and drink normally unless instructed otherwise.     TREATMENT FOR COMMON SIDE EFFECTS:  - Mild abdominal pain, nausea, belching, bloating or excessive gas:  rest,   eat lightly and use a heating pad.  - Sore Throat: treat with throat lozenges and/or gargle with warm salt   water.  - Because air was used during the procedure, expelling large amounts of air   from your rectum or belching is normal.  - If a bowel prep was taken, you may not have a bowel movement for 1-3 days.    This is normal.  SYMPTOMS TO WATCH FOR AND REPORT TO YOUR PHYSICIAN:  1. Abdominal pain or bloating, other than gas cramps.  2. Chest pain.  3. Back pain.  4. Signs of infection such as: chills or fever occurring within 24 hours   after the procedure.  5. Rectal bleeding, which would show as bright red, maroon, or black stools.   (A tablespoon of blood from the rectum is not serious, especially if   hemorrhoids are present.)  6. Vomiting.  7. Weakness or dizziness.  GO DIRECTLY TO THE NEAREST EMERGENCY ROOM IF YOU HAVE ANY OF THE FOLLOWING:      Difficulty breathing              Chills and/or fever over 101 F   Persistent vomiting and/or vomiting blood   Severe abdominal pain   Severe chest pain   Black, tarry stools   Bleeding- more than one  tablespoon   Any other symptom or condition that you feel may need urgent attention  Your doctor recommends these additional instructions:  If any biopsies were taken, your doctors clinic will contact you in 1 to 2   weeks with any results.  - Discharge patient to home.   - Resume previous diet.   - Continue present medications.   - Await pathology results.   - Repeat colonoscopy date to be determined after pending pathology results   are reviewed for surveillance.   - Return to referring physician.   - Written discharge instructions were provided to the patient.   - The signs and symptoms of potential delayed complications were discussed   with the patient.   - Patient has a contact number available for emergencies.   - Return to normal activities tomorrow.  For questions, problems or results please call your physician - Caroline Swartz MD at Work:  [phone].  [facility] [place], EMERGENCY ROOM PHONE NUMBER: [phone]  IF A COMPLICATION OR EMERGENCY SITUATION ARISES AND YOU ARE UNABLE TO REACH   YOUR PHYSICIAN - GO DIRECTLY TO THE EMERGENCY ROOM.  Caroline Swartz MD  12/11/2019 9:30:40 AM  This report has been verified and signed electronically.  [employer]

## 2019-12-11 NOTE — DISCHARGE INSTRUCTIONS
Colonoscopy     A camera attached to a flexible tube with a viewing lens is used to take video pictures.     Colonoscopy is a test to view the inside of your lower digestive tract (colon and rectum). Sometimes it can show the last part of the small intestine (ileum). During the test, small pieces of tissue may be removed for testing. This is called a biopsy. Small growths, such as polyps, may also be removed.   Why is colonoscopy done?  The test is done to help look for colon cancer. And it can help find the source of abdominal pain, bleeding, and changes in bowel habits. It may be needed once a year, depending on factors such as your:  · Age  · Health history  · Family health history  · Symptoms  · Results from any prior colonoscopy  Risks and possible complications  These include:  · Bleeding               · A puncture or tear in the colon   · Risks of anesthesia  · A cancer lesion not being seen  Getting ready   To prepare for the test:  · Talk with your healthcare provider about the risks of the test (see below). Also ask your healthcare provider about alternatives to the test.  · Tell your healthcare provider about any medicines you take. Also tell him or her about any health conditions you may have.  · Make sure your rectum and colon are empty for the test. Follow the diet and bowel prep instructions exactly. If you dont, the test may need to be rescheduled.  · Plan for a friend or family member to drive you home after the test.     Colonoscopy provides an inside view of the entire colon.     You may discuss the results with your doctor right away or at a future visit.  During the test   The test is usually done in the hospital on an outpatient basis. This means you go home the same day. The procedure takes about 30 minutes. During that time:  · You are given relaxing (sedating) medicine through an IV line. You may be drowsy, or fully asleep.  · The healthcare provider will first give you a physical exam to  check for anal and rectal problems.  · Then the anus is lubricated and the scope inserted.  · If you are awake, you may have a feeling similar to needing to have a bowel movement. You may also feel pressure as air is pumped into the colon. Its OK to pass gas during the procedure.  · Biopsy, polyp removal, or other treatments may be done during the test.  After the test   You may have gas right after the test. It can help to try to pass it to help prevent later bloating. Your healthcare provider may discuss the results with you right away. Or you may need to schedule a follow-up visit to talk about the results. After the test, you can go back to your normal eating and other activities. You may be tired from the sedation and need to rest for a few hours.  Date Last Reviewed: 11/1/2016 © 2000-2017 [employer]. [address] [place] [address], [place] [address]. All rights reserved. This information is not intended as a substitute for professional medical care. Always follow your healthcare professional's instructions.

## 2019-12-11 NOTE — TRANSFER OF CARE
"Anesthesia Transfer of Care Note    Patient: Twila Roman    Procedure(s) Performed: Procedure(s) (LRB):  COLONOSCOPY (N/A)    Patient location: PACU    Anesthesia Type: general    Transport from OR: Transported from OR on room air with adequate spontaneous ventilation    Post pain: adequate analgesia    Post assessment: no apparent anesthetic complications and tolerated procedure well    Post vital signs: stable    Level of consciousness: awake and responds to stimulation    Nausea/Vomiting: no nausea/vomiting    Complications: none    Transfer of care protocol was followed      Last vitals:   Visit Vitals  /65 (BP Location: Left arm, Patient Position: Lying)   Pulse 74   Temp 36.2 °C (97.1 °F) (Temporal)   Resp 18   Ht 5' 5" (1.651 m)   Wt 68 kg (150 lb)   LMP 04/19/2015 (Approximate)   SpO2 98%   BMI 24.96 kg/m²     "

## 2019-12-11 NOTE — ANESTHESIA RELEASE NOTE
"Anesthesia Release from PACU Note    Patient: Twila Roman    Procedure(s) Performed: Procedure(s) (LRB):  COLONOSCOPY (N/A)    Anesthesia type: general    Post pain: Adequate analgesia    Post assessment: no apparent anesthetic complications and tolerated procedure well    Last Vitals:   Visit Vitals  /75   Pulse 72   Temp 36.2 °C (97.1 °F) (Temporal)   Resp 19   Ht 5' 5" (1.651 m)   Wt 68 kg (150 lb)   LMP 04/19/2015 (Approximate)   SpO2 98%   BMI 24.96 kg/m²       Post vital signs: stable    Level of consciousness: awake and alert     Nausea/Vomiting: no nausea/no vomiting    Complications: none    Airway Patency: patent    Respiratory: unassisted, spontaneous ventilation, room air    Cardiovascular: stable and blood pressure at baseline    Hydration: euvolemic  "

## 2019-12-17 LAB
FINAL PATHOLOGIC DIAGNOSIS: NORMAL
GROSS: NORMAL

## 2019-12-29 RX ORDER — METOPROLOL SUCCINATE 25 MG/1
TABLET, EXTENDED RELEASE ORAL
Qty: 90 TABLET | Refills: 3 | Status: SHIPPED | OUTPATIENT
Start: 2019-12-29 | End: 2021-02-06

## 2019-12-30 RX ORDER — LORAZEPAM 1 MG/1
TABLET ORAL
Qty: 30 TABLET | Refills: 0 | Status: SHIPPED | OUTPATIENT
Start: 2019-12-30 | End: 2020-02-07

## 2020-02-07 RX ORDER — LORAZEPAM 1 MG/1
TABLET ORAL
Qty: 30 TABLET | Refills: 0 | Status: SHIPPED | OUTPATIENT
Start: 2020-02-07 | End: 2020-03-20

## 2020-02-13 RX ORDER — BUPROPION HYDROCHLORIDE 300 MG/1
TABLET ORAL
Qty: 30 TABLET | Refills: 11 | Status: SHIPPED | OUTPATIENT
Start: 2020-02-13 | End: 2021-02-22

## 2020-02-26 RX ORDER — CITALOPRAM 10 MG/1
TABLET ORAL
Qty: 30 TABLET | Refills: 6 | Status: SHIPPED | OUTPATIENT
Start: 2020-02-26 | End: 2020-10-01

## 2020-03-20 RX ORDER — LORAZEPAM 1 MG/1
TABLET ORAL
Qty: 30 TABLET | Refills: 0 | Status: SHIPPED | OUTPATIENT
Start: 2020-03-20 | End: 2020-05-06 | Stop reason: SDUPTHER

## 2020-05-03 DIAGNOSIS — J01.90 ACUTE SINUSITIS, RECURRENCE NOT SPECIFIED, UNSPECIFIED LOCATION: ICD-10-CM

## 2020-05-03 RX ORDER — MONTELUKAST SODIUM 10 MG/1
TABLET ORAL
Qty: 30 TABLET | Refills: 11 | Status: SHIPPED | OUTPATIENT
Start: 2020-05-03 | End: 2021-05-08

## 2020-05-04 RX ORDER — LORAZEPAM 1 MG/1
TABLET ORAL
Qty: 30 TABLET | OUTPATIENT
Start: 2020-05-04

## 2020-05-06 ENCOUNTER — OFFICE VISIT (OUTPATIENT)
Dept: INTERNAL MEDICINE | Facility: CLINIC | Age: 55
End: 2020-05-06
Payer: COMMERCIAL

## 2020-05-06 DIAGNOSIS — F41.9 ANXIETY: Primary | ICD-10-CM

## 2020-05-06 PROCEDURE — 99213 OFFICE O/P EST LOW 20 MIN: CPT | Mod: 95,,, | Performed by: INTERNAL MEDICINE

## 2020-05-06 PROCEDURE — 99213 PR OFFICE/OUTPT VISIT, EST, LEVL III, 20-29 MIN: ICD-10-PCS | Mod: 95,,, | Performed by: INTERNAL MEDICINE

## 2020-05-06 RX ORDER — LORAZEPAM 1 MG/1
1 TABLET ORAL EVERY 12 HOURS PRN
Qty: 60 TABLET | Refills: 0 | Status: SHIPPED | OUTPATIENT
Start: 2020-05-06 | End: 2020-06-20

## 2020-05-06 NOTE — PROGRESS NOTES
Subjective:       Patient ID: Twila Roman is a 54 y.o. female.    Chief Complaint: No chief complaint on file.    Patient is a 54 y.o.female who presents today for med fill  The patient location is: [place]  The chief complaint leading to consultation is: med fill  Visit type: audiovisual  Total time spent with patient: 10 min  Each patient to whom he or she provides medical services by telemedicine is:  (1) informed of the relationship between the physician and patient and the respective role of any other health care provider with respect to management of the patient; and (2) notified that he or she may decline to receive medical services by telemedicine and may withdraw from such care at any time.    Notes: pt takes lorazepam prn for anxiety; needs refill of this med. No side effects to this medication  Review of Systems   Constitutional: Negative for appetite change, chills, diaphoresis and fever.   HENT: Negative for congestion, ear discharge, ear pain, postnasal drip, tinnitus, trouble swallowing and voice change.    Eyes: Negative for discharge, redness and itching.   Respiratory: Negative for cough, chest tightness, shortness of breath and wheezing.    Cardiovascular: Negative for chest pain, palpitations and leg swelling.   Gastrointestinal: Negative for abdominal pain, constipation, diarrhea, nausea and vomiting.   Endocrine: Negative for cold intolerance and heat intolerance.   Genitourinary: Negative for difficulty urinating, flank pain, hematuria and urgency.   Musculoskeletal: Negative for arthralgias, gait problem, myalgias and neck stiffness.   Skin: Negative for color change and rash.   Neurological: Negative for dizziness, seizures, syncope and headaches.   Hematological: Negative for adenopathy.   Psychiatric/Behavioral: Negative for agitation, behavioral problems, confusion and sleep disturbance. The patient is nervous/anxious.        Objective:      Physical Exam   Constitutional: She is oriented  to person, place, and time. She appears well-developed and well-nourished. No distress.   HENT:   Head: Normocephalic and atraumatic.   Right Ear: External ear normal.   Left Ear: External ear normal.   Eyes: Pupils are equal, round, and reactive to light. Conjunctivae are normal. Right eye exhibits no discharge. Left eye exhibits no discharge. No scleral icterus.   Neck: Normal range of motion. Neck supple.   Pulmonary/Chest: Effort normal. No stridor.   Neurological: She is alert and oriented to person, place, and time.   Skin: She is not diaphoretic.   Psychiatric: She has a normal mood and affect. Her behavior is normal. Judgment and thought content normal.       Assessment and Plan:       1. Anxiety  - lorazepam refilled  - continue celexa  · Medication Management: The risks and benefits of medication were discussed with the patient            No follow-ups on file.

## 2020-06-20 RX ORDER — LORAZEPAM 1 MG/1
TABLET ORAL
Qty: 60 TABLET | Refills: 0 | Status: SHIPPED | OUTPATIENT
Start: 2020-06-20 | End: 2020-07-31

## 2020-07-29 ENCOUNTER — TELEPHONE (OUTPATIENT)
Dept: OBSTETRICS AND GYNECOLOGY | Facility: CLINIC | Age: 55
End: 2020-07-29

## 2020-07-29 DIAGNOSIS — Z12.31 BREAST CANCER SCREENING BY MAMMOGRAM: Primary | ICD-10-CM

## 2020-08-07 ENCOUNTER — HOSPITAL ENCOUNTER (OUTPATIENT)
Dept: RADIOLOGY | Facility: HOSPITAL | Age: 55
Discharge: HOME | End: 2020-08-07
Attending: OBSTETRICS & GYNECOLOGY
Payer: COMMERCIAL

## 2020-08-07 DIAGNOSIS — Z12.31 BREAST CANCER SCREENING BY MAMMOGRAM: ICD-10-CM

## 2020-08-07 PROCEDURE — 77063 BREAST TOMOSYNTHESIS BI: CPT | Mod: 26,,, | Performed by: RADIOLOGY

## 2020-08-07 PROCEDURE — 77067 SCR MAMMO BI INCL CAD: CPT | Mod: TC,PO

## 2020-08-07 PROCEDURE — 77063 MAMMO DIGITAL SCREENING BILAT WITH TOMOSYNTHESIS_CAD: ICD-10-PCS | Mod: 26,,, | Performed by: RADIOLOGY

## 2020-08-07 PROCEDURE — 77067 MAMMO DIGITAL SCREENING BILAT WITH TOMOSYNTHESIS_CAD: ICD-10-PCS | Mod: 26,,, | Performed by: RADIOLOGY

## 2020-08-07 PROCEDURE — 77067 SCR MAMMO BI INCL CAD: CPT | Mod: 26,,, | Performed by: RADIOLOGY

## 2020-08-17 NOTE — PROGRESS NOTES
Hereditary and High-Risk Clinic  Department of Hematology and Oncology  [facility]  [facility] [place], [place]    08/25/2020  Provider:  Neri Hanson DNP    Patient ID: Twila Roman is a 55 y.o. female.    Chief Complaint: Results and Genetic Evaluation      Referring Provider:  No referring provider defined for this encounter.      SUBJECTIVE:      History of Present Illness (HPI):  Established patient presents today for post-test genetic counseling.    Pedigree      Review of Systems   - See HPI.    - Patient's Distress Score today was 5-6/10 (with 10 being the worst).  Patient attributes this to refinancing her home and assisting with her brother's care.  Patient denies experiencing suicidal or homicidal ideations (SI/HI).  Offered patient a referral to Social Work and Psychology, and patient declined both.      OBJECTIVE:     Physical Exam   Constitutional: She appears well developed and well nourished. No distress.   Pulmonary/Chest: Effort normal.   Neurological: She is alert.   Psychiatric: She has a normal mood and affect. Her speech is normal and behavior is normal. Her thought content is normal.     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  This test was completed in the third quarter of last year, approximately  one year ago.    HOXB13 Mutation-Associated Risks and Risk Management:  Patient    Patient's HOXB13 variant has been shown to greatly increase risk for prostate cancer; specifically, the risk of prostate cancer to age 80 is 22%-52%, compared to the risk to age 80 of only 10.9% in the general population ([place], 2019).  Studies suggest that HOXB13 mutation-associated prostate cancers tend to be diagnosed at younger ages, as well ([place], 2019).  Current National Comprehensive Cancer Network guidelines also indicate that HOXB13 mutation carriers are at a significantly higher risk for prostate cancer than non-carriers and are more likely to be diagnosed at a younger age (NCCN, 2020).    Given Twila is female, her HOXB13 mutation does not impact her cancer risk directly.  For affected male relatives of Twila's, information can be found below.    HOXB13 Mutation-Associated Risks and Risk Management:  Patient's Relatives    Each of patient's first-degree relatives, both male and female, has a 50% chance of having the same mutation as patient, and these relatives as well as other more distant relatives should meet with a genetics professional for genetic counseling/testing.  In some cases, relatives may need panel testing in lieu of single-site analysis.  It is important to remember that female relatives, while personally not at known increased risk for HOXB13 mutation-associated cancer, can still be carriers of this gene and then potentially pass it on to their children, and male children with the mutation would be at increased risk for prostate cancer.   I am available for cancer-genetic counseling/testing, or the patient's relatives can visit [email or web address] to locate a local genetic counselor.     A HOXB13 mutation-positive male should see a healthcare provider with knowledge of HOXB13 evaluation and management, as they may benefit from surveillance and/or early intervention.    Patient's Mother's Ovarian Cancer    The  "patient independently expressed motivation to undergo risk-reducing oophorectomy given her mother's ovarian cancer history.  I will refer her to Gynecologic Oncology to discuss this further and for ovarian cancer risk evaluation and management.    With the patient's previous germline cancer-genetic test results, which do not reveal a pathogenic mutation related to ovarian cancer that would potentially explain the patient's mother's ovarian cancer, we must keep in mind that there are significant limitations of interpreting a negative genetic testing result in a patient unaffected with cancer prior to testing appropriate affected relative(s).  Only some cancers are hereditary.  It is possible to see familial clustering of related cancer amongst family members, and these are sometimes caused by environmental factors and/or lifestyle factors that may be shared amongst family members.      Further Cancer Genetics    Based on the information provided to me by the patient, Twila Roman, she meets criteria for genetic testing for Breast and Ovarian Cancer Susceptibility Genes based on current National Comprehensive Cancer Network (NCCN) Guidelines.  There are significant limitations of interpreting a negative genetic testing result in a patient unaffected with cancer prior to testing appropriate affected relative(s).     Germline cancer-genetic panel testing consists of testing multiple genes known to be related to hereditary cancer syndromes.  These genes are known as "tumor suppressor genes."  A key role of tumor suppressor genes is to prevent cancer.  When a tumor suppressor gene has a clinically significant mutation, it affects the functioning of the gene, and the carrier may be more likely to develop cancers in certain organs.       Results of genetic testing include positive, negative, and variant of uncertain significance (VUS) (i.e. unclear) results.  If positive, the patient's specific cancer risks vary " depending upon the tumor suppressor gene(s) in which there is/are a mutation(s).  In some cases, depending upon the result and the patient's clinical history, modified management may be recommended, including measures for risk reduction and/or surveillance, though modified management is not always an option.  Modified management may also be recommended, even with a negative result, based upon risk assessment that incorporates the family history.  A VUS indicates that there is not presently enough data for the laboratory to make a determination as to whether the variant is benign or pathogenic; VUSs are not typically acted upon clinically.       If Twila tests positive for a mutation inherited in an autosomal-dominant manner, her first-degree relatives would each have a 50% chance of having the same mutation, and other blood-relatives would also be at (a lesser) risk of having the same mutation.       The Genetic Information Nondiscrimination Act (HIWOT) prohibits most health insurance agencies and employers with 16 or more employees from discriminating against an individual based on the results of genetic testing; however, HIWOT does not protect individuals from discrimination by other types of policies/entities, including but not limited to life insurance, disability, long-term care insurance,  benefits or insurance, and [facility] benefits).     An outside laboratory would perform the testing after a blood sample is collected at an [facility] laboratory or a saliva sample is collected by the patient at home.  With genetic testing, there is a potential for the patient to incur out-of-pocket costs.  Results can take several weeks.  Further post-test genetic counseling would be conducted once the genetic testing results are available.     Offered Twila germline genetic oncology testing today, versus deferring testing at this time or declining testing altogether.  At this visit, the patient originally  felt that she would like to pursue updated germline cancer-genetic testing at this time but ultimately elected to hold off and knows to notify me if she desires to proceed moving forward.      Additional Information    All of the patient's blood relatives should consider speaking with a genetics professional regarding undergoing genetic counseling/testing.  Again, I am available for testing of any relatives (patient has my contact information), or they can visit [email or web address] to locate a genetic counselor local to them.     Twila is to continue following up with all healthcare providers as they have indicated to her and should ensure that they are each aware of her personal and family histories so that her medical management including cancer screenings can be based in part off of these histories.      Twila is established with primary care provider (PCP) Dr. Michelle Farias and Gynecology provider Dr. Maryanne Thompson.      I recommend that the patient undergo total-body skin examinations annually with a Dermatology provider.  I have placed a referral to Dermatology for the patient.      Based on Twila's being overdue for follow-up, follow-up with the [facility] breast specialist is recommended at this time.  The patient stated she would independently follow up.    Further regarding the patient's relatives, it is possible for them to have cancer-related genetic mutations that were not identified in Twila.  The patient's relatives should consider speaking with a healthcare provider regarding undergoing genetic counseling/testing; I am available for this purpose, or they can visit [email or web address] to locate a local genetic counselor, and I have shared the contact information for both with the patient and encouraged her to share that information with her relatives.      Twila should update me with any changes to her personal or family history and with any relative's genetic testing results and check in with me  annually to determine if updated genetic testing is indicated for her.     Questions were encouraged and answered to the patient's satisfaction, and she verbalized understanding of information and agreement with the plan.      References:  1.  [employer]. ([employer]). (2019). Patient's results report: Accession # [id number].  2.  National Comprehensive Cancer Network. (2020). NCCN clinical practice guidelines in oncology (NCCN guidelines(R)), Prostate cancer early detection: Version 1.2020.    ASSESSMENT/PLAN:       Twila was seen today for results and genetic evaluation.    Diagnoses and all orders for this visit:    Encounter for nonprocreative genetic counseling        - As otherwise detailed in this note.    Mutation in HOXB13 gene        -  Affects male relatives. Follow-up recommendations for male recommendations provided in this note.    Family history of breast cancer  -     Based on Twila's being overdue for follow-up, follow-up with the [facility] breast specialist is recommended at this time.  The patient stated she would independently follow up.    Family history of ovarian cancer  -     Ambulatory referral/consult to Gynecologic Oncology; Future    General medical exam  -     Ambulatory referral/consult to Dermatology; Future      Follow up in about 1 year (around 8/18/2021) for determination of need for updated genetic testing.        During this encounter, I spent approximately 60 minutes face-to-face with this patient, more than half of which was spent counseling the patient and/or coordinating her care as detailed above.    Neri Lechuga, DNP, APRN, FNP-BC, AOCNP  Nurse Practitioner, Hereditary and High-Risk Clinic  Department of Hematology and Oncology  [facility], 3rd floor  [facility] [address], [place]  [address]  office phone: [phone]    office fax: [phone]     08/25/2020

## 2020-08-18 ENCOUNTER — OFFICE VISIT (OUTPATIENT)
Dept: HEMATOLOGY/ONCOLOGY | Facility: CLINIC | Age: 55
End: 2020-08-18
Payer: COMMERCIAL

## 2020-08-18 DIAGNOSIS — Z80.41 FAMILY HISTORY OF OVARIAN CANCER: ICD-10-CM

## 2020-08-18 DIAGNOSIS — Z00.00 GENERAL MEDICAL EXAM: ICD-10-CM

## 2020-08-18 DIAGNOSIS — Z15.03 MUTATION IN HOXB13 GENE: ICD-10-CM

## 2020-08-18 DIAGNOSIS — Z71.83 ENCOUNTER FOR NONPROCREATIVE GENETIC COUNSELING: Primary | ICD-10-CM

## 2020-08-18 DIAGNOSIS — Z80.3 FAMILY HISTORY OF BREAST CANCER: ICD-10-CM

## 2020-08-18 PROCEDURE — 99215 PR OFFICE/OUTPT VISIT, EST, LEVL V, 40-54 MIN: ICD-10-PCS | Mod: S$GLB,,, | Performed by: NURSE PRACTITIONER

## 2020-08-18 PROCEDURE — 99215 OFFICE O/P EST HI 40 MIN: CPT | Mod: S$GLB,,, | Performed by: NURSE PRACTITIONER

## 2020-08-18 PROCEDURE — 99999 PR PBB SHADOW E&M-EST. PATIENT-LVL III: ICD-10-PCS | Mod: PBBFAC,,, | Performed by: NURSE PRACTITIONER

## 2020-08-18 PROCEDURE — 99999 PR PBB SHADOW E&M-EST. PATIENT-LVL III: CPT | Mod: PBBFAC,,, | Performed by: NURSE PRACTITIONER

## 2020-08-18 NOTE — Clinical Note
Justin Mercedes. Referral is in. Pt is independently motivated to meet with Dr. Burns to discuss possibility of risk-reducing oophorectomy based on family history. Can you please schedule? Thanks. -Neri

## 2020-08-25 ENCOUNTER — TELEPHONE (OUTPATIENT)
Dept: GYNECOLOGIC ONCOLOGY | Facility: CLINIC | Age: 55
End: 2020-08-25

## 2020-08-25 PROBLEM — Z15.03 MUTATION IN HOXB13 GENE: Status: ACTIVE | Noted: 2020-08-25

## 2020-08-25 NOTE — TELEPHONE ENCOUNTER
----- Message from Neri Hanson DNP sent at 8/25/2020 11:04 AM CDT -----  Justin Mercedes. Referral is in. Pt is independently motivated to meet with Dr. Burns to discuss possibility of risk-reducing oophorectomy based on family history. Can you please schedule? Thanks. -Neri

## 2020-08-25 NOTE — TELEPHONE ENCOUNTER
Spoke with our patient about her schedule appointment in gyn oncology she agreed she voiced understanding of the date, time and location. All questions answered appointment mail. MA/REANNA /Preceptor Daren BURNS

## 2020-08-28 ENCOUNTER — INITIAL CONSULT (OUTPATIENT)
Dept: GYNECOLOGIC ONCOLOGY | Facility: CLINIC | Age: 55
End: 2020-08-28
Payer: COMMERCIAL

## 2020-08-28 ENCOUNTER — LAB VISIT (OUTPATIENT)
Dept: LAB | Facility: OTHER | Age: 55
End: 2020-08-28
Attending: INTERNAL MEDICINE
Payer: COMMERCIAL

## 2020-08-28 VITALS
SYSTOLIC BLOOD PRESSURE: 126 MMHG | DIASTOLIC BLOOD PRESSURE: 77 MMHG | BODY MASS INDEX: 28.12 KG/M2 | HEART RATE: 68 BPM | WEIGHT: 169 LBS

## 2020-08-28 DIAGNOSIS — Z80.41 FAMILY HISTORY OF OVARIAN CANCER: ICD-10-CM

## 2020-08-28 DIAGNOSIS — Z80.41 FAMILY HISTORY OF OVARIAN CANCER: Primary | ICD-10-CM

## 2020-08-28 DIAGNOSIS — Z15.03 MUTATION IN HOXB13 GENE: ICD-10-CM

## 2020-08-28 LAB — CANCER AG125 SERPL-ACNC: 15 U/ML (ref 0–30)

## 2020-08-28 PROCEDURE — 86304 IMMUNOASSAY TUMOR CA 125: CPT

## 2020-08-28 PROCEDURE — 99999 PR PBB SHADOW E&M-EST. PATIENT-LVL III: ICD-10-PCS | Mod: PBBFAC,,, | Performed by: OBSTETRICS & GYNECOLOGY

## 2020-08-28 PROCEDURE — 99245 OFF/OP CONSLTJ NEW/EST HI 55: CPT | Mod: S$GLB,,, | Performed by: OBSTETRICS & GYNECOLOGY

## 2020-08-28 PROCEDURE — 99245 PR OFFICE CONSULTATION,LEVEL V: ICD-10-PCS | Mod: S$GLB,,, | Performed by: OBSTETRICS & GYNECOLOGY

## 2020-08-28 PROCEDURE — 36415 COLL VENOUS BLD VENIPUNCTURE: CPT

## 2020-08-28 PROCEDURE — 99999 PR PBB SHADOW E&M-EST. PATIENT-LVL III: CPT | Mod: PBBFAC,,, | Performed by: OBSTETRICS & GYNECOLOGY

## 2020-08-28 NOTE — PROGRESS NOTES
Subjective:      Patient ID: Twila Roman is a 55 y.o. female.    Chief Complaint: Mutation in HOXB13 gene (family Hx of Ovarian Cancer)      HPI     55 yr old para 0 referred from BALJIT Hanson to discuss risk reducing surgery for a family history of ovarian cancer. 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 This test was completed approximately one year ago. Test results did not reveal a pathogenic mutation related to ovarian cancer.    LMP age 52. No PMB. + HRT briefly.    Prior anterior spinal fusion. No other abdominal or pelvic surgeries.    Family history for mother - ovarian cancer (diagnosed at 66, dec at 72). Also a paternal cousin with breast cancer at age 20 (dec) and maternal aunt recently diagnosed with breast cancer. No uterine or colon cancer.    Review of Systems   Constitutional: Positive for appetite change (some early satiety). Negative for activity change, chills, diaphoresis, fatigue, fever and unexpected weight change.   Respiratory: Negative for cough, chest tightness, shortness of breath and wheezing.    Cardiovascular: Negative for chest pain, palpitations and leg swelling.   Gastrointestinal: Negative for abdominal distention, abdominal pain, blood in stool, constipation, diarrhea, nausea and vomiting.   Genitourinary: Positive for pelvic pain (occas RLQ pain). Negative for difficulty urinating, dyspareunia, dysuria, flank pain, frequency, hematuria, menstrual problem, vaginal bleeding, vaginal discharge and vaginal pain.   Musculoskeletal: Negative for arthralgias and back pain.   Skin: Negative for color change and rash.   Neurological: Negative for dizziness, weakness, numbness and headaches.   Hematological: Negative for adenopathy.   Psychiatric/Behavioral: Negative for confusion and sleep disturbance. The patient is not nervous/anxious.        Past Medical History:   Diagnosis Date    Anxiety     Bronchitis     Family history of ovarian cancer     Hypertension       Past Surgical History:   Procedure Laterality Date    BACK SURGERY      spinal fusion    COLONOSCOPY N/A 2019    Procedure: COLONOSCOPY;  Surgeon: Caroline Swartz MD;  Location: [facility] ([address] [place] [address]);  Service: Endoscopy;  Laterality: N/A;    KNEE ARTHROSCOPY W/ MENISCECTOMY Left 8/15/2018    Procedure: ARTHROSCOPY, KNEE, WITH MENISCECTOMY;  Surgeon: Handy Elkins MD;  Location: [facility] OR;  Service: Orthopedics;  Laterality: Left;  Video    TRANSFORAMINAL EPIDURAL INJECTION OF STEROID Right 2019    Procedure: INJECTION, STEROID, EPIDURAL, TRANSFORAMINAL APPROACH, L4 AND L5;  Surgeon: Daniel Seo MD;  Location: [facility];  Service: Pain Management;  Laterality: Right;    TUMOR REMOVAL      right hand     Family History   Problem Relation Age of Onset    Ovarian cancer Mother 66         at 72    Heart disease Father         double bypass    Macular degeneration Father     Heart failure Father     No Known Problems Brother     No Known Problems Brother     Cancer Paternal Grandfather         lung (pt thinks), possible smoker    Breast cancer Paternal Cousin 20        father's sister's daughter, pt thinks bilateral, cousin is     Other Maternal Aunt         mat aunt had part of colon removed, pt does not think cancer    Breast cancer Maternal Aunt      Social History     Socioeconomic History    Marital status:      Spouse name: Not on file    Number of children: Not on file    Years of education: Not on file    Highest education level: Not on file   Occupational History    Occupation:      Employer: [employer]    Social Needs    Financial resource strain: Not hard at all    Food insecurity     Worry: Never true     Inability: Never true    Transportation needs     Medical: No     Non-medical: No   Tobacco Use    Smoking status: Never Smoker    Smokeless tobacco: Never Used   Substance and Sexual Activity    Alcohol use:  Yes     Alcohol/week: 5.0 standard drinks     Types: 5 Glasses of wine per week     Frequency: 4 or more times a week     Drinks per session: 1 or 2     Binge frequency: Monthly     Comment: one a day- red wine    Drug use: No    Sexual activity: Yes     Partners: Male     Comment: Boyfriend    Lifestyle    Physical activity     Days per week: 1 day     Minutes per session: 20 min    Stress: To some extent   Relationships    Social connections     Talks on phone: Once a week     Gets together: Twice a week     Attends Taoist service: Not on file     Active member of club or organization: No     Attends meetings of clubs or organizations: Never     Relationship status:    Other Topics Concern    Not on file   Social History Narrative    Not on file     Current Outpatient Medications   Medication Sig    albuterol 90 mcg/actuation inhaler Inhale 1-2 puffs into the lungs every 6 (six) hours as needed for Wheezing.    biotin 5,000 mcg TbDL Take 1 capsule by mouth once daily.    buPROPion (WELLBUTRIN XL) 300 MG 24 hr tablet TAKE 1 TABLET BY MOUTH EVERY DAY    cholecalciferol, vitamin D3, (VITAMIN D3) 2,000 unit Tab Take 1 tablet by mouth once daily.    citalopram (CELEXA) 10 MG tablet TAKE 1 TABLET BY MOUTH EVERY DAY    estradiol (IMVEXXY STARTER PACK) 10 mcg InPk Place 10 mcg vaginally once daily. Place 10 mcg vaginally once daily for two weeks. Then twice weekly thereafter.    estradiol-progesterone (BIJUVA) 1-100 mg Cap Take 1-100 mg by mouth once daily.    FLAXSEED OIL MISC by Misc.(Non-Drug; Combo Route) route.    LORazepam (ATIVAN) 1 MG tablet TAKE 1 TABLET(1 MG) BY MOUTH EVERY 12 HOURS AS NEEDED FOR ANXIETY    metoprolol succinate (TOPROL-XL) 25 MG 24 hr tablet TAKE 1 TABLET(25 MG) BY MOUTH EVERY DAY    montelukast (SINGULAIR) 10 mg tablet TAKE 1 TABLET BY MOUTH EVERY EVENING    multivitamin (THERAGRAN) per tablet Take 1 tablet by mouth once daily.    naproxen (NAPROSYN) 500 MG tablet  TAKE 1 TABLET BY MOUTH TWICE DAILY WITH MEALS    TOBRADEX 0.3-0.1 % Oint      No current facility-administered medications for this visit.      Review of patient's allergies indicates:  No Known Allergies    /77   Pulse 68   Wt 76.7 kg (169 lb)   LMP 04/19/2015 (Approximate)   BMI 28.12 kg/m²     Objective:   Physical Exam:   Constitutional: She is oriented to person, place, and time. She appears well-developed and well-nourished. No distress.    HENT:   Head: Normocephalic and atraumatic.    Eyes: No scleral icterus.    Neck: Normal range of motion.    Cardiovascular: Exam reveals no cyanosis and no edema.     Pulmonary/Chest: Effort normal. No respiratory distress. She exhibits no tenderness.        Abdominal: Soft. Normal appearance. She exhibits no distension, no fluid wave and no mass. There is no abdominal tenderness. There is no rigidity, no rebound and no guarding. No hernia.         Genitourinary:    Vagina, uterus and rectum normal.      Pelvic exam was performed with patient supine.   There is no rash, tenderness or lesion on the right labia. There is no rash, tenderness or lesion on the left labia. Uterus is not tender. Cervix is normal. Right adnexum displays no mass, no tenderness and no fullness. Left adnexum displays no mass, no tenderness and no fullness. No bleeding in the vagina. Labial bartholins normal.negative for vaginal discharge          Musculoskeletal: Normal range of motion and moves all extremeties. No edema.       Neurological: She is alert and oriented to person, place, and time.    Skin: Skin is warm and dry. No rash noted. No cyanosis or erythema. No pallor.    Psychiatric: She has a normal mood and affect. Thought content normal.       Assessment:     1. Family history of ovarian cancer    2. Mutation in HOXB13 gene        Plan:       Family history and genetics testing results reviewed with patient. Her lifetime risk based on her mother having ovarian cancer and negative  genetic testing is around 5%.  We went through the discussion of no known inhertibale mutations being seen, but this would not completely rule out an unknown genetic cause driving cancer in her family.  As such,  and pelvic US ordered.  Pending results will contact patient to review surgical options and schedule.  She has chronic pressure and pain and questionable PMB.

## 2020-08-28 NOTE — LETTER
August 28, 2020      Neri Hanson, BALJIT  [address]  [facility] [address]           [facility] [address]  [address], [address]  [facility] [address]  Phone: [phone]  Fax: [phone]          Patient: Twila Roman   MR Number: [id number]   YOB: 1965   Date of Visit: 8/28/2020       Dear Neri Hanson:    Thank you for referring Twila Roman to me for evaluation. Attached you will find relevant portions of my assessment and plan of care.    If you have questions, please do not hesitate to call me. I look forward to following Twila Roman along with you.    Sincerely,    Danie Burns MD    Enclosure  CC:  No Recipients    If you would like to receive this communication electronically, please contact [email or web address] or [phone] to request more information on "[employer]" Link access.    For providers and/or their staff who would like to refer a patient to [facility], please contact us through our one-stop-shop provider referral line, [facility], at [phone].    If you feel you have received this communication in error or would no longer like to receive these types of communications, please e-mail [email or web address]

## 2020-08-29 ENCOUNTER — HOSPITAL ENCOUNTER (OUTPATIENT)
Dept: RADIOLOGY | Facility: OTHER | Age: 55
Discharge: HOME | End: 2020-08-29
Attending: NURSE PRACTITIONER
Payer: COMMERCIAL

## 2020-08-29 DIAGNOSIS — Z80.41 FAMILY HISTORY OF OVARIAN CANCER: ICD-10-CM

## 2020-08-29 PROCEDURE — 76856 US PELVIS COMPLETE NON OB: ICD-10-PCS | Mod: 26,,, | Performed by: RADIOLOGY

## 2020-08-29 PROCEDURE — 76856 US EXAM PELVIC COMPLETE: CPT | Mod: TC

## 2020-08-29 PROCEDURE — 76856 US EXAM PELVIC COMPLETE: CPT | Mod: 26,,, | Performed by: RADIOLOGY

## 2020-08-31 ENCOUNTER — TELEPHONE (OUTPATIENT)
Dept: GYNECOLOGIC ONCOLOGY | Facility: CLINIC | Age: 55
End: 2020-08-31

## 2020-08-31 NOTE — TELEPHONE ENCOUNTER
----- Message from Danie Burns MD sent at 8/29/2020  9:49 AM CDT -----  CA-125 normal, please let patient know

## 2020-09-14 ENCOUNTER — TELEPHONE (OUTPATIENT)
Dept: INTERNAL MEDICINE | Facility: CLINIC | Age: 55
End: 2020-09-14

## 2020-09-17 ENCOUNTER — PATIENT MESSAGE (OUTPATIENT)
Dept: GYNECOLOGIC ONCOLOGY | Facility: CLINIC | Age: 55
End: 2020-09-17

## 2020-09-17 DIAGNOSIS — Z01.812 PRE-PROCEDURE LAB EXAM: ICD-10-CM

## 2020-09-17 DIAGNOSIS — Z80.41 FAMILY HISTORY OF OVARIAN CANCER: Primary | ICD-10-CM

## 2020-09-17 DIAGNOSIS — Z15.03 MUTATION IN HOXB13 GENE: ICD-10-CM

## 2020-09-21 RX ORDER — LORAZEPAM 1 MG/1
TABLET ORAL
Qty: 60 TABLET | Refills: 0 | Status: SHIPPED | OUTPATIENT
Start: 2020-09-21 | End: 2020-10-30

## 2020-09-22 ENCOUNTER — PATIENT OUTREACH (OUTPATIENT)
Dept: ADMINISTRATIVE | Facility: OTHER | Age: 55
End: 2020-09-22

## 2020-09-23 ENCOUNTER — OFFICE VISIT (OUTPATIENT)
Dept: DERMATOLOGY | Facility: CLINIC | Age: 55
End: 2020-09-23
Payer: COMMERCIAL

## 2020-09-23 ENCOUNTER — TELEPHONE (OUTPATIENT)
Dept: DERMATOLOGY | Facility: CLINIC | Age: 55
End: 2020-09-23

## 2020-09-23 VITALS — WEIGHT: 169 LBS | BODY MASS INDEX: 28.12 KG/M2

## 2020-09-23 DIAGNOSIS — D22.9 NEVUS OF MULTIPLE SITES: ICD-10-CM

## 2020-09-23 DIAGNOSIS — B36.0 TINEA VERSICOLOR: Primary | ICD-10-CM

## 2020-09-23 DIAGNOSIS — Z00.00 GENERAL MEDICAL EXAM: ICD-10-CM

## 2020-09-23 DIAGNOSIS — L81.4 LENTIGINES: ICD-10-CM

## 2020-09-23 PROCEDURE — 3008F PR BODY MASS INDEX (BMI) DOCUMENTED: ICD-10-PCS | Mod: CPTII,S$GLB,, | Performed by: DERMATOLOGY

## 2020-09-23 PROCEDURE — 87220 TISSUE EXAM FOR FUNGI: CPT | Mod: S$GLB,,, | Performed by: DERMATOLOGY

## 2020-09-23 PROCEDURE — 99999 PR PBB SHADOW E&M-EST. PATIENT-LVL IV: CPT | Mod: PBBFAC,,, | Performed by: DERMATOLOGY

## 2020-09-23 PROCEDURE — 99203 OFFICE O/P NEW LOW 30 MIN: CPT | Mod: 25,S$GLB,, | Performed by: DERMATOLOGY

## 2020-09-23 PROCEDURE — 87220 PR  TISSUE EXAM BY KOH: ICD-10-PCS | Mod: S$GLB,,, | Performed by: DERMATOLOGY

## 2020-09-23 PROCEDURE — 99999 PR PBB SHADOW E&M-EST. PATIENT-LVL IV: ICD-10-PCS | Mod: PBBFAC,,, | Performed by: DERMATOLOGY

## 2020-09-23 PROCEDURE — 3008F BODY MASS INDEX DOCD: CPT | Mod: CPTII,S$GLB,, | Performed by: DERMATOLOGY

## 2020-09-23 PROCEDURE — 99203 PR OFFICE/OUTPT VISIT, NEW, LEVL III, 30-44 MIN: ICD-10-PCS | Mod: 25,S$GLB,, | Performed by: DERMATOLOGY

## 2020-09-23 RX ORDER — SELENIUM SULFIDE 22.5 MG/ML
SHAMPOO TOPICAL
Qty: 180 ML | Refills: 5 | Status: SHIPPED | OUTPATIENT
Start: 2020-09-23 | End: 2023-09-23

## 2020-09-23 RX ORDER — ECONAZOLE NITRATE 10 MG/G
CREAM TOPICAL
Qty: 30 G | Refills: 2 | Status: SHIPPED | OUTPATIENT
Start: 2020-09-23 | End: 2022-09-29 | Stop reason: CLARIF

## 2020-09-23 RX ORDER — KETOCONAZOLE 20 MG/ML
SHAMPOO, SUSPENSION TOPICAL
Qty: 120 ML | Refills: 6 | Status: SHIPPED | OUTPATIENT
Start: 2020-09-23 | End: 2020-09-23 | Stop reason: ALTCHOICE

## 2020-09-23 NOTE — LETTER
September 23, 2020      Neri Hanson, [facility]  [address] [facility] [address]           [place] [facility]at[facility]  [address] [facility].  [place] [address]  Phone: [phone]  Fax: [phone]          Patient: Twila Roman   MR Number: [id number]   YOB: 1965   Date of Visit: 9/23/2020       Dear Neri Hanson:    Thank you for referring Twila Roman to me for evaluation. Attached you will find relevant portions of my assessment and plan of care.    If you have questions, please do not hesitate to call me. I look forward to following Twila Roman along with you.    Sincerely,    Veronica Crocker MD    Enclosure  CC:  No Recipients    If you would like to receive this communication electronically, please contact [email or web address] or [phone] to request more information on [employer] Link access.    For providers and/or their staff who would like to refer a patient to [facility], please contact us through our one-stop-shop provider referral line, [facility], at [phone].    If you feel you have received this communication in error or would no longer like to receive these types of communications, please e-mail [email or web address]

## 2020-09-23 NOTE — PROGRESS NOTES
Subjective:       Patient ID:  Twila Roman is a 55 y.o. female who presents for   Chief Complaint   Patient presents with    Skin Check     TBSE     Rash on chest and back for months no tx.  Also would like skin check.       Review of Systems   Constitutional: Negative for fever, chills, weight loss, weight gain, fatigue, night sweats and malaise.   Skin: Positive for rash, daily sunscreen use, activity-related sunscreen use and wears hat.   Hematologic/Lymphatic: Bruises/bleeds easily.        Objective:    Physical Exam   Constitutional: She appears well-developed and well-nourished. No distress.   Neurological: She is alert and oriented to person, place, and time. She is not disoriented.   Psychiatric: She has a normal mood and affect.   Skin:   Areas Examined (abnormalities noted in diagram):   Scalp / Hair Palpated and Inspected  Head / Face Inspection Performed  Neck Inspection Performed  Chest / Axilla Inspection Performed  Abdomen Inspection Performed  Genitals / Buttocks / Groin Inspection Performed  Back Inspection Performed  RUE Inspected  LUE Inspection Performed  RLE Inspected  LLE Inspection Performed  Nails and Digits Inspection Performed              Diagram Legend     Erythematous scaling macule/papule c/w actinic keratosis       Vascular papule c/w angioma      Pigmented verrucoid papule/plaque c/w seborrheic keratosis      Yellow umbilicated papule c/w sebaceous hyperplasia      Irregularly shaped tan macule c/w lentigo     1-2 mm smooth white papules consistent with Milia      Movable subcutaneous cyst with punctum c/w epidermal inclusion cyst      Subcutaneous movable cyst c/w pilar cyst      Firm pink to brown papule c/w dermatofibroma      Pedunculated fleshy papule(s) c/w skin tag(s)      Evenly pigmented macule c/w junctional nevus     Mildly variegated pigmented, slightly irregular-bordered macule c/w mildly atypical nevus      Flesh colored to evenly pigmented papule c/w intradermal  "nevus       Pink pearly papule/plaque c/w basal cell carcinoma      Erythematous hyperkeratotic cursted plaque c/w SCC      Surgical scar with no sign of skin cancer recurrence      Open and closed comedones      Inflammatory papules and pustules      Verrucoid papule consistent consistent with wart     Erythematous eczematous patches and plaques     Dystrophic onycholytic nail with subungual debris c/w onychomycosis     Umbilicated papule    Erythematous-base heme-crusted tan verrucoid plaque consistent with inflamed seborrheic keratosis     Erythematous Silvery Scaling Plaque c/w Psoriasis     See annotation      Assessment / Plan:        Tinea versicolor  -     ketoconazole (NIZORAL) 2 % shampoo; Apply topically every 7 days.  Dispense: 120 mL; Refill: 6  -     econazole nitrate 1 % cream; Use bid  Dispense: 30 g; Refill: 2    General medical exam  -     Ambulatory referral/consult to Dermatology    Total body skin examination performed today including at least 12 points as noted in physical examination. No lesions suspicious for malignancy noted.      Lentigines  The "ABCD" rules to observe pigmented lesions were reviewed.      Nevus of multiple sites  The "ABCD" rules to observe pigmented lesions were reviewed.  Brochure provided               Follow up in about 1 year (around 9/23/2021).  "

## 2020-10-05 ENCOUNTER — PATIENT MESSAGE (OUTPATIENT)
Dept: GYNECOLOGIC ONCOLOGY | Facility: CLINIC | Age: 55
End: 2020-10-05

## 2020-10-15 ENCOUNTER — ANESTHESIA EVENT (OUTPATIENT)
Dept: SURGERY | Facility: OTHER | Age: 55
End: 2020-10-15
Payer: COMMERCIAL

## 2020-10-15 ENCOUNTER — HOSPITAL ENCOUNTER (OUTPATIENT)
Dept: PREADMISSION TESTING | Facility: OTHER | Age: 55
Discharge: HOME | End: 2020-10-15
Attending: OBSTETRICS & GYNECOLOGY
Payer: COMMERCIAL

## 2020-10-15 VITALS
DIASTOLIC BLOOD PRESSURE: 81 MMHG | HEIGHT: 65 IN | WEIGHT: 160 LBS | SYSTOLIC BLOOD PRESSURE: 130 MMHG | OXYGEN SATURATION: 98 % | TEMPERATURE: 98 F | BODY MASS INDEX: 26.66 KG/M2 | HEART RATE: 76 BPM

## 2020-10-15 DIAGNOSIS — Z01.818 PREOP TESTING: Primary | ICD-10-CM

## 2020-10-15 LAB
ABO + RH BLD: NORMAL
ANION GAP SERPL CALC-SCNC: 11 MMOL/L (ref 8–16)
BASOPHILS # BLD AUTO: 0.06 K/UL (ref 0–0.2)
BASOPHILS NFR BLD: 1 % (ref 0–1.9)
BLD GP AB SCN CELLS X3 SERPL QL: NORMAL
BUN SERPL-MCNC: 17 MG/DL (ref 6–20)
CALCIUM SERPL-MCNC: 9.4 MG/DL (ref 8.7–10.5)
CHLORIDE SERPL-SCNC: 105 MMOL/L (ref 95–110)
CO2 SERPL-SCNC: 24 MMOL/L (ref 23–29)
CREAT SERPL-MCNC: 0.9 MG/DL (ref 0.5–1.4)
DIFFERENTIAL METHOD: ABNORMAL
EOSINOPHIL # BLD AUTO: 0.1 K/UL (ref 0–0.5)
EOSINOPHIL NFR BLD: 1.3 % (ref 0–8)
ERYTHROCYTE [DISTWIDTH] IN BLOOD BY AUTOMATED COUNT: 13.1 % (ref 11.5–14.5)
EST. GFR  (AFRICAN AMERICAN): >60 ML/MIN/1.73 M^2
EST. GFR  (NON AFRICAN AMERICAN): >60 ML/MIN/1.73 M^2
GLUCOSE SERPL-MCNC: 108 MG/DL (ref 70–110)
HCT VFR BLD AUTO: 41.7 % (ref 37–48.5)
HGB BLD-MCNC: 13.7 G/DL (ref 12–16)
IMM GRANULOCYTES # BLD AUTO: 0.08 K/UL (ref 0–0.04)
IMM GRANULOCYTES NFR BLD AUTO: 1.3 % (ref 0–0.5)
LYMPHOCYTES # BLD AUTO: 1.6 K/UL (ref 1–4.8)
LYMPHOCYTES NFR BLD: 26.2 % (ref 18–48)
MCH RBC QN AUTO: 31.1 PG (ref 27–31)
MCHC RBC AUTO-ENTMCNC: 32.9 G/DL (ref 32–36)
MCV RBC AUTO: 95 FL (ref 82–98)
MONOCYTES # BLD AUTO: 0.8 K/UL (ref 0.3–1)
MONOCYTES NFR BLD: 12.4 % (ref 4–15)
NEUTROPHILS # BLD AUTO: 3.6 K/UL (ref 1.8–7.7)
NEUTROPHILS NFR BLD: 57.8 % (ref 38–73)
NRBC BLD-RTO: 0 /100 WBC
PLATELET # BLD AUTO: 241 K/UL (ref 150–350)
PMV BLD AUTO: 8.9 FL (ref 9.2–12.9)
POTASSIUM SERPL-SCNC: 4.6 MMOL/L (ref 3.5–5.1)
RBC # BLD AUTO: 4.4 M/UL (ref 4–5.4)
SODIUM SERPL-SCNC: 140 MMOL/L (ref 136–145)
WBC # BLD AUTO: 6.27 K/UL (ref 3.9–12.7)

## 2020-10-15 PROCEDURE — 80048 BASIC METABOLIC PNL TOTAL CA: CPT

## 2020-10-15 PROCEDURE — 86901 BLOOD TYPING SEROLOGIC RH(D): CPT

## 2020-10-15 PROCEDURE — 85025 COMPLETE CBC W/AUTO DIFF WBC: CPT

## 2020-10-15 RX ORDER — SODIUM CHLORIDE, SODIUM LACTATE, POTASSIUM CHLORIDE, CALCIUM CHLORIDE 600; 310; 30; 20 MG/100ML; MG/100ML; MG/100ML; MG/100ML
INJECTION, SOLUTION INTRAVENOUS CONTINUOUS
Status: CANCELLED | OUTPATIENT
Start: 2020-10-15

## 2020-10-15 RX ORDER — LIDOCAINE HYDROCHLORIDE 10 MG/ML
0.5 INJECTION, SOLUTION EPIDURAL; INFILTRATION; INTRACAUDAL; PERINEURAL ONCE
Status: CANCELLED | OUTPATIENT
Start: 2020-10-15 | End: 2020-10-15

## 2020-10-15 RX ORDER — ACETAMINOPHEN 500 MG
1000 TABLET ORAL
Status: CANCELLED | OUTPATIENT
Start: 2020-10-15 | End: 2020-10-15

## 2020-10-15 RX ORDER — CELECOXIB 200 MG/1
400 CAPSULE ORAL
Status: CANCELLED | OUTPATIENT
Start: 2020-10-15 | End: 2020-10-15

## 2020-10-15 RX ORDER — FAMOTIDINE 20 MG/1
20 TABLET, FILM COATED ORAL
Status: CANCELLED | OUTPATIENT
Start: 2020-10-15 | End: 2020-10-15

## 2020-10-15 NOTE — DISCHARGE INSTRUCTIONS
Information to Prepare you for your Surgery    PRE-ADMIT TESTING -  [phone]    [address]  [place] [facility]          Your surgery has been scheduled at [facility]. We are pleased to have the opportunity to serve you. For Further Information please call [phone].    On the day of surgery please report to the Information Desk on the 1st floor.    · CONTACT YOUR PHYSICIAN'S OFFICE THE DAY PRIOR TO YOUR SURGERY TO OBTAIN YOUR ARRIVAL TIME.     · The evening before surgery do not eat anything after 9 p.m. ( this includes hard candy, chewing gum and mints).  You may only have GATORADE, POWERADE AND WATER  from 9 p.m. until you leave your home.   DO NOT DRINK ANY LIQUIDS ON THE WAY TO THE HOSPITAL.      SPECIAL MEDICATION INSTRUCTIONS: TAKE medications checked off by the Anesthesiologist on your Medication List.    Angiogram Patients: Take medications as instructed by your physician, including aspirin.     Surgery Patients:    If you take ASPIRIN - Your PHYSICIAN/SURGEON will need to inform you IF/OR when you need to stop taking aspirin prior to your surgery.     Do Not take any medications containing IBUPROFEN.  Do Not Wear any make-up or dark nail polish   (especially eye make-up) to surgery. If you come to surgery with makeup on you will be required to remove the makeup or nail polish.    Do not shave your surgical area at least 5 days prior to your surgery. The surgical prep will be performed at the hospital according to Infection Control regulations.    Leave all valuables at home.   Do Not wear any jewelry or watches, including any metal in body piercings. Jewelry must be removed prior to coming to the hospital.  There is a possibility that rings that are unable to be removed may be cut off if they are on the surgical extremity.    Contact Lens must be removed before surgery. Either do not wear the contact lens or bring a case and solution for  storage.  Please bring a container for eyeglasses or dentures as required.  Bring any paperwork your physician has provided, such as consent forms,  history and physicals, doctor's orders, etc.   Bring comfortable clothes that are loose fitting to wear upon discharge. Take into consideration the type of surgery being performed.  Maintain your diet as advised per your physician the day prior to surgery.      Adequate rest the night before surgery is advised.   Park in the Parking lot behind the hospital or in the [facility] across the street from the parking lot. Parking is complimentary.  If you will be discharged the same day as your procedure, please arrange for a responsible adult to drive you home or to accompany you if traveling by taxi.   YOU WILL NOT BE PERMITTED TO DRIVE OR TO LEAVE THE HOSPITAL ALONE AFTER SURGERY.   If you are being discharged the same day, it is strongly recommended that you arrange for someone to remain with you for the first 24 hrs following your surgery.    The Surgeon will speak to your family/visitor after your surgery regarding the outcome of your surgery and post op care.  The Surgeon may speak to you after your surgery, but there is a possibility you may not remember the details.  Please check with your family members regarding the conversation with the Surgeon.    We strongly recommend whoever is bringing you home be present for discharge instructions.  This will ensure a thorough understanding for your post op home care.    ALL CHILDREN MUST ALWAYS BE ACCOMPANIED BY AN ADULT.    Visitors-Refer to current Visitor policy handouts.    Thank you for your cooperation.  The Staff of [facility].                Bathing Instructions with Hibiclens     Shower the evening before and morning of your procedure with Hibiclens:   Wash your face with water and your regular face wash/soap   Apply Hibiclens directly on your skin or on a wet washcloth and wash  gently. When showering: Move away from the shower stream when applying Hibiclens to avoid rinsing off too soon.   Rinse thoroughly with warm water   Do not dilute Hibiclens         Dry off as usual, do not use any deodorant, powder, body lotions, perfume, after shave or cologne.

## 2020-10-15 NOTE — ANESTHESIA PREPROCEDURE EVALUATION
10/15/2020  Twila Roman is a 55 y.o., female.    Anesthesia Evaluation    I have reviewed the Patient Summary Reports.    I have reviewed the Nursing Notes. I have reviewed the NPO Status.   I have reviewed the Medications.     Review of Systems  Anesthesia Hx:  No problems with previous Anesthesia  Denies Family Hx of Anesthesia complications.    Social:  Social Alcohol Use, Non-Smoker    Hematology/Oncology:  Hematology Normal   Oncology Normal     EENT/Dental:EENT/Dental Normal   Cardiovascular:   Hypertension, well controlled    Pulmonary:  Pulmonary Normal    Renal/:  Renal/ Normal     Hepatic/GI:  Hepatic/GI Normal    Musculoskeletal:  Spine Disorders: lumbar Chronic Pain    Neurological:   Chronic Pain Syndrome   Endocrine:  Endocrine Normal    Dermatological:  Skin Normal    Psych:   anxiety          Physical Exam  General:  Well nourished    Airway/Jaw/Neck:  Airway Findings: Mouth Opening: Normal Tongue: Normal  General Airway Assessment: Adult, Good  Mallampati: I  TM Distance: Normal, at least 6 cm  Jaw/Neck Findings:  Neck ROM: Normal ROM      Dental:  Dental Findings: In tact        Mental Status:  Mental Status Findings:  Cooperative, Alert and Oriented         Anesthesia Plan  Type of Anesthesia, risks & benefits discussed:  Anesthesia Type:  general  Patient's Preference:   Intra-op Monitoring Plan: standard ASA monitors  Intra-op Monitoring Plan Comments:   Post Op Pain Control Plan: per primary service following discharge from PACU and multimodal analgesia  Post Op Pain Control Plan Comments:   Induction:    Beta Blocker:         Informed Consent: Patient understands risks and agrees with Anesthesia plan.  Questions answered. Anesthesia consent signed with patient.  ASA Score: 2     Day of Surgery Review of History & Physical:    H&P update referred to the surgeon.     Anesthesia  Plan Notes: Labs per surgeon.        Ready For Surgery From Anesthesia Perspective.

## 2020-10-17 ENCOUNTER — LAB VISIT (OUTPATIENT)
Dept: URGENT CARE | Facility: CLINIC | Age: 55
End: 2020-10-17
Payer: COMMERCIAL

## 2020-10-17 VITALS — TEMPERATURE: 98 F

## 2020-10-17 DIAGNOSIS — Z01.812 PRE-PROCEDURE LAB EXAM: ICD-10-CM

## 2020-10-17 PROCEDURE — U0003 INFECTIOUS AGENT DETECTION BY NUCLEIC ACID (DNA OR RNA); SEVERE ACUTE RESPIRATORY SYNDROME CORONAVIRUS 2 (SARS-COV-2) (CORONAVIRUS DISEASE [COVID-19]), AMPLIFIED PROBE TECHNIQUE, MAKING USE OF HIGH THROUGHPUT TECHNOLOGIES AS DESCRIBED BY CMS-2020-01-R: HCPCS

## 2020-10-18 LAB — SARS-COV-2 RNA RESP QL NAA+PROBE: NOT DETECTED

## 2020-10-19 ENCOUNTER — TELEPHONE (OUTPATIENT)
Dept: GYNECOLOGIC ONCOLOGY | Facility: CLINIC | Age: 55
End: 2020-10-19

## 2020-10-20 ENCOUNTER — HOSPITAL ENCOUNTER (OUTPATIENT)
Facility: OTHER | Age: 55
Discharge: HOME | End: 2020-10-21
Attending: OBSTETRICS & GYNECOLOGY | Admitting: OBSTETRICS & GYNECOLOGY
Payer: COMMERCIAL

## 2020-10-20 ENCOUNTER — ANESTHESIA (OUTPATIENT)
Dept: SURGERY | Facility: OTHER | Age: 55
End: 2020-10-20
Payer: COMMERCIAL

## 2020-10-20 DIAGNOSIS — N93.9 ABNORMAL UTERINE BLEEDING (AUB): ICD-10-CM

## 2020-10-20 DIAGNOSIS — Z15.03 MUTATION IN HOXB13 GENE: ICD-10-CM

## 2020-10-20 DIAGNOSIS — Z90.710 S/P LAPAROSCOPIC HYSTERECTOMY: ICD-10-CM

## 2020-10-20 DIAGNOSIS — Z80.41 FAMILY HISTORY OF OVARIAN CANCER: Primary | ICD-10-CM

## 2020-10-20 LAB — POCT GLUCOSE: 114 MG/DL (ref 70–110)

## 2020-10-20 PROCEDURE — 25000003 PHARM REV CODE 250: Performed by: ANESTHESIOLOGY

## 2020-10-20 PROCEDURE — 37000009 HC ANESTHESIA EA ADD 15 MINS: Performed by: OBSTETRICS & GYNECOLOGY

## 2020-10-20 PROCEDURE — 27201423 OPTIME MED/SURG SUP & DEVICES STERILE SUPPLY: Performed by: OBSTETRICS & GYNECOLOGY

## 2020-10-20 PROCEDURE — 25000003 PHARM REV CODE 250: Performed by: OBSTETRICS & GYNECOLOGY

## 2020-10-20 PROCEDURE — 63600175 PHARM REV CODE 636 W HCPCS: Performed by: OBSTETRICS & GYNECOLOGY

## 2020-10-20 PROCEDURE — 63600175 PHARM REV CODE 636 W HCPCS: Performed by: SPECIALIST

## 2020-10-20 PROCEDURE — 63600175 PHARM REV CODE 636 W HCPCS: Performed by: NURSE ANESTHETIST, CERTIFIED REGISTERED

## 2020-10-20 PROCEDURE — 71000039 HC RECOVERY, EACH ADD'L HOUR: Performed by: OBSTETRICS & GYNECOLOGY

## 2020-10-20 PROCEDURE — 36000713 HC OR TIME LEV V EA ADD 15 MIN: Performed by: OBSTETRICS & GYNECOLOGY

## 2020-10-20 PROCEDURE — 82962 GLUCOSE BLOOD TEST: CPT | Performed by: OBSTETRICS & GYNECOLOGY

## 2020-10-20 PROCEDURE — 25000003 PHARM REV CODE 250: Performed by: STUDENT IN AN ORGANIZED HEALTH CARE EDUCATION/TRAINING PROGRAM

## 2020-10-20 PROCEDURE — 63600175 PHARM REV CODE 636 W HCPCS: Performed by: ANESTHESIOLOGY

## 2020-10-20 PROCEDURE — 88307 PR  SURG PATH,LEVEL V: ICD-10-PCS | Mod: 26,,, | Performed by: PATHOLOGY

## 2020-10-20 PROCEDURE — 71000015 HC POSTOP RECOV 1ST HR: Performed by: OBSTETRICS & GYNECOLOGY

## 2020-10-20 PROCEDURE — 71000016 HC POSTOP RECOV ADDL HR: Performed by: OBSTETRICS & GYNECOLOGY

## 2020-10-20 PROCEDURE — 36000712 HC OR TIME LEV V 1ST 15 MIN: Performed by: OBSTETRICS & GYNECOLOGY

## 2020-10-20 PROCEDURE — 88309 TISSUE EXAM BY PATHOLOGIST: CPT | Performed by: PATHOLOGY

## 2020-10-20 PROCEDURE — 71000033 HC RECOVERY, INTIAL HOUR: Performed by: OBSTETRICS & GYNECOLOGY

## 2020-10-20 PROCEDURE — 25000003 PHARM REV CODE 250: Performed by: NURSE ANESTHETIST, CERTIFIED REGISTERED

## 2020-10-20 PROCEDURE — 88307 TISSUE EXAM BY PATHOLOGIST: CPT | Mod: 26,,, | Performed by: PATHOLOGY

## 2020-10-20 PROCEDURE — 25000003 PHARM REV CODE 250: Performed by: SPECIALIST

## 2020-10-20 PROCEDURE — 94799 UNLISTED PULMONARY SVC/PX: CPT

## 2020-10-20 PROCEDURE — 58571 TLH W/T/O 250 G OR LESS: CPT | Mod: ,,, | Performed by: OBSTETRICS & GYNECOLOGY

## 2020-10-20 PROCEDURE — 63600175 PHARM REV CODE 636 W HCPCS: Performed by: STUDENT IN AN ORGANIZED HEALTH CARE EDUCATION/TRAINING PROGRAM

## 2020-10-20 PROCEDURE — 94761 N-INVAS EAR/PLS OXIMETRY MLT: CPT

## 2020-10-20 PROCEDURE — 58571 PR LAPAROSCOPY W TOT HYSTERECTUTERUS <=250 GRAM  W TUBE/OVARY: ICD-10-PCS | Mod: ,,, | Performed by: OBSTETRICS & GYNECOLOGY

## 2020-10-20 PROCEDURE — 58571 PR LAPAROSCOPY W TOT HYSTERECTUTERUS <=250 GRAM  W TUBE/OVARY: ICD-10-PCS | Mod: AS,,, | Performed by: NURSE PRACTITIONER

## 2020-10-20 PROCEDURE — 37000008 HC ANESTHESIA 1ST 15 MINUTES: Performed by: OBSTETRICS & GYNECOLOGY

## 2020-10-20 PROCEDURE — 58571 TLH W/T/O 250 G OR LESS: CPT | Mod: AS,,, | Performed by: NURSE PRACTITIONER

## 2020-10-20 RX ORDER — IBUPROFEN 400 MG/1
800 TABLET ORAL ONCE
Status: COMPLETED | OUTPATIENT
Start: 2020-10-20 | End: 2020-10-20

## 2020-10-20 RX ORDER — ROCURONIUM BROMIDE 10 MG/ML
INJECTION, SOLUTION INTRAVENOUS
Status: DISCONTINUED | OUTPATIENT
Start: 2020-10-20 | End: 2020-10-20

## 2020-10-20 RX ORDER — NEOSTIGMINE METHYLSULFATE 1 MG/ML
INJECTION, SOLUTION INTRAVENOUS
Status: DISCONTINUED | OUTPATIENT
Start: 2020-10-20 | End: 2020-10-20

## 2020-10-20 RX ORDER — CELECOXIB 200 MG/1
400 CAPSULE ORAL
Status: COMPLETED | OUTPATIENT
Start: 2020-10-20 | End: 2020-10-20

## 2020-10-20 RX ORDER — FAMOTIDINE 20 MG/1
20 TABLET, FILM COATED ORAL
Status: COMPLETED | OUTPATIENT
Start: 2020-10-20 | End: 2020-10-20

## 2020-10-20 RX ORDER — IBUPROFEN 600 MG/1
600 TABLET ORAL EVERY 6 HOURS
Status: DISCONTINUED | OUTPATIENT
Start: 2020-10-21 | End: 2020-10-21 | Stop reason: HOSPADM

## 2020-10-20 RX ORDER — ONDANSETRON 8 MG/1
8 TABLET, ORALLY DISINTEGRATING ORAL EVERY 8 HOURS PRN
Status: DISCONTINUED | OUTPATIENT
Start: 2020-10-20 | End: 2020-10-21 | Stop reason: HOSPADM

## 2020-10-20 RX ORDER — MEPERIDINE HYDROCHLORIDE 25 MG/ML
12.5 INJECTION INTRAMUSCULAR; INTRAVENOUS; SUBCUTANEOUS ONCE AS NEEDED
Status: DISCONTINUED | OUTPATIENT
Start: 2020-10-20 | End: 2020-10-20 | Stop reason: HOSPADM

## 2020-10-20 RX ORDER — ALPRAZOLAM 0.5 MG/1
0.5 TABLET, ORALLY DISINTEGRATING ORAL ONCE
Status: COMPLETED | OUTPATIENT
Start: 2020-10-20 | End: 2020-10-20

## 2020-10-20 RX ORDER — LIDOCAINE HYDROCHLORIDE 10 MG/ML
1 INJECTION, SOLUTION EPIDURAL; INFILTRATION; INTRACAUDAL; PERINEURAL ONCE
Status: DISCONTINUED | OUTPATIENT
Start: 2020-10-20 | End: 2020-10-20 | Stop reason: HOSPADM

## 2020-10-20 RX ORDER — FENTANYL CITRATE 50 UG/ML
INJECTION, SOLUTION INTRAMUSCULAR; INTRAVENOUS
Status: DISCONTINUED | OUTPATIENT
Start: 2020-10-20 | End: 2020-10-20

## 2020-10-20 RX ORDER — OXYCODONE AND ACETAMINOPHEN 10; 325 MG/1; MG/1
1 TABLET ORAL EVERY 4 HOURS PRN
Status: DISCONTINUED | OUTPATIENT
Start: 2020-10-20 | End: 2020-10-21 | Stop reason: HOSPADM

## 2020-10-20 RX ORDER — DIPHENHYDRAMINE HCL 25 MG
25 CAPSULE ORAL EVERY 4 HOURS PRN
Status: DISCONTINUED | OUTPATIENT
Start: 2020-10-20 | End: 2020-10-21 | Stop reason: HOSPADM

## 2020-10-20 RX ORDER — SODIUM CHLORIDE, SODIUM LACTATE, POTASSIUM CHLORIDE, CALCIUM CHLORIDE 600; 310; 30; 20 MG/100ML; MG/100ML; MG/100ML; MG/100ML
INJECTION, SOLUTION INTRAVENOUS CONTINUOUS
Status: DISCONTINUED | OUTPATIENT
Start: 2020-10-20 | End: 2020-10-21 | Stop reason: HOSPADM

## 2020-10-20 RX ORDER — HYDROCODONE BITARTRATE AND ACETAMINOPHEN 5; 325 MG/1; MG/1
1 TABLET ORAL EVERY 4 HOURS PRN
Status: DISCONTINUED | OUTPATIENT
Start: 2020-10-20 | End: 2020-10-21 | Stop reason: HOSPADM

## 2020-10-20 RX ORDER — HYDROMORPHONE HYDROCHLORIDE 2 MG/ML
0.4 INJECTION, SOLUTION INTRAMUSCULAR; INTRAVENOUS; SUBCUTANEOUS EVERY 5 MIN PRN
Status: DISCONTINUED | OUTPATIENT
Start: 2020-10-20 | End: 2020-10-20 | Stop reason: HOSPADM

## 2020-10-20 RX ORDER — DIPHENHYDRAMINE HYDROCHLORIDE 50 MG/ML
25 INJECTION INTRAMUSCULAR; INTRAVENOUS EVERY 4 HOURS PRN
Status: DISCONTINUED | OUTPATIENT
Start: 2020-10-20 | End: 2020-10-21 | Stop reason: HOSPADM

## 2020-10-20 RX ORDER — MUPIROCIN 20 MG/G
OINTMENT TOPICAL
Status: DISCONTINUED | OUTPATIENT
Start: 2020-10-20 | End: 2020-10-20 | Stop reason: HOSPADM

## 2020-10-20 RX ORDER — ACETAMINOPHEN 500 MG
1000 TABLET ORAL
Status: COMPLETED | OUTPATIENT
Start: 2020-10-20 | End: 2020-10-20

## 2020-10-20 RX ORDER — OXYCODONE HYDROCHLORIDE 5 MG/1
5 TABLET ORAL
Status: DISCONTINUED | OUTPATIENT
Start: 2020-10-20 | End: 2020-10-20 | Stop reason: HOSPADM

## 2020-10-20 RX ORDER — OXYCODONE AND ACETAMINOPHEN 5; 325 MG/1; MG/1
1 TABLET ORAL EVERY 4 HOURS PRN
Status: DISCONTINUED | OUTPATIENT
Start: 2020-10-20 | End: 2020-10-21 | Stop reason: HOSPADM

## 2020-10-20 RX ORDER — KETOROLAC TROMETHAMINE 30 MG/ML
30 INJECTION, SOLUTION INTRAMUSCULAR; INTRAVENOUS EVERY 8 HOURS
Status: DISCONTINUED | OUTPATIENT
Start: 2020-10-20 | End: 2020-10-21 | Stop reason: HOSPADM

## 2020-10-20 RX ORDER — ONDANSETRON 2 MG/ML
4 INJECTION INTRAMUSCULAR; INTRAVENOUS DAILY PRN
Status: DISCONTINUED | OUTPATIENT
Start: 2020-10-20 | End: 2020-10-20 | Stop reason: HOSPADM

## 2020-10-20 RX ORDER — MIDAZOLAM HYDROCHLORIDE 1 MG/ML
INJECTION INTRAMUSCULAR; INTRAVENOUS
Status: DISCONTINUED | OUTPATIENT
Start: 2020-10-20 | End: 2020-10-20

## 2020-10-20 RX ORDER — LIDOCAINE HYDROCHLORIDE 10 MG/ML
0.5 INJECTION, SOLUTION EPIDURAL; INFILTRATION; INTRACAUDAL; PERINEURAL ONCE
Status: DISCONTINUED | OUTPATIENT
Start: 2020-10-20 | End: 2020-10-20 | Stop reason: HOSPADM

## 2020-10-20 RX ORDER — SODIUM CHLORIDE 0.9 % (FLUSH) 0.9 %
3 SYRINGE (ML) INJECTION
Status: DISCONTINUED | OUTPATIENT
Start: 2020-10-20 | End: 2020-10-21 | Stop reason: HOSPADM

## 2020-10-20 RX ORDER — BISACODYL 10 MG
10 SUPPOSITORY, RECTAL RECTAL DAILY PRN
Status: DISCONTINUED | OUTPATIENT
Start: 2020-10-20 | End: 2020-10-21 | Stop reason: HOSPADM

## 2020-10-20 RX ORDER — DEXAMETHASONE SODIUM PHOSPHATE 4 MG/ML
INJECTION, SOLUTION INTRA-ARTICULAR; INTRALESIONAL; INTRAMUSCULAR; INTRAVENOUS; SOFT TISSUE
Status: DISCONTINUED | OUTPATIENT
Start: 2020-10-20 | End: 2020-10-20

## 2020-10-20 RX ORDER — LIDOCAINE HCL/PF 100 MG/5ML
SYRINGE (ML) INTRAVENOUS
Status: DISCONTINUED | OUTPATIENT
Start: 2020-10-20 | End: 2020-10-20

## 2020-10-20 RX ORDER — CEFAZOLIN SODIUM 2 G/50ML
2 SOLUTION INTRAVENOUS
Status: COMPLETED | OUTPATIENT
Start: 2020-10-20 | End: 2020-10-20

## 2020-10-20 RX ORDER — HYDROCODONE BITARTRATE AND ACETAMINOPHEN 5; 325 MG/1; MG/1
1 TABLET ORAL EVERY 4 HOURS PRN
Qty: 20 TABLET | Refills: 0 | Status: SHIPPED | OUTPATIENT
Start: 2020-10-20 | End: 2020-10-21 | Stop reason: HOSPADM

## 2020-10-20 RX ORDER — KETAMINE HCL IN 0.9 % NACL 50 MG/5 ML
SYRINGE (ML) INTRAVENOUS
Status: DISCONTINUED | OUTPATIENT
Start: 2020-10-20 | End: 2020-10-20

## 2020-10-20 RX ORDER — PROPOFOL 10 MG/ML
VIAL (ML) INTRAVENOUS
Status: DISCONTINUED | OUTPATIENT
Start: 2020-10-20 | End: 2020-10-20

## 2020-10-20 RX ORDER — IBUPROFEN 600 MG/1
600 TABLET ORAL EVERY 6 HOURS PRN
Qty: 30 TABLET | Refills: 0 | Status: SHIPPED | OUTPATIENT
Start: 2020-10-20 | End: 2021-09-30

## 2020-10-20 RX ORDER — ONDANSETRON 2 MG/ML
INJECTION INTRAMUSCULAR; INTRAVENOUS
Status: DISCONTINUED | OUTPATIENT
Start: 2020-10-20 | End: 2020-10-20

## 2020-10-20 RX ADMIN — LIDOCAINE HYDROCHLORIDE 50 MG: 20 INJECTION, SOLUTION INTRAVENOUS at 07:10

## 2020-10-20 RX ADMIN — NEOSTIGMINE METHYLSULFATE 5 MG: 1 INJECTION INTRAVENOUS at 08:10

## 2020-10-20 RX ADMIN — HYDROMORPHONE HYDROCHLORIDE 0.4 MG: 2 INJECTION INTRAMUSCULAR; INTRAVENOUS; SUBCUTANEOUS at 09:10

## 2020-10-20 RX ADMIN — MIDAZOLAM HYDROCHLORIDE 2 MG: 1 INJECTION, SOLUTION INTRAMUSCULAR; INTRAVENOUS at 06:10

## 2020-10-20 RX ADMIN — OXYCODONE HYDROCHLORIDE AND ACETAMINOPHEN 1 TABLET: 10; 325 TABLET ORAL at 10:10

## 2020-10-20 RX ADMIN — SODIUM CHLORIDE, SODIUM LACTATE, POTASSIUM CHLORIDE, AND CALCIUM CHLORIDE: 600; 310; 30; 20 INJECTION, SOLUTION INTRAVENOUS at 08:10

## 2020-10-20 RX ADMIN — ROCURONIUM BROMIDE 50 MG: 10 INJECTION, SOLUTION INTRAVENOUS at 07:10

## 2020-10-20 RX ADMIN — FENTANYL CITRATE 50 MCG: 50 INJECTION, SOLUTION INTRAMUSCULAR; INTRAVENOUS at 07:10

## 2020-10-20 RX ADMIN — OXYCODONE 5 MG: 5 TABLET ORAL at 01:10

## 2020-10-20 RX ADMIN — SODIUM CHLORIDE, SODIUM LACTATE, POTASSIUM CHLORIDE, AND CALCIUM CHLORIDE: 600; 310; 30; 20 INJECTION, SOLUTION INTRAVENOUS at 06:10

## 2020-10-20 RX ADMIN — OXYCODONE HYDROCHLORIDE AND ACETAMINOPHEN 1 TABLET: 10; 325 TABLET ORAL at 06:10

## 2020-10-20 RX ADMIN — ALPRAZOLAM 0.5 MG: 0.5 TABLET, ORALLY DISINTEGRATING ORAL at 09:10

## 2020-10-20 RX ADMIN — KETOROLAC TROMETHAMINE 30 MG: 30 INJECTION, SOLUTION INTRAMUSCULAR; INTRAVENOUS at 10:10

## 2020-10-20 RX ADMIN — FAMOTIDINE 20 MG: 20 TABLET ORAL at 06:10

## 2020-10-20 RX ADMIN — CARBOXYMETHYLCELLULOSE SODIUM 2 DROP: 2.5 SOLUTION/ DROPS OPHTHALMIC at 07:10

## 2020-10-20 RX ADMIN — PROPOFOL 200 MG: 10 INJECTION, EMULSION INTRAVENOUS at 07:10

## 2020-10-20 RX ADMIN — GLYCOPYRROLATE 0.2 MG: 0.2 INJECTION, SOLUTION INTRAMUSCULAR; INTRAVITREAL at 07:10

## 2020-10-20 RX ADMIN — ONDANSETRON HYDROCHLORIDE 4 MG: 2 INJECTION INTRAMUSCULAR; INTRAVENOUS at 07:10

## 2020-10-20 RX ADMIN — DEXAMETHASONE SODIUM PHOSPHATE 8 MG: 4 INJECTION, SOLUTION INTRAMUSCULAR; INTRAVENOUS at 07:10

## 2020-10-20 RX ADMIN — CEFAZOLIN SODIUM 2 G: 2 SOLUTION INTRAVENOUS at 07:10

## 2020-10-20 RX ADMIN — MUPIROCIN: 20 OINTMENT TOPICAL at 06:10

## 2020-10-20 RX ADMIN — CELECOXIB 400 MG: 200 CAPSULE ORAL at 06:10

## 2020-10-20 RX ADMIN — OXYCODONE 5 MG: 5 TABLET ORAL at 10:10

## 2020-10-20 RX ADMIN — GLYCOPYRROLATE 0.6 MG: 0.2 INJECTION, SOLUTION INTRAMUSCULAR; INTRAVITREAL at 08:10

## 2020-10-20 RX ADMIN — Medication 30 MG: at 07:10

## 2020-10-20 RX ADMIN — HYDROCODONE BITARTRATE AND ACETAMINOPHEN 1 TABLET: 5; 325 TABLET ORAL at 04:10

## 2020-10-20 RX ADMIN — ACETAMINOPHEN 1000 MG: 500 TABLET, FILM COATED ORAL at 06:10

## 2020-10-20 RX ADMIN — FENTANYL CITRATE 100 MCG: 50 INJECTION, SOLUTION INTRAMUSCULAR; INTRAVENOUS at 07:10

## 2020-10-20 RX ADMIN — IBUPROFEN 800 MG: 400 TABLET, FILM COATED ORAL at 10:10

## 2020-10-20 NOTE — OP NOTE
DATE OF PROCEDURE:  10/20/2020     SURGEON: Danie Burns    ASSISTANTS: Jacques Patel MD; Sommer Mcginnis NP (first assist)     PREOPERATIVE DIAGNOSES: Family history of ovarian cancer  PMB  Pelvic pain     POSTOPERATIVE DIAGNOSES: Same     PROCEDURES:  Robotic-assisted laparoscopic hysterectomy and bilateral   salpingo-oophorectomy.     COMPLICATIONS: None     ESTIMATED BLOOD LOSS: 50 cc     ANESTHESIA: GETA     INTRAOPERATIVE FINDINGS:  5 cm uterus with no intrapelvic or intraabdominal abnormal findings.  Normal bilateral tubes and ovaries.    PROCEDURE IN DETAIL: Informed consent was obtained and the patient was taken to   the Operating Suite.  General anesthesia was administered.  Once felt to be   adequate, she was placed in dorsal lithotomy position with her arms tucked.  The   abdomen and pelvis were prepped and draped in the usual fashion and a speculum   was placed in the vagina.  The cervix was visualized, grasped with a   single-tooth tenaculum and the uterus sounded to approximately 5 cm.    Serial dilation of cervix was performed and a small VCare manipulator was   placed without difficulty.  Barker catheter was placed to gravity drainage and   attention was turned to the abdominal portion of the procedure. A Veress needle was placed through the umbilicus and opening pressure was noted to be less than 4 .  Pneumoperitoneum was obtained with carbon dioxide and once this was felt to be adequate, an 8 mm   incision was made and a robotic trocar was placed through this.  Intraperitoneal   placement was confirmed with the camera.  Lateral robotic trocars x3 were   placed through 8 mm incisions.  A right upper quadrant 8 mm AirSeal accessory   port was placed.  The patient was placed in deep Trendelenburg.  The robot was   docked and instruments were passed in the operative field. Attention was first turned to the left side   where the left round ligament was transected after sacrificing with bipolar    cautery.  The anterior and posterior leaflets of the broad ligament were opened.  The pararectal space was developed.  The ureter was identified and a window   was made beneath the infundibulopelvic ligament.  This was sacrificed with   bipolar cautery and transected.  The medial fold of the peritoneum was then   taken to the uterosacrals.  Attention was turned to the right side, which was   taken down in an identical manner.  Anteriorly, the vesicouterine peritoneum was  incised and the bladder was mobilized inferiorly over the ring.  A 10 o'clock   to 2 o'clock colpotomy was performed.  Posteriorly, a 4 o'clock to 8 o'clock   colpotomy was performed and bilateral cardinal ligaments and uterine vessels   skeletonized of their peritoneal attachments, sacrificed with bipolar cautery   and transected.  Circumferential colpotomy was completed and the uterus, cervix,   bilateral tubes and ovaries were removed.  The cuff was then closed with a 0   PDS suture tied in the midline.  The pelvis was irrigated and noted to be   hemostatic.  Once hemostasis was confirmed, the   instruments were removed, the robot was undocked and the pneumoperitoneum was   evacuated.  The patient was flattened.  All ports were removed and port sites   were inspected and made hemostatic with electrocautery and closed with   subcuticular 4-0 Monocryl suture.  Steri-Strips and pressure dressing were   applied and the patient was awoken and taken to Recovery Room in stable   condition.  Of note, I was present for and performed all key aspects of   procedure.  Sommer Mcginnis's expertise was needed as there was no qualified   resident available.

## 2020-10-20 NOTE — OR NURSING
Pt states pain tolerable. More relaxed. States feels better.No change from previous assessment. preppared for transfer to acu.

## 2020-10-20 NOTE — TRANSFER OF CARE
"Anesthesia Transfer of Care Note    Patient: Twila Roman    Procedure(s) Performed: Procedure(s) (LRB):  XI ROBOTIC HYSTERECTOMY (N/A)  XI ROBOTIC SALPINGO-OOPHORECTOMY (Bilateral)    Patient location: PACU    Anesthesia Type: general    Transport from OR: Transported from OR on 2-3 L/min O2 by NC with adequate spontaneous ventilation    Post pain: adequate analgesia    Post assessment: no apparent anesthetic complications    Post vital signs: stable    Level of consciousness: awake and alert    Nausea/Vomiting: no nausea/vomiting    Complications: none    Transfer of care protocol was followed      Last vitals:   Visit Vitals  BP (!) 137/95   Pulse 90   Temp 36.1 °C (97 °F) (Skin)   Resp 16   Ht 5' 5" (1.651 m)   Wt 72.6 kg (160 lb)   LMP 04/19/2015 (Approximate)   SpO2 96%   Breastfeeding No   BMI 26.63 kg/m²     "

## 2020-10-20 NOTE — ANESTHESIA PROCEDURE NOTES
Intubation  Performed by: Ene Wells CRNA  Authorized by: Veronika Munguia MD     Intubation:     Induction:  Intravenous    Intubated:  Postinduction    Mask Ventilation:  Moderately difficult with oral airway    Attempts:  1    Attempted By:  CRNA    Method of Intubation:  Video laryngoscopy    Blade:  Mao 3    Laryngeal View Grade: Grade IIA - cords partially seen      Difficult Airway Encountered?: No      Complications:  None    Airway Device:  Oral endotracheal tube    Airway Device Size:  7.0    Style/Cuff Inflation:  Cuffed (inflated to minimal occlusive pressure)    Tube secured:  22    Secured at:  The lips    Placement Verified By:  Capnometry    Complicating Factors:  Anterior larynx, poor neck/head extension and short neck    Findings Post-Intubation:  BS equal bilateral and atraumatic/condition of teeth unchanged

## 2020-10-20 NOTE — ANESTHESIA POSTPROCEDURE EVALUATION
Anesthesia Post Evaluation    Patient: Twila Roman    Procedure(s) Performed: Procedure(s) (LRB):  XI ROBOTIC HYSTERECTOMY (N/A)  XI ROBOTIC SALPINGO-OOPHORECTOMY (Bilateral)    Final Anesthesia Type: general    Patient location during evaluation: PACU  Patient participation: Yes- Able to Participate  Level of consciousness: awake and alert  Post-procedure vital signs: reviewed and stable  Pain management: adequate  Airway patency: patent    PONV status at discharge: No PONV  Anesthetic complications: no      Cardiovascular status: blood pressure returned to baseline and stable  Respiratory status: unassisted, spontaneous ventilation and room air  Hydration status: euvolemic  Follow-up not needed.  Comments: Very anxious patient          Vitals Value Taken Time   BP 94/57 10/20/20 0958   Temp 36.7 °C (98 °F) 10/20/20 0958   Pulse 81 10/20/20 1010   Resp 16 10/20/20 1023   SpO2 98 % 10/20/20 1010   Vitals shown include unvalidated device data.      Event Time   Out of Recovery 10:12:47         Pain/Micah Score: Pain Rating Prior to Med Admin: 6 (10/20/2020 10:33 AM)  Pain Rating Post Med Admin: 5 (states tolerable) (10/20/2020  9:54 AM)  Micah Score: 8 (10/20/2020  9:54 AM)

## 2020-10-20 NOTE — OR NURSING
Pt extremely anxious crying tense. Keeps apologizing. States she takes antianxiety meds at home. Anesthesia [facility]aux notified. See new order rec'd.

## 2020-10-20 NOTE — H&P
Below H&P reviewed. Since the writing of this H&P pt underwent TVUS and had CA-125 checked which were both normal. Pt decided to proceed with risk reducing surgery with RATLH/BSO. Consents signed at bedside. Risks/benefits/alternatives discussed. No changes in medical or surgical history since writing of H&P.    Jacques Patel MD  PGY4, OBGYN [facility]      Subjective:      Patient ID: Twila Roman is a 55 y.o. female.     Chief Complaint: Mutation in HOXB13 gene (family Hx of Ovarian Cancer)        HPI      55 yr old para 0 referred from BALJIT Hanson to discuss risk reducing surgery for a family history of ovarian cancer. Fluid-1 revealed a clinically significant HOXB13 mutation. This test was completed approximately one year ago. Test results did not reveal a pathogenic mutation related to ovarian cancer.     LMP age 52. No PMB. + HRT briefly.     Prior anterior spinal fusion. No other abdominal or pelvic surgeries.     Family history for mother - ovarian cancer (diagnosed at 66, dec at 72). Also a paternal cousin with breast cancer at age 20 (dec) and maternal aunt recently diagnosed with breast cancer. No uterine or colon cancer.     Review of Systems   Constitutional: Positive for appetite change (some early satiety). Negative for activity change, chills, diaphoresis, fatigue, fever and unexpected weight change.   Respiratory: Negative for cough, chest tightness, shortness of breath and wheezing.    Cardiovascular: Negative for chest pain, palpitations and leg swelling.   Gastrointestinal: Negative for abdominal distention, abdominal pain, blood in stool, constipation, diarrhea, nausea and vomiting.   Genitourinary: Positive for pelvic pain (occas RLQ pain). Negative for difficulty urinating, dyspareunia, dysuria, flank pain, frequency, hematuria, menstrual problem, vaginal bleeding, vaginal discharge and vaginal pain.   Musculoskeletal: Negative for arthralgias and back pain.   Skin:  Negative for color change and rash.   Neurological: Negative for dizziness, weakness, numbness and headaches.   Hematological: Negative for adenopathy.   Psychiatric/Behavioral: Negative for confusion and sleep disturbance. The patient is not nervous/anxious.               Past Medical History:   Diagnosis Date    Anxiety      Bronchitis      Family history of ovarian cancer      Hypertension              Past Surgical History:   Procedure Laterality Date    BACK SURGERY         spinal fusion    COLONOSCOPY N/A 2019     Procedure: COLONOSCOPY;  Surgeon: Caroline Swartz MD;  [facility] [facility] [address]);  Service: Endoscopy;  Laterality: N/A;    KNEE ARTHROSCOPY W/ MENISCECTOMY Left 8/15/2018     Procedure: ARTHROSCOPY, KNEE, WITH MENISCECTOMY;  Surgeon: Handy Elkins MD;  Location: [facility] OR;  Service: Orthopedics;  Laterality: Left;  Video    TRANSFORAMINAL EPIDURAL INJECTION OF STEROID Right 2019     Procedure: INJECTION, STEROID, EPIDURAL, TRANSFORAMINAL APPROACH, L4 AND L5;  Surgeon: Daniel Seo MD;  Location: [facility];  Service: Pain Management;  Laterality: Right;    TUMOR REMOVAL         right hand            Family History   Problem Relation Age of Onset    Ovarian cancer Mother 66          at 72    Heart disease Father           double bypass    Macular degeneration Father      Heart failure Father      No Known Problems Brother      No Known Problems Brother      Cancer Paternal Grandfather           lung (pt thinks), possible smoker    Breast cancer Paternal Cousin 20         father's sister's daughter, pt thinks bilateral, cousin is     Other Maternal Aunt           mat aunt had part of colon removed, pt does not think cancer    Breast cancer Maternal Aunt        Social History               Socioeconomic History    Marital status:        Spouse name: Not on file    Number of children: Not on file    Years of education: Not on  file    Highest education level: Not on file   Occupational History    Occupation:        Employer: [employer]    Social Needs    Financial resource strain: Not hard at all    Food insecurity       Worry: Never true       Inability: Never true    Transportation needs       Medical: No       Non-medical: No   Tobacco Use    Smoking status: Never Smoker    Smokeless tobacco: Never Used   Substance and Sexual Activity    Alcohol use: Yes       Alcohol/week: 5.0 standard drinks       Types: 5 Glasses of wine per week       Frequency: 4 or more times a week       Drinks per session: 1 or 2       Binge frequency: Monthly       Comment: one a day- red wine    Drug use: No    Sexual activity: Yes       Partners: Male       Comment: Boyfriend    Lifestyle    Physical activity       Days per week: 1 day       Minutes per session: 20 min    Stress: To some extent   Relationships    Social connections       Talks on phone: Once a week       Gets together: Twice a week       Attends [employer] witness service: Not on file       Active member of club or organization: No       Attends meetings of clubs or organizations: Never       Relationship status:    Other Topics Concern    Not on file   Social History Narrative    Not on file             Current Outpatient Medications   Medication Sig    albuterol 90 mcg/actuation inhaler Inhale 1-2 puffs into the lungs every 6 (six) hours as needed for Wheezing.    biotin 5,000 mcg TbDL Take 1 capsule by mouth once daily.    buPROPion (WELLBUTRIN XL) 300 MG 24 hr tablet TAKE 1 TABLET BY MOUTH EVERY DAY    cholecalciferol, vitamin D3, (VITAMIN D3) 2,000 unit Tab Take 1 tablet by mouth once daily.    citalopram (CELEXA) 10 MG tablet TAKE 1 TABLET BY MOUTH EVERY DAY    estradiol (IMVEXXY STARTER PACK) 10 mcg InPk Place 10 mcg vaginally once daily. Place 10 mcg vaginally once daily for two weeks. Then twice weekly thereafter.    estradiol-progesterone  (BIJUVA) 1-100 mg Cap Take 1-100 mg by mouth once daily.    FLAXSEED OIL MISC by Misc.(Non-Drug; Combo Route) route.    LORazepam (ATIVAN) 1 MG tablet TAKE 1 TABLET(1 MG) BY MOUTH EVERY 12 HOURS AS NEEDED FOR ANXIETY    metoprolol succinate (TOPROL-XL) 25 MG 24 hr tablet TAKE 1 TABLET(25 MG) BY MOUTH EVERY DAY    montelukast (SINGULAIR) 10 mg tablet TAKE 1 TABLET BY MOUTH EVERY EVENING    multivitamin (THERAGRAN) per tablet Take 1 tablet by mouth once daily.    naproxen (NAPROSYN) 500 MG tablet TAKE 1 TABLET BY MOUTH TWICE DAILY WITH MEALS    TOBRADEX 0.3-0.1 % Oint        No current facility-administered medications for this visit.       Review of patient's allergies indicates:  No Known Allergies     /77   Pulse 68   Wt 76.7 kg (169 lb)   LMP 04/19/2015 (Approximate)   BMI 28.12 kg/m²      Objective:   Physical Exam:   Constitutional: She is oriented to person, place, and time. She appears well-developed and well-nourished. No distress.    HENT:   Head: Normocephalic and atraumatic.    Eyes: No scleral icterus.    Neck: Normal range of motion.    Cardiovascular: Exam reveals no cyanosis and no edema.     Pulmonary/Chest: Effort normal. No respiratory distress. She exhibits no tenderness.         Abdominal: Soft. Normal appearance. She exhibits no distension, no fluid wave and no mass. There is no abdominal tenderness. There is no rigidity, no rebound and no guarding. No hernia.         Genitourinary:    Vagina, uterus and rectum normal.      Pelvic exam was performed with patient supine.   There is no rash, tenderness or lesion on the right labia. There is no rash, tenderness or lesion on the left labia. Uterus is not tender. Cervix is normal. Right adnexum displays no mass, no tenderness and no fullness. Left adnexum displays no mass, no tenderness and no fullness. No bleeding in the vagina. Labial bartholins normal.negative for vaginal discharge          Musculoskeletal: Normal range of motion  and moves all extremeties. No edema.       Neurological: She is alert and oriented to person, place, and time.    Skin: Skin is warm and dry. No rash noted. No cyanosis or erythema. No pallor.    Psychiatric: She has a normal mood and affect. Thought content normal.         Assessment:      1. Family history of ovarian cancer    2. Mutation in HOXB13 gene          Plan:       Family history and genetics testing results reviewed with patient. Her lifetime risk based on her mother having ovarian cancer and negative genetic testing is around 5%.  We went through the discussion of no known inhertibale mutations being seen, but this would not completely rule out an unknown genetic cause driving cancer in her family.  As such,  and pelvic US ordered.  Pending results will contact patient to review surgical options and schedule.  She has chronic pressure and pain and questionable PMB.

## 2020-10-21 VITALS
HEART RATE: 75 BPM | HEIGHT: 65 IN | WEIGHT: 160 LBS | OXYGEN SATURATION: 94 % | RESPIRATION RATE: 18 BRPM | TEMPERATURE: 99 F | BODY MASS INDEX: 26.66 KG/M2 | DIASTOLIC BLOOD PRESSURE: 80 MMHG | SYSTOLIC BLOOD PRESSURE: 116 MMHG

## 2020-10-21 LAB
BASOPHILS # BLD AUTO: 0.02 K/UL (ref 0–0.2)
BASOPHILS NFR BLD: 0.2 % (ref 0–1.9)
DIFFERENTIAL METHOD: ABNORMAL
EOSINOPHIL # BLD AUTO: 0 K/UL (ref 0–0.5)
EOSINOPHIL NFR BLD: 0 % (ref 0–8)
ERYTHROCYTE [DISTWIDTH] IN BLOOD BY AUTOMATED COUNT: 13.2 % (ref 11.5–14.5)
HCT VFR BLD AUTO: 36.3 % (ref 37–48.5)
HGB BLD-MCNC: 11.7 G/DL (ref 12–16)
IMM GRANULOCYTES # BLD AUTO: 0.07 K/UL (ref 0–0.04)
IMM GRANULOCYTES NFR BLD AUTO: 0.9 % (ref 0–0.5)
LYMPHOCYTES # BLD AUTO: 1 K/UL (ref 1–4.8)
LYMPHOCYTES NFR BLD: 11.6 % (ref 18–48)
MCH RBC QN AUTO: 30.5 PG (ref 27–31)
MCHC RBC AUTO-ENTMCNC: 32.2 G/DL (ref 32–36)
MCV RBC AUTO: 95 FL (ref 82–98)
MONOCYTES # BLD AUTO: 0.9 K/UL (ref 0.3–1)
MONOCYTES NFR BLD: 11 % (ref 4–15)
NEUTROPHILS # BLD AUTO: 6.2 K/UL (ref 1.8–7.7)
NEUTROPHILS NFR BLD: 76.3 % (ref 38–73)
NRBC BLD-RTO: 0 /100 WBC
PLATELET # BLD AUTO: 203 K/UL (ref 150–350)
PMV BLD AUTO: 8.7 FL (ref 9.2–12.9)
RBC # BLD AUTO: 3.84 M/UL (ref 4–5.4)
WBC # BLD AUTO: 8.18 K/UL (ref 3.9–12.7)

## 2020-10-21 PROCEDURE — 63600175 PHARM REV CODE 636 W HCPCS: Performed by: STUDENT IN AN ORGANIZED HEALTH CARE EDUCATION/TRAINING PROGRAM

## 2020-10-21 PROCEDURE — 94761 N-INVAS EAR/PLS OXIMETRY MLT: CPT

## 2020-10-21 PROCEDURE — 85025 COMPLETE CBC W/AUTO DIFF WBC: CPT

## 2020-10-21 PROCEDURE — 36415 COLL VENOUS BLD VENIPUNCTURE: CPT

## 2020-10-21 PROCEDURE — 94799 UNLISTED PULMONARY SVC/PX: CPT

## 2020-10-21 PROCEDURE — 25000003 PHARM REV CODE 250: Performed by: STUDENT IN AN ORGANIZED HEALTH CARE EDUCATION/TRAINING PROGRAM

## 2020-10-21 RX ORDER — OXYCODONE AND ACETAMINOPHEN 5; 325 MG/1; MG/1
1 TABLET ORAL EVERY 4 HOURS PRN
Qty: 20 TABLET | Refills: 0 | Status: SHIPPED | OUTPATIENT
Start: 2020-10-21 | End: 2020-10-29 | Stop reason: SDUPTHER

## 2020-10-21 RX ADMIN — OXYCODONE HYDROCHLORIDE AND ACETAMINOPHEN 1 TABLET: 10; 325 TABLET ORAL at 10:10

## 2020-10-21 RX ADMIN — DIPHENHYDRAMINE HYDROCHLORIDE 25 MG: 25 CAPSULE ORAL at 01:10

## 2020-10-21 RX ADMIN — OXYCODONE HYDROCHLORIDE AND ACETAMINOPHEN 1 TABLET: 10; 325 TABLET ORAL at 01:10

## 2020-10-21 RX ADMIN — OXYCODONE HYDROCHLORIDE AND ACETAMINOPHEN 1 TABLET: 10; 325 TABLET ORAL at 06:10

## 2020-10-21 RX ADMIN — KETOROLAC TROMETHAMINE 30 MG: 30 INJECTION, SOLUTION INTRAMUSCULAR; INTRAVENOUS at 06:10

## 2020-10-21 RX ADMIN — OXYCODONE HYDROCHLORIDE AND ACETAMINOPHEN 1 TABLET: 10; 325 TABLET ORAL at 02:10

## 2020-10-21 RX ADMIN — ONDANSETRON 8 MG: 8 TABLET, ORALLY DISINTEGRATING ORAL at 06:10

## 2020-10-21 NOTE — DISCHARGE SUMMARY
[facility]  Obstetrics & Gynecology  Discharge Summary    Patient Name: Twila Roman  MRN: [id number]  Admission Date: 10/20/2020  Hospital Length of Stay: 0 days  Discharge Date and Time: 10/21/2020  2:46 PM  Attending Physician: No att. providers found   Discharging Provider: Jacques Patel MD  Primary Care Provider: Michelle Farias DO    HPI: Pt presented for scheduled RATLH/BSO for risk-reduction 2/2 family history of ovarian cancer and genetic variant.    Hospital Course: Presented for above surgery which proceeded without complication. Please see Op note for further details. Postoperative course was uncomplicated, and patient made routine advances as anticipated. On POD#1, patient is meeting all postoperative milestones and is ready for discharge. Pain is well-controlled with PO pain medications. Patient is tolerating PO without nausea or vomiting, ambulating and urinating without difficulty, and passing flatus. Patient instructed to f/u in 4 weeks for post op visit.      Procedure(s) (LRB):  XI ROBOTIC HYSTERECTOMY (N/A)  XI ROBOTIC SALPINGO-OOPHORECTOMY (Bilateral)     Consults:     Significant Diagnostic Studies: Labs:   CBC   Recent Labs   Lab 10/21/20  0355   WBC 8.18   HGB 11.7*   HCT 36.3*          Pending Diagnostic Studies:     Procedure Component Value Units Date/Time    Specimen to Pathology, Surgery Other [[id number]] Collected: 10/20/20 0824    Order Status: Sent Lab Status: In process Updated: 10/20/20 1012        Final Active Diagnoses:    Diagnosis Date Noted POA    Abnormal uterine bleeding (AUB) [N93.9] 10/20/2020 Yes    S/P RATLH/BSO [Z90.710] 10/20/2020 No      Problems Resolved During this Admission:        Discharged Condition: good    Disposition: Home or Self Care    Follow Up:  Follow-up Information     Danie Burns MD. Schedule an appointment as soon as possible for a visit in 6 weeks.    Specialties: Gynecologic Oncology, Gynecology, Oncology  Why:  Post Op Check  Contact information:  [address] [place]  [facility] [id number]  [phone]                 Patient Instructions:      Diet general     Diet Adult Regular     Pelvic Rest   Order Comments: Nothing in the vagina until cleared at your post op visit     Call MD for:  temperature >100.4     Call MD for:  persistent nausea and vomiting     Call MD for:  severe uncontrolled pain     Call MD for:  redness, tenderness, or signs of infection (pain, swelling, redness, odor or green/yellow discharge around incision site)     Call MD for:  difficulty breathing, headache or visual disturbances     Call MD for:  persistent dizziness or light-headedness     Call MD for:  extreme fatigue     Call MD for:   Order Comments: Heavy vaginal bleeding (light spotting is normal)     No dressing needed     Pelvic Rest   Order Comments: Nothing in the vagina for 6 weeks     Notify your health care provider if you experience any of the following:  increased confusion or weakness     Notify your health care provider if you experience any of the following:  persistent dizziness, light-headedness, or visual disturbances     Notify your health care provider if you experience any of the following:  worsening rash     Notify your health care provider if you experience any of the following:  severe persistent headache     Notify your health care provider if you experience any of the following:  difficulty breathing or increased cough     Notify your health care provider if you experience any of the following:  redness, tenderness, or signs of infection (pain, swelling, redness, odor or green/yellow discharge around incision site)     Notify your health care provider if you experience any of the following:  severe uncontrolled pain     Notify your health care provider if you experience any of the following:  temperature >100.4     Notify your health care provider if you experience any of the following:  persistent nausea and vomiting  or diarrhea     Notify your health care provider if you experience any of the following:   Order Comments: Heavy vaginal bleeding (light spotting is normal)     No dressing needed   Order Comments: --okay to shower/run soapy water over incision sites  --keep incision sites clean and dry  --steri strips will fall off on their own with time  --if steri strips are still on in 1 week, you may gently remove from the skin     Activity as tolerated     Activity as tolerated     Medications:  Reconciled Home Medications:      Medication List      START taking these medications    ibuprofen 600 MG tablet  Commonly known as: ADVIL,MOTRIN  Take 1 tablet (600 mg total) by mouth every 6 (six) hours as needed.     oxyCODONE-acetaminophen 5-325 mg per tablet  Commonly known as: PERCOCET  Take 1 tablet by mouth every 4 (four) hours as needed for Pain.        CONTINUE taking these medications    albuterol 90 mcg/actuation inhaler  Commonly known as: PROVENTIL/VENTOLIN HFA  Inhale 1-2 puffs into the lungs every 6 (six) hours as needed for Wheezing.     biotin 5,000 mcg Tbdl  Take 1 capsule by mouth once daily.     buPROPion 300 MG 24 hr tablet  Commonly known as: WELLBUTRIN XL  TAKE 1 TABLET BY MOUTH EVERY DAY     citalopram 10 MG tablet  Commonly known as: CELEXA  TAKE 1 TABLET BY MOUTH EVERY DAY     econazole nitrate 1 % cream  Use bid     estradioL 10 mcg Inpk  Commonly known as: IMVEXXY STARTER PACK  Place 10 mcg vaginally once daily. Place 10 mcg vaginally once daily for two weeks. Then twice weekly thereafter.     estradiol-progesterone 1-100 mg Cap  Commonly known as: BIJUVA  Take 1-100 mg by mouth once daily.     FLAXSEED OIL MISC  by Misc.(Non-Drug; Combo Route) route.     LORazepam 1 MG tablet  Commonly known as: ATIVAN  TAKE 1 TABLET(1 MG) BY MOUTH EVERY 12 HOURS AS NEEDED FOR ANXIETY     metoprolol succinate 25 MG 24 hr tablet  Commonly known as: TOPROL-XL  TAKE 1 TABLET(25 MG) BY MOUTH EVERY DAY     montelukast 10 mg  tablet  Commonly known as: SINGULAIR  TAKE 1 TABLET BY MOUTH EVERY EVENING     multivitamin per tablet  Commonly known as: THERAGRAN  Take 1 tablet by mouth once daily.     naproxen 500 MG tablet  Commonly known as: NAPROSYN  TAKE 1 TABLET BY MOUTH TWICE DAILY WITH MEALS     selenium sulfide 2.25 % Sham  Use  qweek     VITAMIN D3 50 mcg (2,000 unit) Tab  Generic drug: cholecalciferol (vitamin D3)  Take 1 tablet by mouth once daily.            Jacques Patel MD  Obstetrics & Gynecology  [facility]

## 2020-10-21 NOTE — PROGRESS NOTES
[facility]  Obstetrics & Gynecology  Progress Note    Patient Name: Twila Roman  MRN: [id number]  Admission Date: 10/20/2020  Primary Care Provider: Michelle Farias DO  Principal Problem: <principal problem not specified>    Subjective:     Interval History: Pt doing relatively well this morning. Reports throat pain since surgery. Abdominal pain moderate, 7/10 in severity, but adequately controlled with oxycodone. No nausea/vomiting. Is voiding, ambulating, passing gas. Tolerating small amount of regular diet.     Scheduled Meds:   ibuprofen  600 mg Oral Q6H    ketorolac  30 mg Intravenous Q8H     Continuous Infusions:   lactated ringers Stopped (10/20/20 0853)     PRN Meds:bisacodyL, diphenhydrAMINE, diphenhydrAMINE, diphenhydrAMINE, diphenhydrAMINE, HYDROcodone-acetaminophen, influenza, ondansetron, ondansetron, oxyCODONE-acetaminophen, oxyCODONE-acetaminophen, promethazine (PHENERGAN) IVPB, promethazine (PHENERGAN) IVPB, sodium chloride 0.9%    Review of patient's allergies indicates:  No Known Allergies    Objective:     Vital Signs (Most Recent):  Temp: 98.5 °F (36.9 °C) (10/21/20 0416)  Pulse: 80 (10/21/20 0416)  Resp: 18 (10/21/20 0416)  BP: (!) 104/56 (10/21/20 0416)  SpO2: (!) 92 % (10/21/20 0416) Vital Signs (24h Range):  Temp:  [97 °F (36.1 °C)-98.7 °F (37.1 °C)] 98.5 °F (36.9 °C)  Pulse:  [71-91] 80  Resp:  [16-18] 18  SpO2:  [92 %-100 %] 92 %  BP: ()/(56-95) 104/56     Weight: 72.6 kg (160 lb)  Body mass index is 26.63 kg/m².  Patient's last menstrual period was 04/19/2015 (approximate).    I&O (Last 24H):    Intake/Output Summary (Last 24 hours) at 10/21/2020 0585  Last data filed at 10/20/2020 1414  Gross per 24 hour   Intake 2200 ml   Output 680 ml   Net 1520 ml       Physical Exam:   Constitutional: She is oriented to person, place, and time. She appears well-developed and well-nourished. No distress.    HENT:   Head: Normocephalic and atraumatic.        Pulmonary/Chest: Effort normal. No respiratory distress.        Abdominal: She exhibits distension (tense, tympanic). There is no abdominal tenderness. There is no rebound and no guarding.   Incisions c/d/i             Musculoskeletal: Moves all extremeties.       Neurological: She is alert and oriented to person, place, and time.    Skin: Skin is warm and dry. She is not diaphoretic.    Psychiatric: She has a normal mood and affect. Her behavior is normal. Judgment and thought content normal.       Laboratory:  Recent Labs   Lab 10/15/20  1201 10/21/20  0355   WBC 6.27 8.18   HGB 13.7 11.7*   HCT 41.7 36.3*   MCV 95 95    203      Diagnostic Results:   n/a    Assessment/Plan:     Active Diagnoses:    Diagnosis Date Noted POA    Abnormal uterine bleeding (AUB) [N93.9] 10/20/2020 Yes    S/P RATLH/BSO [Z90.710] 10/20/2020 No      Problems Resolved During this Admission:     1. Routine post op care  --scheduled ibuprofen, PRN percocet  --regular diet as tolerated  --PRNs: simethicone, benadryl, zofran, phenergan  --am CBC 11/36, down from 13/41. Appropriate  --encourage ambulation, IS, SCDs  --lovenox not indicated    DISPO: anticipate discharge home today.     Jacques Patel MD  Obstetrics & Gynecology  [facility]

## 2020-10-23 LAB
FINAL PATHOLOGIC DIAGNOSIS: NORMAL
GROSS: NORMAL
Lab: NORMAL

## 2020-10-29 ENCOUNTER — PATIENT MESSAGE (OUTPATIENT)
Dept: GYNECOLOGIC ONCOLOGY | Facility: CLINIC | Age: 55
End: 2020-10-29

## 2020-10-29 DIAGNOSIS — Z90.710 S/P LAPAROSCOPIC HYSTERECTOMY: Primary | ICD-10-CM

## 2020-10-29 DIAGNOSIS — Z90.710 S/P LAPAROSCOPIC HYSTERECTOMY: ICD-10-CM

## 2020-10-29 RX ORDER — OXYCODONE AND ACETAMINOPHEN 5; 325 MG/1; MG/1
1 TABLET ORAL EVERY 4 HOURS PRN
Qty: 20 TABLET | Refills: 0 | Status: SHIPPED | OUTPATIENT
Start: 2020-10-29 | End: 2020-10-29 | Stop reason: SDUPTHER

## 2020-10-29 RX ORDER — OXYCODONE AND ACETAMINOPHEN 5; 325 MG/1; MG/1
1 TABLET ORAL EVERY 4 HOURS PRN
Qty: 20 TABLET | Refills: 0 | Status: SHIPPED | OUTPATIENT
Start: 2020-10-29 | End: 2020-10-30 | Stop reason: SDUPTHER

## 2020-10-30 DIAGNOSIS — Z90.710 S/P LAPAROSCOPIC HYSTERECTOMY: ICD-10-CM

## 2020-10-30 RX ORDER — OXYCODONE AND ACETAMINOPHEN 5; 325 MG/1; MG/1
1 TABLET ORAL EVERY 4 HOURS PRN
Qty: 20 TABLET | Refills: 0 | Status: SHIPPED | OUTPATIENT
Start: 2020-10-30 | End: 2021-03-05

## 2020-10-30 NOTE — TELEPHONE ENCOUNTER
Patient requested that rx be re-routed to different pharmacy due to preferred pharmacy having no electricity. Will route to MD.

## 2020-11-04 ENCOUNTER — PATIENT MESSAGE (OUTPATIENT)
Dept: GYNECOLOGIC ONCOLOGY | Facility: CLINIC | Age: 55
End: 2020-11-04

## 2020-11-16 ENCOUNTER — PATIENT MESSAGE (OUTPATIENT)
Dept: GYNECOLOGIC ONCOLOGY | Facility: CLINIC | Age: 55
End: 2020-11-16

## 2020-11-16 ENCOUNTER — OFFICE VISIT (OUTPATIENT)
Dept: GYNECOLOGIC ONCOLOGY | Facility: CLINIC | Age: 55
End: 2020-11-16
Payer: COMMERCIAL

## 2020-11-16 VITALS
HEART RATE: 69 BPM | WEIGHT: 166.69 LBS | BODY MASS INDEX: 27.73 KG/M2 | DIASTOLIC BLOOD PRESSURE: 77 MMHG | SYSTOLIC BLOOD PRESSURE: 138 MMHG

## 2020-11-16 DIAGNOSIS — Z90.710 S/P LAPAROSCOPIC HYSTERECTOMY: ICD-10-CM

## 2020-11-16 DIAGNOSIS — Z15.03 MUTATION IN HOXB13 GENE: ICD-10-CM

## 2020-11-16 DIAGNOSIS — Z80.41 FAMILY HISTORY OF OVARIAN CANCER: Primary | ICD-10-CM

## 2020-11-16 PROCEDURE — 99024 PR POST-OP FOLLOW-UP VISIT: ICD-10-PCS | Mod: S$GLB,,, | Performed by: OBSTETRICS & GYNECOLOGY

## 2020-11-16 PROCEDURE — 99024 POSTOP FOLLOW-UP VISIT: CPT | Mod: S$GLB,,, | Performed by: OBSTETRICS & GYNECOLOGY

## 2020-11-16 PROCEDURE — 1125F PR PAIN SEVERITY QUANTIFIED, PAIN PRESENT: ICD-10-PCS | Mod: S$GLB,,, | Performed by: OBSTETRICS & GYNECOLOGY

## 2020-11-16 PROCEDURE — 3008F PR BODY MASS INDEX (BMI) DOCUMENTED: ICD-10-PCS | Mod: CPTII,S$GLB,, | Performed by: OBSTETRICS & GYNECOLOGY

## 2020-11-16 PROCEDURE — 3008F BODY MASS INDEX DOCD: CPT | Mod: CPTII,S$GLB,, | Performed by: OBSTETRICS & GYNECOLOGY

## 2020-11-16 PROCEDURE — 99999 PR PBB SHADOW E&M-EST. PATIENT-LVL III: ICD-10-PCS | Mod: PBBFAC,,, | Performed by: OBSTETRICS & GYNECOLOGY

## 2020-11-16 PROCEDURE — 99999 PR PBB SHADOW E&M-EST. PATIENT-LVL III: CPT | Mod: PBBFAC,,, | Performed by: OBSTETRICS & GYNECOLOGY

## 2020-11-16 PROCEDURE — 1125F AMNT PAIN NOTED PAIN PRSNT: CPT | Mod: S$GLB,,, | Performed by: OBSTETRICS & GYNECOLOGY
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
## 2020-12-04 ENCOUNTER — PATIENT MESSAGE (OUTPATIENT)
Dept: ADMINISTRATIVE | Facility: HOSPITAL | Age: 55
End: 2020-12-04

## 2020-12-07 ENCOUNTER — TELEPHONE (OUTPATIENT)
Dept: INTERNAL MEDICINE | Facility: CLINIC | Age: 55
End: 2020-12-07

## 2020-12-07 ENCOUNTER — PATIENT OUTREACH (OUTPATIENT)
Dept: ADMINISTRATIVE | Facility: HOSPITAL | Age: 55
End: 2020-12-07

## 2021-01-16 ENCOUNTER — OFFICE VISIT (OUTPATIENT)
Dept: URGENT CARE | Facility: CLINIC | Age: 56
End: 2021-01-16
Payer: COMMERCIAL

## 2021-01-16 VITALS
DIASTOLIC BLOOD PRESSURE: 95 MMHG | HEART RATE: 80 BPM | BODY MASS INDEX: 24.99 KG/M2 | HEIGHT: 65 IN | SYSTOLIC BLOOD PRESSURE: 139 MMHG | OXYGEN SATURATION: 97 % | WEIGHT: 150 LBS | TEMPERATURE: 99 F

## 2021-01-16 DIAGNOSIS — R09.81 NASAL CONGESTION: ICD-10-CM

## 2021-01-16 DIAGNOSIS — J06.9 VIRAL URI WITH COUGH: Primary | ICD-10-CM

## 2021-01-16 LAB
CTP QC/QA: YES
SARS-COV-2 RDRP RESP QL NAA+PROBE: NEGATIVE

## 2021-01-16 PROCEDURE — 99214 OFFICE O/P EST MOD 30 MIN: CPT | Mod: S$GLB,,, | Performed by: NURSE PRACTITIONER

## 2021-01-16 PROCEDURE — 3008F BODY MASS INDEX DOCD: CPT | Mod: CPTII,S$GLB,, | Performed by: NURSE PRACTITIONER

## 2021-01-16 PROCEDURE — 99214 PR OFFICE/OUTPT VISIT, EST, LEVL IV, 30-39 MIN: ICD-10-PCS | Mod: S$GLB,,, | Performed by: NURSE PRACTITIONER

## 2021-01-16 PROCEDURE — U0002 COVID-19 LAB TEST NON-CDC: HCPCS | Mod: QW,S$GLB,, | Performed by: NURSE PRACTITIONER

## 2021-01-16 PROCEDURE — U0002: ICD-10-PCS | Mod: QW,S$GLB,, | Performed by: NURSE PRACTITIONER

## 2021-01-16 PROCEDURE — 3008F PR BODY MASS INDEX (BMI) DOCUMENTED: ICD-10-PCS | Mod: CPTII,S$GLB,, | Performed by: NURSE PRACTITIONER

## 2021-01-26 ENCOUNTER — CLINICAL SUPPORT (OUTPATIENT)
Dept: URGENT CARE | Facility: CLINIC | Age: 56
End: 2021-01-26
Payer: COMMERCIAL

## 2021-01-26 DIAGNOSIS — Z20.822 EXPOSURE TO COVID-19 VIRUS: Primary | ICD-10-CM

## 2021-01-26 LAB
CTP QC/QA: YES
SARS-COV-2 RDRP RESP QL NAA+PROBE: NEGATIVE

## 2021-01-26 PROCEDURE — U0002: ICD-10-PCS | Mod: QW,S$GLB,, | Performed by: PHYSICIAN ASSISTANT

## 2021-01-26 PROCEDURE — U0002 COVID-19 LAB TEST NON-CDC: HCPCS | Mod: QW,S$GLB,, | Performed by: PHYSICIAN ASSISTANT

## 2021-03-01 ENCOUNTER — PATIENT MESSAGE (OUTPATIENT)
Dept: PAIN MEDICINE | Facility: CLINIC | Age: 56
End: 2021-03-01

## 2021-03-04 ENCOUNTER — TELEPHONE (OUTPATIENT)
Dept: PAIN MEDICINE | Facility: CLINIC | Age: 56
End: 2021-03-04

## 2021-03-04 ENCOUNTER — PATIENT OUTREACH (OUTPATIENT)
Dept: ADMINISTRATIVE | Facility: OTHER | Age: 56
End: 2021-03-04

## 2021-03-05 ENCOUNTER — OFFICE VISIT (OUTPATIENT)
Dept: PAIN MEDICINE | Facility: CLINIC | Age: 56
End: 2021-03-05
Payer: COMMERCIAL

## 2021-03-05 VITALS
RESPIRATION RATE: 18 BRPM | HEIGHT: 65 IN | WEIGHT: 168.88 LBS | HEART RATE: 65 BPM | TEMPERATURE: 98 F | DIASTOLIC BLOOD PRESSURE: 88 MMHG | SYSTOLIC BLOOD PRESSURE: 126 MMHG | OXYGEN SATURATION: 100 % | BODY MASS INDEX: 28.14 KG/M2

## 2021-03-05 DIAGNOSIS — M54.16 LUMBAR RADICULOPATHY: Primary | ICD-10-CM

## 2021-03-05 DIAGNOSIS — M51.36 DDD (DEGENERATIVE DISC DISEASE), LUMBAR: ICD-10-CM

## 2021-03-05 DIAGNOSIS — G89.29 OTHER CHRONIC PAIN: ICD-10-CM

## 2021-03-05 PROCEDURE — 99999 PR PBB SHADOW E&M-EST. PATIENT-LVL V: ICD-10-PCS | Mod: PBBFAC,,, | Performed by: NURSE PRACTITIONER

## 2021-03-05 PROCEDURE — 3008F BODY MASS INDEX DOCD: CPT | Mod: CPTII,S$GLB,, | Performed by: NURSE PRACTITIONER

## 2021-03-05 PROCEDURE — 3008F PR BODY MASS INDEX (BMI) DOCUMENTED: ICD-10-PCS | Mod: CPTII,S$GLB,, | Performed by: NURSE PRACTITIONER

## 2021-03-05 PROCEDURE — 1125F AMNT PAIN NOTED PAIN PRSNT: CPT | Mod: S$GLB,,, | Performed by: NURSE PRACTITIONER

## 2021-03-05 PROCEDURE — 3074F PR MOST RECENT SYSTOLIC BLOOD PRESSURE < 130 MM HG: ICD-10-PCS | Mod: CPTII,S$GLB,, | Performed by: NURSE PRACTITIONER

## 2021-03-05 PROCEDURE — 99213 OFFICE O/P EST LOW 20 MIN: CPT | Mod: S$GLB,,, | Performed by: NURSE PRACTITIONER

## 2021-03-05 PROCEDURE — 1125F PR PAIN SEVERITY QUANTIFIED, PAIN PRESENT: ICD-10-PCS | Mod: S$GLB,,, | Performed by: NURSE PRACTITIONER

## 2021-03-05 PROCEDURE — 3079F PR MOST RECENT DIASTOLIC BLOOD PRESSURE 80-89 MM HG: ICD-10-PCS | Mod: CPTII,S$GLB,, | Performed by: NURSE PRACTITIONER

## 2021-03-05 PROCEDURE — 99999 PR PBB SHADOW E&M-EST. PATIENT-LVL V: CPT | Mod: PBBFAC,,, | Performed by: NURSE PRACTITIONER

## 2021-03-05 PROCEDURE — 99213 PR OFFICE/OUTPT VISIT, EST, LEVL III, 20-29 MIN: ICD-10-PCS | Mod: S$GLB,,, | Performed by: NURSE PRACTITIONER

## 2021-03-05 PROCEDURE — 3079F DIAST BP 80-89 MM HG: CPT | Mod: CPTII,S$GLB,, | Performed by: NURSE PRACTITIONER

## 2021-03-05 PROCEDURE — 3074F SYST BP LT 130 MM HG: CPT | Mod: CPTII,S$GLB,, | Performed by: NURSE PRACTITIONER

## 2021-03-16 ENCOUNTER — HOSPITAL ENCOUNTER (OUTPATIENT)
Facility: OTHER | Age: 56
Discharge: HOME | End: 2021-03-16
Attending: ANESTHESIOLOGY | Admitting: ANESTHESIOLOGY
Payer: COMMERCIAL

## 2021-03-16 VITALS
TEMPERATURE: 98 F | OXYGEN SATURATION: 98 % | WEIGHT: 160 LBS | SYSTOLIC BLOOD PRESSURE: 120 MMHG | BODY MASS INDEX: 26.66 KG/M2 | HEIGHT: 65 IN | DIASTOLIC BLOOD PRESSURE: 69 MMHG | HEART RATE: 72 BPM | RESPIRATION RATE: 17 BRPM

## 2021-03-16 DIAGNOSIS — G89.29 CHRONIC PAIN: ICD-10-CM

## 2021-03-16 DIAGNOSIS — M54.16 LUMBAR RADICULOPATHY: Primary | ICD-10-CM

## 2021-03-16 DIAGNOSIS — M51.36 DDD (DEGENERATIVE DISC DISEASE), LUMBAR: ICD-10-CM

## 2021-03-16 PROCEDURE — 64483 NJX AA&/STRD TFRM EPI L/S 1: CPT | Mod: RT | Performed by: ANESTHESIOLOGY

## 2021-03-16 PROCEDURE — 64483 PR EPIDURAL INJ, ANES/STEROID, TRANSFORAMINAL, LUMB/SACR, SNGL LEVL: ICD-10-PCS | Mod: RT,,, | Performed by: ANESTHESIOLOGY

## 2021-03-16 PROCEDURE — 25000003 PHARM REV CODE 250: Performed by: ANESTHESIOLOGY

## 2021-03-16 PROCEDURE — 64483 NJX AA&/STRD TFRM EPI L/S 1: CPT | Mod: RT,,, | Performed by: ANESTHESIOLOGY

## 2021-03-16 PROCEDURE — 63600175 PHARM REV CODE 636 W HCPCS: Performed by: ANESTHESIOLOGY

## 2021-03-16 PROCEDURE — 25500020 PHARM REV CODE 255: Performed by: ANESTHESIOLOGY

## 2021-03-16 PROCEDURE — 64484 NJX AA&/STRD TFRM EPI L/S EA: CPT | Mod: RT,,, | Performed by: ANESTHESIOLOGY

## 2021-03-16 PROCEDURE — 64484 PRA INJECT ANES/STEROID FORAMEN LUMBAR/SACRAL W IMG GUIDE ,EA ADD LEVEL: ICD-10-PCS | Mod: RT,,, | Performed by: ANESTHESIOLOGY

## 2021-03-16 PROCEDURE — 64484 NJX AA&/STRD TFRM EPI L/S EA: CPT | Mod: RT | Performed by: ANESTHESIOLOGY

## 2021-03-16 RX ORDER — LIDOCAINE HYDROCHLORIDE 10 MG/ML
INJECTION INFILTRATION; PERINEURAL
Status: DISCONTINUED | OUTPATIENT
Start: 2021-03-16 | End: 2021-03-16 | Stop reason: HOSPADM

## 2021-03-16 RX ORDER — MIDAZOLAM HYDROCHLORIDE 1 MG/ML
INJECTION INTRAMUSCULAR; INTRAVENOUS
Status: DISCONTINUED | OUTPATIENT
Start: 2021-03-16 | End: 2021-03-16 | Stop reason: HOSPADM

## 2021-03-16 RX ORDER — DEXAMETHASONE SODIUM PHOSPHATE 10 MG/ML
INJECTION INTRAMUSCULAR; INTRAVENOUS
Status: DISCONTINUED | OUTPATIENT
Start: 2021-03-16 | End: 2021-03-16 | Stop reason: HOSPADM

## 2021-03-16 RX ORDER — BUPIVACAINE HYDROCHLORIDE 2.5 MG/ML
INJECTION, SOLUTION EPIDURAL; INFILTRATION; INTRACAUDAL
Status: DISCONTINUED | OUTPATIENT
Start: 2021-03-16 | End: 2021-03-16 | Stop reason: HOSPADM

## 2021-03-16 RX ORDER — FENTANYL CITRATE 50 UG/ML
INJECTION, SOLUTION INTRAMUSCULAR; INTRAVENOUS
Status: DISCONTINUED | OUTPATIENT
Start: 2021-03-16 | End: 2021-03-16 | Stop reason: HOSPADM

## 2021-03-16 RX ORDER — SODIUM CHLORIDE 9 MG/ML
INJECTION, SOLUTION INTRAVENOUS CONTINUOUS
Status: DISCONTINUED | OUTPATIENT
Start: 2021-03-16 | End: 2021-03-16 | Stop reason: HOSPADM

## 2021-03-16 RX ADMIN — SODIUM CHLORIDE: 0.9 INJECTION, SOLUTION INTRAVENOUS at 11:03

## 2021-04-07 ENCOUNTER — PATIENT OUTREACH (OUTPATIENT)
Dept: ADMINISTRATIVE | Facility: OTHER | Age: 56
End: 2021-04-07

## 2021-04-08 ENCOUNTER — OFFICE VISIT (OUTPATIENT)
Dept: SURGERY | Facility: CLINIC | Age: 56
End: 2021-04-08
Payer: COMMERCIAL

## 2021-04-08 VITALS
DIASTOLIC BLOOD PRESSURE: 95 MMHG | SYSTOLIC BLOOD PRESSURE: 134 MMHG | HEIGHT: 65 IN | BODY MASS INDEX: 27.94 KG/M2 | HEART RATE: 77 BPM | WEIGHT: 167.69 LBS

## 2021-04-08 DIAGNOSIS — K59.00 CONSTIPATION, UNSPECIFIED CONSTIPATION TYPE: ICD-10-CM

## 2021-04-08 DIAGNOSIS — K60.2 ANAL FISSURE: Primary | ICD-10-CM

## 2021-04-08 PROCEDURE — 3008F BODY MASS INDEX DOCD: CPT | Mod: CPTII,S$GLB,, | Performed by: NURSE PRACTITIONER

## 2021-04-08 PROCEDURE — 99999 PR PBB SHADOW E&M-EST. PATIENT-LVL IV: CPT | Mod: PBBFAC,,, | Performed by: NURSE PRACTITIONER

## 2021-04-08 PROCEDURE — 1126F AMNT PAIN NOTED NONE PRSNT: CPT | Mod: S$GLB,,, | Performed by: NURSE PRACTITIONER

## 2021-04-08 PROCEDURE — 3008F PR BODY MASS INDEX (BMI) DOCUMENTED: ICD-10-PCS | Mod: CPTII,S$GLB,, | Performed by: NURSE PRACTITIONER

## 2021-04-08 PROCEDURE — 1126F PR PAIN SEVERITY QUANTIFIED, NO PAIN PRESENT: ICD-10-PCS | Mod: S$GLB,,, | Performed by: NURSE PRACTITIONER

## 2021-04-08 PROCEDURE — 99204 OFFICE O/P NEW MOD 45 MIN: CPT | Mod: S$GLB,,, | Performed by: NURSE PRACTITIONER

## 2021-04-08 PROCEDURE — 99999 PR PBB SHADOW E&M-EST. PATIENT-LVL IV: ICD-10-PCS | Mod: PBBFAC,,, | Performed by: NURSE PRACTITIONER

## 2021-04-08 PROCEDURE — 99204 PR OFFICE/OUTPT VISIT, NEW, LEVL IV, 45-59 MIN: ICD-10-PCS | Mod: S$GLB,,, | Performed by: NURSE PRACTITIONER

## 2021-04-08 RX ORDER — POLYETHYLENE GLYCOL 3350 17 G/17G
17 POWDER, FOR SOLUTION ORAL DAILY
Qty: 507 G | Refills: 5 | Status: SHIPPED | OUTPATIENT
Start: 2021-04-08 | End: 2021-09-30

## 2021-06-16 ENCOUNTER — TELEPHONE (OUTPATIENT)
Dept: ORTHOPEDICS | Facility: CLINIC | Age: 56
End: 2021-06-16

## 2021-07-06 ENCOUNTER — PATIENT MESSAGE (OUTPATIENT)
Dept: ADMINISTRATIVE | Facility: HOSPITAL | Age: 56
End: 2021-07-06

## 2021-07-12 ENCOUNTER — PATIENT OUTREACH (OUTPATIENT)
Dept: ADMINISTRATIVE | Facility: HOSPITAL | Age: 56
End: 2021-07-12

## 2021-07-12 DIAGNOSIS — Z12.31 ENCOUNTER FOR SCREENING MAMMOGRAM FOR BREAST CANCER: Primary | ICD-10-CM

## 2021-08-12 ENCOUNTER — TELEPHONE (OUTPATIENT)
Dept: INTERNAL MEDICINE | Facility: CLINIC | Age: 56
End: 2021-08-12

## 2021-08-12 DIAGNOSIS — Z00.00 ANNUAL PHYSICAL EXAM: Primary | ICD-10-CM

## 2021-08-13 ENCOUNTER — HOSPITAL ENCOUNTER (OUTPATIENT)
Dept: RADIOLOGY | Facility: HOSPITAL | Age: 56
Discharge: HOME | End: 2021-08-13
Attending: INTERNAL MEDICINE
Payer: COMMERCIAL

## 2021-08-13 DIAGNOSIS — Z12.31 ENCOUNTER FOR SCREENING MAMMOGRAM FOR BREAST CANCER: ICD-10-CM

## 2021-08-13 PROCEDURE — 77067 SCR MAMMO BI INCL CAD: CPT | Mod: TC,PO

## 2021-08-13 PROCEDURE — 77067 MAMMO DIGITAL SCREENING BILAT WITH TOMO: ICD-10-PCS | Mod: 26,,, | Performed by: RADIOLOGY

## 2021-08-13 PROCEDURE — 77063 MAMMO DIGITAL SCREENING BILAT WITH TOMO: ICD-10-PCS | Mod: 26,,, | Performed by: RADIOLOGY

## 2021-08-13 PROCEDURE — 77067 SCR MAMMO BI INCL CAD: CPT | Mod: 26,,, | Performed by: RADIOLOGY

## 2021-08-13 PROCEDURE — 77063 BREAST TOMOSYNTHESIS BI: CPT | Mod: 26,,, | Performed by: RADIOLOGY

## 2021-08-17 ENCOUNTER — PATIENT MESSAGE (OUTPATIENT)
Dept: INTERNAL MEDICINE | Facility: CLINIC | Age: 56
End: 2021-08-17

## 2021-08-17 DIAGNOSIS — R92.8 ABNORMAL MAMMOGRAM: Primary | ICD-10-CM

## 2021-08-27 ENCOUNTER — LAB VISIT (OUTPATIENT)
Dept: LAB | Facility: HOSPITAL | Age: 56
End: 2021-08-27
Attending: INTERNAL MEDICINE
Payer: COMMERCIAL

## 2021-08-27 DIAGNOSIS — Z00.00 ANNUAL PHYSICAL EXAM: ICD-10-CM

## 2021-08-27 LAB
ALBUMIN SERPL BCP-MCNC: 4.1 G/DL (ref 3.5–5.2)
ALP SERPL-CCNC: 66 U/L (ref 55–135)
ALT SERPL W/O P-5'-P-CCNC: 34 U/L (ref 10–44)
ANION GAP SERPL CALC-SCNC: 13 MMOL/L (ref 8–16)
AST SERPL-CCNC: 33 U/L (ref 10–40)
BASOPHILS # BLD AUTO: 0.06 K/UL (ref 0–0.2)
BASOPHILS NFR BLD: 1 % (ref 0–1.9)
BILIRUB SERPL-MCNC: 0.7 MG/DL (ref 0.1–1)
BUN SERPL-MCNC: 12 MG/DL (ref 6–20)
CALCIUM SERPL-MCNC: 9.6 MG/DL (ref 8.7–10.5)
CHLORIDE SERPL-SCNC: 102 MMOL/L (ref 95–110)
CHOLEST SERPL-MCNC: 253 MG/DL (ref 120–199)
CHOLEST/HDLC SERPL: 3.7 {RATIO} (ref 2–5)
CO2 SERPL-SCNC: 24 MMOL/L (ref 23–29)
CREAT SERPL-MCNC: 0.8 MG/DL (ref 0.5–1.4)
DIFFERENTIAL METHOD: ABNORMAL
EOSINOPHIL # BLD AUTO: 0.1 K/UL (ref 0–0.5)
EOSINOPHIL NFR BLD: 2.2 % (ref 0–8)
ERYTHROCYTE [DISTWIDTH] IN BLOOD BY AUTOMATED COUNT: 13.3 % (ref 11.5–14.5)
EST. GFR  (AFRICAN AMERICAN): >60 ML/MIN/1.73 M^2
EST. GFR  (NON AFRICAN AMERICAN): >60 ML/MIN/1.73 M^2
GLUCOSE SERPL-MCNC: 102 MG/DL (ref 70–110)
HCT VFR BLD AUTO: 42.4 % (ref 37–48.5)
HDLC SERPL-MCNC: 69 MG/DL (ref 40–75)
HDLC SERPL: 27.3 % (ref 20–50)
HGB BLD-MCNC: 14.1 G/DL (ref 12–16)
IMM GRANULOCYTES # BLD AUTO: 0.03 K/UL (ref 0–0.04)
IMM GRANULOCYTES NFR BLD AUTO: 0.5 % (ref 0–0.5)
LDLC SERPL CALC-MCNC: 149 MG/DL (ref 63–159)
LYMPHOCYTES # BLD AUTO: 1.3 K/UL (ref 1–4.8)
LYMPHOCYTES NFR BLD: 22.5 % (ref 18–48)
MCH RBC QN AUTO: 31.8 PG (ref 27–31)
MCHC RBC AUTO-ENTMCNC: 33.3 G/DL (ref 32–36)
MCV RBC AUTO: 96 FL (ref 82–98)
MONOCYTES # BLD AUTO: 0.7 K/UL (ref 0.3–1)
MONOCYTES NFR BLD: 11.3 % (ref 4–15)
NEUTROPHILS # BLD AUTO: 3.7 K/UL (ref 1.8–7.7)
NEUTROPHILS NFR BLD: 62.5 % (ref 38–73)
NONHDLC SERPL-MCNC: 184 MG/DL
NRBC BLD-RTO: 0 /100 WBC
PLATELET # BLD AUTO: 256 K/UL (ref 150–450)
PMV BLD AUTO: 9.8 FL (ref 9.2–12.9)
POTASSIUM SERPL-SCNC: 4.2 MMOL/L (ref 3.5–5.1)
PROT SERPL-MCNC: 7.2 G/DL (ref 6–8.4)
RBC # BLD AUTO: 4.44 M/UL (ref 4–5.4)
SODIUM SERPL-SCNC: 139 MMOL/L (ref 136–145)
TRIGL SERPL-MCNC: 175 MG/DL (ref 30–150)
TSH SERPL DL<=0.005 MIU/L-ACNC: 2.06 UIU/ML (ref 0.4–4)
WBC # BLD AUTO: 5.86 K/UL (ref 3.9–12.7)

## 2021-08-27 PROCEDURE — 80061 LIPID PANEL: CPT | Performed by: INTERNAL MEDICINE

## 2021-08-27 PROCEDURE — 85025 COMPLETE CBC W/AUTO DIFF WBC: CPT | Performed by: INTERNAL MEDICINE

## 2021-08-27 PROCEDURE — 36415 COLL VENOUS BLD VENIPUNCTURE: CPT | Mod: PO | Performed by: INTERNAL MEDICINE

## 2021-08-27 PROCEDURE — 84443 ASSAY THYROID STIM HORMONE: CPT | Performed by: INTERNAL MEDICINE

## 2021-08-27 PROCEDURE — 80053 COMPREHEN METABOLIC PANEL: CPT | Performed by: INTERNAL MEDICINE

## 2021-08-30 ENCOUNTER — PATIENT MESSAGE (OUTPATIENT)
Dept: INTERNAL MEDICINE | Facility: CLINIC | Age: 56
End: 2021-08-30

## 2021-09-23 ENCOUNTER — OFFICE VISIT (OUTPATIENT)
Dept: HEMATOLOGY/ONCOLOGY | Facility: CLINIC | Age: 56
End: 2021-09-23
Payer: COMMERCIAL

## 2021-09-23 VITALS
TEMPERATURE: 99 F | WEIGHT: 171.06 LBS | RESPIRATION RATE: 18 BRPM | SYSTOLIC BLOOD PRESSURE: 126 MMHG | HEART RATE: 76 BPM | OXYGEN SATURATION: 95 % | BODY MASS INDEX: 28.5 KG/M2 | DIASTOLIC BLOOD PRESSURE: 79 MMHG | HEIGHT: 65 IN

## 2021-09-23 DIAGNOSIS — R92.8 ABNORMAL MAMMOGRAM: ICD-10-CM

## 2021-09-23 DIAGNOSIS — Z91.89 AT HIGH RISK FOR BREAST CANCER: Primary | ICD-10-CM

## 2021-09-23 PROCEDURE — 1159F PR MEDICATION LIST DOCUMENTED IN MEDICAL RECORD: ICD-10-PCS | Mod: CPTII,S$GLB,, | Performed by: NURSE PRACTITIONER

## 2021-09-23 PROCEDURE — 99214 OFFICE O/P EST MOD 30 MIN: CPT | Mod: S$GLB,,, | Performed by: NURSE PRACTITIONER

## 2021-09-23 PROCEDURE — 1159F MED LIST DOCD IN RCRD: CPT | Mod: CPTII,S$GLB,, | Performed by: NURSE PRACTITIONER

## 2021-09-23 PROCEDURE — 99999 PR PBB SHADOW E&M-EST. PATIENT-LVL IV: CPT | Mod: PBBFAC,,, | Performed by: NURSE PRACTITIONER

## 2021-09-23 PROCEDURE — 3074F SYST BP LT 130 MM HG: CPT | Mod: CPTII,S$GLB,, | Performed by: NURSE PRACTITIONER

## 2021-09-23 PROCEDURE — 3078F PR MOST RECENT DIASTOLIC BLOOD PRESSURE < 80 MM HG: ICD-10-PCS | Mod: CPTII,S$GLB,, | Performed by: NURSE PRACTITIONER

## 2021-09-23 PROCEDURE — 3008F PR BODY MASS INDEX (BMI) DOCUMENTED: ICD-10-PCS | Mod: CPTII,S$GLB,, | Performed by: NURSE PRACTITIONER

## 2021-09-23 PROCEDURE — 3008F BODY MASS INDEX DOCD: CPT | Mod: CPTII,S$GLB,, | Performed by: NURSE PRACTITIONER

## 2021-09-23 PROCEDURE — 1160F RVW MEDS BY RX/DR IN RCRD: CPT | Mod: CPTII,S$GLB,, | Performed by: NURSE PRACTITIONER

## 2021-09-23 PROCEDURE — 99999 PR PBB SHADOW E&M-EST. PATIENT-LVL IV: ICD-10-PCS | Mod: PBBFAC,,, | Performed by: NURSE PRACTITIONER

## 2021-09-23 PROCEDURE — 1160F PR REVIEW ALL MEDS BY PRESCRIBER/CLIN PHARMACIST DOCUMENTED: ICD-10-PCS | Mod: CPTII,S$GLB,, | Performed by: NURSE PRACTITIONER

## 2021-09-23 PROCEDURE — 99214 PR OFFICE/OUTPT VISIT, EST, LEVL IV, 30-39 MIN: ICD-10-PCS | Mod: S$GLB,,, | Performed by: NURSE PRACTITIONER

## 2021-09-23 PROCEDURE — 3074F PR MOST RECENT SYSTOLIC BLOOD PRESSURE < 130 MM HG: ICD-10-PCS | Mod: CPTII,S$GLB,, | Performed by: NURSE PRACTITIONER

## 2021-09-23 PROCEDURE — 3078F DIAST BP <80 MM HG: CPT | Mod: CPTII,S$GLB,, | Performed by: NURSE PRACTITIONER

## 2021-09-30 ENCOUNTER — OFFICE VISIT (OUTPATIENT)
Dept: INTERNAL MEDICINE | Facility: CLINIC | Age: 56
End: 2021-09-30
Payer: COMMERCIAL

## 2021-09-30 VITALS
BODY MASS INDEX: 28.91 KG/M2 | HEIGHT: 65 IN | TEMPERATURE: 98 F | SYSTOLIC BLOOD PRESSURE: 130 MMHG | OXYGEN SATURATION: 96 % | HEART RATE: 77 BPM | DIASTOLIC BLOOD PRESSURE: 88 MMHG | WEIGHT: 173.5 LBS | RESPIRATION RATE: 18 BRPM

## 2021-09-30 DIAGNOSIS — R53.83 FATIGUE, UNSPECIFIED TYPE: ICD-10-CM

## 2021-09-30 DIAGNOSIS — Z00.00 ANNUAL PHYSICAL EXAM: Primary | ICD-10-CM

## 2021-09-30 DIAGNOSIS — E78.5 HYPERLIPIDEMIA, UNSPECIFIED HYPERLIPIDEMIA TYPE: ICD-10-CM

## 2021-09-30 DIAGNOSIS — F41.9 ANXIETY: ICD-10-CM

## 2021-09-30 PROCEDURE — 1159F PR MEDICATION LIST DOCUMENTED IN MEDICAL RECORD: ICD-10-PCS | Mod: CPTII,S$GLB,, | Performed by: INTERNAL MEDICINE

## 2021-09-30 PROCEDURE — 1159F MED LIST DOCD IN RCRD: CPT | Mod: CPTII,S$GLB,, | Performed by: INTERNAL MEDICINE

## 2021-09-30 PROCEDURE — 3079F PR MOST RECENT DIASTOLIC BLOOD PRESSURE 80-89 MM HG: ICD-10-PCS | Mod: CPTII,S$GLB,, | Performed by: INTERNAL MEDICINE

## 2021-09-30 PROCEDURE — 3075F PR MOST RECENT SYSTOLIC BLOOD PRESS GE 130-139MM HG: ICD-10-PCS | Mod: CPTII,S$GLB,, | Performed by: INTERNAL MEDICINE

## 2021-09-30 PROCEDURE — 99396 PREV VISIT EST AGE 40-64: CPT | Mod: S$GLB,,, | Performed by: INTERNAL MEDICINE

## 2021-09-30 PROCEDURE — 3008F PR BODY MASS INDEX (BMI) DOCUMENTED: ICD-10-PCS | Mod: CPTII,S$GLB,, | Performed by: INTERNAL MEDICINE

## 2021-09-30 PROCEDURE — 3075F SYST BP GE 130 - 139MM HG: CPT | Mod: CPTII,S$GLB,, | Performed by: INTERNAL MEDICINE

## 2021-09-30 PROCEDURE — 3079F DIAST BP 80-89 MM HG: CPT | Mod: CPTII,S$GLB,, | Performed by: INTERNAL MEDICINE

## 2021-09-30 PROCEDURE — 3008F BODY MASS INDEX DOCD: CPT | Mod: CPTII,S$GLB,, | Performed by: INTERNAL MEDICINE

## 2021-09-30 PROCEDURE — 99396 PR PREVENTIVE VISIT,EST,40-64: ICD-10-PCS | Mod: S$GLB,,, | Performed by: INTERNAL MEDICINE

## 2021-09-30 PROCEDURE — 99999 PR PBB SHADOW E&M-EST. PATIENT-LVL IV: CPT | Mod: PBBFAC,,, | Performed by: INTERNAL MEDICINE

## 2021-09-30 PROCEDURE — 99999 PR PBB SHADOW E&M-EST. PATIENT-LVL IV: ICD-10-PCS | Mod: PBBFAC,,, | Performed by: INTERNAL MEDICINE

## 2021-09-30 RX ORDER — NEOMYCIN SULFATE, POLYMYXIN B SULFATE AND HYDROCORTISONE 10; 3.5; 1 MG/ML; MG/ML; [USP'U]/ML
SUSPENSION/ DROPS AURICULAR (OTIC)
COMMUNITY
Start: 2021-05-11 | End: 2022-08-03

## 2021-10-06 ENCOUNTER — LAB VISIT (OUTPATIENT)
Dept: LAB | Facility: HOSPITAL | Age: 56
End: 2021-10-06
Attending: INTERNAL MEDICINE
Payer: COMMERCIAL

## 2021-10-06 DIAGNOSIS — Z00.00 ANNUAL PHYSICAL EXAM: ICD-10-CM

## 2021-10-06 DIAGNOSIS — R53.83 FATIGUE, UNSPECIFIED TYPE: ICD-10-CM

## 2021-10-06 LAB
25(OH)D3+25(OH)D2 SERPL-MCNC: 71 NG/ML (ref 30–96)
CORTIS SERPL-MCNC: 6.7 UG/DL
VIT B12 SERPL-MCNC: 523 PG/ML (ref 210–950)

## 2021-10-06 PROCEDURE — 82306 VITAMIN D 25 HYDROXY: CPT | Performed by: INTERNAL MEDICINE

## 2021-10-06 PROCEDURE — 86376 MICROSOMAL ANTIBODY EACH: CPT | Performed by: INTERNAL MEDICINE

## 2021-10-06 PROCEDURE — 82533 TOTAL CORTISOL: CPT | Performed by: INTERNAL MEDICINE

## 2021-10-06 PROCEDURE — 82607 VITAMIN B-12: CPT | Performed by: INTERNAL MEDICINE

## 2021-10-07 ENCOUNTER — PATIENT MESSAGE (OUTPATIENT)
Dept: INTERNAL MEDICINE | Facility: CLINIC | Age: 56
End: 2021-10-07

## 2021-10-07 LAB — THYROPEROXIDASE IGG SERPL-ACNC: <6 IU/ML

## 2021-10-07 RX ORDER — BUPROPION HYDROCHLORIDE 150 MG/1
150 TABLET ORAL DAILY
Qty: 30 TABLET | Refills: 11 | Status: SHIPPED | OUTPATIENT
Start: 2021-10-07 | End: 2022-08-30 | Stop reason: SDUPTHER

## 2021-11-01 RX ORDER — LORAZEPAM 1 MG/1
TABLET ORAL
Qty: 60 TABLET | Refills: 0 | Status: SHIPPED | OUTPATIENT
Start: 2021-11-01 | End: 2021-12-06

## 2021-12-06 ENCOUNTER — OFFICE VISIT (OUTPATIENT)
Dept: INTERNAL MEDICINE | Facility: CLINIC | Age: 56
End: 2021-12-06
Payer: COMMERCIAL

## 2021-12-06 VITALS
HEIGHT: 65 IN | BODY MASS INDEX: 28.25 KG/M2 | HEART RATE: 75 BPM | WEIGHT: 169.56 LBS | DIASTOLIC BLOOD PRESSURE: 81 MMHG | OXYGEN SATURATION: 98 % | SYSTOLIC BLOOD PRESSURE: 121 MMHG | TEMPERATURE: 98 F

## 2021-12-06 DIAGNOSIS — R05.9 COUGH: ICD-10-CM

## 2021-12-06 DIAGNOSIS — J32.9 SINUSITIS, UNSPECIFIED CHRONICITY, UNSPECIFIED LOCATION: Primary | ICD-10-CM

## 2021-12-06 DIAGNOSIS — R50.9 FEBRILE ILLNESS: ICD-10-CM

## 2021-12-06 DIAGNOSIS — M79.10 MUSCLE PAIN: ICD-10-CM

## 2021-12-06 DIAGNOSIS — R68.83 CHILLS: ICD-10-CM

## 2021-12-06 DIAGNOSIS — R50.9 FEVER: ICD-10-CM

## 2021-12-06 DIAGNOSIS — R11.0 NAUSEA: ICD-10-CM

## 2021-12-06 DIAGNOSIS — R19.7 DIARRHEA: ICD-10-CM

## 2021-12-06 DIAGNOSIS — R51.9 HEAD ACHE: ICD-10-CM

## 2021-12-06 LAB
INFLUENZA A, MOLECULAR: NEGATIVE
INFLUENZA B, MOLECULAR: NEGATIVE
SARS-COV-2 RNA RESP QL NAA+PROBE: NOT DETECTED
SARS-COV-2- CYCLE NUMBER: NORMAL
SPECIMEN SOURCE: NORMAL

## 2021-12-06 PROCEDURE — U0003 INFECTIOUS AGENT DETECTION BY NUCLEIC ACID (DNA OR RNA); SEVERE ACUTE RESPIRATORY SYNDROME CORONAVIRUS 2 (SARS-COV-2) (CORONAVIRUS DISEASE [COVID-19]), AMPLIFIED PROBE TECHNIQUE, MAKING USE OF HIGH THROUGHPUT TECHNOLOGIES AS DESCRIBED BY CMS-2020-01-R: HCPCS | Performed by: FAMILY MEDICINE

## 2021-12-06 PROCEDURE — 99214 OFFICE O/P EST MOD 30 MIN: CPT | Mod: S$GLB,,, | Performed by: FAMILY MEDICINE

## 2021-12-06 PROCEDURE — 99999 PR PBB SHADOW E&M-EST. PATIENT-LVL IV: ICD-10-PCS | Mod: PBBFAC,,, | Performed by: FAMILY MEDICINE

## 2021-12-06 PROCEDURE — 87502 INFLUENZA DNA AMP PROBE: CPT | Mod: PO | Performed by: FAMILY MEDICINE

## 2021-12-06 PROCEDURE — 99214 PR OFFICE/OUTPT VISIT, EST, LEVL IV, 30-39 MIN: ICD-10-PCS | Mod: S$GLB,,, | Performed by: FAMILY MEDICINE

## 2021-12-06 PROCEDURE — 99999 PR PBB SHADOW E&M-EST. PATIENT-LVL IV: CPT | Mod: PBBFAC,,, | Performed by: FAMILY MEDICINE

## 2021-12-06 PROCEDURE — U0005 INFEC AGEN DETEC AMPLI PROBE: HCPCS | Performed by: FAMILY MEDICINE

## 2021-12-06 RX ORDER — FLUTICASONE PROPIONATE 50 MCG
2 SPRAY, SUSPENSION (ML) NASAL DAILY
Qty: 16 G | Refills: 11 | Status: SHIPPED | OUTPATIENT
Start: 2021-12-06 | End: 2023-09-23

## 2021-12-06 RX ORDER — BENZONATATE 200 MG/1
200 CAPSULE ORAL 3 TIMES DAILY PRN
Qty: 30 CAPSULE | Refills: 0 | Status: SHIPPED | OUTPATIENT
Start: 2021-12-06 | End: 2021-12-16

## 2021-12-06 RX ORDER — AZITHROMYCIN 250 MG/1
TABLET, FILM COATED ORAL
Qty: 6 TABLET | Refills: 0 | Status: SHIPPED | OUTPATIENT
Start: 2021-12-06 | End: 2022-07-06

## 2021-12-06 RX ORDER — LORAZEPAM 1 MG/1
TABLET ORAL
Qty: 60 TABLET | Refills: 0 | Status: SHIPPED | OUTPATIENT
Start: 2021-12-06 | End: 2022-01-07

## 2021-12-08 ENCOUNTER — PATIENT MESSAGE (OUTPATIENT)
Dept: INTERNAL MEDICINE | Facility: CLINIC | Age: 56
End: 2021-12-08
Payer: COMMERCIAL

## 2021-12-08 ENCOUNTER — TELEPHONE (OUTPATIENT)
Dept: INTERNAL MEDICINE | Facility: CLINIC | Age: 56
End: 2021-12-08
Payer: COMMERCIAL

## 2022-01-07 NOTE — TELEPHONE ENCOUNTER
No new care gaps identified.  Powered by [employer]. Reference number: [id number].   1/07/2022 8:23:57 AM CST

## 2022-01-09 RX ORDER — LORAZEPAM 1 MG/1
TABLET ORAL
Qty: 60 TABLET | Refills: 0 | Status: SHIPPED | OUTPATIENT
Start: 2022-01-09 | End: 2022-03-07

## 2022-01-28 NOTE — TELEPHONE ENCOUNTER
No new care gaps identified.  Powered by [employer] by [employer]. Reference number: [id number].   1/28/2022 3:26:04 AM CST

## 2022-02-02 ENCOUNTER — PATIENT MESSAGE (OUTPATIENT)
Dept: INTERNAL MEDICINE | Facility: CLINIC | Age: 57
End: 2022-02-02
Payer: COMMERCIAL

## 2022-02-03 RX ORDER — METOPROLOL SUCCINATE 25 MG/1
25 TABLET, EXTENDED RELEASE ORAL DAILY
Qty: 90 TABLET | Refills: 3 | Status: SHIPPED | OUTPATIENT
Start: 2022-02-03 | End: 2023-01-26

## 2022-02-10 RX ORDER — METOPROLOL SUCCINATE 25 MG/1
TABLET, EXTENDED RELEASE ORAL
Qty: 90 TABLET | Refills: 3 | OUTPATIENT
Start: 2022-02-10

## 2022-02-10 NOTE — TELEPHONE ENCOUNTER
Quick DC. Request already responded to by other means (e.g. phone or fax)     This medication has been reordered by PCP already. Will remove duplicate and close out encounter.

## 2022-03-07 RX ORDER — LORAZEPAM 1 MG/1
TABLET ORAL
Qty: 60 TABLET | Refills: 1 | Status: SHIPPED | OUTPATIENT
Start: 2022-03-07 | End: 2022-06-26

## 2022-03-07 NOTE — TELEPHONE ENCOUNTER
No new care gaps identified.  Powered by [employer] by [employer]. Reference number: [id number].   3/07/2022 12:33:02 PM CST

## 2022-03-07 NOTE — TELEPHONE ENCOUNTER
No new care gaps identified.  Powered by [employer] by [employer]. Reference number: [id number].   3/06/2022 9:42:36 PM CST

## 2022-03-14 DIAGNOSIS — J01.90 ACUTE SINUSITIS, RECURRENCE NOT SPECIFIED, UNSPECIFIED LOCATION: ICD-10-CM

## 2022-03-14 NOTE — TELEPHONE ENCOUNTER
No new care gaps identified.  Powered by [employer] by [employer]. Reference number: [id number].   3/14/2022 6:28:55 AM CDT

## 2022-03-15 ENCOUNTER — PATIENT MESSAGE (OUTPATIENT)
Dept: INTERNAL MEDICINE | Facility: CLINIC | Age: 57
End: 2022-03-15
Payer: COMMERCIAL

## 2022-03-15 DIAGNOSIS — J01.90 ACUTE SINUSITIS, RECURRENCE NOT SPECIFIED, UNSPECIFIED LOCATION: ICD-10-CM

## 2022-03-15 RX ORDER — BUPROPION HYDROCHLORIDE 300 MG/1
300 TABLET ORAL DAILY
Qty: 30 TABLET | Refills: 11 | Status: SHIPPED | OUTPATIENT
Start: 2022-03-15 | End: 2023-06-19

## 2022-03-15 RX ORDER — BUPROPION HYDROCHLORIDE 300 MG/1
TABLET ORAL
Qty: 30 TABLET | Refills: 11 | OUTPATIENT
Start: 2022-03-15

## 2022-03-15 RX ORDER — MONTELUKAST SODIUM 10 MG/1
10 TABLET ORAL NIGHTLY
Qty: 30 TABLET | Refills: 11 | Status: SHIPPED | OUTPATIENT
Start: 2022-03-15 | End: 2023-04-20

## 2022-03-15 NOTE — TELEPHONE ENCOUNTER
No new care gaps identified.  Powered by [employer] by [employer]. Reference number: [id number].   3/15/2022 9:28:31 AM CDT
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
## 2022-03-22 RX ORDER — MONTELUKAST SODIUM 10 MG/1
TABLET ORAL
Qty: 30 TABLET | Refills: 11 | OUTPATIENT
Start: 2022-03-22

## 2022-03-31 ENCOUNTER — OFFICE VISIT (OUTPATIENT)
Dept: HEMATOLOGY/ONCOLOGY | Facility: CLINIC | Age: 57
End: 2022-03-31
Payer: COMMERCIAL

## 2022-03-31 VITALS
OXYGEN SATURATION: 98 % | BODY MASS INDEX: 28.25 KG/M2 | TEMPERATURE: 98 F | WEIGHT: 169.56 LBS | RESPIRATION RATE: 18 BRPM | SYSTOLIC BLOOD PRESSURE: 133 MMHG | HEIGHT: 65 IN | HEART RATE: 72 BPM | DIASTOLIC BLOOD PRESSURE: 81 MMHG

## 2022-03-31 DIAGNOSIS — Z15.03 MUTATION IN HOXB13 GENE: ICD-10-CM

## 2022-03-31 DIAGNOSIS — Z80.41 FAMILY HISTORY OF OVARIAN CANCER: ICD-10-CM

## 2022-03-31 DIAGNOSIS — Z91.89 AT HIGH RISK FOR BREAST CANCER: Primary | ICD-10-CM

## 2022-03-31 PROCEDURE — 1160F PR REVIEW ALL MEDS BY PRESCRIBER/CLIN PHARMACIST DOCUMENTED: ICD-10-PCS | Mod: CPTII,S$GLB,, | Performed by: NURSE PRACTITIONER

## 2022-03-31 PROCEDURE — 1159F MED LIST DOCD IN RCRD: CPT | Mod: CPTII,S$GLB,, | Performed by: NURSE PRACTITIONER

## 2022-03-31 PROCEDURE — 3008F PR BODY MASS INDEX (BMI) DOCUMENTED: ICD-10-PCS | Mod: CPTII,S$GLB,, | Performed by: NURSE PRACTITIONER

## 2022-03-31 PROCEDURE — 99214 PR OFFICE/OUTPT VISIT, EST, LEVL IV, 30-39 MIN: ICD-10-PCS | Mod: S$GLB,,, | Performed by: NURSE PRACTITIONER

## 2022-03-31 PROCEDURE — 3075F PR MOST RECENT SYSTOLIC BLOOD PRESS GE 130-139MM HG: ICD-10-PCS | Mod: CPTII,S$GLB,, | Performed by: NURSE PRACTITIONER

## 2022-03-31 PROCEDURE — 99999 PR PBB SHADOW E&M-EST. PATIENT-LVL V: ICD-10-PCS | Mod: PBBFAC,,, | Performed by: NURSE PRACTITIONER

## 2022-03-31 PROCEDURE — 3008F BODY MASS INDEX DOCD: CPT | Mod: CPTII,S$GLB,, | Performed by: NURSE PRACTITIONER

## 2022-03-31 PROCEDURE — 99999 PR PBB SHADOW E&M-EST. PATIENT-LVL V: CPT | Mod: PBBFAC,,, | Performed by: NURSE PRACTITIONER

## 2022-03-31 PROCEDURE — 99214 OFFICE O/P EST MOD 30 MIN: CPT | Mod: S$GLB,,, | Performed by: NURSE PRACTITIONER

## 2022-03-31 PROCEDURE — 3079F DIAST BP 80-89 MM HG: CPT | Mod: CPTII,S$GLB,, | Performed by: NURSE PRACTITIONER

## 2022-03-31 PROCEDURE — 1160F RVW MEDS BY RX/DR IN RCRD: CPT | Mod: CPTII,S$GLB,, | Performed by: NURSE PRACTITIONER

## 2022-03-31 PROCEDURE — 3079F PR MOST RECENT DIASTOLIC BLOOD PRESSURE 80-89 MM HG: ICD-10-PCS | Mod: CPTII,S$GLB,, | Performed by: NURSE PRACTITIONER

## 2022-03-31 PROCEDURE — 3075F SYST BP GE 130 - 139MM HG: CPT | Mod: CPTII,S$GLB,, | Performed by: NURSE PRACTITIONER

## 2022-03-31 PROCEDURE — 1159F PR MEDICATION LIST DOCUMENTED IN MEDICAL RECORD: ICD-10-PCS | Mod: CPTII,S$GLB,, | Performed by: NURSE PRACTITIONER

## 2022-04-24 ENCOUNTER — PATIENT MESSAGE (OUTPATIENT)
Dept: INTERNAL MEDICINE | Facility: CLINIC | Age: 57
End: 2022-04-24
Payer: COMMERCIAL

## 2022-04-25 ENCOUNTER — OFFICE VISIT (OUTPATIENT)
Dept: URGENT CARE | Facility: CLINIC | Age: 57
End: 2022-04-25
Payer: COMMERCIAL

## 2022-04-25 ENCOUNTER — PATIENT MESSAGE (OUTPATIENT)
Dept: INTERNAL MEDICINE | Facility: CLINIC | Age: 57
End: 2022-04-25
Payer: COMMERCIAL

## 2022-04-25 VITALS
SYSTOLIC BLOOD PRESSURE: 162 MMHG | HEIGHT: 65 IN | BODY MASS INDEX: 28.16 KG/M2 | OXYGEN SATURATION: 98 % | HEART RATE: 92 BPM | RESPIRATION RATE: 18 BRPM | TEMPERATURE: 98 F | WEIGHT: 169 LBS | DIASTOLIC BLOOD PRESSURE: 105 MMHG

## 2022-04-25 DIAGNOSIS — R35.0 FREQUENCY OF URINATION: ICD-10-CM

## 2022-04-25 DIAGNOSIS — R31.9 URINARY TRACT INFECTION WITH HEMATURIA, SITE UNSPECIFIED: Primary | ICD-10-CM

## 2022-04-25 DIAGNOSIS — N39.0 URINARY TRACT INFECTION WITH HEMATURIA, SITE UNSPECIFIED: Primary | ICD-10-CM

## 2022-04-25 DIAGNOSIS — I10 ELEVATED BLOOD PRESSURE READING IN OFFICE WITH DIAGNOSIS OF HYPERTENSION: ICD-10-CM

## 2022-04-25 DIAGNOSIS — R39.15 URGENCY OF URINATION: ICD-10-CM

## 2022-04-25 LAB
BILIRUB UR QL STRIP: NEGATIVE
GLUCOSE UR QL STRIP: NEGATIVE
KETONES UR QL STRIP: NEGATIVE
LEUKOCYTE ESTERASE UR QL STRIP: NEGATIVE
PH, POC UA: 5 (ref 5–8)
POC BLOOD, URINE: NEGATIVE
POC NITRATES, URINE: NEGATIVE
PROT UR QL STRIP: NEGATIVE
SP GR UR STRIP: 1.01 (ref 1–1.03)
UROBILINOGEN UR STRIP-ACNC: NORMAL (ref 0.1–1.1)

## 2022-04-25 PROCEDURE — 3080F DIAST BP >= 90 MM HG: CPT | Mod: CPTII,S$GLB,, | Performed by: NURSE PRACTITIONER

## 2022-04-25 PROCEDURE — 99213 PR OFFICE/OUTPT VISIT, EST, LEVL III, 20-29 MIN: ICD-10-PCS | Mod: S$GLB,,, | Performed by: NURSE PRACTITIONER

## 2022-04-25 PROCEDURE — 3077F SYST BP >= 140 MM HG: CPT | Mod: CPTII,S$GLB,, | Performed by: NURSE PRACTITIONER

## 2022-04-25 PROCEDURE — 3080F PR MOST RECENT DIASTOLIC BLOOD PRESSURE >= 90 MM HG: ICD-10-PCS | Mod: CPTII,S$GLB,, | Performed by: NURSE PRACTITIONER

## 2022-04-25 PROCEDURE — 3008F BODY MASS INDEX DOCD: CPT | Mod: CPTII,S$GLB,, | Performed by: NURSE PRACTITIONER

## 2022-04-25 PROCEDURE — 81003 POCT URINALYSIS, DIPSTICK, AUTOMATED, W/O SCOPE: ICD-10-PCS | Mod: QW,S$GLB,, | Performed by: NURSE PRACTITIONER

## 2022-04-25 PROCEDURE — 1159F PR MEDICATION LIST DOCUMENTED IN MEDICAL RECORD: ICD-10-PCS | Mod: CPTII,S$GLB,, | Performed by: NURSE PRACTITIONER

## 2022-04-25 PROCEDURE — 1159F MED LIST DOCD IN RCRD: CPT | Mod: CPTII,S$GLB,, | Performed by: NURSE PRACTITIONER

## 2022-04-25 PROCEDURE — 3008F PR BODY MASS INDEX (BMI) DOCUMENTED: ICD-10-PCS | Mod: CPTII,S$GLB,, | Performed by: NURSE PRACTITIONER

## 2022-04-25 PROCEDURE — 87086 URINE CULTURE/COLONY COUNT: CPT | Performed by: NURSE PRACTITIONER

## 2022-04-25 PROCEDURE — 81003 URINALYSIS AUTO W/O SCOPE: CPT | Mod: QW,S$GLB,, | Performed by: NURSE PRACTITIONER

## 2022-04-25 PROCEDURE — 3077F PR MOST RECENT SYSTOLIC BLOOD PRESSURE >= 140 MM HG: ICD-10-PCS | Mod: CPTII,S$GLB,, | Performed by: NURSE PRACTITIONER

## 2022-04-25 PROCEDURE — 99213 OFFICE O/P EST LOW 20 MIN: CPT | Mod: S$GLB,,, | Performed by: NURSE PRACTITIONER

## 2022-04-25 RX ORDER — SULFAMETHOXAZOLE AND TRIMETHOPRIM 800; 160 MG/1; MG/1
1 TABLET ORAL 2 TIMES DAILY
Qty: 20 TABLET | Refills: 0 | Status: SHIPPED | OUTPATIENT
Start: 2022-04-25 | End: 2022-05-05

## 2022-04-25 NOTE — PROGRESS NOTES
"Subjective:       Patient ID: Twila Roman is a 56 y.o. female.    Vitals:  height is 5' 5" (1.651 m) and weight is 76.7 kg (169 lb). Her temperature is 98.4 °F (36.9 °C). Her blood pressure is 162/105 (abnormal) and her pulse is 92. Her respiration is 18 and oxygen saturation is 98%.     Chief Complaint: Urinary Tract Infection (Noticed blood.  Abdominal and back pain. - Entered by patient)    Urinary Tract Infection   This is a new problem. The problem occurs every urination. The problem has been gradually worsening. The quality of the pain is described as aching and burning. The pain is at a severity of 6/10. The pain is moderate. There has been no fever. She is sexually active. There is no history of pyelonephritis. Associated symptoms include flank pain, frequency, hematuria and urgency. Pertinent negatives include no behavior changes, chills, discharge, hesitancy, nausea, possible pregnancy, sweats, vomiting, weight loss, bubble bath use, constipation, rash or withholding. She has tried acetaminophen for the symptoms. The treatment provided no relief. There is no history of catheterization, diabetes insipidus, diabetes mellitus, genitourinary reflux, hypertension, kidney stones, recurrent UTIs, a single kidney, STD, urinary stasis or a urological procedure.       Constitution: Negative for chills.   Gastrointestinal: Negative for nausea, vomiting and constipation.   Genitourinary: Positive for frequency, urgency, flank pain and hematuria.   Skin: Negative for rash.       Objective:      Physical Exam   Constitutional: She is oriented to person, place, and time. She appears well-developed.   HENT:   Head: Normocephalic and atraumatic.   Ears:   Right Ear: External ear normal.   Left Ear: External ear normal.   Nose: Nose normal. No nasal deformity. No epistaxis.   Mouth/Throat: Oropharynx is clear and moist and mucous membranes are normal.   Eyes: Lids are normal.   Neck: Trachea normal and phonation normal. " Neck supple.   Cardiovascular: Normal pulses.   Pulmonary/Chest: Effort normal.   Abdominal: Normal appearance and bowel sounds are normal. She exhibits no distension. Soft. There is no abdominal tenderness. There is no left CVA tenderness and no right CVA tenderness.   Neurological: She is alert and oriented to person, place, and time.   Skin: Skin is warm, dry and intact.   Psychiatric: Her speech is normal and behavior is normal.   Nursing note and vitals reviewed.        Results for orders placed or performed in visit on 04/25/22   POCT Urinalysis, Dipstick, Automated, W/O Scope   Result Value Ref Range    POC Blood, Urine Negative Negative    POC Bilirubin, Urine Negative Negative    POC Urobilinogen, Urine Norm 0.1 - 1.1    POC Ketones, Urine Negative Negative    POC Protein, Urine Negative Negative    POC Nitrates, Urine Negative Negative    POC Glucose, Urine Negative Negative    pH, UA 5.0 5 - 8    POC Specific Gravity, Urine 1.015 1.003 - 1.029    POC Leukocytes, Urine Negative Negative     Assessment:       1. dysuria    2. Frequency of urination    3. Urgency of urination    4. Elevated blood pressure reading in office with diagnosis of hypertension          Plan:         dysuria  -     POCT Urinalysis, Dipstick, Automated, W/O Scope  -     Culture, Urine  -     sulfamethoxazole-trimethoprim 800-160mg (BACTRIM DS) 800-160 mg Tab; Take 1 tablet by mouth 2 (two) times daily. for 10 days  Dispense: 20 tablet; Refill: 0    Frequency of urination  -     Culture, Urine  -     sulfamethoxazole-trimethoprim 800-160mg (BACTRIM DS) 800-160 mg Tab; Take 1 tablet by mouth 2 (two) times daily. for 10 days  Dispense: 20 tablet; Refill: 0    Urgency of urination  -     Culture, Urine  -     sulfamethoxazole-trimethoprim 800-160mg (BACTRIM DS) 800-160 mg Tab; Take 1 tablet by mouth 2 (two) times daily. for 10 days  Dispense: 20 tablet; Refill: 0    Elevated blood pressure reading in office with diagnosis of  hypertension               Patient Instructions   IF YOU ARE UNABLE TO KEEP YOUR MEDICATION DOWN OR IF YOU ARE UNABLE TO GET FEVER UNDER CONTROL, GO TO THE ER IMMEDIATELY      Take antibiotics to completion as prescribed  Take anti-nausea medication prescribed as needed for nausea  Force oral fluids  Tylenol and ibuprofen alternating every 4-6 hours as directed for fever    Follow up with your PCP      If your condition worsens or fails to improve we recommend that you receive another evaluation at the ER immediately or contact your PCP to discuss your concerns or return here. You must understand that you've received an urgent care treatment only and that you may be released before all your medical problems are known or treated. You the patient will arrange for followup care as instructed.       If you had cultures done it will take 3-5 days to result. We will call you with the result.      If you are are female and on BCP use additional methods to prevent pregnancy while on the antibiotics and for one cycle after.

## 2022-04-25 NOTE — PATIENT INSTRUCTIONS
IF YOU ARE UNABLE TO KEEP YOUR MEDICATION DOWN OR IF YOU ARE UNABLE TO GET FEVER UNDER CONTROL, GO TO THE ER IMMEDIATELY      Take antibiotics to completion as prescribed  Take anti-nausea medication prescribed as needed for nausea  Force oral fluids  Tylenol and ibuprofen alternating every 4-6 hours as directed for fever    Follow up with your PCP      If your condition worsens or fails to improve we recommend that you receive another evaluation at the ER immediately or contact your PCP to discuss your concerns or return here. You must understand that you've received an urgent care treatment only and that you may be released before all your medical problems are known or treated. You the patient will arrange for followup care as instructed.       If you had cultures done it will take 3-5 days to result. We will call you with the result.      If you are are female and on BCP use additional methods to prevent pregnancy while on the antibiotics and for one cycle after.

## 2022-04-27 LAB — BACTERIA UR CULT: NO GROWTH

## 2022-05-05 ENCOUNTER — TELEPHONE (OUTPATIENT)
Dept: SPORTS MEDICINE | Facility: CLINIC | Age: 57
End: 2022-05-05
Payer: COMMERCIAL

## 2022-05-05 DIAGNOSIS — M25.561 PAIN IN BOTH KNEES, UNSPECIFIED CHRONICITY: Primary | ICD-10-CM

## 2022-05-05 DIAGNOSIS — M25.562 PAIN IN BOTH KNEES, UNSPECIFIED CHRONICITY: Primary | ICD-10-CM

## 2022-05-05 NOTE — TELEPHONE ENCOUNTER
Spoke to the Pt about her upcoming appt.  Informed her that the Dr is wanting some images for the appt.  Asked if she can come about 20 mins before for and she confirmed.

## 2022-05-06 ENCOUNTER — PATIENT MESSAGE (OUTPATIENT)
Dept: INTERNAL MEDICINE | Facility: CLINIC | Age: 57
End: 2022-05-06
Payer: COMMERCIAL

## 2022-05-06 DIAGNOSIS — E66.3 OVERWEIGHT: Primary | ICD-10-CM

## 2022-05-06 NOTE — TELEPHONE ENCOUNTER
I have not had a patient try it yet to see if it is effective. Our bariatric med docs may know more; would she like a referral to them. If so, I entered it just in case

## 2022-05-07 ENCOUNTER — PATIENT MESSAGE (OUTPATIENT)
Dept: INTERNAL MEDICINE | Facility: CLINIC | Age: 57
End: 2022-05-07
Payer: COMMERCIAL

## 2022-05-08 NOTE — TELEPHONE ENCOUNTER
Unfortunately contrave is a weight loss drug, I don't prescribe any weight loss meds as you must be seen frequently for this type of medication; our bariatric team will prescribe though; they will not discuss any surgery as she certainly does not need surgery but they will certainly talk about any medication options

## 2022-05-10 ENCOUNTER — TELEPHONE (OUTPATIENT)
Dept: INTERNAL MEDICINE | Facility: CLINIC | Age: 57
End: 2022-05-10
Payer: COMMERCIAL

## 2022-05-10 ENCOUNTER — PATIENT MESSAGE (OUTPATIENT)
Dept: INTERNAL MEDICINE | Facility: CLINIC | Age: 57
End: 2022-05-10
Payer: COMMERCIAL

## 2022-05-10 NOTE — TELEPHONE ENCOUNTER
"----- Message from Ana Bond sent at 5/9/2022  3:05 PM CDT -----  Regarding: RE: tashia  The initial visit has to be scheduled with financial services before the patient can be seen by a provider. So that the  will tell the patient what her insurance will cover and if she has any out of pocket cost.   ----- Message -----  From: Caroline Coy LPN  Sent: 5/9/2022  10:02 AM CDT  To: [facility] Referral Coordinators  Subject: tashia                                         Referral to bariatric medicine.  However, the pt was scheduled with "Financial services" instead on 7/15/22.  Please assist pt with scheduling this consult.  Thanks       "

## 2022-05-10 NOTE — PROGRESS NOTES
Subjective:     Twila Roman     Chief Complaint   Patient presents with    Right Knee - Pain         HPI    Twila is a 56 y.o. female coming in today for right knee pain that began about 7-8 month(s) ago, referred by self. Pt. describes the pain as a 6/10 burning pain that does not radiate. There was not a fall/injury/ or trauma associated with the onset of symptoms. The pain is better with walking and worse with flexion, stairs, at rest. Has tried ice/heat, NSAIDs and tylenol without relief. Pt. Denies any other musculoskeletal complaints at this time. Hx of left knee meniscectomy several years ago. Hx of right sided sciatica and lumbar discectomy.     Joint instability? Feels unstable with standing  Mechanical locking/clicking? + popping, catching  Affecting ADL's? yes   Affecting sleep? yes    Occupation:     Review of Systems   Constitutional: Negative for chills and fever.   HENT: Negative for hearing loss and tinnitus.    Eyes: Negative for blurred vision and photophobia.   Respiratory: Negative for cough and shortness of breath.    Cardiovascular: Negative for chest pain and leg swelling.   Gastrointestinal: Negative for abdominal pain, heartburn, nausea and vomiting.   Genitourinary: Negative for dysuria and hematuria.   Musculoskeletal: Positive for joint pain. Negative for back pain, falls, myalgias and neck pain.   Skin: Negative for rash.   Neurological: Negative for dizziness, tingling, focal weakness, weakness and headaches.   Endo/Heme/Allergies: Negative for environmental allergies. Does not bruise/bleed easily.   Psychiatric/Behavioral: Negative for depression. The patient is not nervous/anxious.          PAST MEDICAL HISTORY:   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      PAST SURGICAL HISTORY:   Past Surgical History:   Procedure Laterality Date    BACK SURGERY      spinal fusion     COLONOSCOPY N/A 2019    Procedure: COLONOSCOPY;  Surgeon: Caroline Swartz MD;  [facility] ENDO (4TH FLR);  Service: Endoscopy;  Laterality: N/A;    KNEE ARTHROSCOPY W/ MENISCECTOMY Left 8/15/2018    Procedure: ARTHROSCOPY, KNEE, WITH MENISCECTOMY;  Surgeon: Handy Elkins MD;  Location: [facility] OR;  Service: Orthopedics;  Laterality: Left;  Video    ROBOT-ASSISTED LAPAROSCOPIC ABDOMINAL HYSTERECTOMY USING DA MARIA ESTHER XI N/A 10/20/2020    Procedure: XI ROBOTIC HYSTERECTOMY;  Surgeon: Danie Burns MD;  Location: [facility] OR;  Service: OB/GYN;  Laterality: N/A;    ROBOT-ASSISTED LAPAROSCOPIC SALPINGO-OOPHORECTOMY USING DA MARIA ESTHER XI Bilateral 10/20/2020    Procedure: XI ROBOTIC SALPINGO-OOPHORECTOMY;  Surgeon: Danie Burns MD;  Location: [facility] OR;  Service: OB/GYN;  Laterality: Bilateral;    TRANSFORAMINAL EPIDURAL INJECTION OF STEROID Right 2019    Procedure: INJECTION, STEROID, EPIDURAL, TRANSFORAMINAL APPROACH, L4 AND L5;  Surgeon: Daniel Seo MD;  Location: [facility] PAIN MGT;  Service: Pain Management;  Laterality: Right;    TRANSFORAMINAL EPIDURAL INJECTION OF STEROID Right 3/16/2021    Procedure: INJECTION, STEROID, EPIDURAL, TRANSFORAMINAL APPROACH L4/5 AND L5/S1;  Surgeon: Daniel Seo MD;  Location: [facility] PAIN MGT;  Service: Pain Management;  Laterality: Right;    TUMOR REMOVAL      right hand     FAMILY HISTORY:   Family History   Problem Relation Age of Onset    Ovarian cancer Mother 66         at 72    Heart disease Father         double bypass    Macular degeneration Father     Heart failure Father     No Known Problems Brother     No Known Problems Brother     Cancer Paternal Grandfather         lung (pt thinks), possible smoker    Breast cancer Paternal Cousin 20        father's sister's daughter, pt thinks bilateral, cousin is     Other Maternal Aunt         mat aunt had part of colon removed, pt does not think cancer    Breast cancer Maternal  Aunt      SOCIAL HISTORY:   Social History     Socioeconomic History    Marital status:    Occupational History    Occupation:      Employer: [employer]    Tobacco Use    Smoking status: Former Smoker    Smokeless tobacco: Never Used   Substance and Sexual Activity    Alcohol use: Yes     Alcohol/week: 5.0 standard drinks     Types: 5 Glasses of wine per week     Comment: one a day- red wine    Drug use: No    Sexual activity: Yes     Partners: Male     Comment: Boyfriend      Social Determinants of Health     Financial Resource Strain: Low Risk     Difficulty of Paying Living Expenses: Not hard at all   Food Insecurity: No Food Insecurity    Worried About Running Out of Food in the Last Year: Never true    Ran Out of Food in the Last Year: Never true   Transportation Needs: No Transportation Needs    Lack of Transportation (Medical): No    Lack of Transportation (Non-Medical): No   Physical Activity: Insufficiently Active    Days of Exercise per Week: 2 days    Minutes of Exercise per Session: 20 min   Stress: Stress Concern Present    Feeling of Stress : To some extent   Social Connections: Unknown    Frequency of Communication with Friends and Family: Twice a week    Frequency of Social Gatherings with Friends and Family: Twice a week    Active Member of Clubs or Organizations: No    Attends Club or Organization Meetings: Never    Marital Status:    Housing Stability: Low Risk     Unable to Pay for Housing in the Last Year: No    Number of Places Lived in the Last Year: 1    Unstable Housing in the Last Year: No       MEDICATIONS:     Current Outpatient Medications:     biotin 5,000 mcg TbDL, Take 1 capsule by mouth once daily., Disp: , Rfl:     buPROPion (WELLBUTRIN XL) 150 MG TB24 tablet, Take 1 tablet (150 mg total) by mouth once daily., Disp: 30 tablet, Rfl: 11    buPROPion (WELLBUTRIN XL) 300 MG 24 hr tablet, Take 1 tablet (300 mg total) by mouth once  "daily., Disp: 30 tablet, Rfl: 11    cholecalciferol, vitamin D3, (VITAMIN D3) 50 mcg (2,000 unit) Tab, Take 1 tablet by mouth once daily., Disp: , Rfl:     LORazepam (ATIVAN) 1 MG tablet, TAKE 1 TABLET BY MOUTH EVERY 12 HOURS AS NEEDED FOR ANXIETY, Disp: 60 tablet, Rfl: 1    metoprolol succinate (TOPROL-XL) 25 MG 24 hr tablet, Take 1 tablet (25 mg total) by mouth once daily., Disp: 90 tablet, Rfl: 3    montelukast (SINGULAIR) 10 mg tablet, Take 1 tablet (10 mg total) by mouth every evening., Disp: 30 tablet, Rfl: 11    multivitamin (THERAGRAN) per tablet, Take 1 tablet by mouth once daily., Disp: , Rfl:     azithromycin (ZITHROMAX Z-BUZZ) 250 MG tablet, Take 2 tablets on day 1, then 1 tablet on days 2-5. (Patient not taking: No sig reported), Disp: 6 tablet, Rfl: 0    citalopram (CELEXA) 10 MG tablet, TAKE 1 TABLET BY MOUTH EVERY DAY (Patient not taking: Reported on 5/11/2022), Disp: 30 tablet, Rfl: 6    econazole nitrate 1 % cream, Use bid, Disp: 30 g, Rfl: 2    FLAXSEED OIL MISC, by Misc.(Non-Drug; Combo Route) route., Disp: , Rfl:     fluticasone propionate (FLONASE) 50 mcg/actuation nasal spray, 2 sprays (100 mcg total) by Each Nostril route once daily. (Patient not taking: Reported on 5/11/2022), Disp: 16 g, Rfl: 11    meloxicam (MOBIC) 15 MG tablet, Take 1 tablet (15 mg total) by mouth once daily., Disp: 30 tablet, Rfl: 0    neomycin-polymyxin-hydrocortisone (CORTISPORIN) 3.5-10,000-1 mg/mL-unit/mL-% otic suspension, SHAKE LIQUID AND INSTILL 4 DROPS TO AFFECTED EAR TWICE DAILY FOR 7 DAYS, Disp: , Rfl:     selenium sulfide 2.25 % Sham, Use  qweek, Disp: 180 mL, Rfl: 5  ALLERGIES:   Review of patient's allergies indicates:  No Known Allergies    Objective:     VITAL SIGNS: /72   Ht 5' 5" (1.651 m)   Wt 76.7 kg (169 lb)   LMP 04/19/2015 (Approximate)   BMI 28.12 kg/m²    General    Nursing note and vitals reviewed.  Constitutional: She is oriented to person, place, and time. She appears " well-developed and well-nourished.   HENT:   Head: Normocephalic and atraumatic.   no nasal discharge, no external ear redness or discharge   Eyes:   EOM is full and smooth  No eye redness or discharge   Neck: Neck supple. No tracheal deviation present.   Cardiovascular: Normal rate.    2+ Radial pulse bilaterally  2+ Dorsalis Pedis pulse bilaterally  No LE edema appreciated   Pulmonary/Chest: Effort normal. No respiratory distress.   Abdominal: She exhibits no distension.   No rigidity   Neurological: She is alert and oriented to person, place, and time. She exhibits normal muscle tone. Coordination normal.   See details below   Psychiatric: She has a normal mood and affect. Her behavior is normal.                 MUSCULOSKELETAL EXAM    right KNEE EXAMINATION   Affected side is compared to contralateral knee     Observation:  No edema, erythema, ecchymosis, or effusion noted.  No muscle atrophy of the thighs and calves noted.  No obvious bony deformities noted.   No Genu valgus/varum noted.  No recurvatum noted.    No tibial internal/external torsion.    Posture:  Posterior pelvis tilt with loss of lumbar lordosis  Gait: Non-antalgic with Neutral ankle mechanics and Neutral medial arch    Tenderness:  Patella - none    Lateral joint line - +  Quad tendon - none   Medial joint line - none  Patellar tendon - none   Medial plica - none  Tibial tubercle - none   Lateral plica - none  Pes anserine - none   MCL prox - none  Distal ITB - none   MCL distal - none  MFC - none    LCL prox - none  LFC - none    LCL distal - none  Tibia - none    Fibula - none    No obvious bursae, plicae, popliteal cysts, or tendon derangement palpated.          ROM (* = with pain):   Active extension to 0° on left without hyperextension, lag, crepitus, or patellar J sign.   Active extension to 0° on right without hyperextension, lag, crepitus, or patellar J sign.  Active flexion to 130° on left* and 135° on right    Strength(* = with  pain):  Knee Flexion - 5/5 on left and 5/5 on right  Knee Extension - 5/5 on left and 5/5 on right  Hip Flexion - 5/5 on left and 5/5 on right  Hip Extension - 5/5 on left and 5/5 on right  Ankle dorsiflexion - 5/5 on left and 5/5 on right  Ankle Plantarflexion - 5/5 on left and 5/5 on right    Patellofemoral Exam:   Patellar ballottement - negative  Bulge sign - negative  Patellar grind - negative    No patellar laxity with medial and lateral translation   No apprehension with medial and lateral patellar translation.     Meniscus Testing:     + pain with terminal flexion at lateral joint line.  Skylers test -  positive  Thesaly test - positive  Bounce home test - positive    Ligament Testing:  Lachman's test - negative  No laxity with anterior drawer.   No laxity with posterior drawer.    No posterior sag sign.   No laxity with varus testing at 0 and 30 degrees.  No laxity with valgus testing at 0 and 30 degrees.    IT band testing:  Noble Compression test - negative    Neurovascular Examination:   Normal gait without antalgia.  Sensation intact to light touch in the obturator, lateral/intermediate/medial/posterior femoral cutaneous, saphenous, and common peroneal nerves bilaterally.  Motor Function:    Fully intact motor function at hip, knee, foot and ankle.  DTRs: 2+/4 reflexes at L4 and S1 dermatomes.   Negative seated straight leg raise bilaterally.    Pulses intact at the DP and PT arteries bilaterally.    Capillary refill intact <2 seconds in all toes bilaterally.    IMAGIN. X-ray ordered due to right knee pain. (AP bilateral standing, PA bilateral standing in flexion, bilateral merchants, and  bilateral lateral views) taken today.   2. X-ray images were reviewed personally by me and then directly with patient.  3. FINDINGS: X-ray images obtained demonstrate little joint space narrowing can be seen bilaterally.  Bony structures are intact.  No joint effusion is seen in either knee.  4. IMPRESSION:  Kellgren-Ravi grade 2 OA bilaterally.         Assessment:      Encounter Diagnoses   Name Primary?    Mechanical knee pain, right Yes    Chronic pain of right knee           Plan:   1.  Persistent right knee pain with associated mechanical symptoms and lateral joint line pain concerning for a right lateral meniscus tear.  Right knee MRI ordered for further evaluation.  - Rx given for Meloxicam 15 mg po qday x 14 days then prn for pain control. Pt. Advised to avoid all other NSAIDS while on this medication.  -   Recommend ice of 20 minutes at a time as needed for pain control  -  X-ray images of right knee taken today (AP bilateral standing, PA bilateral standing in flexion, bilateral merchants, and  bilateral lateral views) showed Kellgren-Ravi grade 2 OA bilaterally. Images were personally reviewed with patient.    2. Follow-up in 1-2 weeks for reevaluation  And review of right knee MRI via virtual visit    3. Patient agreeable to today's plan and all questions were answered    This note is dictated using the M*Modal Fluency Direct word recognition program. There are word recognition mistakes that are occasionally missed on review.

## 2022-05-11 ENCOUNTER — HOSPITAL ENCOUNTER (OUTPATIENT)
Dept: RADIOLOGY | Facility: HOSPITAL | Age: 57
Discharge: HOME | End: 2022-05-11
Attending: NEUROMUSCULOSKELETAL MEDICINE & OMM
Payer: COMMERCIAL

## 2022-05-11 ENCOUNTER — OFFICE VISIT (OUTPATIENT)
Dept: SPORTS MEDICINE | Facility: CLINIC | Age: 57
End: 2022-05-11
Payer: COMMERCIAL

## 2022-05-11 VITALS
WEIGHT: 169 LBS | HEIGHT: 65 IN | DIASTOLIC BLOOD PRESSURE: 72 MMHG | SYSTOLIC BLOOD PRESSURE: 100 MMHG | BODY MASS INDEX: 28.16 KG/M2

## 2022-05-11 DIAGNOSIS — M25.562 PAIN IN BOTH KNEES, UNSPECIFIED CHRONICITY: ICD-10-CM

## 2022-05-11 DIAGNOSIS — M25.561 PAIN IN BOTH KNEES, UNSPECIFIED CHRONICITY: ICD-10-CM

## 2022-05-11 DIAGNOSIS — M25.561 MECHANICAL KNEE PAIN, RIGHT: Primary | ICD-10-CM

## 2022-05-11 DIAGNOSIS — G89.29 CHRONIC PAIN OF RIGHT KNEE: ICD-10-CM

## 2022-05-11 DIAGNOSIS — M25.561 CHRONIC PAIN OF RIGHT KNEE: ICD-10-CM

## 2022-05-11 PROCEDURE — 73564 X-RAY EXAM KNEE 4 OR MORE: CPT | Mod: 26,,, | Performed by: RADIOLOGY

## 2022-05-11 PROCEDURE — 73564 XR KNEE ORTHO BILAT WITH FLEXION: ICD-10-PCS | Mod: 26,,, | Performed by: RADIOLOGY

## 2022-05-11 PROCEDURE — 3074F PR MOST RECENT SYSTOLIC BLOOD PRESSURE < 130 MM HG: ICD-10-PCS | Mod: CPTII,S$GLB,, | Performed by: NEUROMUSCULOSKELETAL MEDICINE & OMM

## 2022-05-11 PROCEDURE — 3008F PR BODY MASS INDEX (BMI) DOCUMENTED: ICD-10-PCS | Mod: CPTII,S$GLB,, | Performed by: NEUROMUSCULOSKELETAL MEDICINE & OMM

## 2022-05-11 PROCEDURE — 1160F PR REVIEW ALL MEDS BY PRESCRIBER/CLIN PHARMACIST DOCUMENTED: ICD-10-PCS | Mod: CPTII,S$GLB,, | Performed by: NEUROMUSCULOSKELETAL MEDICINE & OMM

## 2022-05-11 PROCEDURE — 99204 OFFICE O/P NEW MOD 45 MIN: CPT | Mod: S$GLB,,, | Performed by: NEUROMUSCULOSKELETAL MEDICINE & OMM

## 2022-05-11 PROCEDURE — 1159F PR MEDICATION LIST DOCUMENTED IN MEDICAL RECORD: ICD-10-PCS | Mod: CPTII,S$GLB,, | Performed by: NEUROMUSCULOSKELETAL MEDICINE & OMM

## 2022-05-11 PROCEDURE — 3078F PR MOST RECENT DIASTOLIC BLOOD PRESSURE < 80 MM HG: ICD-10-PCS | Mod: CPTII,S$GLB,, | Performed by: NEUROMUSCULOSKELETAL MEDICINE & OMM

## 2022-05-11 PROCEDURE — 3008F BODY MASS INDEX DOCD: CPT | Mod: CPTII,S$GLB,, | Performed by: NEUROMUSCULOSKELETAL MEDICINE & OMM

## 2022-05-11 PROCEDURE — 1160F RVW MEDS BY RX/DR IN RCRD: CPT | Mod: CPTII,S$GLB,, | Performed by: NEUROMUSCULOSKELETAL MEDICINE & OMM

## 2022-05-11 PROCEDURE — 3074F SYST BP LT 130 MM HG: CPT | Mod: CPTII,S$GLB,, | Performed by: NEUROMUSCULOSKELETAL MEDICINE & OMM

## 2022-05-11 PROCEDURE — 99204 PR OFFICE/OUTPT VISIT, NEW, LEVL IV, 45-59 MIN: ICD-10-PCS | Mod: S$GLB,,, | Performed by: NEUROMUSCULOSKELETAL MEDICINE & OMM

## 2022-05-11 PROCEDURE — 73564 X-RAY EXAM KNEE 4 OR MORE: CPT | Mod: TC,50,PO

## 2022-05-11 PROCEDURE — 3078F DIAST BP <80 MM HG: CPT | Mod: CPTII,S$GLB,, | Performed by: NEUROMUSCULOSKELETAL MEDICINE & OMM

## 2022-05-11 PROCEDURE — 99999 PR PBB SHADOW E&M-EST. PATIENT-LVL III: ICD-10-PCS | Mod: PBBFAC,,, | Performed by: NEUROMUSCULOSKELETAL MEDICINE & OMM

## 2022-05-11 PROCEDURE — 99999 PR PBB SHADOW E&M-EST. PATIENT-LVL III: CPT | Mod: PBBFAC,,, | Performed by: NEUROMUSCULOSKELETAL MEDICINE & OMM

## 2022-05-11 PROCEDURE — 1159F MED LIST DOCD IN RCRD: CPT | Mod: CPTII,S$GLB,, | Performed by: NEUROMUSCULOSKELETAL MEDICINE & OMM

## 2022-05-11 RX ORDER — MELOXICAM 15 MG/1
15 TABLET ORAL DAILY
Qty: 30 TABLET | Refills: 0 | Status: SHIPPED | OUTPATIENT
Start: 2022-05-11 | End: 2022-06-06

## 2022-05-25 ENCOUNTER — HOSPITAL ENCOUNTER (OUTPATIENT)
Dept: RADIOLOGY | Facility: HOSPITAL | Age: 57
Discharge: HOME | End: 2022-05-25
Attending: NEUROMUSCULOSKELETAL MEDICINE & OMM
Payer: COMMERCIAL

## 2022-05-25 DIAGNOSIS — M25.561 MECHANICAL KNEE PAIN, RIGHT: ICD-10-CM

## 2022-05-25 DIAGNOSIS — M25.561 CHRONIC PAIN OF RIGHT KNEE: ICD-10-CM

## 2022-05-25 DIAGNOSIS — G89.29 CHRONIC PAIN OF RIGHT KNEE: ICD-10-CM

## 2022-05-25 PROCEDURE — 73721 MRI JNT OF LWR EXTRE W/O DYE: CPT | Mod: 26,RT,, | Performed by: RADIOLOGY

## 2022-05-25 PROCEDURE — 73721 MRI JNT OF LWR EXTRE W/O DYE: CPT | Mod: TC,RT

## 2022-05-25 PROCEDURE — 73721 MRI KNEE WITHOUT CONTRAST RIGHT: ICD-10-PCS | Mod: 26,RT,, | Performed by: RADIOLOGY

## 2022-05-26 NOTE — PROGRESS NOTES
Subjective:     Twila Roman    The patient location is: home  The chief complaint leading to consultation is: right knee pain, MRI results    Visit type: audiovisual    Face to Face time with patient: 15  20 minutes of total time spent on the encounter, which includes face to face time and non-face to face time preparing to see the patient (eg, review of tests), Obtaining and/or reviewing separately obtained history, Documenting clinical information in the electronic or other health record, Independently interpreting results (not separately reported) and communicating results to the patient/family/caregiver, or Care coordination (not separately reported).     Each patient to whom he or she provides medical services by telemedicine is:  (1) informed of the relationship between the physician and patient and the respective role of any other health care provider with respect to management of the patient; and (2) notified that he or she may decline to receive medical services by telemedicine and may withdraw from such care at any time.    Notes:     Twila is a 56 y.o. female coming in today for right knee pain. Since last visit the pain has remained unchanged. The pain is better with walking and worse with flexion, stairs, at rest. Pt. describes the pain as a 4/10 achy pain that does not radiate, but can get up to a 8/10 with knee flexion. There has not been any new a fall/injury/ or traumas since last visit.  Pt. denies any new musculoskeletal complaints at this time.     Office note from 5/11/22 reviewed    Joint instability?no  Mechanical locking/clicking?  + popping, catching  Affecting ADL's? yes  Affecting sleep? yes    Review of Systems   Constitutional: Negative for chills and fever.   Musculoskeletal: Positive for joint pain. Negative for back pain, falls, myalgias and neck pain.   Neurological: Negative for dizziness, tingling, focal weakness, weakness and headaches.       PAST MEDICAL HISTORY:   Past  Medical History:   Diagnosis Date    Anxiety     Bronchitis     Family history of ovarian cancer     Hypertension     Mutation in HOXB13 gene      PAST SURGICAL HISTORY:   Past Surgical History:   Procedure Laterality Date    BACK SURGERY      spinal fusion    COLONOSCOPY N/A 12/11/2019    Procedure: COLONOSCOPY;  Surgeon: Caroline Swartz MD;  Location: [address];  Service: Endoscopy;  Laterality: N/A;    KNEE ARTHROSCOPY W/ MENISCECTOMY Left 8/15/2018    Procedure: ARTHROSCOPY, KNEE, WITH MENISCECTOMY;  Surgeon: Handy Elkins MD;  Location: [facility];  Service: Orthopedics;  Laterality: Left;  Video    ROBOT-ASSISTED LAPAROSCOPIC ABDOMINAL HYSTERECTOMY USING DA MARIA ESTHER XI N/A 10/20/2020    Procedure: XI ROBOTIC HYSTERECTOMY;  Surgeon: Danie Burns MD;  Location: [facility];  Service: OB/GYN;  Laterality: N/A;    ROBOT-ASSISTED LAPAROSCOPIC SALPINGO-OOPHORECTOMY USING DA MARIA ESTHER XI Bilateral 10/20/2020    Procedure: XI ROBOTIC SALPINGO-OOPHORECTOMY;  Surgeon: Danie Burns MD;  Location: [facility];  Service: OB/GYN;  Laterality: Bilateral;    TRANSFORAMINAL EPIDURAL INJECTION OF STEROID Right 7/18/2019    Procedure: INJECTION, STEROID, EPIDURAL, TRANSFORAMINAL APPROACH, L4 AND L5;  Surgeon: Daniel Seo MD;  Location: [facility];  Service: Pain Management;  Laterality: Right;    TRANSFORAMINAL EPIDURAL INJECTION OF STEROID Right 3/16/2021    Procedure: INJECTION, STEROID, EPIDURAL, TRANSFORAMINAL APPROACH L4/5 AND L5/S1;  Surgeon: Daniel Seo MD;  Location: [facility];  Service: Pain Management;  Laterality: Right;    TUMOR REMOVAL      right hand       MEDICATIONS:   Current Outpatient Medications:     azithromycin (ZITHROMAX Z-BUZZ) 250 MG tablet, Take 2 tablets on day 1, then 1 tablet on days 2-5. (Patient not taking: No sig reported), Disp: 6 tablet, Rfl: 0    biotin 5,000 mcg TbDL, Take 1 capsule by mouth once daily., Disp: , Rfl:     buPROPion (WELLBUTRIN XL) 150 MG  TB24 tablet, Take 1 tablet (150 mg total) by mouth once daily., Disp: 30 tablet, Rfl: 11    buPROPion (WELLBUTRIN XL) 300 MG 24 hr tablet, Take 1 tablet (300 mg total) by mouth once daily., Disp: 30 tablet, Rfl: 11    cholecalciferol, vitamin D3, (VITAMIN D3) 50 mcg (2,000 unit) Tab, Take 1 tablet by mouth once daily., Disp: , Rfl:     citalopram (CELEXA) 10 MG tablet, TAKE 1 TABLET BY MOUTH EVERY DAY (Patient not taking: Reported on 5/11/2022), Disp: 30 tablet, Rfl: 6    econazole nitrate 1 % cream, Use bid, Disp: 30 g, Rfl: 2    FLAXSEED OIL MISC, by Misc.(Non-Drug; Combo Route) route., Disp: , Rfl:     fluticasone propionate (FLONASE) 50 mcg/actuation nasal spray, 2 sprays (100 mcg total) by Each Nostril route once daily. (Patient not taking: Reported on 5/11/2022), Disp: 16 g, Rfl: 11    LORazepam (ATIVAN) 1 MG tablet, TAKE 1 TABLET BY MOUTH EVERY 12 HOURS AS NEEDED FOR ANXIETY, Disp: 60 tablet, Rfl: 1    meloxicam (MOBIC) 15 MG tablet, Take 1 tablet (15 mg total) by mouth once daily., Disp: 30 tablet, Rfl: 0    metoprolol succinate (TOPROL-XL) 25 MG 24 hr tablet, Take 1 tablet (25 mg total) by mouth once daily., Disp: 90 tablet, Rfl: 3    montelukast (SINGULAIR) 10 mg tablet, Take 1 tablet (10 mg total) by mouth every evening., Disp: 30 tablet, Rfl: 11    multivitamin (THERAGRAN) per tablet, Take 1 tablet by mouth once daily., Disp: , Rfl:     neomycin-polymyxin-hydrocortisone (CORTISPORIN) 3.5-10,000-1 mg/mL-unit/mL-% otic suspension, SHAKE LIQUID AND INSTILL 4 DROPS TO AFFECTED EAR TWICE DAILY FOR 7 DAYS, Disp: , Rfl:     selenium sulfide 2.25 % Sham, Use  qweek, Disp: 180 mL, Rfl: 5  ALLERGIES: Review of patient's allergies indicates:  No Known Allergies      Objective:     VITAL SIGNS: LMP 04/19/2015 (Approximate)    General    Vitals reviewed.  Constitutional: She is oriented to person, place, and time. She appears well-developed and well-nourished.   Neurological: She is alert and oriented to  person, place, and time.   Psychiatric: She has a normal mood and affect. Her behavior is normal.               MUSCULOSKELETAL EXAM    right KNEE EXAMINATION   Affected side is compared to contralateral knee     Observation:  No edema, erythema, ecchymosis, or effusion noted.  No muscle atrophy of the thighs and calves noted.  No obvious bony deformities noted.   No Genu valgus/varum noted.  No recurvatum noted.    No tibial internal/external torsion.      Tenderness: Unable to assess with virtual visit, + subjective tenderness at the anterior knee         ROM (* = with pain):   Active extension to 0° on left without hyperextension.  Active extension to 0° on right without hyperextension.  Active flexion to 135° on left and 130° on right*    Strength(* = with pain): Unable to assess with virtual visit    Patellofemoral Exam: Unable to assess with virtual visit    Meniscus Testing:      No pain with terminal extension and flexion at joint lines.    Ligament Testing: Unable to assess with virtual visit    IT band testing: Unable to assess with virtual visit    Neurovascular Examination:   Motor Function:    Fully intact motor function at hip, knee, foot and ankle.  No subjective numbness or tingling noted    IMAGIN. MRI ordered due to right knee pain taken 22.   2. MRI images were reviewed personally by me and then directly with patient.  3. FINDINGS:   Menisci: Medial meniscus vertical longitudinal tear at the junction of the body segment and posterior horn.  No tear of the lateral meniscus.  Ligaments:  ACL, PCL, MCL, and LCL complex are intact.  Tendons:  Extensor mechanism is maintained.  Cartilage:  Patellofemoral: Partial to full-thickness chondral fissuring throughout the patella with mild subchondral marrow edema.  Full-thickness cartilage loss of the central trochlea measuring 1.0 x 0.4 cm with mild subchondral marrow edema.  Medial tibiofemoral: Articular cartilage is maintained.  Lateral  tibiofemoral: Articular cartilage is maintained.  Bone: No fracture, osteonecrosis or marrow replacing process.  Miscellaneous: Trace joint fluid.  4. IMPRESSION:   1. Medial meniscus vertical longitudinal tear at the junction of the body segment and posterior horn.  No tear of the lateral meniscus.  2. Patellofemoral chondromalacia (Full-thickness cartilage loss of the central trochlea measuring 1.0 x 0.4 cm with mild subchondral marrow edema)    Assessment:      Encounter Diagnoses   Name Primary?    Chronic pain of right knee Yes    Chondromalacia of patellofemoral joint, right     Other tear of medial meniscus of right knee, unspecified whether old or current tear, initial encounter           Plan:   1.  Persistent right knee pain with recent right knee MRI noting a medial meniscus tear and patellofemoral chondromalacia.   - discussed with patient options of a ultrasound-guided corticosteroid injection versus PRP injection and formal physical therapy as next steps and conservative therapy.  The patient like to proceed with a CSI and PT.  - consider viscosupplementation injections versus PRP injection as conservative treatment next step if symptoms persist vs. Referral to orthopedic surgery for knee arthroscopy.   - Referral to outpatient PT ([facility]) increase pelvic stability and neuromuscular retraining  - Continue Meloxicam 15 mg po qday prn for pain control. Pt. Advised to avoid all other NSAIDS while on this medication.  -   Recommend ice of 20 minutes at a time as needed for pain control  -  X-ray images of right knee taken 5/11/22 (AP bilateral standing, PA bilateral standing in flexion, bilateral merchants, and  bilateral lateral views) showed Kellgren-Ravi grade 2 OA bilaterally. Images were personally reviewed with patient.  - MRI  images of right knee taken 5/25/22 showed Medial meniscus vertical longitudinal tear at the junction of the body segment and posterior horn.  No tear of the  lateral meniscus. Patellofemoral chondromalacia (Full-thickness cartilage loss of the central trochlea measuring 1.0 x 0.4 cm with mild subchondral marrow edema). Images were personally reviewed with patient.    2. Follow-up in 1 weeks for right knee US guided CSI.      3. Patient agreeable to today's plan and all questions were answered     This note is dictated using the M*Modal Fluency Direct word recognition program. There are word recognition mistakes that are occasionally missed on review.

## 2022-05-27 ENCOUNTER — OFFICE VISIT (OUTPATIENT)
Dept: SPORTS MEDICINE | Facility: CLINIC | Age: 57
End: 2022-05-27
Payer: COMMERCIAL

## 2022-05-27 DIAGNOSIS — G89.29 CHRONIC PAIN OF RIGHT KNEE: Primary | ICD-10-CM

## 2022-05-27 DIAGNOSIS — M22.41 CHONDROMALACIA OF PATELLOFEMORAL JOINT, RIGHT: ICD-10-CM

## 2022-05-27 DIAGNOSIS — S83.241A OTHER TEAR OF MEDIAL MENISCUS OF RIGHT KNEE, UNSPECIFIED WHETHER OLD OR CURRENT TEAR, INITIAL ENCOUNTER: ICD-10-CM

## 2022-05-27 DIAGNOSIS — M25.561 CHRONIC PAIN OF RIGHT KNEE: Primary | ICD-10-CM

## 2022-05-27 PROCEDURE — 1160F RVW MEDS BY RX/DR IN RCRD: CPT | Mod: CPTII,95,, | Performed by: NEUROMUSCULOSKELETAL MEDICINE & OMM

## 2022-05-27 PROCEDURE — 1159F PR MEDICATION LIST DOCUMENTED IN MEDICAL RECORD: ICD-10-PCS | Mod: CPTII,95,, | Performed by: NEUROMUSCULOSKELETAL MEDICINE & OMM

## 2022-05-27 PROCEDURE — 1159F MED LIST DOCD IN RCRD: CPT | Mod: CPTII,95,, | Performed by: NEUROMUSCULOSKELETAL MEDICINE & OMM

## 2022-05-27 PROCEDURE — 99214 OFFICE O/P EST MOD 30 MIN: CPT | Mod: 95,,, | Performed by: NEUROMUSCULOSKELETAL MEDICINE & OMM

## 2022-05-27 PROCEDURE — 1160F PR REVIEW ALL MEDS BY PRESCRIBER/CLIN PHARMACIST DOCUMENTED: ICD-10-PCS | Mod: CPTII,95,, | Performed by: NEUROMUSCULOSKELETAL MEDICINE & OMM

## 2022-05-27 PROCEDURE — 99214 PR OFFICE/OUTPT VISIT, EST, LEVL IV, 30-39 MIN: ICD-10-PCS | Mod: 95,,, | Performed by: NEUROMUSCULOSKELETAL MEDICINE & OMM

## 2022-05-31 ENCOUNTER — PATIENT MESSAGE (OUTPATIENT)
Dept: SPORTS MEDICINE | Facility: CLINIC | Age: 57
End: 2022-05-31
Payer: COMMERCIAL

## 2022-06-01 NOTE — PROGRESS NOTES
"Subjective:     Twila Roman     Chief Complaint   Patient presents with    Follow-up         Twila is a 56 y.o. female coming in today for their ultrasound guided corticosteroid injection to the right knee as discussed at last virtual visit.     Objective:     VITAL SIGNS: /80   Ht 5' 5" (1.651 m)   Wt 76.7 kg (169 lb)   LMP 04/19/2015 (Approximate)   BMI 28.12 kg/m²       Large Joint Aspiration/Injection  Knee joint, right    Performed by: GIUSEPPE DAHL  Authorized by: GIUSEPPE DAHL  Consent Done?: Yes (Verbal)  Indications: Pain  Site marked: The procedure site was marked   Timeout: Prior to procedure the correct patient, procedure, and site was verified     Location: Knee joint, right  Prep: Patient was prepped with Chlorhexidine and alcohol.  Skin anesthetic: Ethyl Chloride spray was used prior to skin puncture.  Ultrasound Guidance for needle placement: yes  Procedure: After local anesthetic was applied, the 21G, 2 needle was used to enter the right knee joint capsule under US guidance. A 3 cc mixture of 1 cc of 40 mg/ml triamcinolone acetonide and 2 cc of 0.2% Naropin was injected into the right knee joint.   Approach: superolateral  Medications: 40 mg triamcinolone acetonide 40 mg/mL  Patient tolerance: Patient tolerated the procedure well with no immediate complications    Ultrasound guidance was used for needle localization with SonoSite Edge 2, 9-L MHz linear probe(s). Images were saved and stored for documentation. The knee joint was visualized. Short and long axis images of the anterior right knee were taken prior to injection.Dynamic visualization of the needle(s) was continuous throughout the procedure and maintained good position and correct needle placement.      Triamcinolone:  NDC: 893320-5234-3  LOT: [id number]  EXP: 06/2023    Assessment:      Encounter Diagnoses   Name Primary?    Chronic pain of right knee Yes    Chondromalacia of patellofemoral joint, right     Other " tear of medial meniscus of right knee, unspecified whether old or current tear, subsequent encounter           Plan:     1. US guided Intra-articular corticosteroid injection into the right knee joint performed today (see details above) for chronic right knee pain with underlying patellofemoral chondromalacia and medial meniscus tear noted on recent MRI.   - patient scheduled to start formal physical therapy ([facility]) for increased knee stability, increase pelvic stability, neuromuscular retraining  - Continue Meloxicam 15 mg po qday prn for pain control. Pt. Advised to avoid all other NSAIDS while on this medication.  -   Recommend ice of 20 minutes at a time as needed for pain control  -  X-ray images of right knee taken 5/11/22 (AP bilateral standing, PA bilateral standing in flexion, bilateral merchants, and  bilateral lateral views) showed Kellgren-Ravi grade 2 OA bilaterally.   - MRI  images of right knee taken 5/25/22 showed Medial meniscus vertical longitudinal tear at the junction of the body segment and posterior horn.  No tear of the lateral meniscus. Patellofemoral chondromalacia (Full-thickness cartilage loss of the central trochlea measuring 1.0 x 0.4 cm with mild subchondral marrow edema).     2. Follow-up upon completion of PT if pain persist or deteriorates    3. Patient agreeable to today's plan and all questions were answered     This note is dictated using the M*Modal Fluency Direct word recognition program. There are word recognition mistakes that are occasionally missed on review.

## 2022-06-02 ENCOUNTER — OFFICE VISIT (OUTPATIENT)
Dept: SPORTS MEDICINE | Facility: CLINIC | Age: 57
End: 2022-06-02
Payer: COMMERCIAL

## 2022-06-02 VITALS
SYSTOLIC BLOOD PRESSURE: 110 MMHG | DIASTOLIC BLOOD PRESSURE: 80 MMHG | BODY MASS INDEX: 28.16 KG/M2 | WEIGHT: 169 LBS | HEIGHT: 65 IN

## 2022-06-02 DIAGNOSIS — S83.241D OTHER TEAR OF MEDIAL MENISCUS OF RIGHT KNEE, UNSPECIFIED WHETHER OLD OR CURRENT TEAR, SUBSEQUENT ENCOUNTER: ICD-10-CM

## 2022-06-02 DIAGNOSIS — M25.561 CHRONIC PAIN OF RIGHT KNEE: Primary | ICD-10-CM

## 2022-06-02 DIAGNOSIS — M22.41 CHONDROMALACIA OF PATELLOFEMORAL JOINT, RIGHT: ICD-10-CM

## 2022-06-02 DIAGNOSIS — G89.29 CHRONIC PAIN OF RIGHT KNEE: Primary | ICD-10-CM

## 2022-06-02 PROCEDURE — 1159F PR MEDICATION LIST DOCUMENTED IN MEDICAL RECORD: ICD-10-PCS | Mod: CPTII,S$GLB,, | Performed by: NEUROMUSCULOSKELETAL MEDICINE & OMM

## 2022-06-02 PROCEDURE — 3074F PR MOST RECENT SYSTOLIC BLOOD PRESSURE < 130 MM HG: ICD-10-PCS | Mod: CPTII,S$GLB,, | Performed by: NEUROMUSCULOSKELETAL MEDICINE & OMM

## 2022-06-02 PROCEDURE — 20611 PR DRAIN/ASP/INJECT MAJOR JOINT/BURSA W/US GUIDANCE: ICD-10-PCS | Mod: RT,S$GLB,, | Performed by: NEUROMUSCULOSKELETAL MEDICINE & OMM

## 2022-06-02 PROCEDURE — 1160F PR REVIEW ALL MEDS BY PRESCRIBER/CLIN PHARMACIST DOCUMENTED: ICD-10-PCS | Mod: CPTII,S$GLB,, | Performed by: NEUROMUSCULOSKELETAL MEDICINE & OMM

## 2022-06-02 PROCEDURE — 99999 PR PBB SHADOW E&M-EST. PATIENT-LVL III: ICD-10-PCS | Mod: PBBFAC,,, | Performed by: NEUROMUSCULOSKELETAL MEDICINE & OMM

## 2022-06-02 PROCEDURE — 3008F BODY MASS INDEX DOCD: CPT | Mod: CPTII,S$GLB,, | Performed by: NEUROMUSCULOSKELETAL MEDICINE & OMM

## 2022-06-02 PROCEDURE — 99499 NO LOS: ICD-10-PCS | Mod: S$GLB,,, | Performed by: NEUROMUSCULOSKELETAL MEDICINE & OMM

## 2022-06-02 PROCEDURE — 1159F MED LIST DOCD IN RCRD: CPT | Mod: CPTII,S$GLB,, | Performed by: NEUROMUSCULOSKELETAL MEDICINE & OMM

## 2022-06-02 PROCEDURE — 99999 PR PBB SHADOW E&M-EST. PATIENT-LVL III: CPT | Mod: PBBFAC,,, | Performed by: NEUROMUSCULOSKELETAL MEDICINE & OMM

## 2022-06-02 PROCEDURE — 3079F DIAST BP 80-89 MM HG: CPT | Mod: CPTII,S$GLB,, | Performed by: NEUROMUSCULOSKELETAL MEDICINE & OMM

## 2022-06-02 PROCEDURE — 99499 UNLISTED E&M SERVICE: CPT | Mod: S$GLB,,, | Performed by: NEUROMUSCULOSKELETAL MEDICINE & OMM

## 2022-06-02 PROCEDURE — 1160F RVW MEDS BY RX/DR IN RCRD: CPT | Mod: CPTII,S$GLB,, | Performed by: NEUROMUSCULOSKELETAL MEDICINE & OMM

## 2022-06-02 PROCEDURE — 3079F PR MOST RECENT DIASTOLIC BLOOD PRESSURE 80-89 MM HG: ICD-10-PCS | Mod: CPTII,S$GLB,, | Performed by: NEUROMUSCULOSKELETAL MEDICINE & OMM

## 2022-06-02 PROCEDURE — 20611 DRAIN/INJ JOINT/BURSA W/US: CPT | Mod: RT,S$GLB,, | Performed by: NEUROMUSCULOSKELETAL MEDICINE & OMM

## 2022-06-02 PROCEDURE — 3008F PR BODY MASS INDEX (BMI) DOCUMENTED: ICD-10-PCS | Mod: CPTII,S$GLB,, | Performed by: NEUROMUSCULOSKELETAL MEDICINE & OMM

## 2022-06-02 PROCEDURE — 3074F SYST BP LT 130 MM HG: CPT | Mod: CPTII,S$GLB,, | Performed by: NEUROMUSCULOSKELETAL MEDICINE & OMM

## 2022-06-02 RX ORDER — TRIAMCINOLONE ACETONIDE 40 MG/ML
40 INJECTION, SUSPENSION INTRA-ARTICULAR; INTRAMUSCULAR
Status: COMPLETED | OUTPATIENT
Start: 2022-06-02 | End: 2022-06-02

## 2022-06-02 RX ADMIN — TRIAMCINOLONE ACETONIDE 40 MG: 40 INJECTION, SUSPENSION INTRA-ARTICULAR; INTRAMUSCULAR at 12:06

## 2022-06-15 ENCOUNTER — TELEPHONE (OUTPATIENT)
Dept: BARIATRICS | Facility: CLINIC | Age: 57
End: 2022-06-15
Payer: COMMERCIAL

## 2022-06-15 NOTE — TELEPHONE ENCOUNTER
Notified patient of the date & time of financial phone call appt.  Pt aware appt is a telephone call.    Dashboard updated

## 2022-06-16 ENCOUNTER — TELEPHONE (OUTPATIENT)
Dept: BARIATRICS | Facility: CLINIC | Age: 57
End: 2022-06-16
Payer: COMMERCIAL

## 2022-06-17 ENCOUNTER — PATIENT MESSAGE (OUTPATIENT)
Dept: PAIN MEDICINE | Facility: CLINIC | Age: 57
End: 2022-06-17
Payer: COMMERCIAL

## 2022-06-20 ENCOUNTER — HOSPITAL ENCOUNTER (OUTPATIENT)
Dept: RADIOLOGY | Facility: OTHER | Age: 57
Discharge: HOME | End: 2022-06-20
Attending: NURSE PRACTITIONER
Payer: COMMERCIAL

## 2022-06-20 ENCOUNTER — OFFICE VISIT (OUTPATIENT)
Dept: PAIN MEDICINE | Facility: CLINIC | Age: 57
End: 2022-06-20
Payer: COMMERCIAL

## 2022-06-20 VITALS
BODY MASS INDEX: 27.44 KG/M2 | HEIGHT: 65 IN | SYSTOLIC BLOOD PRESSURE: 148 MMHG | TEMPERATURE: 98 F | HEART RATE: 90 BPM | WEIGHT: 164.69 LBS | DIASTOLIC BLOOD PRESSURE: 100 MMHG

## 2022-06-20 DIAGNOSIS — M54.16 LUMBAR RADICULOPATHY: Primary | ICD-10-CM

## 2022-06-20 DIAGNOSIS — M22.41 CHONDROMALACIA OF RIGHT PATELLA: ICD-10-CM

## 2022-06-20 DIAGNOSIS — M54.9 DORSALGIA, UNSPECIFIED: ICD-10-CM

## 2022-06-20 DIAGNOSIS — M54.16 LUMBAR RADICULOPATHY: ICD-10-CM

## 2022-06-20 PROCEDURE — 72114 X-RAY EXAM L-S SPINE BENDING: CPT | Mod: 26,,, | Performed by: RADIOLOGY

## 2022-06-20 PROCEDURE — 1159F MED LIST DOCD IN RCRD: CPT | Mod: CPTII,S$GLB,, | Performed by: NURSE PRACTITIONER

## 2022-06-20 PROCEDURE — 99214 PR OFFICE/OUTPT VISIT, EST, LEVL IV, 30-39 MIN: ICD-10-PCS | Mod: S$GLB,,, | Performed by: NURSE PRACTITIONER

## 2022-06-20 PROCEDURE — 1160F PR REVIEW ALL MEDS BY PRESCRIBER/CLIN PHARMACIST DOCUMENTED: ICD-10-PCS | Mod: CPTII,S$GLB,, | Performed by: NURSE PRACTITIONER

## 2022-06-20 PROCEDURE — 99999 PR PBB SHADOW E&M-EST. PATIENT-LVL V: ICD-10-PCS | Mod: PBBFAC,,, | Performed by: NURSE PRACTITIONER

## 2022-06-20 PROCEDURE — 99999 PR PBB SHADOW E&M-EST. PATIENT-LVL V: CPT | Mod: PBBFAC,,, | Performed by: NURSE PRACTITIONER

## 2022-06-20 PROCEDURE — 3077F PR MOST RECENT SYSTOLIC BLOOD PRESSURE >= 140 MM HG: ICD-10-PCS | Mod: CPTII,S$GLB,, | Performed by: NURSE PRACTITIONER

## 2022-06-20 PROCEDURE — 3080F DIAST BP >= 90 MM HG: CPT | Mod: CPTII,S$GLB,, | Performed by: NURSE PRACTITIONER

## 2022-06-20 PROCEDURE — 99214 OFFICE O/P EST MOD 30 MIN: CPT | Mod: S$GLB,,, | Performed by: NURSE PRACTITIONER

## 2022-06-20 PROCEDURE — 1160F RVW MEDS BY RX/DR IN RCRD: CPT | Mod: CPTII,S$GLB,, | Performed by: NURSE PRACTITIONER

## 2022-06-20 PROCEDURE — 72114 X-RAY EXAM L-S SPINE BENDING: CPT | Mod: TC,FY

## 2022-06-20 PROCEDURE — 3008F PR BODY MASS INDEX (BMI) DOCUMENTED: ICD-10-PCS | Mod: CPTII,S$GLB,, | Performed by: NURSE PRACTITIONER

## 2022-06-20 PROCEDURE — 3080F PR MOST RECENT DIASTOLIC BLOOD PRESSURE >= 90 MM HG: ICD-10-PCS | Mod: CPTII,S$GLB,, | Performed by: NURSE PRACTITIONER

## 2022-06-20 PROCEDURE — 1159F PR MEDICATION LIST DOCUMENTED IN MEDICAL RECORD: ICD-10-PCS | Mod: CPTII,S$GLB,, | Performed by: NURSE PRACTITIONER

## 2022-06-20 PROCEDURE — 3077F SYST BP >= 140 MM HG: CPT | Mod: CPTII,S$GLB,, | Performed by: NURSE PRACTITIONER

## 2022-06-20 PROCEDURE — 3008F BODY MASS INDEX DOCD: CPT | Mod: CPTII,S$GLB,, | Performed by: NURSE PRACTITIONER

## 2022-06-20 PROCEDURE — 72114 XR LUMBAR SPINE 5 VIEW WITH FLEX AND EXT: ICD-10-PCS | Mod: 26,,, | Performed by: RADIOLOGY

## 2022-06-20 RX ORDER — METHYLPREDNISOLONE 4 MG/1
TABLET ORAL
Qty: 1 EACH | Refills: 0 | Status: SHIPPED | OUTPATIENT
Start: 2022-06-20 | End: 2022-07-06

## 2022-06-20 RX ORDER — TIZANIDINE 4 MG/1
4 TABLET ORAL EVERY 12 HOURS PRN
Qty: 60 TABLET | Refills: 2 | Status: SHIPPED | OUTPATIENT
Start: 2022-06-20 | End: 2022-08-22
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
## 2022-06-26 RX ORDER — LORAZEPAM 1 MG/1
TABLET ORAL
Qty: 60 TABLET | Refills: 1 | Status: SHIPPED | OUTPATIENT
Start: 2022-06-26 | End: 2022-09-12 | Stop reason: SDUPTHER

## 2022-06-26 NOTE — TELEPHONE ENCOUNTER
No new care gaps identified.  [facility] Embedded Care Gaps. Reference number: [id number]. 6/25/2022   9:02:53 PM CDT

## 2022-07-01 ENCOUNTER — HOSPITAL ENCOUNTER (OUTPATIENT)
Dept: RADIOLOGY | Facility: OTHER | Age: 57
Discharge: HOME | End: 2022-07-01
Attending: NURSE PRACTITIONER
Payer: COMMERCIAL

## 2022-07-01 DIAGNOSIS — M54.9 DORSALGIA, UNSPECIFIED: ICD-10-CM

## 2022-07-01 PROCEDURE — 72148 MRI LUMBAR SPINE W/O DYE: CPT | Mod: 26,,, | Performed by: RADIOLOGY

## 2022-07-01 PROCEDURE — 72148 MRI LUMBAR SPINE W/O DYE: CPT | Mod: TC

## 2022-07-01 PROCEDURE — 72148 MRI LUMBAR SPINE WITHOUT CONTRAST: ICD-10-PCS | Mod: 26,,, | Performed by: RADIOLOGY

## 2022-07-05 ENCOUNTER — PATIENT MESSAGE (OUTPATIENT)
Dept: PAIN MEDICINE | Facility: CLINIC | Age: 57
End: 2022-07-05
Payer: COMMERCIAL

## 2022-07-06 ENCOUNTER — OFFICE VISIT (OUTPATIENT)
Dept: PAIN MEDICINE | Facility: CLINIC | Age: 57
End: 2022-07-06
Payer: COMMERCIAL

## 2022-07-06 VITALS
HEART RATE: 71 BPM | DIASTOLIC BLOOD PRESSURE: 97 MMHG | SYSTOLIC BLOOD PRESSURE: 152 MMHG | BODY MASS INDEX: 27.63 KG/M2 | TEMPERATURE: 97 F | HEIGHT: 65 IN | WEIGHT: 165.81 LBS

## 2022-07-06 DIAGNOSIS — M53.3 SACROILIAC JOINT DYSFUNCTION OF LEFT SIDE: Primary | ICD-10-CM

## 2022-07-06 PROCEDURE — 99214 PR OFFICE/OUTPT VISIT, EST, LEVL IV, 30-39 MIN: ICD-10-PCS | Mod: S$GLB,,, | Performed by: NURSE PRACTITIONER

## 2022-07-06 PROCEDURE — 3080F PR MOST RECENT DIASTOLIC BLOOD PRESSURE >= 90 MM HG: ICD-10-PCS | Mod: CPTII,S$GLB,, | Performed by: NURSE PRACTITIONER

## 2022-07-06 PROCEDURE — 3077F SYST BP >= 140 MM HG: CPT | Mod: CPTII,S$GLB,, | Performed by: NURSE PRACTITIONER

## 2022-07-06 PROCEDURE — 99999 PR PBB SHADOW E&M-EST. PATIENT-LVL IV: ICD-10-PCS | Mod: PBBFAC,,, | Performed by: NURSE PRACTITIONER

## 2022-07-06 PROCEDURE — 99214 OFFICE O/P EST MOD 30 MIN: CPT | Mod: S$GLB,,, | Performed by: NURSE PRACTITIONER

## 2022-07-06 PROCEDURE — 1159F PR MEDICATION LIST DOCUMENTED IN MEDICAL RECORD: ICD-10-PCS | Mod: CPTII,S$GLB,, | Performed by: NURSE PRACTITIONER

## 2022-07-06 PROCEDURE — 3008F PR BODY MASS INDEX (BMI) DOCUMENTED: ICD-10-PCS | Mod: CPTII,S$GLB,, | Performed by: NURSE PRACTITIONER

## 2022-07-06 PROCEDURE — 1159F MED LIST DOCD IN RCRD: CPT | Mod: CPTII,S$GLB,, | Performed by: NURSE PRACTITIONER

## 2022-07-06 PROCEDURE — 3077F PR MOST RECENT SYSTOLIC BLOOD PRESSURE >= 140 MM HG: ICD-10-PCS | Mod: CPTII,S$GLB,, | Performed by: NURSE PRACTITIONER

## 2022-07-06 PROCEDURE — 3080F DIAST BP >= 90 MM HG: CPT | Mod: CPTII,S$GLB,, | Performed by: NURSE PRACTITIONER

## 2022-07-06 PROCEDURE — 99999 PR PBB SHADOW E&M-EST. PATIENT-LVL IV: CPT | Mod: PBBFAC,,, | Performed by: NURSE PRACTITIONER

## 2022-07-06 PROCEDURE — 3008F BODY MASS INDEX DOCD: CPT | Mod: CPTII,S$GLB,, | Performed by: NURSE PRACTITIONER
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
## 2022-07-07 ENCOUNTER — TELEPHONE (OUTPATIENT)
Dept: ADMINISTRATIVE | Facility: OTHER | Age: 57
End: 2022-07-07
Payer: COMMERCIAL

## 2022-07-13 ENCOUNTER — CLINICAL SUPPORT (OUTPATIENT)
Dept: REHABILITATION | Facility: HOSPITAL | Age: 57
End: 2022-07-13
Payer: COMMERCIAL

## 2022-07-13 DIAGNOSIS — M54.16 LUMBAR RADICULOPATHY: ICD-10-CM

## 2022-07-13 DIAGNOSIS — M62.81 WEAKNESS OF TRUNK MUSCULATURE: Primary | ICD-10-CM

## 2022-07-13 DIAGNOSIS — M25.561 CHRONIC PAIN OF RIGHT KNEE: ICD-10-CM

## 2022-07-13 DIAGNOSIS — G89.29 CHRONIC PAIN OF RIGHT KNEE: ICD-10-CM

## 2022-07-13 DIAGNOSIS — M22.41 CHONDROMALACIA OF RIGHT PATELLA: ICD-10-CM

## 2022-07-13 DIAGNOSIS — M22.41 CHONDROMALACIA OF PATELLOFEMORAL JOINT, RIGHT: ICD-10-CM

## 2022-07-13 DIAGNOSIS — S83.241A OTHER TEAR OF MEDIAL MENISCUS OF RIGHT KNEE, UNSPECIFIED WHETHER OLD OR CURRENT TEAR, INITIAL ENCOUNTER: ICD-10-CM

## 2022-07-13 PROCEDURE — 97162 PT EVAL MOD COMPLEX 30 MIN: CPT | Mod: PO

## 2022-07-13 NOTE — PLAN OF CARE
[facility] OUTPATIENT THERAPY AND [facility]LLNESS   Physical Therapy Initial Evaluation     Date: 7/13/2022   Name: Twila Roman  Clinic Number: [id number]    Therapy Diagnosis:   Encounter Diagnoses   Name Primary?    Chronic pain of right knee     Chondromalacia of patellofemoral joint, right     Other tear of medial meniscus of right knee, unspecified whether old or current tear, initial encounter     Lumbar radiculopathy     Chondromalacia of right patella     Weakness of trunk musculature Yes     Physician: Flex Sr, NP    Physician Orders: PT Eval and Treat   Medical Diagnosis from Referral: M54.16 (ICD-10-CM) - Lumbar radiculopathy M22.41 (ICD-10-CM) - Chondromalacia of right patella  Evaluation Date: 7/13/2022  Authorization Period Expiration: 08/01/2022   Plan of Care Expiration: 9/05/2022  Progress Note Due: 8/13/2022  Visit # / Visits authorized: 1/ 1   FOTO: 1/3      Precautions: Standard     Time In: 340 (patient arrived late)  Time Out: 420  Total Appointment Time (timed & untimed codes): 40 minutes      SUBJECTIVE     Date of onset: chronic with recent episode on June 12 on a work trip - she was transporting suitcase and most have twisted incorrectly. She reports immediate pain in left side of low back with banded pain after she got home form her trip. Prior fusion in L4-L5 in 2009. Aggravating factors include sleeping, standing, sitting, and stair navigation. Alleviating factors include walking and oral steroid pack. She is scheduled for cortisone injection tomorrow.         History of current condition - Twila reports:     Twila is a 56 y.o. female coming in today for right knee pain that began about 7-8 month(s) ago, referred by self. Pt. describes the pain as a 6/10 burning pain that does not radiate. There was not a fall/injury/ or trauma associated with the onset of symptoms. The pain is better with walking and worse with flexion, stairs, at rest. Has tried ice/heat, NSAIDs and tylenol  without relief. Pt. Denies any other musculoskeletal complaints at this time. Hx of left knee meniscectomy several years ago. Hx of right sided sciatica and lumbar discectomy.      Joint instability? Feels unstable with standing  Mechanical locking/clicking? + popping, catching  Affecting ADL's? yes   Affecting sleep? yes    Falls: yes - ~2 months ago    Imaging,   XR LUMBAR SPINE   Impression:  Stable postoperative appearance lower lumbar spine with no acute osseous abnormality seen.      MRI LUMBAR SPINE   Impression:  Postsurgical and degenerative change as above without significant spinal canal stenosis or high-grade neural foraminal narrowing.  Fatty infiltration filum terminale.    MRI Right Knee  Impression:  1. Medial meniscus vertical longitudinal tear at the junction of the body segment and posterior horn.  No tear of the lateral meniscus.  2. Patellofemoral chondromalacia.    XR Knee Bilateral  Little joint space narrowing can be seen on the right. Bony structures are intact. No joint effusion is seen in either knee.         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)  Prior Level of Function: independent   Current Level of Function: she uses pool 5x per week but mostly for pleasure       Pain:  Current 6/10, worst 10/10, best 3/10   Location: bilateral back - lumbar  Description: Aching, Throbbing, Grabbing and Tight  Aggravating Factors: Sitting, Standing, Laying, Night Time, Getting out of bed/chair and Stair Navigation   Easing Factors:  muscle relaxers (she takes daily before bed), meloxicam (doesn't notice change), alleve (notices helps more than tylenol and advil), ice pack    Patients goals: to reduce pain     Medical History:   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    Medications:   Twila has a current medication list which includes the following prescription(s): biotin, bupropion, bupropion, cholecalciferol (vitamin d3), citalopram, econazole nitrate, flaxseed oil, fluticasone propionate, lorazepam, metoprolol succinate, montelukast, multivitamin, neomycin-polymyxin-hydrocortisone, selenium sulfide, and tizanidine.    Allergies:   Review of patient's allergies indicates:  No Known Allergies       OBJECTIVE       Gait Analysis: The patient ambulated with the following assistive device: none; the pt presents with the following gait abnormalities:  increased forward flexed posture, decreased pelvic and trunk rotation, decreased arm swing, increased knee flexion during gait cycle      Posture:  Pt presents with postural abnormalities which include: increased lumbar lordosis       Palpation: Increased tone and tenderness noted with palpation to: right quadratus lumborum, right gluteus medius      RANGE OF MOTION (measured in percent):    Lumbar AROM %   Comments     Lumbar Flexion  80 Reversal with return to upright    Lumbar Extension  20 Pain   Left Side Bending  25 Pain   Right Side Bending  50 Pain   Right Rotation 25 Stretch on left   Left Rotation 50 Pressure on left        Hip AROM Right Left   Comments   Hip Flexion (120) 110 105 Patient reporting sharp pain in left side of back/glute with left hip flexion.       Knee AROM Right   Left     Hyper - Zero - Flexion 3-132 /  0-140 0-137         STRENGTH:    L/E MMT Right     Hip Flexion  Not Tested    Hip Extension  Not Tested    Hip Abduction  4+/5   Knee Flexion 5/5   Knee Extension 5/5   Ankle DF Not Tested    Ankle PF Not Tested        L/E MMT Left     Hip Flexion  Not Tested    Hip Extension  Not Tested    Hip Abduction  4+/5   Knee Flexion 5/5   Knee Extension 5/5   Ankle DF Not Tested    Ankle PF Not Tested        Flexibility: (min, mod, severe limited)   Right hamstring length: 50 degrees  Left hamstring length: 60 degrees        JOINT MOBILITY (GRADED 0-6 OUT OF 6):     Joint Motion Tested Force Direction End Feel - Right   End Feel - Left      Symptoms   Right patellofemoral joint Lateral, Superior Hypomobile Normal        SPECIAL TESTS:    Lumbar Spine Comments    SLR Test Negative, bilaterally   FADIR Test negative   MELI Test negative     Compression: patient reports that it feels better with compression then pain with release  Distraction: better with distraction, but increase pain with release of position      Movement Analysis:     Functional Test  Outcome   Double Leg Squat Quad dominant, decreased lumbopelvic dissociation          Limitation/Restriction for FOTO Survey = not administered          TREATMENT     Total Treatment time (time-based codes) separate from Evaluation: 00 minutes      Twila did not receive treatment.      PATIENT EDUCATION AND HOME EXERCISES     Education provided:   - Role of PT, POC, Objective Findings, and all patient questions answered     Written Home Exercises Provided: none due to time constraints. She will be instructed in home exercise program next treatment session.    ASSESSMENT     Twila is a 56 y.o. female referred to outpatient Physical Therapy with a medical diagnosis of M54.16 (ICD-10-CM) - Lumbar radiculopathy M22.41 (ICD-10-CM) - Chondromalacia of right patella. Patient presents with low back pain without radiculopathy and right knee pain. MRI and XR showing medial meniscus  tear and patellofemoral chondromalacia. Per patient report, she had L4-L5 fusion in 2009 due to persistent numbness in right foot which is still present. Today, she displays limited and painful lumbar AROM, decreased trunk/core activation, hip weakness, flexibility deficits, and poor motor control. Discussed treatment of right knee and back focusing on joint mobility, flexibility, quad strength, core and trunk strength, and ergonomic/lifting training. Patient in concordance with plan as her long term goal is to avid any surgical interventions.         Patient prognosis is Fair.   Patient will benefit from skilled outpatient Physical Therapy to address the deficits stated above and in the chart below, provide patient /family education, and to maximize patientt's level of independence.     Plan of care discussed with patient: Yes  Patient's spiritual, cultural and educational needs considered and patient is agreeable to the plan of care and goals as stated below:     Anticipated Barriers for therapy: chronicity of condition, scheduling     Medical Necessity is demonstrated by the following  History  Co-morbidities and personal factors that may impact the plan of care Co-morbidities:   Anxiety, Bronchitis, Prior back surgery, Hypertension, Prior Left Knee Surgery    Personal Factors:   coping style  lifestyle  character     high   Examination  Body Structures and Functions, activity limitations and participation restrictions that may impact the plan of care Body Regions:   back  lower extremities  trunk    Body Systems:    gross symmetry  ROM  strength  transfers  transitions  motor control  motor learning    Participation Restrictions:   none    Activity limitations:   Learning and applying knowledge  no deficits    General Tasks and Commands  no deficits    Communication  no deficits    Mobility  lifting and carrying objects  walking    Self care  no deficits    Domestic Life  no  "deficits    Interactions/Relationships  no deficits    Life Areas  no deficits    Community and Social Life  community life  recreation and leisure         moderate   Clinical Presentation evolving clinical presentation with changing clinical characteristics moderate   Decision Making/ Complexity Score: moderate     Goals:  Short Term Goals: 4 weeks   1. Patient will demonstrate proper performance of home exercise program of active exercises to improve carryover between sessions.  2. The patient will perform transverse abdominis contraction isomerically for 5 seconds to improve spinal stability.  3. The patient will perform a forward bend with proper muscle sequencing in order to avoid overuse of the lumbar paraspinals when returning to flexed trunk position.  4. Pt will perform pallof press with good control to demonstrate improved core strength.  5. Patient will achieve 0 degrees of knee extension AROM in 3 weeks in order to walk without limping of hip compensation.        Long Term Goals: 8 weeks   1. The patient will perform a transverse abdominis contraction while performing dynamic LE/UE movements with good control to demonstrate improved spinal stability.   2. Patient will report no pain during lumbar AROM in all planes to promote functional mobility.   3. The patient will demonstrate the ability to ascend/descend a 4" step without handrail or right knee pain.      PLAN   Plan of care Certification: 7/13/2022 to 9/05/2022.    Outpatient Physical Therapy 2 times weekly for 8 weeks to include the following interventions: Cervical/Lumbar Traction, Manual Therapy, Moist Heat/ Ice, Neuromuscular Re-ed, Patient Education, Therapeutic Activities, Therapeutic Exercise and Dry Needling .     Pippa Leonard, PT      I CERTIFY THE NEED FOR THESE SERVICES FURNISHED UNDER THIS PLAN OF TREATMENT AND WHILE UNDER MY CARE   Physician's comments:     Physician's Signature: ___________________________________________________ "

## 2022-07-14 ENCOUNTER — HOSPITAL ENCOUNTER (OUTPATIENT)
Facility: OTHER | Age: 57
Discharge: HOME | End: 2022-07-14
Attending: ANESTHESIOLOGY | Admitting: ANESTHESIOLOGY
Payer: COMMERCIAL

## 2022-07-14 VITALS
DIASTOLIC BLOOD PRESSURE: 91 MMHG | TEMPERATURE: 98 F | HEIGHT: 65 IN | BODY MASS INDEX: 26.66 KG/M2 | RESPIRATION RATE: 16 BRPM | OXYGEN SATURATION: 100 % | SYSTOLIC BLOOD PRESSURE: 144 MMHG | HEART RATE: 78 BPM | WEIGHT: 160 LBS

## 2022-07-14 DIAGNOSIS — G89.29 CHRONIC PAIN: ICD-10-CM

## 2022-07-14 DIAGNOSIS — M46.1 SACROILIITIS: Primary | ICD-10-CM

## 2022-07-14 PROCEDURE — 63600175 PHARM REV CODE 636 W HCPCS: Performed by: ANESTHESIOLOGY

## 2022-07-14 PROCEDURE — 25000003 PHARM REV CODE 250: Performed by: ANESTHESIOLOGY

## 2022-07-14 PROCEDURE — 27096 INJECT SACROILIAC JOINT: CPT | Mod: LT | Performed by: ANESTHESIOLOGY

## 2022-07-14 PROCEDURE — 27096 INJECT SACROILIAC JOINT: CPT | Mod: LT,,, | Performed by: ANESTHESIOLOGY

## 2022-07-14 PROCEDURE — 27096 PR INJECTION,SACROILIAC JOINT: ICD-10-PCS | Mod: LT,,, | Performed by: ANESTHESIOLOGY

## 2022-07-14 RX ORDER — LIDOCAINE HYDROCHLORIDE 20 MG/ML
INJECTION, SOLUTION INFILTRATION; PERINEURAL
Status: DISCONTINUED | OUTPATIENT
Start: 2022-07-14 | End: 2022-07-14 | Stop reason: HOSPADM

## 2022-07-14 RX ORDER — TRIAMCINOLONE ACETONIDE 40 MG/ML
INJECTION, SUSPENSION INTRA-ARTICULAR; INTRAMUSCULAR
Status: DISCONTINUED | OUTPATIENT
Start: 2022-07-14 | End: 2022-07-14 | Stop reason: HOSPADM

## 2022-07-14 RX ORDER — BUPIVACAINE HYDROCHLORIDE 2.5 MG/ML
INJECTION, SOLUTION EPIDURAL; INFILTRATION; INTRACAUDAL
Status: DISCONTINUED | OUTPATIENT
Start: 2022-07-14 | End: 2022-07-14 | Stop reason: HOSPADM

## 2022-07-14 RX ORDER — SODIUM CHLORIDE 9 MG/ML
500 INJECTION, SOLUTION INTRAVENOUS CONTINUOUS
Status: ACTIVE | OUTPATIENT
Start: 2022-07-14

## 2022-07-14 RX ORDER — ALPRAZOLAM 0.5 MG/1
0.5 TABLET ORAL ONCE
Status: COMPLETED | OUTPATIENT
Start: 2022-07-14 | End: 2022-07-14

## 2022-07-14 RX ADMIN — ALPRAZOLAM 0.5 MG: 0.5 TABLET ORAL at 01:07

## 2022-07-14 NOTE — OP NOTE
Sacroiliac Joint Injection under Fluoroscopic Guidance    The procedure, risks, benefits, and options were discussed with the patient. There are no contraindications to the procedure. The patent expressed understanding and agreed to the procedure. Informed written consent was obtained prior to the start of the procedure and can be found in the patient's chart.    PATIENT NAME: Twila Roman   MRN: [id number]     DATE OF PROCEDURE: 07/14/2022    PROCEDURE: Left Sacroiliac Joint Injection under Fluoroscopic Guidance    PRE-OP DIAGNOSIS: Sacroiliac joint dysfunction of left side [M53.3]    POST-OP DIAGNOSIS: Same    PHYSICIAN: Henry Felix MD    ASSISTANTS: None     MEDICATIONS INJECTED: Preservative-free Kenalog 40mg with 3cc of Bupivacine 0.25%     LOCAL ANESTHETIC INJECTED: Xylocaine 2%     SEDATION: None    ESTIMATED BLOOD LOSS: None    COMPLICATIONS: None    TECHNIQUE: Time-out was performed to identify the patient and procedure to be performed. With the patient laying in a prone position, the surgical area was prepped and draped in the usual sterile fashion using ChloraPrep and a fenestrated drape. The sacroiliac joint was determined under fluoroscopy guidance. Skin anesthesia was achieved by injecting Lidocaine 2% over the injection site. The sacroiliac joint was  then approached with a 25 gauge, 3.5 inch spinal quinke needle that was introduced under fluoroscopic guidance in the AP and Lateral views. Once the needle tip was in the area of the joint, and there was no blood, contrast dye Omnipaque (300mg/mL) was injected to confirm placement and there was no vascular runoff. Fluoroscopic imaging in the AP and lateral views revealed a clear outline of the joint space. 4 mL of the medication mixture listed above was injected slowly intraarticular and mary-articular. Displacement of the radio opaque contrast after injection of the medication confirmed that the medication went into the area of the joint. The needles  were removed and bleeding was nil. A sterile dressing was applied. No specimens collected. The patient tolerated the procedure well.       The patient was monitored after the procedure in the recovery area. They were given post-procedure and discharge instructions to follow at home. The patient was discharged in a stable condition.    I reviewed and edited the fellow's note. I conducted my own interview and physical examination. I agree with the findings. I was present and supervising all critical portions of the procedure.    Henry Felix MD

## 2022-07-14 NOTE — DISCHARGE INSTRUCTIONS

## 2022-07-14 NOTE — DISCHARGE SUMMARY
Discharge Note  Short Stay      SUMMARY     Admit Date: 7/14/2022    Attending Physician: Henry Felix      Discharge Physician: Henry Felix      Discharge Date: 7/14/2022 1:56 PM    Procedure(s) (LRB):  INJECTION,SACROILIAC JOINT, LEFT (Left)    Final Diagnosis: Sacroiliac joint dysfunction of left side [M53.3]    Disposition: Home or self care    Patient Instructions:   Current Discharge Medication List      CONTINUE these medications which have NOT CHANGED    Details   biotin 5,000 mcg TbDL Take 1 capsule by mouth once daily.      !! buPROPion (WELLBUTRIN XL) 150 MG TB24 tablet Take 1 tablet (150 mg total) by mouth once daily.  Qty: 30 tablet, Refills: 11      !! buPROPion (WELLBUTRIN XL) 300 MG 24 hr tablet Take 1 tablet (300 mg total) by mouth once daily.  Qty: 30 tablet, Refills: 11      cholecalciferol, vitamin D3, (VITAMIN D3) 50 mcg (2,000 unit) Tab Take 1 tablet by mouth once daily.      citalopram (CELEXA) 10 MG tablet TAKE 1 TABLET BY MOUTH EVERY DAY  Qty: 30 tablet, Refills: 6      econazole nitrate 1 % cream Use bid  Qty: 30 g, Refills: 2    Associated Diagnoses: Tinea versicolor      FLAXSEED OIL MISC by Misc.(Non-Drug; Combo Route) route.      fluticasone propionate (FLONASE) 50 mcg/actuation nasal spray 2 sprays (100 mcg total) by Each Nostril route once daily.  Qty: 16 g, Refills: 11    Associated Diagnoses: Sinusitis, unspecified chronicity, unspecified location      LORazepam (ATIVAN) 1 MG tablet TAKE 1 TABLET BY MOUTH EVERY 12 HOURS AS NEEDED FOR ANXIETY  Qty: 60 tablet, Refills: 1      metoprolol succinate (TOPROL-XL) 25 MG 24 hr tablet Take 1 tablet (25 mg total) by mouth once daily.  Qty: 90 tablet, Refills: 3    Comments: .      montelukast (SINGULAIR) 10 mg tablet Take 1 tablet (10 mg total) by mouth every evening.  Qty: 30 tablet, Refills: 11    Associated Diagnoses: Acute sinusitis, recurrence not specified, unspecified location      multivitamin (THERAGRAN) per tablet Take 1  tablet by mouth once daily.      neomycin-polymyxin-hydrocortisone (CORTISPORIN) 3.5-10,000-1 mg/mL-unit/mL-% otic suspension SHAKE LIQUID AND INSTILL 4 DROPS TO AFFECTED EAR TWICE DAILY FOR 7 DAYS      selenium sulfide 2.25 % Sham Use  qweek  Qty: 180 mL, Refills: 5    Associated Diagnoses: Tinea versicolor      tiZANidine (ZANAFLEX) 4 MG tablet Take 1 tablet (4 mg total) by mouth every 12 (twelve) hours as needed (muscle spasm).  Qty: 60 tablet, Refills: 2       !! - Potential duplicate medications found. Please discuss with provider.              Discharge Diagnosis: Sacroiliac joint dysfunction of left side [M53.3]  Condition on Discharge: Stable with no complications to procedure   Diet on Discharge: Same as before.  Activity: as per instruction sheet.  Discharge to: Home with a responsible adult.  Follow up: 2-4 weeks

## 2022-07-15 ENCOUNTER — PATIENT MESSAGE (OUTPATIENT)
Dept: PAIN MEDICINE | Facility: OTHER | Age: 57
End: 2022-07-15
Payer: COMMERCIAL

## 2022-08-01 ENCOUNTER — CLINICAL SUPPORT (OUTPATIENT)
Dept: REHABILITATION | Facility: HOSPITAL | Age: 57
End: 2022-08-01
Payer: COMMERCIAL

## 2022-08-01 DIAGNOSIS — M62.81 WEAKNESS OF TRUNK MUSCULATURE: Primary | ICD-10-CM

## 2022-08-01 PROCEDURE — 97110 THERAPEUTIC EXERCISES: CPT | Mod: PO,CQ

## 2022-08-01 PROCEDURE — 97140 MANUAL THERAPY 1/> REGIONS: CPT | Mod: PO,CQ

## 2022-08-01 NOTE — PROGRESS NOTES
"[facility] OUTPATIENT THERAPY AND [facility]LLNESS   Physical Therapy Treatment Note     Name: Twila Roman  Clinic Number: [id number]    Therapy Diagnosis:   Encounter Diagnosis   Name Primary?    Weakness of trunk musculature Yes     Physician: Sona Fernandez DO    Visit Date: 8/1/2022    Physician Orders: PT Eval and Treat   Medical Diagnosis from Referral: M54.16 (ICD-10-CM) - Lumbar radiculopathy M22.41 (ICD-10-CM) - Chondromalacia of right patella  Evaluation Date: 7/13/2022  Authorization Period Expiration: 08/01/2022   Plan of Care Expiration: 9/05/2022  Progress Note Due: 8/13/2022  Visit # / Visits authorized: 1/ 20  FOTO: 1/3        Precautions: Standard       PTA Visit #: 1/5     Time In: 1:35 pm  Time Out: 2:20 pm  Total Billable Time: 45 minutes    SUBJECTIVE     Pt reports: she feels a little better after getting the injection, but she's still having pain in the left side of her low back  She was not provided with home exercise program at initial evaluation.  Response to previous treatment:initial evaluation  Functional change: ongoing    Pain: 5/10  Location: left low back    OBJECTIVE     Objective Measures updated at progress report unless specified.     Treatment     Twila received the treatments listed below:      therapeutic exercises to develop strength, endurance, ROM and core stabilization for 35 minutes including:  PPT 20x5"  Prone glute sets 2x10  LTR 10x5"  Seated hamstring stretch 3x30"  Seated ball rollouts 10x5"    Next visit:  Hip hikes  Hip isometrics at wall    manual therapy techniques: Soft tissue Mobilization were applied to the: left low back for 10 minutes, including:  Soft tissue mobilization to left low back, paraspinals, glutes        Patient Education and Home Exercises     Home Exercises Provided and Patient Education Provided     Education provided:   - HEP    Written Home Exercises Provided: yes. Exercises were reviewed and Twila was able to demonstrate them prior to the end " of the session.  Twila demonstrated good  understanding of the education provided. See EMR under Patient Instructions for exercises provided during therapy sessions    ASSESSMENT     Twila Lucas presents to treatment with moderate pain in her left low back. No change in pain levels following manual therapy interventions. Fair tolerance to exercises with patient having mild pain with most movements. Instructed patient to perform exercises within pain free ROM. Continue to progress patient as tolerated.     Twila Is progressing well towards her goals.   Pt prognosis is Good.     Pt will continue to benefit from skilled outpatient physical therapy to address the deficits listed in the problem list box on initial evaluation, provide pt/family education and to maximize pt's level of independence in the home and community environment.     Pt's spiritual, cultural and educational needs considered and pt agreeable to plan of care and goals.     Anticipated Barriers for therapy: chronicity of condition, scheduling       Goals:  Short Term Goals: 4 weeks   1. Patient will demonstrate proper performance of home exercise program of active exercises to improve carryover between sessions.  2. The patient will perform transverse abdominis contraction isomerically for 5 seconds to improve spinal stability.  3. The patient will perform a forward bend with proper muscle sequencing in order to avoid overuse of the lumbar paraspinals when returning to flexed trunk position.  4. Pt will perform pallof press with good control to demonstrate improved core strength.  5. Patient will achieve 0 degrees of knee extension AROM in 3 weeks in order to walk without limping of hip compensation.         Long Term Goals: 8 weeks   1. The patient will perform a transverse abdominis contraction while performing dynamic LE/UE movements with good control to demonstrate improved spinal stability.   2. Patient will report no pain during lumbar AROM in all  "planes to promote functional mobility.   3. The patient will demonstrate the ability to ascend/descend a 4" step without handrail or right knee pain.      PLAN     Continue to progress per PT POC    Caroline Caceres PTA     "

## 2022-08-02 ENCOUNTER — PATIENT MESSAGE (OUTPATIENT)
Dept: PAIN MEDICINE | Facility: CLINIC | Age: 57
End: 2022-08-02
Payer: COMMERCIAL

## 2022-08-03 ENCOUNTER — OFFICE VISIT (OUTPATIENT)
Dept: PAIN MEDICINE | Facility: CLINIC | Age: 57
End: 2022-08-03
Payer: COMMERCIAL

## 2022-08-03 VITALS
RESPIRATION RATE: 18 BRPM | WEIGHT: 160 LBS | SYSTOLIC BLOOD PRESSURE: 140 MMHG | BODY MASS INDEX: 26.66 KG/M2 | HEIGHT: 65 IN | HEART RATE: 64 BPM | DIASTOLIC BLOOD PRESSURE: 95 MMHG

## 2022-08-03 DIAGNOSIS — M46.1 SACROILIITIS: Primary | ICD-10-CM

## 2022-08-03 PROCEDURE — 3080F PR MOST RECENT DIASTOLIC BLOOD PRESSURE >= 90 MM HG: ICD-10-PCS | Mod: CPTII,S$GLB,, | Performed by: NURSE PRACTITIONER

## 2022-08-03 PROCEDURE — 99214 PR OFFICE/OUTPT VISIT, EST, LEVL IV, 30-39 MIN: ICD-10-PCS | Mod: S$GLB,,, | Performed by: NURSE PRACTITIONER

## 2022-08-03 PROCEDURE — 1159F PR MEDICATION LIST DOCUMENTED IN MEDICAL RECORD: ICD-10-PCS | Mod: CPTII,S$GLB,, | Performed by: NURSE PRACTITIONER

## 2022-08-03 PROCEDURE — 3008F PR BODY MASS INDEX (BMI) DOCUMENTED: ICD-10-PCS | Mod: CPTII,S$GLB,, | Performed by: NURSE PRACTITIONER

## 2022-08-03 PROCEDURE — 3008F BODY MASS INDEX DOCD: CPT | Mod: CPTII,S$GLB,, | Performed by: NURSE PRACTITIONER

## 2022-08-03 PROCEDURE — 99214 OFFICE O/P EST MOD 30 MIN: CPT | Mod: S$GLB,,, | Performed by: NURSE PRACTITIONER

## 2022-08-03 PROCEDURE — 3077F SYST BP >= 140 MM HG: CPT | Mod: CPTII,S$GLB,, | Performed by: NURSE PRACTITIONER

## 2022-08-03 PROCEDURE — 1159F MED LIST DOCD IN RCRD: CPT | Mod: CPTII,S$GLB,, | Performed by: NURSE PRACTITIONER

## 2022-08-03 PROCEDURE — 3077F PR MOST RECENT SYSTOLIC BLOOD PRESSURE >= 140 MM HG: ICD-10-PCS | Mod: CPTII,S$GLB,, | Performed by: NURSE PRACTITIONER

## 2022-08-03 PROCEDURE — 99999 PR PBB SHADOW E&M-EST. PATIENT-LVL IV: CPT | Mod: PBBFAC,,, | Performed by: NURSE PRACTITIONER

## 2022-08-03 PROCEDURE — 3080F DIAST BP >= 90 MM HG: CPT | Mod: CPTII,S$GLB,, | Performed by: NURSE PRACTITIONER

## 2022-08-03 PROCEDURE — 99999 PR PBB SHADOW E&M-EST. PATIENT-LVL IV: ICD-10-PCS | Mod: PBBFAC,,, | Performed by: NURSE PRACTITIONER
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
## 2022-08-04 ENCOUNTER — PATIENT MESSAGE (OUTPATIENT)
Dept: PAIN MEDICINE | Facility: OTHER | Age: 57
End: 2022-08-04
Payer: COMMERCIAL

## 2022-08-04 ENCOUNTER — TELEPHONE (OUTPATIENT)
Dept: PAIN MEDICINE | Facility: CLINIC | Age: 57
End: 2022-08-04
Payer: COMMERCIAL

## 2022-08-04 NOTE — TELEPHONE ENCOUNTER
----- Message from Alisia Martínez sent at 8/4/2022  8:42 AM CDT -----   Name of Who is Calling:     What is the request in detail:  patient request call back in reference to call back in reference to verify procedure date    / state she was told 08/15/22    and procedure arrival time Please contact to further discuss and advise      Can the clinic reply by [facility]:     What Number to Call Back if not in [facility]:  [phone]

## 2022-08-08 ENCOUNTER — PATIENT MESSAGE (OUTPATIENT)
Dept: PAIN MEDICINE | Facility: CLINIC | Age: 57
End: 2022-08-08
Payer: COMMERCIAL

## 2022-08-11 ENCOUNTER — CLINICAL SUPPORT (OUTPATIENT)
Dept: REHABILITATION | Facility: HOSPITAL | Age: 57
End: 2022-08-11
Payer: COMMERCIAL

## 2022-08-11 DIAGNOSIS — M62.81 WEAKNESS OF TRUNK MUSCULATURE: Primary | ICD-10-CM

## 2022-08-11 PROCEDURE — 97110 THERAPEUTIC EXERCISES: CPT | Mod: PO,CQ

## 2022-08-11 NOTE — PROGRESS NOTES
"[facility]   Physical Therapy Treatment Note     Name: Twila Roman  Clinic Number: [id number]    Therapy Diagnosis:   Encounter Diagnosis   Name Primary?    Weakness of trunk musculature Yes     Physician: Sona Fernandez DO    Visit Date: 8/11/2022    Physician Orders: PT Eval and Treat   Medical Diagnosis from Referral: M54.16 (ICD-10-CM) - Lumbar radiculopathy M22.41 (ICD-10-CM) - Chondromalacia of right patella  Evaluation Date: 7/13/2022  Authorization Period Expiration: 08/01/2022   Plan of Care Expiration: 9/05/2022  Progress Note Due: 8/13/2022  Visit # / Visits authorized: 2/ 20   FOTO: 1/3        Precautions: Standard       PTA Visit #: 2/5     Time In: 7:15 am  Time Out: 8:03 am  Total Billable Time: 38 minutes plus 10 minutes ice pack    SUBJECTIVE     Pt reports: her back hurts in the morning and as she gets moving it feels a little better  She was not provided with home exercise program at initial evaluation.  Response to previous treatment:initial evaluation  Functional change: ongoing    Pain: 6/10  Location: left low back    OBJECTIVE     Objective Measures updated at progress report unless specified.     Treatment     Twila received the treatments listed below:      therapeutic exercises to develop strength, endurance, ROM and core stabilization for 38 minutes including:  PPT 20x5" (seated)  Prone glute sets 2x10 had to stop due to pain  LTR 10x5" - not performed this session  Seated hamstring stretch 3x30"  Seated ball rollouts 10x5"    Next visit:  Hip hikes  Hip isometrics at wall    manual therapy techniques: Soft tissue Mobilization were applied to the: left low back for 00 minutes, including:  Soft tissue mobilization to left low back, paraspinals, glutes      cold pack for 10 minutes to low back.        Patient Education and Home Exercises     Home Exercises Provided and Patient Education Provided     Education provided:   - HEP    Written Home Exercises " Provided: Patient instructed to cont prior HEP. Exercises were reviewed and Twila was able to demonstrate them prior to the end of the session.  Twila demonstrated good  understanding of the education provided. See EMR under Patient Instructions for exercises provided during therapy sessions    ASSESSMENT     Twila presents to treatment with continued moderate pain in her left low back. She reports increased pain in hooklying position so exercises were modified. Patient attempted prone glute sets, but was unable to complete due to increased pain. Session was limited due to patient's pain. Ice pack applied to low back at end of session with some relief reported. She will have another injection next week. Continue to progress patient as tolerated.     Twila Is progressing well towards her goals.   Pt prognosis is Good.     Pt will continue to benefit from skilled outpatient physical therapy to address the deficits listed in the problem list box on initial evaluation, provide pt/family education and to maximize pt's level of independence in the home and community environment.     Pt's spiritual, cultural and educational needs considered and pt agreeable to plan of care and goals.     Anticipated Barriers for therapy: chronicity of condition, scheduling       Goals:  Short Term Goals: 4 weeks   1. Patient will demonstrate proper performance of home exercise program of active exercises to improve carryover between sessions.  2. The patient will perform transverse abdominis contraction isomerically for 5 seconds to improve spinal stability.  3. The patient will perform a forward bend with proper muscle sequencing in order to avoid overuse of the lumbar paraspinals when returning to flexed trunk position.  4. Pt will perform pallof press with good control to demonstrate improved core strength.  5. Patient will achieve 0 degrees of knee extension AROM in 3 weeks in order to walk without limping of hip compensation.     "     Long Term Goals: 8 weeks   1. The patient will perform a transverse abdominis contraction while performing dynamic LE/UE movements with good control to demonstrate improved spinal stability.   2. Patient will report no pain during lumbar AROM in all planes to promote functional mobility.   3. The patient will demonstrate the ability to ascend/descend a 4" step without handrail or right knee pain.      PLAN     Continue to progress per PT POC    Caroline Caceres PTA     "

## 2022-08-12 ENCOUNTER — PATIENT MESSAGE (OUTPATIENT)
Dept: PAIN MEDICINE | Facility: OTHER | Age: 57
End: 2022-08-12
Payer: COMMERCIAL

## 2022-08-16 ENCOUNTER — HOSPITAL ENCOUNTER (OUTPATIENT)
Facility: OTHER | Age: 57
Discharge: HOME | End: 2022-08-16
Attending: ANESTHESIOLOGY | Admitting: ANESTHESIOLOGY
Payer: COMMERCIAL

## 2022-08-16 VITALS
SYSTOLIC BLOOD PRESSURE: 130 MMHG | DIASTOLIC BLOOD PRESSURE: 80 MMHG | TEMPERATURE: 98 F | HEART RATE: 70 BPM | HEIGHT: 65 IN | WEIGHT: 160 LBS | BODY MASS INDEX: 26.66 KG/M2 | RESPIRATION RATE: 16 BRPM | OXYGEN SATURATION: 100 %

## 2022-08-16 DIAGNOSIS — M46.1 SACROILIITIS: Primary | ICD-10-CM

## 2022-08-16 DIAGNOSIS — G89.29 CHRONIC PAIN: ICD-10-CM

## 2022-08-16 PROCEDURE — 25000003 PHARM REV CODE 250: Performed by: ANESTHESIOLOGY

## 2022-08-16 PROCEDURE — 27096 INJECT SACROILIAC JOINT: CPT | Mod: LT,,, | Performed by: ANESTHESIOLOGY

## 2022-08-16 PROCEDURE — 27096 INJECT SACROILIAC JOINT: CPT | Mod: LT | Performed by: ANESTHESIOLOGY

## 2022-08-16 PROCEDURE — 63600175 PHARM REV CODE 636 W HCPCS: Performed by: ANESTHESIOLOGY

## 2022-08-16 PROCEDURE — 27096 PR INJECTION,SACROILIAC JOINT: ICD-10-PCS | Mod: LT,,, | Performed by: ANESTHESIOLOGY

## 2022-08-16 PROCEDURE — 25000003 PHARM REV CODE 250: Performed by: STUDENT IN AN ORGANIZED HEALTH CARE EDUCATION/TRAINING PROGRAM

## 2022-08-16 RX ORDER — SODIUM CHLORIDE 9 MG/ML
500 INJECTION, SOLUTION INTRAVENOUS CONTINUOUS
Status: DISCONTINUED | OUTPATIENT
Start: 2022-08-16 | End: 2022-08-16 | Stop reason: HOSPADM

## 2022-08-16 RX ORDER — BUPIVACAINE HYDROCHLORIDE 5 MG/ML
INJECTION, SOLUTION EPIDURAL; INTRACAUDAL
Status: DISCONTINUED | OUTPATIENT
Start: 2022-08-16 | End: 2022-08-16 | Stop reason: HOSPADM

## 2022-08-16 RX ORDER — MIDAZOLAM HYDROCHLORIDE 1 MG/ML
INJECTION INTRAMUSCULAR; INTRAVENOUS
Status: DISCONTINUED | OUTPATIENT
Start: 2022-08-16 | End: 2022-08-16 | Stop reason: HOSPADM

## 2022-08-16 NOTE — OP NOTE
Sacroiliac Joint Injection under Fluoroscopic Guidance    The procedure, risks, benefits, and options were discussed with the patient. There are no contraindications to the procedure. The patent expressed understanding and agreed to the procedure. Informed written consent was obtained prior to the start of the procedure and can be found in the patient's chart.    PATIENT NAME: Twila Roman   MRN: [id number]     DATE OF PROCEDURE: 08/16/2022    PROCEDURE: Diagnostic Left Sacroiliac Joint Injection under Fluoroscopic Guidance    PRE-OP DIAGNOSIS: Sacroiliitis [M46.1]    POST-OP DIAGNOSIS: Same    PHYSICIAN: Henry Felix MD    ASSISTANTS: Dr. Mckeon     MEDICATIONS INJECTED: 3cc of Bupivacine 0.25%     LOCAL ANESTHETIC INJECTED: Xylocaine 2%     SEDATION:   Versed 2mg and Fentanyl 0mcg                                                                                                                                                                                     Conscious sedation ordered by M.D. Patient re-evaluation prior to administration of conscious sedation. No changes noted in patient's status from initial evaluation. The patient's vital signs were monitored by RN and patient remained hemodynamically stable throughout the procedure.    Event Time In   Sedation Start 1341   Sedation End 1342       ESTIMATED BLOOD LOSS: None    COMPLICATIONS: None    TECHNIQUE: Time-out was performed to identify the patient and procedure to be performed. With the patient laying in a prone position, the surgical area was prepped and draped in the usual sterile fashion using ChloraPrep and a fenestrated drape. The sacroiliac joint was determined under fluoroscopy guidance. Skin anesthesia was achieved by injecting Lidocaine 2% over the injection site. The sacroiliac joint was  then approached with a 25 gauge, 3.5 inch spinal quinke needle that was introduced under fluoroscopic guidance in the AP and Lateral views. Once the needle  tip was in the area of the joint, and there was no blood, contrast dye Omnipaque (240mg/mL) was injected to confirm placement and there was no vascular runoff. Fluoroscopic imaging in the AP and lateral views revealed a clear outline of the joint space. 3 mL of the medication mixture listed above was injected slowly intraarticular and mary-articular. Displacement of the radio opaque contrast after injection of the medication confirmed that the medication went into the area of the joint. The needles were removed and bleeding was nil. A sterile dressing was applied. No specimens collected. The patient tolerated the procedure well.       The patient was monitored after the procedure in the recovery area. They were given post-procedure and discharge instructions to follow at home. The patient was discharged in a stable condition.    I reviewed and edited the fellow's note. I conducted my own interview and physical examination. I agree with the findings. I was present and supervising all critical portions of the procedure.    Henry Felix MD

## 2022-08-16 NOTE — DISCHARGE SUMMARY
Discharge Note  Short Stay      SUMMARY     Admit Date: 8/16/2022    Attending Physician: Henry Felix      Discharge Physician: Henry Felix      Discharge Date: 8/16/2022 1:45 PM    Procedure(s) (LRB):  INJECTION,SACROILIAC JOINT, LEFT DIAGNOSTIC SI WITHOUT STEROID (Left)    Final Diagnosis: Sacroiliitis [M46.1]    Disposition: Home or self care    Patient Instructions:   Current Discharge Medication List      CONTINUE these medications which have NOT CHANGED    Details   biotin 5,000 mcg TbDL Take 1 capsule by mouth once daily.      !! buPROPion (WELLBUTRIN XL) 150 MG TB24 tablet Take 1 tablet (150 mg total) by mouth once daily.  Qty: 30 tablet, Refills: 11      !! buPROPion (WELLBUTRIN XL) 300 MG 24 hr tablet Take 1 tablet (300 mg total) by mouth once daily.  Qty: 30 tablet, Refills: 11      cholecalciferol, vitamin D3, (VITAMIN D3) 50 mcg (2,000 unit) Tab Take 1 tablet by mouth once daily.      citalopram (CELEXA) 10 MG tablet TAKE 1 TABLET BY MOUTH EVERY DAY  Qty: 30 tablet, Refills: 6      econazole nitrate 1 % cream Use bid  Qty: 30 g, Refills: 2    Associated Diagnoses: Tinea versicolor      FLAXSEED OIL MISC by Misc.(Non-Drug; Combo Route) route.      fluticasone propionate (FLONASE) 50 mcg/actuation nasal spray 2 sprays (100 mcg total) by Each Nostril route once daily.  Qty: 16 g, Refills: 11    Associated Diagnoses: Sinusitis, unspecified chronicity, unspecified location      LORazepam (ATIVAN) 1 MG tablet TAKE 1 TABLET BY MOUTH EVERY 12 HOURS AS NEEDED FOR ANXIETY  Qty: 60 tablet, Refills: 1      metoprolol succinate (TOPROL-XL) 25 MG 24 hr tablet Take 1 tablet (25 mg total) by mouth once daily.  Qty: 90 tablet, Refills: 3    Comments: .      montelukast (SINGULAIR) 10 mg tablet Take 1 tablet (10 mg total) by mouth every evening.  Qty: 30 tablet, Refills: 11    Associated Diagnoses: Acute sinusitis, recurrence not specified, unspecified location      multivitamin (THERAGRAN) per tablet Take 1  tablet by mouth once daily.      selenium sulfide 2.25 % Sham Use  qweek  Qty: 180 mL, Refills: 5    Associated Diagnoses: Tinea versicolor       !! - Potential duplicate medications found. Please discuss with provider.              Discharge Diagnosis: Sacroiliitis [M46.1]  Condition on Discharge: Stable with no complications to procedure   Diet on Discharge: Same as before.  Activity: as per instruction sheet.  Discharge to: Home with a responsible adult.  Follow up: 2-4 weeks

## 2022-08-16 NOTE — DISCHARGE INSTRUCTIONS

## 2022-08-17 ENCOUNTER — PATIENT MESSAGE (OUTPATIENT)
Dept: PAIN MEDICINE | Facility: CLINIC | Age: 57
End: 2022-08-17
Payer: COMMERCIAL

## 2022-08-17 DIAGNOSIS — M46.1 SACROILIITIS: Primary | ICD-10-CM

## 2022-08-19 ENCOUNTER — PATIENT MESSAGE (OUTPATIENT)
Dept: PAIN MEDICINE | Facility: CLINIC | Age: 57
End: 2022-08-19
Payer: COMMERCIAL

## 2022-08-19 ENCOUNTER — CLINICAL SUPPORT (OUTPATIENT)
Dept: REHABILITATION | Facility: HOSPITAL | Age: 57
End: 2022-08-19
Payer: COMMERCIAL

## 2022-08-19 DIAGNOSIS — M62.81 WEAKNESS OF TRUNK MUSCULATURE: Primary | ICD-10-CM

## 2022-08-19 PROCEDURE — 97164 PT RE-EVAL EST PLAN CARE: CPT | Mod: PO

## 2022-08-19 NOTE — PLAN OF CARE
"[facility]   Physical Therapy Treatment Note / Progress Note / Updated Plan of Care    Name: Twila Roman  Clinic Number: [id number]    Therapy Diagnosis:   Encounter Diagnosis   Name Primary?    Weakness of trunk musculature Yes     Physician: Sona Fernandez DO    Visit Date: 8/19/2022    Physician Orders: PT Eval and Treat   Medical Diagnosis from Referral: M54.16 (ICD-10-CM) - Lumbar radiculopathy M22.41 (ICD-10-CM) - Chondromalacia of right patella  Evaluation Date: 7/13/2022  Authorization Period Expiration: 08/01/2022   Updated Plan of Care Expiration: 10/31/2022  Next Progress Note Due: 9/18/2022  Visit # / Visits authorized: 3 / 20 + initial evaluation   FOTO: 1/3        Precautions: Standard       PTA Visit #: 0/5     Time In: 711  Time Out: 745  Total Billable Time: 34 minutes     SUBJECTIVE     Pt reports: that she had a diagnostic shot this Tuesday with relief for ~12 hours but symptoms returned. He has been using a pain diary since injection. About 24 hours after the shot, her pain level increased to 6/10. She reports pain ("achiness") is present in the morning but improved after an hour. She is using ice and muscle relaxer at night to help with pain. She states that the exercises cause more pain.    Functional Limitations: unable to drive and sit for prolonged periods       She was compliant with home exercise program.  Response to previous treatment:initial evaluation  Functional change: ongoing     Pain: 5/10  Location: left low back    OBJECTIVE       Gait Analysis: The patient ambulated with the following assistive device: none; the pt presents with the following gait abnormalities:  increased forward flexed posture, decreased pelvic and trunk rotation, decreased arm swing        Posture:  Pt presents with postural abnormalities which include: increased lumbar lordosis         Palpation: Increased tone and tenderness noted with palpation to: right piriformis, right " "gluteus medius, right gluteus minimus         RANGE OF MOTION (measured in percent):     Lumbar AROM %    Comments      Lumbar Flexion  80 Reversal with return to upright    Lumbar Extension  20 Pain       Repeated Extension: positive for reproduction of symptoms   Repeated Flexion: positive for reproduction of symptoms     Pain with active and passive hip flexion, IR, and ER bilaterally   Reproduction of left buttock pain with passive hip flexion with knee flexion to 90 degrees       Treatment     Olinda received the treatments listed below:      therapeutic exercises to develop strength, endurance, ROM and core stabilization for 00 minutes including:  PPT 20x5" (seated)  Prone glute sets 2x10 had to stop due to pain  LTR 10x5" - not performed this session  Seated hamstring stretch 3x30"  Seated ball rollouts 10x5"    Next visit:  Hip hikes  Hip isometrics at wall    manual therapy techniques: Soft tissue Mobilization were applied to the: left low back for 00 minutes, including:  Soft tissue mobilization to left low back, paraspinals, glutes      cold pack for 00 minutes to low back.      Patient Education and Home Exercises     Home Exercises Provided and Patient Education Provided     Education provided:   - to continue HEP    Written Home Exercises Provided: Patient instructed to cont prior HEP. Exercises were reviewed and Olinda was able to demonstrate them prior to the end of the session.  Olinda demonstrated good  understanding of the education provided. See EMR under Patient Instructions for exercises provided during therapy sessions    ASSESSMENT     Reassessment performed today with patient having a total of 3 treatment visits since initial evaluation on 7/13/2022. She does not report any change since beginning treatment and reports pain with home exercise program. She reports pain is worse with prolonged sitting, but feels better with activity and with recent injection without steroid. Symptoms are localized " to left buttock without radiculopathy down left extremity. Unsure what is causing patient symptoms as subjective reports are not consistent with objective data. Also, may be psychological/emotional component to manifestation of symptoms as patient started crying at the end of session. No noted directional preference at this time, but patient able to tolerate right side lying with left hip and knee flexed to ~90 degrees with leg supported by bolster. Would possibly benefit from flexion based exercise program. Will continue with physical therapy, but recommend returning to [facility] due to no change.      Olinda Is not progressing well towards her goals.   Pt prognosis is Good.     Pt will continue to benefit from skilled outpatient physical therapy to address the deficits listed in the problem list box on initial evaluation, provide pt/family education and to maximize pt's level of independence in the home and community environment.     Pt's spiritual, cultural and educational needs considered and pt agreeable to plan of care and goals.     Anticipated Barriers for therapy: chronicity of condition, scheduling       Goals:  Short Term Goals: 4 weeks (not progressing)   1. Patient will demonstrate proper performance of home exercise program of active exercises to improve carryover between sessions.  2. The patient will perform transverse abdominis contraction isomerically for 5 seconds to improve spinal stability.  3. The patient will perform a forward bend with proper muscle sequencing in order to avoid overuse of the lumbar paraspinals when returning to flexed trunk position.  4. Pt will perform pallof press with good control to demonstrate improved core strength.  5. Patient will achieve 0 degrees of knee extension AROM in 3 weeks in order to walk without limping of hip compensation.         Long Term Goals: 8 weeks   1. The patient will perform a transverse abdominis contraction while performing dynamic LE/UE movements  "with good control to demonstrate improved spinal stability.   2. Patient will report no pain during lumbar AROM in all planes to promote functional mobility.   3. The patient will demonstrate the ability to ascend/descend a 4" step without handrail or right knee pain.      PLAN     Updated Certification Period: 8/19/2022 to 10/31/2022   Recommended Treatment Plan: 2 times per week for 10 weeks:  Aquatic Therapy, Cervical/Lumbar Traction, Manual Therapy, Moist Heat/ Ice, Neuromuscular Re-ed, Patient Education, Therapeutic Activities and Therapeutic Exercise  Other Recommendations: none      I CERTIFY THE NEED FOR THESE SERVICES FURNISHED UNDER THIS PLAN OF TREATMENT AND WHILE UNDER MY CARE  Physician's comments:      Physician's Signature: ___________________________________________________      Pippa Leonard, PT, DPT      "

## 2022-08-19 NOTE — PROGRESS NOTES
"[facility]   Physical Therapy Treatment Note / Progress Note / Updated Plan of Care    Name: Twila Roman  Clinic Number: [id number]    Therapy Diagnosis:   Encounter Diagnosis   Name Primary?    Weakness of trunk musculature Yes     Physician: Sona Fernandez DO    Visit Date: 8/19/2022    Physician Orders: PT Eval and Treat   Medical Diagnosis from Referral: M54.16 (ICD-10-CM) - Lumbar radiculopathy M22.41 (ICD-10-CM) - Chondromalacia of right patella  Evaluation Date: 7/13/2022  Authorization Period Expiration: 08/01/2022   Updated Plan of Care Expiration: 10/31/2022  Next Progress Note Due: 9/18/2022  Visit # / Visits authorized: 3 / 20 + initial evaluation   FOTO: 1/3        Precautions: Standard       PTA Visit #: 0/5     Time In: 711  Time Out: 745  Total Billable Time: 34 minutes     SUBJECTIVE     Pt reports: that she had a diagnostic shot this Tuesday with relief for ~12 hours but symptoms returned. He has been using a pain diary since injection. About 24 hours after the shot, her pain level increased to 6/10. She reports pain ("achiness") is present in the morning but improved after an hour. She is using ice and muscle relaxer at night to help with pain. She states that the exercises cause more pain.    Functional Limitations: unable to drive and sit for prolonged periods       She was compliant with home exercise program.  Response to previous treatment:initial evaluation  Functional change: ongoing     Pain: 5/10  Location: left low back    OBJECTIVE       Gait Analysis: The patient ambulated with the following assistive device: none; the pt presents with the following gait abnormalities:  increased forward flexed posture, decreased pelvic and trunk rotation, decreased arm swing        Posture:  Pt presents with postural abnormalities which include: increased lumbar lordosis         Palpation: Increased tone and tenderness noted with palpation to: right piriformis, right " "gluteus medius, right gluteus minimus         RANGE OF MOTION (measured in percent):     Lumbar AROM %    Comments      Lumbar Flexion  80 Reversal with return to upright    Lumbar Extension  20 Pain       Repeated Extension: positive for reproduction of symptoms   Repeated Flexion: positive for reproduction of symptoms     Pain with active and passive hip flexion, IR, and ER bilaterally   Reproduction of left buttock pain with passive hip flexion with knee flexion to 90 degrees       Treatment     Olinda received the treatments listed below:      therapeutic exercises to develop strength, endurance, ROM and core stabilization for 00 minutes including:  PPT 20x5" (seated)  Prone glute sets 2x10 had to stop due to pain  LTR 10x5" - not performed this session  Seated hamstring stretch 3x30"  Seated ball rollouts 10x5"    Next visit:  Hip hikes  Hip isometrics at wall    manual therapy techniques: Soft tissue Mobilization were applied to the: left low back for 00 minutes, including:  Soft tissue mobilization to left low back, paraspinals, glutes      cold pack for 00 minutes to low back.      Patient Education and Home Exercises     Home Exercises Provided and Patient Education Provided     Education provided:   - to continue HEP    Written Home Exercises Provided: Patient instructed to cont prior HEP. Exercises were reviewed and Olinda was able to demonstrate them prior to the end of the session.  Olinda demonstrated good  understanding of the education provided. See EMR under Patient Instructions for exercises provided during therapy sessions    ASSESSMENT     Reassessment performed today with patient having a total of 3 treatment visits since initial evaluation on 7/13/2022. She does not report any change since beginning treatment and reports pain with home exercise program. She reports pain is worse with prolonged sitting, but feels better with activity and with recent injection without steroid. Symptoms are localized " to left buttock without radiculopathy down left extremity. Unsure what is causing patient symptoms as subjective reports are not consistent with objective data. Also, may be psychological/emotional component to manifestation of symptoms as patient started crying at the end of session. No noted directional preference at this time, but patient able to tolerate right side lying with left hip and knee flexed to ~90 degrees with leg supported by bolster. Would possibly benefit from flexion based exercise program. Will continue with physical therapy, but recommend returning to [facility] due to no change.      Olinda Is not progressing well towards her goals.   Pt prognosis is Good.     Pt will continue to benefit from skilled outpatient physical therapy to address the deficits listed in the problem list box on initial evaluation, provide pt/family education and to maximize pt's level of independence in the home and community environment.     Pt's spiritual, cultural and educational needs considered and pt agreeable to plan of care and goals.     Anticipated Barriers for therapy: chronicity of condition, scheduling       Goals:  Short Term Goals: 4 weeks (not progressing)   1. Patient will demonstrate proper performance of home exercise program of active exercises to improve carryover between sessions.  2. The patient will perform transverse abdominis contraction isomerically for 5 seconds to improve spinal stability.  3. The patient will perform a forward bend with proper muscle sequencing in order to avoid overuse of the lumbar paraspinals when returning to flexed trunk position.  4. Pt will perform pallof press with good control to demonstrate improved core strength.  5. Patient will achieve 0 degrees of knee extension AROM in 3 weeks in order to walk without limping of hip compensation.         Long Term Goals: 8 weeks   1. The patient will perform a transverse abdominis contraction while performing dynamic LE/UE movements  "with good control to demonstrate improved spinal stability.   2. Patient will report no pain during lumbar AROM in all planes to promote functional mobility.   3. The patient will demonstrate the ability to ascend/descend a 4" step without handrail or right knee pain.      PLAN     Updated Certification Period: 8/19/2022 to 10/31/2022   Recommended Treatment Plan: 2 times per week for 10 weeks:  Aquatic Therapy, Cervical/Lumbar Traction, Manual Therapy, Moist Heat/ Ice, Neuromuscular Re-ed, Patient Education, Therapeutic Activities and Therapeutic Exercise  Other Recommendations: none      I CERTIFY THE NEED FOR THESE SERVICES FURNISHED UNDER THIS PLAN OF TREATMENT AND WHILE UNDER MY CARE  Physician's comments:      Physician's Signature: ___________________________________________________      Pippa Leonard, PT, DPT    "

## 2022-08-20 ENCOUNTER — TELEPHONE (OUTPATIENT)
Dept: PAIN MEDICINE | Facility: CLINIC | Age: 57
End: 2022-08-20
Payer: COMMERCIAL

## 2022-08-20 DIAGNOSIS — M46.1 SACROILIITIS: Primary | ICD-10-CM

## 2022-08-20 NOTE — TELEPHONE ENCOUNTER
----- Message from Maxine De Los Santos sent at 8/18/2022  9:59 AM CDT -----  Pt. Had left SI Injection 8/16/22 , are you requesting another Left Si Injection for pt. , please advise.

## 2022-08-22 ENCOUNTER — PATIENT MESSAGE (OUTPATIENT)
Dept: REHABILITATION | Facility: HOSPITAL | Age: 57
End: 2022-08-22
Payer: COMMERCIAL

## 2022-08-22 ENCOUNTER — PATIENT MESSAGE (OUTPATIENT)
Dept: PAIN MEDICINE | Facility: OTHER | Age: 57
End: 2022-08-22
Payer: COMMERCIAL

## 2022-08-22 DIAGNOSIS — M46.1 SACROILIITIS: Primary | ICD-10-CM

## 2022-08-24 ENCOUNTER — PATIENT MESSAGE (OUTPATIENT)
Dept: BARIATRICS | Facility: CLINIC | Age: 57
End: 2022-08-24
Payer: COMMERCIAL

## 2022-08-24 ENCOUNTER — PATIENT MESSAGE (OUTPATIENT)
Dept: SPORTS MEDICINE | Facility: CLINIC | Age: 57
End: 2022-08-24
Payer: COMMERCIAL

## 2022-08-24 ENCOUNTER — TELEPHONE (OUTPATIENT)
Dept: INTERNAL MEDICINE | Facility: CLINIC | Age: 57
End: 2022-08-24
Payer: COMMERCIAL

## 2022-08-24 DIAGNOSIS — Z00.00 ANNUAL PHYSICAL EXAM: Primary | ICD-10-CM

## 2022-08-24 NOTE — TELEPHONE ENCOUNTER
From: Bernie Burris MA   Sent: 4/26/2022   9:18 AM CDT   To: Kathy Frank MA   Subject: FW: Appointment scheduled from Patient Portal         ----- Message -----   From: [facility], System Message   Sent: 4/26/2022   8:55 AM CDT   To: [facility]   Subject: Appointment scheduled from Patient Portal          Appointment For: Twila Roman ([id number])   Visit Type: MYCHART ANNUAL CHECKUP/PHYS ([id number])      9/29/2022   11:30 AM  30 mins.  Michelle Farias DO      [facility] INTERNAL MEDICINE      Patient Comments:   Annual checkup

## 2022-08-25 ENCOUNTER — OFFICE VISIT (OUTPATIENT)
Dept: SPORTS MEDICINE | Facility: CLINIC | Age: 57
End: 2022-08-25
Payer: COMMERCIAL

## 2022-08-25 VITALS
WEIGHT: 160 LBS | SYSTOLIC BLOOD PRESSURE: 130 MMHG | BODY MASS INDEX: 26.66 KG/M2 | DIASTOLIC BLOOD PRESSURE: 90 MMHG | HEIGHT: 65 IN

## 2022-08-25 DIAGNOSIS — M79.10 MYALGIA: ICD-10-CM

## 2022-08-25 DIAGNOSIS — M99.03 SOMATIC DYSFUNCTION OF LUMBAR REGION: ICD-10-CM

## 2022-08-25 DIAGNOSIS — M22.41 CHONDROMALACIA OF PATELLOFEMORAL JOINT, RIGHT: Primary | ICD-10-CM

## 2022-08-25 DIAGNOSIS — S83.241D OTHER TEAR OF MEDIAL MENISCUS OF RIGHT KNEE, UNSPECIFIED WHETHER OLD OR CURRENT TEAR, SUBSEQUENT ENCOUNTER: ICD-10-CM

## 2022-08-25 DIAGNOSIS — M25.561 PATELLOFEMORAL ARTHRALGIA OF RIGHT KNEE: ICD-10-CM

## 2022-08-25 DIAGNOSIS — M99.04 SACRAL REGION SOMATIC DYSFUNCTION: ICD-10-CM

## 2022-08-25 DIAGNOSIS — M17.11 PRIMARY OSTEOARTHRITIS OF RIGHT KNEE: ICD-10-CM

## 2022-08-25 DIAGNOSIS — M99.05 SOMATIC DYSFUNCTION OF PELVIC REGION: ICD-10-CM

## 2022-08-25 PROCEDURE — 3008F PR BODY MASS INDEX (BMI) DOCUMENTED: ICD-10-PCS | Mod: CPTII,S$GLB,, | Performed by: NEUROMUSCULOSKELETAL MEDICINE & OMM

## 2022-08-25 PROCEDURE — 99214 PR OFFICE/OUTPT VISIT, EST, LEVL IV, 30-39 MIN: ICD-10-PCS | Mod: 25,S$GLB,, | Performed by: NEUROMUSCULOSKELETAL MEDICINE & OMM

## 2022-08-25 PROCEDURE — 3075F SYST BP GE 130 - 139MM HG: CPT | Mod: CPTII,S$GLB,, | Performed by: NEUROMUSCULOSKELETAL MEDICINE & OMM

## 2022-08-25 PROCEDURE — 1159F PR MEDICATION LIST DOCUMENTED IN MEDICAL RECORD: ICD-10-PCS | Mod: CPTII,S$GLB,, | Performed by: NEUROMUSCULOSKELETAL MEDICINE & OMM

## 2022-08-25 PROCEDURE — 3080F PR MOST RECENT DIASTOLIC BLOOD PRESSURE >= 90 MM HG: ICD-10-PCS | Mod: CPTII,S$GLB,, | Performed by: NEUROMUSCULOSKELETAL MEDICINE & OMM

## 2022-08-25 PROCEDURE — 97110 PR THERAPEUTIC EXERCISES: ICD-10-PCS | Mod: S$GLB,,, | Performed by: NEUROMUSCULOSKELETAL MEDICINE & OMM

## 2022-08-25 PROCEDURE — 1159F MED LIST DOCD IN RCRD: CPT | Mod: CPTII,S$GLB,, | Performed by: NEUROMUSCULOSKELETAL MEDICINE & OMM

## 2022-08-25 PROCEDURE — 3080F DIAST BP >= 90 MM HG: CPT | Mod: CPTII,S$GLB,, | Performed by: NEUROMUSCULOSKELETAL MEDICINE & OMM

## 2022-08-25 PROCEDURE — 1160F PR REVIEW ALL MEDS BY PRESCRIBER/CLIN PHARMACIST DOCUMENTED: ICD-10-PCS | Mod: CPTII,S$GLB,, | Performed by: NEUROMUSCULOSKELETAL MEDICINE & OMM

## 2022-08-25 PROCEDURE — 99999 PR PBB SHADOW E&M-EST. PATIENT-LVL III: CPT | Mod: PBBFAC,,, | Performed by: NEUROMUSCULOSKELETAL MEDICINE & OMM

## 2022-08-25 PROCEDURE — 3075F PR MOST RECENT SYSTOLIC BLOOD PRESS GE 130-139MM HG: ICD-10-PCS | Mod: CPTII,S$GLB,, | Performed by: NEUROMUSCULOSKELETAL MEDICINE & OMM

## 2022-08-25 PROCEDURE — 3008F BODY MASS INDEX DOCD: CPT | Mod: CPTII,S$GLB,, | Performed by: NEUROMUSCULOSKELETAL MEDICINE & OMM

## 2022-08-25 PROCEDURE — 97110 THERAPEUTIC EXERCISES: CPT | Mod: S$GLB,,, | Performed by: NEUROMUSCULOSKELETAL MEDICINE & OMM

## 2022-08-25 PROCEDURE — 1160F RVW MEDS BY RX/DR IN RCRD: CPT | Mod: CPTII,S$GLB,, | Performed by: NEUROMUSCULOSKELETAL MEDICINE & OMM

## 2022-08-25 PROCEDURE — 99999 PR PBB SHADOW E&M-EST. PATIENT-LVL III: ICD-10-PCS | Mod: PBBFAC,,, | Performed by: NEUROMUSCULOSKELETAL MEDICINE & OMM

## 2022-08-25 PROCEDURE — 98926 OSTEOPATH MANJ 3-4 REGIONS: CPT | Mod: S$GLB,,, | Performed by: NEUROMUSCULOSKELETAL MEDICINE & OMM

## 2022-08-25 PROCEDURE — 98926 PR OSTEOPATHIC MANIP,3-4 BODY REGN: ICD-10-PCS | Mod: S$GLB,,, | Performed by: NEUROMUSCULOSKELETAL MEDICINE & OMM

## 2022-08-25 PROCEDURE — 99214 OFFICE O/P EST MOD 30 MIN: CPT | Mod: 25,S$GLB,, | Performed by: NEUROMUSCULOSKELETAL MEDICINE & OMM

## 2022-08-25 NOTE — PROGRESS NOTES
"Subjective:     Twila Shayy Abel     Chief Complaint   Patient presents with    Follow-up         Follow-up      Olinda is a 57 y.o. female coming in today for right knee pain. Since last visit the pain has Improved then deteriorated. Pt reports good improvement with R knee CSI, but only for3 weeks. She injured her back on a work trip and did 3 visits of PT for her low back as well as her knee and had a poor experience with this. Reports back and knee are worse. Her right knee "gave out" on her 2 nights ago while cooking and she fell. Having a lot of pain with knee extension. The pain is improving with rest, OTC NSAIDs and ice. She is taking meloxicam without relief, states Aleve works better. The pain is better with walking and worse with flexion, stairs, at rest. Pt. describes the pain as a 3/10 achy pain that does not radiate. There has not been any new a fall/injury/ or traumas since last visit.  Pt. denies any new musculoskeletal complaints at this time. She sees pain management for her back and has left SIJ diagnostic injection scheduled.    Office note from 6/2/22 reviewed    Joint instability? +  Mechanical locking/clicking? + popping, catching  Affecting ADL's? yes   Affecting sleep? yes    PAST MEDICAL HISTORY:   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      PAST SURGICAL HISTORY:   Past Surgical History:   Procedure Laterality Date    BACK SURGERY      spinal fusion    COLONOSCOPY N/A 12/11/2019    Procedure: COLONOSCOPY;  Surgeon: Caroline Swartz MD;  Location: [facility] ENDO ([address] [place] [address]);  Service: Endoscopy;  Laterality: N/A;    INJECTION, SACROILIAC JOINT Left 7/14/2022    Procedure: INJECTION,SACROILIAC JOINT, LEFT;  Surgeon: Henry Felix MD;  Location: [facility];  Service: Pain Management;  Laterality: Left;    INJECTION, SACROILIAC JOINT Left 8/16/2022    Procedure: " INJECTION,SACROILIAC JOINT, LEFT DIAGNOSTIC SI WITHOUT STEROID;  Surgeon: Henry Felix MD;  Location: [facility] PAIN MGT;  Service: Pain Management;  Laterality: Left;    KNEE ARTHROSCOPY W/ MENISCECTOMY Left 8/15/2018    Procedure: ARTHROSCOPY, KNEE, WITH MENISCECTOMY;  Surgeon: Handy Elkins MD;  Location: [facility] OR;  Service: Orthopedics;  Laterality: Left;  Video    ROBOT-ASSISTED LAPAROSCOPIC ABDOMINAL HYSTERECTOMY USING DA MARIA ESTHER XI N/A 10/20/2020    Procedure: XI ROBOTIC HYSTERECTOMY;  Surgeon: Danie Burns MD;  Location: [facility] OR;  Service: OB/GYN;  Laterality: N/A;    ROBOT-ASSISTED LAPAROSCOPIC SALPINGO-OOPHORECTOMY USING DA MARIA ESTHER XI Bilateral 10/20/2020    Procedure: XI ROBOTIC SALPINGO-OOPHORECTOMY;  Surgeon: Danie Burns MD;  Location: [facility] OR;  Service: OB/GYN;  Laterality: Bilateral;    TRANSFORAMINAL EPIDURAL INJECTION OF STEROID Right 2019    Procedure: INJECTION, STEROID, EPIDURAL, TRANSFORAMINAL APPROACH, L4 AND L5;  Surgeon: Daniel Seo MD;  Location: [facility] PAIN [facility]GT;  Service: Pain Management;  Laterality: Right;    TRANSFORAMINAL EPIDURAL INJECTION OF STEROID Right 3/16/2021    Procedure: INJECTION, STEROID, EPIDURAL, TRANSFORAMINAL APPROACH L4/5 AND L5/S1;  Surgeon: Daniel Seo MD;  Location: [facility] PAIN [facility]GT;  Service: Pain Management;  Laterality: Right;    TUMOR REMOVAL      right hand     FAMILY HISTORY:   Family History   Problem Relation Age of Onset    Ovarian cancer Mother 66         at 72    Heart disease Father         double bypass    Macular degeneration Father     Heart failure Father     No Known Problems Brother     No Known Problems Brother     Cancer Paternal Grandfather         lung (pt thinks), possible smoker    Breast cancer Paternal Cousin 20        father's sister's daughter, pt thinks bilateral, cousin is     Other Maternal Aunt         mat aunt had part of colon removed, pt does not think cancer    Breast cancer Maternal  Aunt      SOCIAL HISTORY:   Social History     Socioeconomic History    Marital status:    Occupational History    Occupation:      Employer: [employer]    Tobacco Use    Smoking status: Former Smoker    Smokeless tobacco: Never Used   Substance and Sexual Activity    Alcohol use: Yes     Alcohol/week: 5.0 standard drinks     Types: 5 Glasses of wine per week     Comment: one a day- red wine    Drug use: No    Sexual activity: Yes     Partners: Male     Comment: Boyfriend      Social Determinants of Health     Financial Resource Strain: Low Risk     Difficulty of Paying Living Expenses: Not hard at all   Food Insecurity: No Food Insecurity    Worried About Running Out of Food in the Last Year: Never true    Ran Out of Food in the Last Year: Never true   Transportation Needs: No Transportation Needs    Lack of Transportation (Medical): No    Lack of Transportation (Non-Medical): No   Physical Activity: Insufficiently Active    Days of Exercise per Week: 2 days    Minutes of Exercise per Session: 20 min   Stress: Stress Concern Present    Feeling of Stress : To some extent   Social Connections: Unknown    Frequency of Communication with Friends and Family: Twice a week    Frequency of Social Gatherings with Friends and Family: Twice a week    Active Member of Clubs or Organizations: No    Attends Club or Organization Meetings: Never    Marital Status:    Housing Stability: Low Risk     Unable to Pay for Housing in the Last Year: No    Number of Places Lived in the Last Year: 1    Unstable Housing in the Last Year: No       MEDICATIONS:     Current Outpatient Medications:     biotin 5,000 mcg TbDL, Take 1 capsule by mouth once daily., Disp: , Rfl:     buPROPion (WELLBUTRIN XL) 150 MG TB24 tablet, Take 1 tablet (150 mg total) by mouth once daily., Disp: 30 tablet, Rfl: 11    buPROPion (WELLBUTRIN XL) 300 MG 24 hr tablet, Take 1 tablet (300 mg total) by mouth once  "daily., Disp: 30 tablet, Rfl: 11    cholecalciferol, vitamin D3, (VITAMIN D3) 50 mcg (2,000 unit) Tab, Take 1 tablet by mouth once daily., Disp: , Rfl:     citalopram (CELEXA) 10 MG tablet, TAKE 1 TABLET BY MOUTH EVERY DAY (Patient not taking: Reported on 8/3/2022), Disp: 30 tablet, Rfl: 6    econazole nitrate 1 % cream, Use bid, Disp: 30 g, Rfl: 2    FLAXSEED OIL MISC, by Misc.(Non-Drug; Combo Route) route., Disp: , Rfl:     fluticasone propionate (FLONASE) 50 mcg/actuation nasal spray, 2 sprays (100 mcg total) by Each Nostril route once daily., Disp: 16 g, Rfl: 11    LORazepam (ATIVAN) 1 MG tablet, TAKE 1 TABLET BY MOUTH EVERY 12 HOURS AS NEEDED FOR ANXIETY, Disp: 60 tablet, Rfl: 1    metoprolol succinate (TOPROL-XL) 25 MG 24 hr tablet, Take 1 tablet (25 mg total) by mouth once daily., Disp: 90 tablet, Rfl: 3    montelukast (SINGULAIR) 10 mg tablet, Take 1 tablet (10 mg total) by mouth every evening., Disp: 30 tablet, Rfl: 11    multivitamin (THERAGRAN) per tablet, Take 1 tablet by mouth once daily., Disp: , Rfl:     selenium sulfide 2.25 % Sham, Use  qweek, Disp: 180 mL, Rfl: 5    tiZANidine (ZANAFLEX) 4 MG tablet, TAKE 1 TABLET(4 MG) BY MOUTH EVERY 12 HOURS AS NEEDED FOR MUSCLE SPASM, Disp: 60 tablet, Rfl: 2  No current facility-administered medications for this visit.    Facility-Administered Medications Ordered in Other Visits:     0.9%  NaCl infusion, 500 mL, Intravenous, Continuous, Inez Joiner MD  ALLERGIES:   Review of patient's allergies indicates:  No Known Allergies    Objective:     VITAL SIGNS: BP (!) 130/90   Ht 5' 5" (1.651 m)   Wt 72.6 kg (160 lb)   LMP 04/19/2015 (Approximate)   BMI 26.63 kg/m²    General    Vitals reviewed.  Constitutional: She is oriented to person, place, and time. She appears well-developed and well-nourished.   Neurological: She is alert and oriented to person, place, and time.   Psychiatric: She has a normal mood and affect. Her behavior is normal. "             MUSCULOSKELETAL EXAM    right KNEE EXAMINATION   Affected side is compared to contralateral knee     Observation:  No edema, erythema, ecchymosis, or effusion noted.  No muscle atrophy of the thighs and calves noted.  No obvious bony deformities noted.   No Genu valgus/varum noted.  No recurvatum noted.    No tibial internal/external torsion.    Posture:  Posterior pelvis tilt with loss of lumbar lordosis  Gait: Non-antalgic with Neutral ankle mechanics and Neutral medial arch  Poor right pelvic stability with left hip hiking compensatory pattern noted with single leg raise    Tenderness:  Patella - + patellofemoral pain     Lateral joint line - no  Quad tendon - none   Medial joint line - none  Patellar tendon - none   Medial plica - none  Tibial tubercle - none   Lateral plica - none  Pes anserine - none   MCL prox - none  Distal ITB - none   MCL distal - none  MFC - none    LCL prox - none  LFC - none    LCL distal - none  Tibia - none    Fibula - none    No obvious bursae, plicae, popliteal cysts, or tendon derangement palpated.          ROM (* = with pain):   Active extension to 0° on left without hyperextension, lag, crepitus, or patellar J sign.   Active extension to 0° on right* without hyperextension, lag, crepitus, or patellar J sign.  Active flexion to 135° on left and 135° on right    Strength(* = with pain):  Knee Flexion - 5/5 on left and 5/5 on right  Knee Extension - 5/5 on left and 5/5 on right  Hip Flexion - 5/5 on left and 5/5 on right  Hip Extension - 5/5 on left and 5/5 on right  Ankle dorsiflexion - 5/5 on left and 5/5 on right  Ankle Plantarflexion - 5/5 on left and 5/5 on right  Gluteus medius - 4+/5 on left and 4+/5 on right    Patellofemoral Exam:   Patellar ballottement - negative  Bulge sign - negative  Patellar grind - negative    No patellar laxity with medial and lateral translation   No apprehension with medial and lateral patellar translation.     Meniscus Testing:     No  pain with terminal flexion or extension at the join tlines  Skylers test -negative  Bounce home test - negative    Ligament Testing:  Lachman's test - negative  No laxity with anterior drawer.   No laxity with posterior drawer.    No posterior sag sign.   No laxity with varus testing at 0 and 30 degrees.  No laxity with valgus testing at 0 and 30 degrees.    IT band testing:  Noble Compression test - negative    TART (Tissue texture abnormality, Asymmetry,  Restriction of motion and/or Tenderness) changes:     Lumbar Spine   L1 Neutral   L2 Neutral   L3 Neutral   L4 TTA RIGHT   L5 TTA RIGHT       Pelvis:  · Innominate:Right anterior rotation  · Pubic bone:Right inferior pubic shear    Sacrum:Right unilateral extension      Key   F= Flexed   E = Extended   R = Rotated   S = Sidebent   TTA = tissue texture abnormality         Neurovascular Examination:   Normal gait without antalgia.  Sensation intact to light touch in the obturator, lateral/intermediate/medial/posterior femoral cutaneous, saphenous, and common peroneal nerves bilaterally.  Motor Function:    Fully intact motor function at hip, knee, foot and ankle.  DTRs: 2+/4 reflexes at L4 and S1 dermatomes.   Negative seated straight leg raise bilaterally.    Pulses intact at the DP and PT arteries bilaterally.    Capillary refill intact <2 seconds in all toes bilaterally.      Assessment:      Encounter Diagnoses   Name Primary?    Chondromalacia of patellofemoral joint, right Yes    Primary osteoarthritis of right knee     Patellofemoral arthralgia of right knee     Other tear of medial meniscus of right knee, unspecified whether old or current tear, subsequent encounter     Myalgia     Somatic dysfunction of lumbar region     Sacral region somatic dysfunction     Somatic dysfunction of pelvic region           Plan:   1. right knee pain secondary to patellofemoral chondromalacia with associated patellofemoral maltracking stemming from weak gluteal  muscles and poor pelvic stability with compensatory muscle firing patterns.  Only temporary relief with previous right knee ultrasound-guided CSI performed on 06/02/2022.  Underlying medial meniscus tear noted on previous MRI however patient is not having any associated meniscal symptoms on exam today.  Patient's pain was worse with formal physical therapy.  - HME order for right Stoney-pull light patellar brace fitted for today by Maxine KUMARI - wear with work and activity  - Recommend OTC Aleve prn for pain control  - Recommend Ice up to 20 minutes at a time prn for pain control  - OMT performed today to address biomechanical contributions to maltracking and HEP started  - discussed option of visco supplementation vs. PRP injections as next step in conservative treatment.  Patient like to proceed with viscosupplementation injection series.  -  X-ray images of right knee taken 5/11/22 (AP bilateral standing, PA bilateral standing in flexion, bilateral merchants, and  bilateral lateral views) showed Kellgren-Ravi grade 2 OA bilaterally. Images were personally reviewed.  - MRI  images of right knee taken 5/25/22 showed Medial meniscus vertical longitudinal tear at the junction of the body segment and posterior horn.  No tear of the lateral meniscus. Patellofemoral chondromalacia. Images were personally reviewed.    2. OMT 3-4 regions. Oral consent obtained.  Reviewed benefits and potential side effects.   - OMT indicated today due to signs and symptoms as well as local and remote somatic dysfunction findings and their related neurokinetic, lymphatic, fascial and/or arteriovenous body connections.   - OMT techniques used: Myofascial Release and Muscle Energy   - Treatment was tolerated well. Improvement noted in segmental mobility post-treatment in dysfunctional regions. There were no adverse events and no complications immediately following treatment.     3. Pt. Given the following HEP:  A) Clam shell  exercises bilaterally: hold leg in abducted and externally rotated position for 5-10 seconds, repeat 5-10 times  B) Bilateral seated quadriceps strengthening exercise: Straight leg raises with hip neural, hip externally rotated, and then hip internally rotated. 10-15 reps in each plane, twice daily.    HOME EXERCISE PROGRAM (HEP):  The patient was taught a homegoing physical therapy regimen as described above. The patient demonstrated understanding of the exercises and proper technique of their execution. This interaction took 15 minutes.     4. Follow-up in 2 weeks for start of right knee Euflexxa injection series    5. Patient agreeable to today's plan and all questions were answered    This note is dictated using the [employer] Fluency Direct word recognition program. There are word recognition mistakes that are occasionally missed on review.      MEDICAL NECESSITY FOR VISCOSUPPLEMETNATION: After thorough evaluation of the patient, I have determined that visco-supplementation is medically necessary. The patient has painful DJD of the knee with failure of conservative treatments including lifestyle modifications and rehabilitation exercises.  Oral analgesics/NSAIDs have not adequately controlled symptoms and there is radiographic evidence of Kellgren Ravi grade 2 or greater OA changes, or in lack of radiographic evidence, there is arthroscopic or other evidence of chondrosis.

## 2022-08-31 ENCOUNTER — PATIENT MESSAGE (OUTPATIENT)
Dept: PAIN MEDICINE | Facility: OTHER | Age: 57
End: 2022-08-31
Payer: COMMERCIAL

## 2022-09-01 ENCOUNTER — HOSPITAL ENCOUNTER (OUTPATIENT)
Facility: OTHER | Age: 57
Discharge: HOME | End: 2022-09-01
Attending: ANESTHESIOLOGY | Admitting: ANESTHESIOLOGY
Payer: COMMERCIAL

## 2022-09-01 VITALS
OXYGEN SATURATION: 97 % | BODY MASS INDEX: 26.66 KG/M2 | HEIGHT: 65 IN | WEIGHT: 160 LBS | DIASTOLIC BLOOD PRESSURE: 78 MMHG | SYSTOLIC BLOOD PRESSURE: 133 MMHG | TEMPERATURE: 98 F | HEART RATE: 76 BPM | RESPIRATION RATE: 16 BRPM

## 2022-09-01 DIAGNOSIS — G89.29 CHRONIC PAIN: ICD-10-CM

## 2022-09-01 DIAGNOSIS — M46.1 SACROILIITIS: Primary | ICD-10-CM

## 2022-09-01 PROCEDURE — 63600175 PHARM REV CODE 636 W HCPCS: Performed by: ANESTHESIOLOGY

## 2022-09-01 PROCEDURE — 27096 INJECT SACROILIAC JOINT: CPT | Mod: LT | Performed by: ANESTHESIOLOGY

## 2022-09-01 PROCEDURE — 27096 PR INJECTION,SACROILIAC JOINT: ICD-10-PCS | Mod: LT,,, | Performed by: ANESTHESIOLOGY

## 2022-09-01 PROCEDURE — 25000003 PHARM REV CODE 250: Performed by: STUDENT IN AN ORGANIZED HEALTH CARE EDUCATION/TRAINING PROGRAM

## 2022-09-01 PROCEDURE — 27096 INJECT SACROILIAC JOINT: CPT | Mod: LT,,, | Performed by: ANESTHESIOLOGY

## 2022-09-01 PROCEDURE — 25000003 PHARM REV CODE 250: Performed by: ANESTHESIOLOGY

## 2022-09-01 RX ORDER — BUPIVACAINE HYDROCHLORIDE 2.5 MG/ML
INJECTION, SOLUTION EPIDURAL; INFILTRATION; INTRACAUDAL
Status: DISCONTINUED | OUTPATIENT
Start: 2022-09-01 | End: 2022-09-01 | Stop reason: HOSPADM

## 2022-09-01 RX ORDER — LIDOCAINE HYDROCHLORIDE 20 MG/ML
INJECTION, SOLUTION INFILTRATION; PERINEURAL
Status: DISCONTINUED | OUTPATIENT
Start: 2022-09-01 | End: 2022-09-01 | Stop reason: HOSPADM

## 2022-09-01 RX ORDER — SODIUM CHLORIDE 9 MG/ML
500 INJECTION, SOLUTION INTRAVENOUS CONTINUOUS
Status: DISCONTINUED | OUTPATIENT
Start: 2022-09-01 | End: 2022-09-01 | Stop reason: HOSPADM

## 2022-09-01 RX ORDER — MIDAZOLAM HYDROCHLORIDE 1 MG/ML
INJECTION INTRAMUSCULAR; INTRAVENOUS
Status: DISCONTINUED | OUTPATIENT
Start: 2022-09-01 | End: 2022-09-01 | Stop reason: HOSPADM

## 2022-09-01 NOTE — DISCHARGE INSTRUCTIONS

## 2022-09-01 NOTE — DISCHARGE SUMMARY
Discharge Note  Short Stay      SUMMARY     Admit Date: 9/1/2022    Attending Physician: Henry Felix      Discharge Physician: Henry Felix      Discharge Date: 9/1/2022 2:40 PM    Procedure(s) (LRB):  INJECTION,SACROILIAC JOINT LEFT DIAGNOSTIC WITH NO STEROIDS (Left)    Final Diagnosis: Sacroiliitis [M46.1]    Disposition: Home or self care    Patient Instructions:   Current Discharge Medication List        CONTINUE these medications which have NOT CHANGED    Details   biotin 5,000 mcg TbDL Take 1 capsule by mouth once daily.      !! buPROPion (WELLBUTRIN XL) 150 MG TB24 tablet TAKE 1 TABLET BY MOUTH EVERY DAY  Qty: 30 tablet, Refills: 11      !! buPROPion (WELLBUTRIN XL) 300 MG 24 hr tablet Take 1 tablet (300 mg total) by mouth once daily.  Qty: 30 tablet, Refills: 11      cholecalciferol, vitamin D3, (VITAMIN D3) 50 mcg (2,000 unit) Tab Take 1 tablet by mouth once daily.      econazole nitrate 1 % cream Use bid  Qty: 30 g, Refills: 2    Associated Diagnoses: Tinea versicolor      FLAXSEED OIL MISC by Misc.(Non-Drug; Combo Route) route.      fluticasone propionate (FLONASE) 50 mcg/actuation nasal spray 2 sprays (100 mcg total) by Each Nostril route once daily.  Qty: 16 g, Refills: 11    Associated Diagnoses: Sinusitis, unspecified chronicity, unspecified location      LORazepam (ATIVAN) 1 MG tablet TAKE 1 TABLET BY MOUTH EVERY 12 HOURS AS NEEDED FOR ANXIETY  Qty: 60 tablet, Refills: 1      metoprolol succinate (TOPROL-XL) 25 MG 24 hr tablet Take 1 tablet (25 mg total) by mouth once daily.  Qty: 90 tablet, Refills: 3    Comments: .      montelukast (SINGULAIR) 10 mg tablet Take 1 tablet (10 mg total) by mouth every evening.  Qty: 30 tablet, Refills: 11    Associated Diagnoses: Acute sinusitis, recurrence not specified, unspecified location      multivitamin (THERAGRAN) per tablet Take 1 tablet by mouth once daily.      selenium sulfide 2.25 % Sham Use  qweek  Qty: 180 mL, Refills: 5    Associated Diagnoses:  Tinea versicolor      tiZANidine (ZANAFLEX) 4 MG tablet TAKE 1 TABLET(4 MG) BY MOUTH EVERY 12 HOURS AS NEEDED FOR MUSCLE SPASM  Qty: 60 tablet, Refills: 2       !! - Potential duplicate medications found. Please discuss with provider.              Discharge Diagnosis: Sacroiliitis [M46.1]  Condition on Discharge: Stable with no complications to procedure   Diet on Discharge: Same as before.  Activity: as per instruction sheet.  Discharge to: Home with a responsible adult.  Follow up: 2-4 weeks

## 2022-09-01 NOTE — OP NOTE
Sacroiliac Joint Injection under Fluoroscopic Guidance    The procedure, risks, benefits, and options were discussed with the patient. There are no contraindications to the procedure. The patent expressed understanding and agreed to the procedure. Informed written consent was obtained prior to the start of the procedure and can be found in the patient's chart.    PATIENT NAME: Twila Roman   MRN: [id number]     DATE OF PROCEDURE: 09/01/2022    PROCEDURE: Diagnostic Left Sacroiliac Joint Injection under Fluoroscopic Guidance    PRE-OP DIAGNOSIS: Sacroiliitis [M46.1]    POST-OP DIAGNOSIS: Same    PHYSICIAN: Henry Felix MD    ASSISTANTS: Dr. Pederson    MEDICATIONS INJECTED: 3cc of Bupivacine 0.25%     LOCAL ANESTHETIC INJECTED: Xylocaine 2%     SEDATION: Versed 2mg and Fentanyl 0mcg                                                                                                                                                                                     Conscious sedation ordered by M.D. Patient re-evaluation prior to administration of conscious sedation. No changes noted in patient's status from initial evaluation. The patient's vital signs were monitored by RN and patient remained hemodynamically stable throughout the procedure.    Event Time In   Sedation Start 1433   Sedation End 1434     Patient reports % pain relief from previous diagnostic SI joint injection.    ESTIMATED BLOOD LOSS: None    COMPLICATIONS: None    TECHNIQUE: Time-out was performed to identify the patient and procedure to be performed. With the patient laying in a prone position, the surgical area was prepped and draped in the usual sterile fashion using ChloraPrep and a fenestrated drape. The sacroiliac joint was determined under fluoroscopy guidance. Skin anesthesia was achieved by injecting Lidocaine 2% over the injection site. The sacroiliac joint was  then approached with a 25 gauge, 3.5 inch spinal quinke needle that was  introduced under fluoroscopic guidance in the AP and Lateral views. Once the needle tip was in the area of the joint, and there was no blood, contrast dye Omnipaque (240mg/mL) was injected to confirm placement and there was no vascular runoff. Fluoroscopic imaging in the AP and lateral views revealed a clear outline of the joint space. 3 mL of the medication mixture listed above was injected slowly intraarticular and mary-articular. Displacement of the radio opaque contrast after injection of the medication confirmed that the medication went into the area of the joint. The needles were removed and bleeding was nil. A sterile dressing was applied. No specimens collected. The patient tolerated the procedure well.       The patient was monitored after the procedure in the recovery area. They were given post-procedure and discharge instructions to follow at home. The patient was discharged in a stable condition.    I reviewed and edited the fellow's note. I conducted my own interview and physical examination. I agree with the findings. I was present and supervising all critical portions of the procedure.    Henry Felix MD

## 2022-09-02 ENCOUNTER — PATIENT MESSAGE (OUTPATIENT)
Dept: PAIN MEDICINE | Facility: OTHER | Age: 57
End: 2022-09-02
Payer: COMMERCIAL

## 2022-09-06 ENCOUNTER — PATIENT MESSAGE (OUTPATIENT)
Dept: PAIN MEDICINE | Facility: CLINIC | Age: 57
End: 2022-09-06
Payer: COMMERCIAL

## 2022-09-06 DIAGNOSIS — M46.1 SACROILIITIS: Primary | ICD-10-CM

## 2022-09-06 DIAGNOSIS — G89.4 CHRONIC PAIN SYNDROME: ICD-10-CM

## 2022-09-08 ENCOUNTER — OFFICE VISIT (OUTPATIENT)
Dept: SPORTS MEDICINE | Facility: CLINIC | Age: 57
End: 2022-09-08
Payer: COMMERCIAL

## 2022-09-08 VITALS — SYSTOLIC BLOOD PRESSURE: 110 MMHG | DIASTOLIC BLOOD PRESSURE: 76 MMHG

## 2022-09-08 DIAGNOSIS — M17.11 PRIMARY OSTEOARTHRITIS OF RIGHT KNEE: ICD-10-CM

## 2022-09-08 DIAGNOSIS — M22.41 CHONDROMALACIA OF PATELLOFEMORAL JOINT, RIGHT: Primary | ICD-10-CM

## 2022-09-08 PROCEDURE — 3078F DIAST BP <80 MM HG: CPT | Mod: CPTII,S$GLB,, | Performed by: NEUROMUSCULOSKELETAL MEDICINE & OMM

## 2022-09-08 PROCEDURE — 1160F RVW MEDS BY RX/DR IN RCRD: CPT | Mod: CPTII,S$GLB,, | Performed by: NEUROMUSCULOSKELETAL MEDICINE & OMM

## 2022-09-08 PROCEDURE — 99499 UNLISTED E&M SERVICE: CPT | Mod: S$GLB,,, | Performed by: NEUROMUSCULOSKELETAL MEDICINE & OMM

## 2022-09-08 PROCEDURE — 3074F PR MOST RECENT SYSTOLIC BLOOD PRESSURE < 130 MM HG: ICD-10-PCS | Mod: CPTII,S$GLB,, | Performed by: NEUROMUSCULOSKELETAL MEDICINE & OMM

## 2022-09-08 PROCEDURE — 1160F PR REVIEW ALL MEDS BY PRESCRIBER/CLIN PHARMACIST DOCUMENTED: ICD-10-PCS | Mod: CPTII,S$GLB,, | Performed by: NEUROMUSCULOSKELETAL MEDICINE & OMM

## 2022-09-08 PROCEDURE — 20611 DRAIN/INJ JOINT/BURSA W/US: CPT | Mod: RT,S$GLB,, | Performed by: NEUROMUSCULOSKELETAL MEDICINE & OMM

## 2022-09-08 PROCEDURE — 99999 PR PBB SHADOW E&M-EST. PATIENT-LVL II: ICD-10-PCS | Mod: PBBFAC,,, | Performed by: NEUROMUSCULOSKELETAL MEDICINE & OMM

## 2022-09-08 PROCEDURE — 1159F PR MEDICATION LIST DOCUMENTED IN MEDICAL RECORD: ICD-10-PCS | Mod: CPTII,S$GLB,, | Performed by: NEUROMUSCULOSKELETAL MEDICINE & OMM

## 2022-09-08 PROCEDURE — 99999 PR PBB SHADOW E&M-EST. PATIENT-LVL II: CPT | Mod: PBBFAC,,, | Performed by: NEUROMUSCULOSKELETAL MEDICINE & OMM

## 2022-09-08 PROCEDURE — 3074F SYST BP LT 130 MM HG: CPT | Mod: CPTII,S$GLB,, | Performed by: NEUROMUSCULOSKELETAL MEDICINE & OMM

## 2022-09-08 PROCEDURE — 3078F PR MOST RECENT DIASTOLIC BLOOD PRESSURE < 80 MM HG: ICD-10-PCS | Mod: CPTII,S$GLB,, | Performed by: NEUROMUSCULOSKELETAL MEDICINE & OMM

## 2022-09-08 PROCEDURE — 1159F MED LIST DOCD IN RCRD: CPT | Mod: CPTII,S$GLB,, | Performed by: NEUROMUSCULOSKELETAL MEDICINE & OMM

## 2022-09-08 PROCEDURE — 20611 PR DRAIN/ASP/INJECT MAJOR JOINT/BURSA W/US GUIDANCE: ICD-10-PCS | Mod: RT,S$GLB,, | Performed by: NEUROMUSCULOSKELETAL MEDICINE & OMM

## 2022-09-08 PROCEDURE — 99499 NO LOS: ICD-10-PCS | Mod: S$GLB,,, | Performed by: NEUROMUSCULOSKELETAL MEDICINE & OMM

## 2022-09-08 NOTE — PROGRESS NOTES
"Subjective:     Twila Roman     Chief Complaint   Patient presents with    Injections         Olinda is a 57 y.o. female coming in today for their 1st Euflexxa injection to the right knee.     Objective:     VITAL SIGNS: /76   LMP 04/19/2015 (Approximate)      Euflexxa Injection Procedure #1     A time out was performed, including verification of patient ID, procedure, site and side, availability of information and equipment, review of safety issues, and agreement with consent, the procedure site was marked.    Location: Knee joint, right     Procedure: The patient was prepped aseptically with alcohol and chlorhexidine. Ethyl Chloride spray was used prior to skin puncture to help numb the superficial skin. After cold spray was applied, 2 cc's of 0.2% Naropin was injected into the skin and superficial tissue at the injection site using a 21 G, 2" needle to form an anesthetic tunnel and ensure proper needle placement into the right knee joint space. Using a hemostat, the syringe was exchanged with the Euflexxa syringe, and 2cc of Euflexxa was injected into the right knee joint. The patient was in the supine position during the duration of this procedure and the injection approach was from the superolateral aspect.     Ultrasound guidance was used for needle localization with SonoSite Edge 2, 9-L MHz linear probe(s). Images were saved and stored for documentation. The right knee joint was well visualized.  Dynamic visualization of the needle(s) was continuous throughout the procedure and maintained good position and correct needle placement.        Patient tolerance: The patient tolerated the procedure well with no immediate complications. There were no adverse reactions to the medication. Patient was instructed to apply ice to the joint for up to 20 minutes at a time and avoid strenuous activities for 24-36 hours following the injection. The patient was warned of possible blood pressure changes during that " time. Following that time, the patient can resume activities as prior to the injection.     The patient was reminded to call the clinic immediately for any adverse side effects as explained in clinic today.     Euflexxa:  Lot: [id number]  Exp: 09/17/2023      Assessment:      Encounter Diagnoses   Name Primary?    Chondromalacia of patellofemoral joint, right Yes    Primary osteoarthritis of right knee           Plan:     1.first Euflexxa injection of right knee received today (see procedure note above)  2. Follow-up in 1 week for 2nd injection of 3 injection series  3. Patient agreeable to today's plan and all questions were answered

## 2022-09-11 NOTE — TELEPHONE ENCOUNTER
No new care gaps identified.  [facility] Embedded Care Gaps. Reference number: [id number]. 9/11/2022   10:29:23 AM DENNIST

## 2022-09-12 ENCOUNTER — PATIENT MESSAGE (OUTPATIENT)
Dept: INTERNAL MEDICINE | Facility: CLINIC | Age: 57
End: 2022-09-12
Payer: COMMERCIAL

## 2022-09-12 ENCOUNTER — OFFICE VISIT (OUTPATIENT)
Dept: PAIN MEDICINE | Facility: CLINIC | Age: 57
End: 2022-09-12
Payer: COMMERCIAL

## 2022-09-12 ENCOUNTER — OFFICE VISIT (OUTPATIENT)
Dept: URGENT CARE | Facility: CLINIC | Age: 57
End: 2022-09-12
Payer: COMMERCIAL

## 2022-09-12 VITALS
TEMPERATURE: 100 F | HEIGHT: 65 IN | BODY MASS INDEX: 27.18 KG/M2 | HEART RATE: 86 BPM | SYSTOLIC BLOOD PRESSURE: 133 MMHG | DIASTOLIC BLOOD PRESSURE: 94 MMHG | WEIGHT: 163.13 LBS

## 2022-09-12 VITALS
RESPIRATION RATE: 16 BRPM | WEIGHT: 163.13 LBS | OXYGEN SATURATION: 96 % | BODY MASS INDEX: 27.18 KG/M2 | HEART RATE: 92 BPM | DIASTOLIC BLOOD PRESSURE: 94 MMHG | HEIGHT: 65 IN | SYSTOLIC BLOOD PRESSURE: 148 MMHG | TEMPERATURE: 100 F

## 2022-09-12 DIAGNOSIS — M53.3 SACROILIAC JOINT DYSFUNCTION OF LEFT SIDE: ICD-10-CM

## 2022-09-12 DIAGNOSIS — J06.9 VIRAL URI: Primary | ICD-10-CM

## 2022-09-12 DIAGNOSIS — J02.9 SORE THROAT: ICD-10-CM

## 2022-09-12 DIAGNOSIS — M54.9 DORSALGIA, UNSPECIFIED: ICD-10-CM

## 2022-09-12 DIAGNOSIS — G89.4 CHRONIC PAIN SYNDROME: ICD-10-CM

## 2022-09-12 DIAGNOSIS — M46.1 SACROILIITIS: Primary | ICD-10-CM

## 2022-09-12 LAB
CTP QC/QA: YES
CTP QC/QA: YES
POC MOLECULAR INFLUENZA A AGN: NEGATIVE
POC MOLECULAR INFLUENZA B AGN: NEGATIVE
SARS-COV-2 RDRP RESP QL NAA+PROBE: NEGATIVE

## 2022-09-12 PROCEDURE — 3008F BODY MASS INDEX DOCD: CPT | Mod: CPTII,S$GLB,, | Performed by: NURSE PRACTITIONER

## 2022-09-12 PROCEDURE — U0002: ICD-10-PCS | Mod: QW,S$GLB,, | Performed by: NURSE PRACTITIONER

## 2022-09-12 PROCEDURE — 1159F MED LIST DOCD IN RCRD: CPT | Mod: CPTII,S$GLB,, | Performed by: NURSE PRACTITIONER

## 2022-09-12 PROCEDURE — 99214 OFFICE O/P EST MOD 30 MIN: CPT | Mod: S$GLB,,, | Performed by: NURSE PRACTITIONER

## 2022-09-12 PROCEDURE — 3077F PR MOST RECENT SYSTOLIC BLOOD PRESSURE >= 140 MM HG: ICD-10-PCS | Mod: CPTII,S$GLB,, | Performed by: NURSE PRACTITIONER

## 2022-09-12 PROCEDURE — 3008F PR BODY MASS INDEX (BMI) DOCUMENTED: ICD-10-PCS | Mod: CPTII,S$GLB,, | Performed by: NURSE PRACTITIONER

## 2022-09-12 PROCEDURE — 99999 PR PBB SHADOW E&M-EST. PATIENT-LVL IV: CPT | Mod: PBBFAC,,, | Performed by: NURSE PRACTITIONER

## 2022-09-12 PROCEDURE — 1159F PR MEDICATION LIST DOCUMENTED IN MEDICAL RECORD: ICD-10-PCS | Mod: CPTII,S$GLB,, | Performed by: NURSE PRACTITIONER

## 2022-09-12 PROCEDURE — U0002 COVID-19 LAB TEST NON-CDC: HCPCS | Mod: QW,S$GLB,, | Performed by: NURSE PRACTITIONER

## 2022-09-12 PROCEDURE — 99214 PR OFFICE/OUTPT VISIT, EST, LEVL IV, 30-39 MIN: ICD-10-PCS | Mod: S$GLB,,, | Performed by: NURSE PRACTITIONER

## 2022-09-12 PROCEDURE — 87502 INFLUENZA DNA AMP PROBE: CPT | Mod: QW,S$GLB,, | Performed by: NURSE PRACTITIONER

## 2022-09-12 PROCEDURE — 3080F DIAST BP >= 90 MM HG: CPT | Mod: CPTII,S$GLB,, | Performed by: NURSE PRACTITIONER

## 2022-09-12 PROCEDURE — 3075F SYST BP GE 130 - 139MM HG: CPT | Mod: CPTII,S$GLB,, | Performed by: NURSE PRACTITIONER

## 2022-09-12 PROCEDURE — 3075F PR MOST RECENT SYSTOLIC BLOOD PRESS GE 130-139MM HG: ICD-10-PCS | Mod: CPTII,S$GLB,, | Performed by: NURSE PRACTITIONER

## 2022-09-12 PROCEDURE — 1160F RVW MEDS BY RX/DR IN RCRD: CPT | Mod: CPTII,S$GLB,, | Performed by: NURSE PRACTITIONER

## 2022-09-12 PROCEDURE — 3077F SYST BP >= 140 MM HG: CPT | Mod: CPTII,S$GLB,, | Performed by: NURSE PRACTITIONER

## 2022-09-12 PROCEDURE — 99999 PR PBB SHADOW E&M-EST. PATIENT-LVL IV: ICD-10-PCS | Mod: PBBFAC,,, | Performed by: NURSE PRACTITIONER

## 2022-09-12 PROCEDURE — 3080F PR MOST RECENT DIASTOLIC BLOOD PRESSURE >= 90 MM HG: ICD-10-PCS | Mod: CPTII,S$GLB,, | Performed by: NURSE PRACTITIONER

## 2022-09-12 PROCEDURE — 1160F PR REVIEW ALL MEDS BY PRESCRIBER/CLIN PHARMACIST DOCUMENTED: ICD-10-PCS | Mod: CPTII,S$GLB,, | Performed by: NURSE PRACTITIONER

## 2022-09-12 PROCEDURE — 87502 POCT INFLUENZA A/B MOLECULAR: ICD-10-PCS | Mod: QW,S$GLB,, | Performed by: NURSE PRACTITIONER

## 2022-09-12 RX ORDER — LORAZEPAM 1 MG/1
1 TABLET ORAL EVERY 12 HOURS PRN
Qty: 60 TABLET | Refills: 1 | Status: SHIPPED | OUTPATIENT
Start: 2022-09-12 | End: 2022-11-28

## 2022-09-12 RX ORDER — LORAZEPAM 1 MG/1
TABLET ORAL
Qty: 60 TABLET | OUTPATIENT
Start: 2022-09-12
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
## 2022-09-12 NOTE — PROGRESS NOTES
"Subjective:       Patient ID: Tiwla Roman is a 57 y.o. female.    Vitals:  height is 5' 5" (1.651 m) and weight is 74 kg (163 lb 2.3 oz). Her oral temperature is 99.5 °F (37.5 °C). Her blood pressure is 148/94 (abnormal) and her pulse is 92. Her respiration is 16 and oxygen saturation is 96%.     Chief Complaint: Sore Throat    Patient states that she has been experiencing a sore throat, fever and congestion starting yesterday. Patient also has a cough that is productive of green sputum  Patient tried taking Tylenol/Advil and had no relief.     Sore Throat   This is a new problem. The current episode started yesterday. The problem has been gradually worsening. The pain is worse on the right side. The maximum temperature recorded prior to her arrival was 100.4 - 100.9 F. The pain is at a severity of 6/10. Associated symptoms include congestion, coughing and headaches.     HENT:  Positive for congestion and sore throat.    Respiratory:  Positive for cough.    Neurological:  Positive for headaches.     Objective:      Physical Exam   Constitutional:  Non-toxic appearance. She does not appear ill. No distress.   HENT:   Head: Normocephalic and atraumatic.   Ears:   Right Ear: Hearing, tympanic membrane, external ear and ear canal normal.   Left Ear: Hearing, tympanic membrane, external ear and ear canal normal.   Nose: Congestion present.   Mouth/Throat: Mucous membranes are moist. Oropharynx is clear.   Pulmonary/Chest: Effort normal.   Abdominal: Normal appearance.   Musculoskeletal: Normal range of motion.         General: Normal range of motion.   Neurological: no focal deficit. She is alert.   Skin: Skin is not diaphoretic. Capillary refill takes less than 2 seconds.   Nursing note and vitals reviewed.      Results for orders placed or performed in visit on 09/12/22   POCT COVID-19 Rapid Screening   Result Value Ref Range    POC Rapid COVID Negative Negative     Acceptable Yes    POCT Influenza " "A/B MOLECULAR   Result Value Ref Range    POC Molecular Influenza A Ag Negative Negative, Not Reported    POC Molecular Influenza B Ag Negative Negative, Not Reported     Acceptable Yes       Assessment:       1. Viral URI    2. Sore throat          Plan:     Covid negative  Coricidin HBP for symptomatic treatment     Viral URI    Sore throat  -     POCT COVID-19 Rapid Screening  -     POCT Influenza A/B MOLECULAR                 Patient Instructions                                                            URI   If your condition worsens or fails to improve we recommend that you receive another evaluation at the ER immediately or contact your PCP to discuss your concerns or return here. You must understand that you've received an urgent care treatment only and that you may be released before all your medical problems are known or treated. You the [facility].   If we discussed that I think your illness is viral, it will not respond to antibiotics and will last 5-7 days. If we discussed "wait and see" antibiotics and if over the next few days the symptoms worsen start the antibiotics I have given you.   -  If you are female and on BCP and do take the antibiotics, use additional methods to prevent pregnancy while on the antibiotics and for one cycle after.   -  Flonase (fluticasone) is a nasal spray which is available over the counter and may help with your symptoms.   -  Zyrtec D, Claritin D or Allegra D can also help with symptoms of congestion and drainage.   -  If you have hypertension avoid using the "D" which is the decongestant.  Instead you can use Coricidin HBP for cold and cough symptoms.    -  If you just have drainage you can take plain Zyrtec, Claritin or Allegra   -  If you just have a congested feeling you can take pseudoephedrine (unless you have high blood pressure) which you have to sign for behind the counter. Do not buy the phenylephrine " which is on the shelf as it is not effective   -  Rest and fluids are also important.   -  Tylenol or ibuprofen can also be used as directed for pain unless you have an allergy to them or medical condition such as stomach ulcers, kidney or liver disease or blood thinners etc for which you should not be taking these type of medications.   -  If you are flying in the next few days Afrin nose drops for the airplane flight upon take off and landing may help. Other than at those times refrain from using afrin.   - If you were prescribed a narcotic do not drive or operate heavy machinery while taking these medications.

## 2022-09-12 NOTE — TELEPHONE ENCOUNTER
Pt would like to know if she can get a partial refill or do a virtual sooner     Pt is completely out of Lorazepam

## 2022-09-13 NOTE — PROGRESS NOTES
"Subjective:     Twila Roman     Chief Complaint   Patient presents with    Injections           Olinda is a 57 y.o. female coming in today for their 2nd Euflexxa injection to the right knee. Pt. Tolerated first injection well.     Objective:     VITAL SIGNS: /78   Ht 5' 5" (1.651 m)   Wt 73.9 kg (163 lb)   LMP 04/19/2015 (Approximate)   BMI 27.12 kg/m²      Euflexxa Injection Procedure #2     A time out was performed, including verification of patient ID, procedure, site and side, availability of information and equipment, review of safety issues, and agreement with consent, the procedure site was marked.    Location: Knee joint, right     Procedure: The patient was prepped aseptically with alcohol and chlorhexidine. Ethyl Chloride spray was used prior to skin puncture to help numb the superficial skin. After cold spray was applied, 2 cc's of 0.2% Naropin was injected into the skin and superficial tissue at the injection site using a 21 G, 2" needle to form an anesthetic tunnel and ensure proper needle placement into the right knee joint space. Using a hemostat, the syringe was exchanged with the Euflexxa syringe, and 2cc of Euflexxa was injected into the right knee joint. The patient was in the supine position during the duration of this procedure and the injection approach was from the superolateral aspect.     Ultrasound guidance was used for needle localization with SonoSite Edge 2, 9-L MHz linear probe(s). Images were saved and stored for documentation. The right knee joint was well visualized.  Dynamic visualization of the needle(s) was continuous throughout the procedure and maintained good position and correct needle placement.        Patient tolerance: The patient tolerated the procedure well with no immediate complications. There were no adverse reactions to the medication. Patient was instructed to apply ice to the joint for up to 20 minutes at a time and avoid strenuous activities for 24-36 " hours following the injection. The patient was warned of possible blood pressure changes during that time. Following that time, the patient can resume activities as prior to the injection.     The patient was reminded to call the clinic immediately for any adverse side effects as explained in clinic today.     Euflexxa:  Lot: [id number]   Exp: 09/17/2023      Assessment:      Encounter Diagnosis   Name Primary?    Primary osteoarthritis of right knee Yes            Plan:     1.second Euflexxa injection of right knee received today (see procedure note above)  2. Follow-up in 1 week for 3rd  injection of 3 injection series  3. Patient agreeable to today's plan and all questions were answered

## 2022-09-15 ENCOUNTER — OFFICE VISIT (OUTPATIENT)
Dept: SPORTS MEDICINE | Facility: CLINIC | Age: 57
End: 2022-09-15
Payer: COMMERCIAL

## 2022-09-15 VITALS
WEIGHT: 163 LBS | BODY MASS INDEX: 27.16 KG/M2 | SYSTOLIC BLOOD PRESSURE: 124 MMHG | HEIGHT: 65 IN | DIASTOLIC BLOOD PRESSURE: 78 MMHG

## 2022-09-15 DIAGNOSIS — M17.11 PRIMARY OSTEOARTHRITIS OF RIGHT KNEE: Primary | ICD-10-CM

## 2022-09-15 PROCEDURE — 99499 UNLISTED E&M SERVICE: CPT | Mod: S$GLB,,, | Performed by: NEUROMUSCULOSKELETAL MEDICINE & OMM

## 2022-09-15 PROCEDURE — 3074F PR MOST RECENT SYSTOLIC BLOOD PRESSURE < 130 MM HG: ICD-10-PCS | Mod: CPTII,S$GLB,, | Performed by: NEUROMUSCULOSKELETAL MEDICINE & OMM

## 2022-09-15 PROCEDURE — 20611 DRAIN/INJ JOINT/BURSA W/US: CPT | Mod: RT,S$GLB,, | Performed by: NEUROMUSCULOSKELETAL MEDICINE & OMM

## 2022-09-15 PROCEDURE — 3008F PR BODY MASS INDEX (BMI) DOCUMENTED: ICD-10-PCS | Mod: CPTII,S$GLB,, | Performed by: NEUROMUSCULOSKELETAL MEDICINE & OMM

## 2022-09-15 PROCEDURE — 99999 PR PBB SHADOW E&M-EST. PATIENT-LVL III: CPT | Mod: PBBFAC,,, | Performed by: NEUROMUSCULOSKELETAL MEDICINE & OMM

## 2022-09-15 PROCEDURE — 99499 NO LOS: ICD-10-PCS | Mod: S$GLB,,, | Performed by: NEUROMUSCULOSKELETAL MEDICINE & OMM

## 2022-09-15 PROCEDURE — 3008F BODY MASS INDEX DOCD: CPT | Mod: CPTII,S$GLB,, | Performed by: NEUROMUSCULOSKELETAL MEDICINE & OMM

## 2022-09-15 PROCEDURE — 20611 PR DRAIN/ASP/INJECT MAJOR JOINT/BURSA W/US GUIDANCE: ICD-10-PCS | Mod: RT,S$GLB,, | Performed by: NEUROMUSCULOSKELETAL MEDICINE & OMM

## 2022-09-15 PROCEDURE — 3074F SYST BP LT 130 MM HG: CPT | Mod: CPTII,S$GLB,, | Performed by: NEUROMUSCULOSKELETAL MEDICINE & OMM

## 2022-09-15 PROCEDURE — 3078F PR MOST RECENT DIASTOLIC BLOOD PRESSURE < 80 MM HG: ICD-10-PCS | Mod: CPTII,S$GLB,, | Performed by: NEUROMUSCULOSKELETAL MEDICINE & OMM

## 2022-09-15 PROCEDURE — 1159F MED LIST DOCD IN RCRD: CPT | Mod: CPTII,S$GLB,, | Performed by: NEUROMUSCULOSKELETAL MEDICINE & OMM

## 2022-09-15 PROCEDURE — 99999 PR PBB SHADOW E&M-EST. PATIENT-LVL III: ICD-10-PCS | Mod: PBBFAC,,, | Performed by: NEUROMUSCULOSKELETAL MEDICINE & OMM

## 2022-09-15 PROCEDURE — 1160F RVW MEDS BY RX/DR IN RCRD: CPT | Mod: CPTII,S$GLB,, | Performed by: NEUROMUSCULOSKELETAL MEDICINE & OMM

## 2022-09-15 PROCEDURE — 3078F DIAST BP <80 MM HG: CPT | Mod: CPTII,S$GLB,, | Performed by: NEUROMUSCULOSKELETAL MEDICINE & OMM

## 2022-09-15 PROCEDURE — 1159F PR MEDICATION LIST DOCUMENTED IN MEDICAL RECORD: ICD-10-PCS | Mod: CPTII,S$GLB,, | Performed by: NEUROMUSCULOSKELETAL MEDICINE & OMM

## 2022-09-15 PROCEDURE — 1160F PR REVIEW ALL MEDS BY PRESCRIBER/CLIN PHARMACIST DOCUMENTED: ICD-10-PCS | Mod: CPTII,S$GLB,, | Performed by: NEUROMUSCULOSKELETAL MEDICINE & OMM

## 2022-09-16 ENCOUNTER — PATIENT MESSAGE (OUTPATIENT)
Dept: PAIN MEDICINE | Facility: CLINIC | Age: 57
End: 2022-09-16
Payer: COMMERCIAL

## 2022-09-16 ENCOUNTER — TELEPHONE (OUTPATIENT)
Dept: PAIN MEDICINE | Facility: CLINIC | Age: 57
End: 2022-09-16
Payer: COMMERCIAL

## 2022-09-16 ENCOUNTER — TELEPHONE (OUTPATIENT)
Dept: SPINE | Facility: CLINIC | Age: 57
End: 2022-09-16
Payer: COMMERCIAL

## 2022-09-16 NOTE — TELEPHONE ENCOUNTER
----- Message from Juanita Cook sent at 9/16/2022  1:29 PM CDT -----  Name of Who is Calling: SEBAS MONTGOMERY [[id number]]            What is the request in detail: Patient is requesting call back to schedule SI joint fusion              Can the clinic reply by [facility]: yes              What [facility] [phone]

## 2022-09-16 NOTE — TELEPHONE ENCOUNTER
----- Message from Arlene Sullivan sent at 9/16/2022  3:57 PM CDT -----  Regarding: Returning a call  Contact: TWILA ROMAN [[id number]]  Type:  Patient Returning Call    Who Called:Twila Roman    Who Left Message for Patient:AMADOU Campbell   Does the patient know what this is regarding?:Call back   Would the patient rather a call back or a response via [facility]? Call back   Best Call Back Number:[phone]  Additional Information: In regards to procedure scheduling

## 2022-09-19 ENCOUNTER — PATIENT MESSAGE (OUTPATIENT)
Dept: PAIN MEDICINE | Facility: CLINIC | Age: 57
End: 2022-09-19
Payer: COMMERCIAL

## 2022-09-19 ENCOUNTER — PATIENT MESSAGE (OUTPATIENT)
Dept: PAIN MEDICINE | Facility: OTHER | Age: 57
End: 2022-09-19
Payer: COMMERCIAL

## 2022-09-19 DIAGNOSIS — M46.1 SACROILIITIS: Primary | ICD-10-CM

## 2022-09-19 NOTE — TELEPHONE ENCOUNTER
Staff contacted pt regarding message about her SI fusion procedure not being scheduled yet. Staff informed pt to contact the procedure area regarding getting this appt scheduled due to this having to be done in procedure area. Staff also provided the pt with the procedure area contact number [phone]. Staff informed pt to give office a call at [phone] regarding any questions.

## 2022-09-21 ENCOUNTER — PATIENT MESSAGE (OUTPATIENT)
Dept: SPORTS MEDICINE | Facility: CLINIC | Age: 57
End: 2022-09-21
Payer: COMMERCIAL

## 2022-09-22 ENCOUNTER — OFFICE VISIT (OUTPATIENT)
Dept: SPORTS MEDICINE | Facility: CLINIC | Age: 57
End: 2022-09-22
Payer: COMMERCIAL

## 2022-09-22 ENCOUNTER — LAB VISIT (OUTPATIENT)
Dept: LAB | Facility: HOSPITAL | Age: 57
End: 2022-09-22
Attending: INTERNAL MEDICINE
Payer: COMMERCIAL

## 2022-09-22 VITALS — WEIGHT: 163 LBS | HEIGHT: 65 IN | BODY MASS INDEX: 27.16 KG/M2 | TEMPERATURE: 98 F

## 2022-09-22 DIAGNOSIS — Z00.00 ANNUAL PHYSICAL EXAM: ICD-10-CM

## 2022-09-22 DIAGNOSIS — M17.11 PRIMARY OSTEOARTHRITIS OF RIGHT KNEE: Primary | ICD-10-CM

## 2022-09-22 LAB
BILIRUB UR QL STRIP: NEGATIVE
CLARITY UR REFRACT.AUTO: CLEAR
COLOR UR AUTO: YELLOW
GLUCOSE UR QL STRIP: NEGATIVE
HGB UR QL STRIP: NEGATIVE
KETONES UR QL STRIP: NEGATIVE
LEUKOCYTE ESTERASE UR QL STRIP: NEGATIVE
NITRITE UR QL STRIP: NEGATIVE
PH UR STRIP: 5 [PH] (ref 5–8)
PROT UR QL STRIP: NEGATIVE
SP GR UR STRIP: 1.01 (ref 1–1.03)
URN SPEC COLLECT METH UR: NORMAL

## 2022-09-22 PROCEDURE — 99999 PR PBB SHADOW E&M-EST. PATIENT-LVL III: ICD-10-PCS | Mod: PBBFAC,,, | Performed by: FAMILY MEDICINE

## 2022-09-22 PROCEDURE — 81003 URINALYSIS AUTO W/O SCOPE: CPT | Performed by: INTERNAL MEDICINE

## 2022-09-22 PROCEDURE — 1159F PR MEDICATION LIST DOCUMENTED IN MEDICAL RECORD: ICD-10-PCS | Mod: CPTII,S$GLB,, | Performed by: FAMILY MEDICINE

## 2022-09-22 PROCEDURE — 20611 DRAIN/INJ JOINT/BURSA W/US: CPT | Mod: RT,S$GLB,, | Performed by: FAMILY MEDICINE

## 2022-09-22 PROCEDURE — 99499 NO LOS: ICD-10-PCS | Mod: S$GLB,,, | Performed by: FAMILY MEDICINE

## 2022-09-22 PROCEDURE — 20611 LARGE JOINT ASPIRATION/INJECTION: R KNEE: ICD-10-PCS | Mod: RT,S$GLB,, | Performed by: FAMILY MEDICINE

## 2022-09-22 PROCEDURE — 3008F PR BODY MASS INDEX (BMI) DOCUMENTED: ICD-10-PCS | Mod: CPTII,S$GLB,, | Performed by: FAMILY MEDICINE

## 2022-09-22 PROCEDURE — 1160F PR REVIEW ALL MEDS BY PRESCRIBER/CLIN PHARMACIST DOCUMENTED: ICD-10-PCS | Mod: CPTII,S$GLB,, | Performed by: FAMILY MEDICINE

## 2022-09-22 PROCEDURE — 99999 PR PBB SHADOW E&M-EST. PATIENT-LVL III: CPT | Mod: PBBFAC,,, | Performed by: FAMILY MEDICINE

## 2022-09-22 PROCEDURE — 99499 UNLISTED E&M SERVICE: CPT | Mod: S$GLB,,, | Performed by: FAMILY MEDICINE

## 2022-09-22 PROCEDURE — 1160F RVW MEDS BY RX/DR IN RCRD: CPT | Mod: CPTII,S$GLB,, | Performed by: FAMILY MEDICINE

## 2022-09-22 PROCEDURE — 3008F BODY MASS INDEX DOCD: CPT | Mod: CPTII,S$GLB,, | Performed by: FAMILY MEDICINE

## 2022-09-22 PROCEDURE — 1159F MED LIST DOCD IN RCRD: CPT | Mod: CPTII,S$GLB,, | Performed by: FAMILY MEDICINE

## 2022-09-22 NOTE — PROGRESS NOTES
Euflexxa right knee 3/3  Lot number: [id number]  Expiration date: 2023-09-10        MEDICAL NECESSITY FOR VISCOSUPPLEMENTATION USE: After thorough evaluation of the patient, I have determined that viscosupplementation treatment is medically necessary. The patient has painful degenerative joint disease (DJD) of the knee(s) with failure of conservative treatments including lifestyle modifications and rehabilitation exercises. Oral analgesics including NSAIDs have not adequately controlled the patient's symptoms. There is radiographic evidence of Kellgren-Ravi grade II (or greater) osteoarthritic (OA) changes, or if lack of radiographic evidence, there is arthroscopic or other evidence of chondrosis of the knee(s).

## 2022-09-22 NOTE — PROGRESS NOTES
Subjective:       Patient ID: Twila Roman is a 57 y.o. female.    Chief Complaint: Annual Exam    Patient is a 57 y.o.female who presents today for annual    Labs reviewed  Vaccines shingrix printed  Gyn UTD  Mammogram up to date; scheduled  Colon: UTD    Luis Alfredo: stable    Having back surgery on Monday/     Right knee pain: was seeing dr alcala and had steroid injection; had the gel series as well.  Review of Systems   Constitutional:  Negative for appetite change, chills, diaphoresis and fever.   HENT:  Negative for congestion, ear discharge, ear pain, postnasal drip, tinnitus, trouble swallowing and voice change.    Eyes:  Negative for discharge, redness and itching.   Respiratory:  Negative for cough, chest tightness, shortness of breath and wheezing.    Cardiovascular:  Negative for chest pain, palpitations and leg swelling.   Gastrointestinal:  Negative for abdominal pain, constipation, diarrhea, nausea and vomiting.   Endocrine: Negative for cold intolerance and heat intolerance.   Genitourinary:  Negative for difficulty urinating, flank pain, hematuria and urgency.   Musculoskeletal:  Negative for arthralgias, gait problem, myalgias and neck stiffness.   Skin:  Negative for color change and rash.   Neurological:  Negative for dizziness, seizures, syncope and headaches.   Hematological:  Negative for adenopathy.   Psychiatric/Behavioral:  Negative for agitation, behavioral problems, confusion and sleep disturbance.      Objective:      Physical Exam  Vitals and nursing note reviewed.   Constitutional:       General: She is not in acute distress.     Appearance: She is well-developed. She is not diaphoretic.   HENT:      Head: Normocephalic and atraumatic.      Right Ear: External ear normal.      Left Ear: External ear normal.      Nose: Nose normal.      Mouth/Throat:      Pharynx: No oropharyngeal exudate.   Eyes:      General: No scleral icterus.        Right eye: No discharge.         Left eye: No  discharge.      Conjunctiva/sclera: Conjunctivae normal.      Pupils: Pupils are equal, round, and reactive to light.   Neck:      Thyroid: No thyromegaly.      Vascular: No JVD.      Trachea: No tracheal deviation.   Cardiovascular:      Rate and Rhythm: Normal rate.      Heart sounds: Normal heart sounds. No murmur heard.    No friction rub. No gallop.   Pulmonary:      Effort: Pulmonary effort is normal. No respiratory distress.      Breath sounds: Normal breath sounds. No stridor. No wheezing or rales.   Chest:      Chest wall: No tenderness.   Abdominal:      General: Bowel sounds are normal. There is no distension.      Palpations: Abdomen is soft.      Tenderness: There is no abdominal tenderness. There is no rebound.   Musculoskeletal:         General: No tenderness.      Cervical back: Neck supple.   Lymphadenopathy:      Cervical: No cervical adenopathy.   Skin:     General: Skin is warm and dry.      Findings: No erythema or rash.   Neurological:      Mental Status: She is alert and oriented to person, place, and time.   Psychiatric:         Behavior: Behavior normal.       Assessment and Plan:       1. Annual physical exam  Labs reviewed    2. Anxiety  Stable on meds/ f/u in 6 months with virtual appt        No follow-ups on file.

## 2022-09-22 NOTE — PROCEDURES
"Large Joint Aspiration/Injection: R knee    Date/Time: 9/22/2022 9:15 AM  Performed by: Capo Prince MD  Authorized by: Capo Prince MD     Consent Done?:  Yes (Verbal)  Indications:  Pain  Site marked: the procedure site was marked    Timeout: prior to procedure the correct patient, procedure, and site was verified    Prep: patient was prepped and draped in usual sterile fashion      Details:  Needle Size:  20 G  Ultrasonic Guidance for needle placement?: Yes    Images are saved and documented.  Approach:  Lateral  Location:  Knee  Site:  R knee  Medications:  20 mg sodium hyaluronate (EUFLEXXA) 10 mg/mL(mw 2.4 -3.6 million)  Patient tolerance:  Patient tolerated the procedure well with no immediate complications     Description of ultrasound utilization for needle guidance:   Ultrasound guidance used for needle localization. Images saved and stored for documentation. The SUPRAPATELLAR BURSA / KNEE JOINT was visualized. Dynamic visualization of the 20g x 3.5" needle was continuous throughout the procedure.   "

## 2022-09-29 ENCOUNTER — OFFICE VISIT (OUTPATIENT)
Dept: INTERNAL MEDICINE | Facility: CLINIC | Age: 57
End: 2022-09-29
Payer: COMMERCIAL

## 2022-09-29 ENCOUNTER — DOCUMENTATION ONLY (OUTPATIENT)
Dept: REHABILITATION | Facility: HOSPITAL | Age: 57
End: 2022-09-29
Payer: COMMERCIAL

## 2022-09-29 ENCOUNTER — HOSPITAL ENCOUNTER (OUTPATIENT)
Dept: PREADMISSION TESTING | Facility: OTHER | Age: 57
Discharge: HOME | End: 2022-09-29
Attending: ANESTHESIOLOGY
Payer: COMMERCIAL

## 2022-09-29 ENCOUNTER — ANESTHESIA EVENT (OUTPATIENT)
Dept: SURGERY | Facility: OTHER | Age: 57
End: 2022-09-29
Payer: COMMERCIAL

## 2022-09-29 VITALS
DIASTOLIC BLOOD PRESSURE: 76 MMHG | SYSTOLIC BLOOD PRESSURE: 119 MMHG | HEIGHT: 65 IN | RESPIRATION RATE: 18 BRPM | OXYGEN SATURATION: 99 % | BODY MASS INDEX: 26.49 KG/M2 | WEIGHT: 159 LBS | TEMPERATURE: 98 F | HEART RATE: 65 BPM

## 2022-09-29 VITALS
TEMPERATURE: 98 F | HEIGHT: 65 IN | WEIGHT: 160.94 LBS | OXYGEN SATURATION: 99 % | BODY MASS INDEX: 26.81 KG/M2 | SYSTOLIC BLOOD PRESSURE: 124 MMHG | DIASTOLIC BLOOD PRESSURE: 80 MMHG | HEART RATE: 69 BPM

## 2022-09-29 DIAGNOSIS — F41.9 ANXIETY: ICD-10-CM

## 2022-09-29 DIAGNOSIS — Z00.00 ANNUAL PHYSICAL EXAM: Primary | ICD-10-CM

## 2022-09-29 PROCEDURE — 3079F DIAST BP 80-89 MM HG: CPT | Mod: CPTII,S$GLB,, | Performed by: INTERNAL MEDICINE

## 2022-09-29 PROCEDURE — 3008F BODY MASS INDEX DOCD: CPT | Mod: CPTII,S$GLB,, | Performed by: INTERNAL MEDICINE

## 2022-09-29 PROCEDURE — 3074F SYST BP LT 130 MM HG: CPT | Mod: CPTII,S$GLB,, | Performed by: INTERNAL MEDICINE

## 2022-09-29 PROCEDURE — 99999 PR PBB SHADOW E&M-EST. PATIENT-LVL IV: CPT | Mod: PBBFAC,,, | Performed by: INTERNAL MEDICINE

## 2022-09-29 PROCEDURE — 3044F HG A1C LEVEL LT 7.0%: CPT | Mod: CPTII,S$GLB,, | Performed by: INTERNAL MEDICINE

## 2022-09-29 PROCEDURE — 99396 PREV VISIT EST AGE 40-64: CPT | Mod: S$GLB,,, | Performed by: INTERNAL MEDICINE

## 2022-09-29 PROCEDURE — 3079F PR MOST RECENT DIASTOLIC BLOOD PRESSURE 80-89 MM HG: ICD-10-PCS | Mod: CPTII,S$GLB,, | Performed by: INTERNAL MEDICINE

## 2022-09-29 PROCEDURE — 1159F PR MEDICATION LIST DOCUMENTED IN MEDICAL RECORD: ICD-10-PCS | Mod: CPTII,S$GLB,, | Performed by: INTERNAL MEDICINE

## 2022-09-29 PROCEDURE — 3044F PR MOST RECENT HEMOGLOBIN A1C LEVEL <7.0%: ICD-10-PCS | Mod: CPTII,S$GLB,, | Performed by: INTERNAL MEDICINE

## 2022-09-29 PROCEDURE — 99396 PR PREVENTIVE VISIT,EST,40-64: ICD-10-PCS | Mod: S$GLB,,, | Performed by: INTERNAL MEDICINE

## 2022-09-29 PROCEDURE — 3074F PR MOST RECENT SYSTOLIC BLOOD PRESSURE < 130 MM HG: ICD-10-PCS | Mod: CPTII,S$GLB,, | Performed by: INTERNAL MEDICINE

## 2022-09-29 PROCEDURE — 99999 PR PBB SHADOW E&M-EST. PATIENT-LVL IV: ICD-10-PCS | Mod: PBBFAC,,, | Performed by: INTERNAL MEDICINE

## 2022-09-29 PROCEDURE — 1159F MED LIST DOCD IN RCRD: CPT | Mod: CPTII,S$GLB,, | Performed by: INTERNAL MEDICINE

## 2022-09-29 PROCEDURE — 3008F PR BODY MASS INDEX (BMI) DOCUMENTED: ICD-10-PCS | Mod: CPTII,S$GLB,, | Performed by: INTERNAL MEDICINE

## 2022-09-29 RX ORDER — SODIUM CHLORIDE, SODIUM LACTATE, POTASSIUM CHLORIDE, CALCIUM CHLORIDE 600; 310; 30; 20 MG/100ML; MG/100ML; MG/100ML; MG/100ML
INJECTION, SOLUTION INTRAVENOUS CONTINUOUS
Status: CANCELLED | OUTPATIENT
Start: 2022-09-29

## 2022-09-29 RX ORDER — LIDOCAINE HYDROCHLORIDE 10 MG/ML
0.5 INJECTION, SOLUTION EPIDURAL; INFILTRATION; INTRACAUDAL; PERINEURAL ONCE
Status: CANCELLED | OUTPATIENT
Start: 2022-09-29 | End: 2022-09-29

## 2022-09-29 NOTE — PLAN OF CARE
[facility]   Discharge Note    Name: Twila Roman  Clinic Number: [id number]    Therapy Diagnosis:        Encounter Diagnosis   Name Primary?    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     Discharge FOTO Score: staff did not capture     Goals: not met, patient requested discharged    PLAN   This patient is discharged from Physical Therapy      Pippa Leonard, PT

## 2022-09-29 NOTE — ANESTHESIA PREPROCEDURE EVALUATION
09/29/2022  Twila Roman is a 57 y.o., female.      Pre-op Assessment    I have reviewed the Patient Summary Reports.     I have reviewed the Nursing Notes. I have reviewed the NPO Status.   I have reviewed the Medications.     Review of Systems  Anesthesia Hx:  No problems with previous Anesthesia  History of prior surgery of interest to airway management or planning: Previous anesthesia: General Robotic LISE Bahai 2020 with general anesthesia.  Procedure performed at an [facility]. Denies Family Hx of Anesthesia complications.   Denies Personal Hx of Anesthesia complications.   Social:  Social Alcohol Use, Non-Smoker    Hematology/Oncology:  Hematology Normal   Oncology Normal     EENT/Dental:EENT/Dental Normal   Cardiovascular:   Exercise tolerance: good Hypertension, well controlled    Pulmonary:  Pulmonary Normal    Renal/:  Renal/ Normal     Hepatic/GI:  Hepatic/GI Normal    Musculoskeletal:  Spine Disorders: lumbar Chronic Pain    Neurological:   Chronic Pain Syndrome   Endocrine:  Endocrine Normal    Dermatological:  Skin Normal    Psych:   Psychiatric History anxiety          Physical Exam  General: Well nourished, Cooperative, Alert and Oriented    Airway:  Mallampati: I   Mouth Opening: Normal  Neck ROM: Normal ROM    Dental:  Intact        Anesthesia Plan  Type of Anesthesia, risks & benefits discussed:    Anesthesia Type: Gen ETT  Intra-op Monitoring Plan: Standard ASA Monitors  Post Op Pain Control Plan: multimodal analgesia  Induction:  IV  Airway Plan: Video, Post-Induction  Informed Consent: Informed consent signed with the Patient and all parties understand the risks and agree with anesthesia plan.  All questions answered.   ASA Score: 2  Anesthesia Plan Notes: Labs in epic ok    Ready For Surgery From Anesthesia Perspective.     .

## 2022-09-29 NOTE — DISCHARGE INSTRUCTIONS
Information to Prepare you for your Surgery    PRE-ADMIT TESTING -  [phone]    [address] [facility]          Your surgery has been scheduled at [facility]. We are pleased to have the opportunity to serve you. For Further Information please call [phone].    On the day of surgery please report to the Information Desk on the 1st floor.    CONTACT YOUR PHYSICIAN'S OFFICE THE DAY PRIOR TO YOUR SURGERY TO OBTAIN YOUR ARRIVAL TIME.     The evening before surgery do not eat anything after 9 p.m. ( this includes hard candy, chewing gum and mints).  You may only have GATORADE, POWERADE AND WATER  from 9 p.m. until you leave your home.   DO NOT DRINK ANY LIQUIDS ON THE WAY TO THE HOSPITAL.      Why does your anesthesiologist allow you to drink Gatorade/Powerade before surgery?  Gatorade/Powerade helps to increase your comfort before surgery and to decrease your nausea after surgery. The carbohydrates in Gatorade/Powerade help reduce your body's stress response to surgery.  If you are a diabetic-drink only water prior to surgery.      Current Visitor policy(12/27/2021) - Patients may have 2 visitors pre and post procedure. Only 2 visitors will be allowed in the Surgical building with the patient.     SPECIAL MEDICATION INSTRUCTIONS: TAKE medications checked off by the Anesthesiologist on your Medication List.    Angiogram Patients: Take medications as instructed by your physician, including aspirin.     Surgery Patients:    If you take ASPIRIN - Your PHYSICIAN/SURGEON will need to inform you IF/OR when you need to stop taking aspirin prior to your surgery.     Do Not take any medications containing IBUPROFEN.    Do Not Wear any make-up (especially eye make-up) to surgery. Please remove any false eyelashes or eyelash extensions. If you arrive the day of surgery with makeup/eyelashes on you will be required to remove prior to surgery. (There is a risk of corneal  abrasions if eye makeup/eyelash extensions are not removed)      Leave all valuables at home.   Do Not wear any jewelry or watches, including any metal in body piercings. Jewelry must be removed prior to coming to the hospital.  There is a possibility that rings that are unable to be removed may be cut off if they are on the surgical extremity.    Please remove all hair extensions, wigs, clips and any other metal accessories/ ornaments from your hair.  These items may pose a flammable/fire risk in Surgery and must be removed.    Do not shave your surgical area at least 5 days prior to your surgery. The surgical prep will be performed at the hospital according to Infection Control regulations.    Contact Lens must be removed before surgery. Either do not wear the contact lens or bring a case and solution for storage.  Please bring a container for eyeglasses or dentures as required.  Bring any paperwork your physician has provided, such as consent forms,  history and physicals, doctor's orders, etc.   Bring comfortable clothes that are loose fitting to wear upon discharge. Take into consideration the type of surgery being performed.  Maintain your diet as advised per your physician the day prior to surgery.      Adequate rest the night before surgery is advised.   Park in the Parking lot behind the hospital or in the [facility] across the street from the parking lot. Parking is complimentary.  If you will be discharged the same day as your procedure, please arrange for a responsible adult to drive you home or to accompany you if traveling by taxi.   YOU WILL NOT BE PERMITTED TO DRIVE OR TO LEAVE THE HOSPITAL ALONE AFTER SURGERY.   If you are being discharged the same day, it is strongly recommended that you arrange for someone to remain with you for the first 24 hrs following your surgery.    The Surgeon will speak to your family/visitor after your surgery regarding the outcome of your surgery and post op  care.  The Surgeon may speak to you after your surgery, but there is a possibility you may not remember the details.  Please check with your family members regarding the conversation with the Surgeon.    We strongly recommend whoever is bringing you home be present for discharge instructions.  This will ensure a thorough understanding for your post op home care.    ALL CHILDREN MUST ALWAYS BE ACCOMPANIED BY AN ADULT.    Visitors-Refer to current Visitor policy handouts.    Thank you for your cooperation.  The Staff of [facility].            Bathing Instructions with Hibiclens    Shower the evening before and morning of your procedure with Hibiclens:  Wash your face with water and your regular face wash/soap  Apply Hibiclens directly on your skin or on a wet washcloth and wash gently. When showering: Move away from the shower stream when applying Hibiclens to avoid rinsing off too soon.  Rinse thoroughly with warm water  Do not dilute Hibiclens        Dry off as usual, do not use any deodorant, powder, body lotions, perfume, after shave or cologne.

## 2022-09-30 ENCOUNTER — PATIENT MESSAGE (OUTPATIENT)
Dept: SURGERY | Facility: OTHER | Age: 57
End: 2022-09-30
Payer: COMMERCIAL

## 2022-09-30 PROBLEM — M46.1 SACROILIITIS: Status: ACTIVE | Noted: 2022-09-30

## 2022-10-03 ENCOUNTER — HOSPITAL ENCOUNTER (OUTPATIENT)
Facility: OTHER | Age: 57
Discharge: HOME | End: 2022-10-03
Attending: ANESTHESIOLOGY | Admitting: ANESTHESIOLOGY
Payer: COMMERCIAL

## 2022-10-03 ENCOUNTER — ANESTHESIA (OUTPATIENT)
Dept: SURGERY | Facility: OTHER | Age: 57
End: 2022-10-03
Payer: COMMERCIAL

## 2022-10-03 DIAGNOSIS — G89.29 OTHER CHRONIC PAIN: ICD-10-CM

## 2022-10-03 DIAGNOSIS — M46.1 SACROILIITIS: Primary | ICD-10-CM

## 2022-10-03 DIAGNOSIS — G89.29 CHRONIC PAIN: ICD-10-CM

## 2022-10-03 PROCEDURE — 27201423 OPTIME MED/SURG SUP & DEVICES STERILE SUPPLY: Performed by: ANESTHESIOLOGY

## 2022-10-03 PROCEDURE — 71000033 HC RECOVERY, INTIAL HOUR: Performed by: ANESTHESIOLOGY

## 2022-10-03 PROCEDURE — 63600175 PHARM REV CODE 636 W HCPCS: Performed by: ANESTHESIOLOGY

## 2022-10-03 PROCEDURE — 71000015 HC POSTOP RECOV 1ST HR: Performed by: ANESTHESIOLOGY

## 2022-10-03 PROCEDURE — 63600175 PHARM REV CODE 636 W HCPCS: Performed by: EMERGENCY MEDICINE

## 2022-10-03 PROCEDURE — 37000008 HC ANESTHESIA 1ST 15 MINUTES: Performed by: ANESTHESIOLOGY

## 2022-10-03 PROCEDURE — 36000710: Performed by: ANESTHESIOLOGY

## 2022-10-03 PROCEDURE — V2790 AMNIOTIC MEMBRANE: HCPCS | Performed by: ANESTHESIOLOGY

## 2022-10-03 PROCEDURE — C1889 IMPLANT/INSERT DEVICE, NOC: HCPCS | Performed by: ANESTHESIOLOGY

## 2022-10-03 PROCEDURE — 71000016 HC POSTOP RECOV ADDL HR: Performed by: ANESTHESIOLOGY

## 2022-10-03 PROCEDURE — 25000003 PHARM REV CODE 250: Performed by: ANESTHESIOLOGY

## 2022-10-03 PROCEDURE — 27279 ARTHRD SI JT PLMT TARTCLR DV: CPT | Mod: LT,,, | Performed by: ANESTHESIOLOGY

## 2022-10-03 PROCEDURE — 36000711: Performed by: ANESTHESIOLOGY

## 2022-10-03 PROCEDURE — C1769 GUIDE WIRE: HCPCS | Performed by: ANESTHESIOLOGY

## 2022-10-03 PROCEDURE — 37000009 HC ANESTHESIA EA ADD 15 MINS: Performed by: ANESTHESIOLOGY

## 2022-10-03 PROCEDURE — 25000003 PHARM REV CODE 250: Performed by: NURSE ANESTHETIST, CERTIFIED REGISTERED

## 2022-10-03 PROCEDURE — C9290 INJ, BUPIVACAINE LIPOSOME: HCPCS | Performed by: ANESTHESIOLOGY

## 2022-10-03 PROCEDURE — 27279 PR ARTHRODESIS SACROILIAC JNT PERQ W/IMAGE GUIDE INCL BONE GRAFT: ICD-10-PCS | Mod: LT,,, | Performed by: ANESTHESIOLOGY

## 2022-10-03 PROCEDURE — 63600175 PHARM REV CODE 636 W HCPCS: Performed by: NURSE ANESTHETIST, CERTIFIED REGISTERED

## 2022-10-03 PROCEDURE — 71000039 HC RECOVERY, EACH ADD'L HOUR: Performed by: ANESTHESIOLOGY

## 2022-10-03 DEVICE — IMPLANTABLE DEVICE: Type: IMPLANTABLE DEVICE | Site: BACK | Status: FUNCTIONAL

## 2022-10-03 RX ORDER — CEFAZOLIN SODIUM 1 G/50ML
1 SOLUTION INTRAVENOUS ONCE
Status: COMPLETED | OUTPATIENT
Start: 2022-10-03 | End: 2022-10-03

## 2022-10-03 RX ORDER — DEXAMETHASONE SODIUM PHOSPHATE 4 MG/ML
INJECTION, SOLUTION INTRA-ARTICULAR; INTRALESIONAL; INTRAMUSCULAR; INTRAVENOUS; SOFT TISSUE
Status: DISCONTINUED | OUTPATIENT
Start: 2022-10-03 | End: 2022-10-03

## 2022-10-03 RX ORDER — SULFAMETHOXAZOLE AND TRIMETHOPRIM 400; 80 MG/1; MG/1
1 TABLET ORAL 2 TIMES DAILY
Qty: 10 TABLET | Refills: 0 | Status: SHIPPED | OUTPATIENT
Start: 2022-10-03 | End: 2022-10-08

## 2022-10-03 RX ORDER — FENTANYL CITRATE 50 UG/ML
INJECTION, SOLUTION INTRAMUSCULAR; INTRAVENOUS
Status: DISCONTINUED | OUTPATIENT
Start: 2022-10-03 | End: 2022-10-03

## 2022-10-03 RX ORDER — LIDOCAINE HYDROCHLORIDE AND EPINEPHRINE 10; 10 MG/ML; UG/ML
INJECTION, SOLUTION INFILTRATION; PERINEURAL
Status: DISCONTINUED | OUTPATIENT
Start: 2022-10-03 | End: 2022-10-03 | Stop reason: HOSPADM

## 2022-10-03 RX ORDER — PROPOFOL 10 MG/ML
VIAL (ML) INTRAVENOUS
Status: DISCONTINUED | OUTPATIENT
Start: 2022-10-03 | End: 2022-10-03

## 2022-10-03 RX ORDER — SODIUM CHLORIDE 9 MG/ML
500 INJECTION, SOLUTION INTRAVENOUS CONTINUOUS
Status: DISCONTINUED | OUTPATIENT
Start: 2022-10-03 | End: 2022-10-03 | Stop reason: HOSPADM

## 2022-10-03 RX ORDER — HYDROMORPHONE HYDROCHLORIDE 2 MG/ML
0.4 INJECTION, SOLUTION INTRAMUSCULAR; INTRAVENOUS; SUBCUTANEOUS EVERY 5 MIN PRN
Status: DISCONTINUED | OUTPATIENT
Start: 2022-10-03 | End: 2022-10-03 | Stop reason: HOSPADM

## 2022-10-03 RX ORDER — MIDAZOLAM HYDROCHLORIDE 1 MG/ML
1 INJECTION INTRAMUSCULAR; INTRAVENOUS ONCE
Status: COMPLETED | OUTPATIENT
Start: 2022-10-03 | End: 2022-10-03

## 2022-10-03 RX ORDER — LIDOCAINE HCL/PF 100 MG/5ML
SYRINGE (ML) INTRAVENOUS
Status: DISCONTINUED | OUTPATIENT
Start: 2022-10-03 | End: 2022-10-03

## 2022-10-03 RX ORDER — LIDOCAINE HYDROCHLORIDE 10 MG/ML
0.5 INJECTION, SOLUTION EPIDURAL; INFILTRATION; INTRACAUDAL; PERINEURAL ONCE
Status: DISCONTINUED | OUTPATIENT
Start: 2022-10-03 | End: 2022-10-03 | Stop reason: HOSPADM

## 2022-10-03 RX ORDER — OXYCODONE AND ACETAMINOPHEN 5; 325 MG/1; MG/1
1 TABLET ORAL EVERY 8 HOURS PRN
Qty: 15 TABLET | Refills: 0 | Status: SHIPPED | OUTPATIENT
Start: 2022-10-03 | End: 2022-10-06 | Stop reason: SDUPTHER

## 2022-10-03 RX ORDER — SODIUM CHLORIDE 0.9 % (FLUSH) 0.9 %
3 SYRINGE (ML) INJECTION
Status: DISCONTINUED | OUTPATIENT
Start: 2022-10-03 | End: 2022-10-03 | Stop reason: HOSPADM

## 2022-10-03 RX ORDER — KETAMINE HYDROCHLORIDE 50 MG/ML
INJECTION, SOLUTION INTRAMUSCULAR; INTRAVENOUS
Status: DISCONTINUED | OUTPATIENT
Start: 2022-10-03 | End: 2022-10-03

## 2022-10-03 RX ORDER — ROCURONIUM BROMIDE 10 MG/ML
INJECTION, SOLUTION INTRAVENOUS
Status: DISCONTINUED | OUTPATIENT
Start: 2022-10-03 | End: 2022-10-03

## 2022-10-03 RX ORDER — PROCHLORPERAZINE EDISYLATE 5 MG/ML
5 INJECTION INTRAMUSCULAR; INTRAVENOUS EVERY 30 MIN PRN
Status: DISCONTINUED | OUTPATIENT
Start: 2022-10-03 | End: 2022-10-03 | Stop reason: HOSPADM

## 2022-10-03 RX ORDER — MEPERIDINE HYDROCHLORIDE 25 MG/ML
12.5 INJECTION INTRAMUSCULAR; INTRAVENOUS; SUBCUTANEOUS ONCE AS NEEDED
Status: DISCONTINUED | OUTPATIENT
Start: 2022-10-03 | End: 2022-10-03 | Stop reason: HOSPADM

## 2022-10-03 RX ORDER — SODIUM CHLORIDE 9 MG/ML
INJECTION, SOLUTION INTRAVENOUS CONTINUOUS
Status: CANCELLED | OUTPATIENT
Start: 2022-10-03

## 2022-10-03 RX ORDER — SODIUM CHLORIDE, SODIUM LACTATE, POTASSIUM CHLORIDE, CALCIUM CHLORIDE 600; 310; 30; 20 MG/100ML; MG/100ML; MG/100ML; MG/100ML
INJECTION, SOLUTION INTRAVENOUS CONTINUOUS
Status: DISCONTINUED | OUTPATIENT
Start: 2022-10-03 | End: 2022-10-03 | Stop reason: HOSPADM

## 2022-10-03 RX ORDER — DIPHENHYDRAMINE HYDROCHLORIDE 50 MG/ML
25 INJECTION INTRAMUSCULAR; INTRAVENOUS EVERY 6 HOURS PRN
Status: DISCONTINUED | OUTPATIENT
Start: 2022-10-03 | End: 2022-10-03 | Stop reason: HOSPADM

## 2022-10-03 RX ORDER — MIDAZOLAM HYDROCHLORIDE 1 MG/ML
INJECTION INTRAMUSCULAR; INTRAVENOUS
Status: DISCONTINUED | OUTPATIENT
Start: 2022-10-03 | End: 2022-10-03

## 2022-10-03 RX ORDER — ONDANSETRON 2 MG/ML
INJECTION INTRAMUSCULAR; INTRAVENOUS
Status: DISCONTINUED | OUTPATIENT
Start: 2022-10-03 | End: 2022-10-03

## 2022-10-03 RX ORDER — OXYCODONE HYDROCHLORIDE 5 MG/1
5 TABLET ORAL
Status: DISCONTINUED | OUTPATIENT
Start: 2022-10-03 | End: 2022-10-03 | Stop reason: HOSPADM

## 2022-10-03 RX ORDER — BACITRACIN ZINC 500 UNIT/G
OINTMENT (GRAM) TOPICAL
Status: DISCONTINUED | OUTPATIENT
Start: 2022-10-03 | End: 2022-10-03 | Stop reason: HOSPADM

## 2022-10-03 RX ADMIN — ROCURONIUM BROMIDE 40 MG: 10 INJECTION, SOLUTION INTRAVENOUS at 10:10

## 2022-10-03 RX ADMIN — KETAMINE HYDROCHLORIDE 25 MG: 50 INJECTION, SOLUTION INTRAMUSCULAR; INTRAVENOUS at 10:10

## 2022-10-03 RX ADMIN — PROPOFOL 150 MG: 10 INJECTION, EMULSION INTRAVENOUS at 10:10

## 2022-10-03 RX ADMIN — DEXAMETHASONE SODIUM PHOSPHATE 8 MG: 4 INJECTION, SOLUTION INTRAMUSCULAR; INTRAVENOUS at 10:10

## 2022-10-03 RX ADMIN — SUGAMMADEX 200 MG: 100 INJECTION, SOLUTION INTRAVENOUS at 11:10

## 2022-10-03 RX ADMIN — LIDOCAINE HYDROCHLORIDE 75 MG: 20 INJECTION, SOLUTION INTRAVENOUS at 10:10

## 2022-10-03 RX ADMIN — MIDAZOLAM HYDROCHLORIDE 2 MG: 1 INJECTION, SOLUTION INTRAMUSCULAR; INTRAVENOUS at 10:10

## 2022-10-03 RX ADMIN — CEFAZOLIN SODIUM 2 G: 1 SOLUTION INTRAVENOUS at 10:10

## 2022-10-03 RX ADMIN — SODIUM CHLORIDE, SODIUM LACTATE, POTASSIUM CHLORIDE, AND CALCIUM CHLORIDE: .6; .31; .03; .02 INJECTION, SOLUTION INTRAVENOUS at 09:10

## 2022-10-03 RX ADMIN — FENTANYL CITRATE 100 MCG: 50 INJECTION, SOLUTION INTRAMUSCULAR; INTRAVENOUS at 10:10

## 2022-10-03 RX ADMIN — ONDANSETRON HYDROCHLORIDE 4 MG: 2 INJECTION INTRAMUSCULAR; INTRAVENOUS at 11:10

## 2022-10-03 RX ADMIN — PROCHLORPERAZINE EDISYLATE 5 MG: 5 INJECTION INTRAMUSCULAR; INTRAVENOUS at 02:10

## 2022-10-03 RX ADMIN — HYDROMORPHONE HYDROCHLORIDE 0.4 MG: 2 INJECTION INTRAMUSCULAR; INTRAVENOUS; SUBCUTANEOUS at 11:10

## 2022-10-03 RX ADMIN — OXYCODONE 5 MG: 5 TABLET ORAL at 11:10

## 2022-10-03 RX ADMIN — MIDAZOLAM HYDROCHLORIDE 1 MG: 1 INJECTION, SOLUTION INTRAMUSCULAR; INTRAVENOUS at 12:10

## 2022-10-03 NOTE — DISCHARGE SUMMARY
Discharge Note  Short Stay      SUMMARY     Admit Date: 10/3/2022    Attending Physician: Henry Felix MD      Discharge Physician: Kyle Goldman DO      Discharge Date: 10/3/2022 11:25 AM    Procedure(s) (LRB):  FUSION, SACROILIAC JOINT LEFT PAINTEQ REP (Left)    Final Diagnosis: Sacroiliitis [M46.1]    Disposition: Home or self care    Patient Instructions:   Current Discharge Medication List        CONTINUE these medications which have NOT CHANGED    Details   biotin 5,000 mcg TbDL Take 1 capsule by mouth once daily.      !! buPROPion (WELLBUTRIN XL) 150 MG TB24 tablet TAKE 1 TABLET BY MOUTH EVERY DAY  Qty: 30 tablet, Refills: 11      !! buPROPion (WELLBUTRIN XL) 300 MG 24 hr tablet Take 1 tablet (300 mg total) by mouth once daily.  Qty: 30 tablet, Refills: 11      cholecalciferol, vitamin D3, (VITAMIN D3) 50 mcg (2,000 unit) Tab Take 1 tablet by mouth once daily.      FLAXSEED OIL MISC by Misc.(Non-Drug; Combo Route) route.      LORazepam (ATIVAN) 1 MG tablet Take 1 tablet (1 mg total) by mouth every 12 (twelve) hours as needed.  Qty: 60 tablet, Refills: 1      metoprolol succinate (TOPROL-XL) 25 MG 24 hr tablet Take 1 tablet (25 mg total) by mouth once daily.  Qty: 90 tablet, Refills: 3    Comments: .      montelukast (SINGULAIR) 10 mg tablet Take 1 tablet (10 mg total) by mouth every evening.  Qty: 30 tablet, Refills: 11    Associated Diagnoses: Acute sinusitis, recurrence not specified, unspecified location      multivitamin (THERAGRAN) per tablet Take 1 tablet by mouth once daily.      tiZANidine (ZANAFLEX) 4 MG tablet TAKE 1 TABLET(4 MG) BY MOUTH EVERY 12 HOURS AS NEEDED FOR MUSCLE SPASM  Qty: 60 tablet, Refills: 2      fluticasone propionate (FLONASE) 50 mcg/actuation nasal spray 2 sprays (100 mcg total) by Each Nostril route once daily.  Qty: 16 g, Refills: 11    Associated Diagnoses: Sinusitis, unspecified chronicity, unspecified location      oxyCODONE-acetaminophen (PERCOCET) 5-325 mg per tablet  Take 1 tablet by mouth every 8 (eight) hours as needed for Pain.  Qty: 15 tablet, Refills: 0    Comments: Quantity prescribed more than 7 day supply? No      selenium sulfide 2.25 % Sham Use  qweek  Qty: 180 mL, Refills: 5    Associated Diagnoses: Tinea versicolor      sulfamethoxazole-trimethoprim 400-80mg (BACTRIM,SEPTRA) 400-80 mg per tablet Take 1 tablet by mouth 2 (two) times daily. for 5 days  Qty: 10 tablet, Refills: 0      valACYclovir (VALTREX) 500 MG tablet Take 500 mg by mouth 2 (two) times daily.       !! - Potential duplicate medications found. Please discuss with provider.              Discharge Diagnosis: Sacroiliitis [M46.1]  Condition on Discharge: Stable with no complications to procedure   Diet on Discharge: Same as before.  Activity: as per instruction sheet.  Discharge to: Home with a responsible adult.  Follow up: 2-4 weeks

## 2022-10-03 NOTE — INTERVAL H&P NOTE
The patient has been examined and the H&P has been reviewed:    I concur with the findings and no changes have occurred since H&P was written.    Surgery risks, benefits and alternative options discussed and understood by patient/family.          Active Hospital Problems    Diagnosis  POA    Sacroiliitis [M46.1]  Unknown      Resolved Hospital Problems   No resolved problems to display.

## 2022-10-03 NOTE — ANESTHESIA PROCEDURE NOTES
Intubation    Date/Time: 10/3/2022 10:21 AM  Performed by: Coleen German CRNA  Authorized by: Daniel Pickard MD     Intubation:     Induction:  Intravenous    Intubated:  Postinduction    Mask Ventilation:  Easy mask    Attempts:  1    Attempted By:  CRNA    Method of Intubation:  Video laryngoscopy    Blade:  Mao 3    Laryngeal View Grade: Grade IIA - cords partially seen      Difficult Airway Encountered?: No      Complications:  None    Airway Device:  Oral endotracheal tube    Airway Device Size:  7.5    Style/Cuff Inflation:  Cuffed (inflated to minimal occlusive pressure)    Tube secured:  22    Secured at:  The lips    Placement Verified By:  Capnometry    Complicating Factors:  Anterior larynx and small mouth    Findings Post-Intubation:  BS equal bilateral and atraumatic/condition of teeth unchanged

## 2022-10-03 NOTE — DISCHARGE INSTRUCTIONS
Your Surgeon Gave You EXPAREL® (bupivacaine liposome injectable suspension)    To help control your pain after surgery, your surgeon injected EXPAREL into your surgical incision just before the end of the procedure.   EXPAREL is a local analgesic that contains the local anesthetic bupivacaine. Local anesthetics provide pain relief by numbing the tissue  around the surgical site.   EXPAREL is specifically designed to release pain medication over time and can control pain for up to 72 hours.   In addition to EXPAREL, your surgeon may provide other pain medications to control your pain.   Each patient is different and responds differently to painmedication. Depending on how you respond to EXPAREL, you may require less  additional pain medication during your recovery.    Possible Side Effects    Side effects can occur with any medication and it is important not to ignore anything you may be experiencing. Some patients who  received EXPAREL experienced nausea, vomiting, or constipation. Rarely, patients who receive bupivacaine (the active ingredient in  EXPAREL) have experienced numbness and tingling in their mouth or lips, lightheadedness, or anxiety. Speak with your doctor right  away if you think you may be experiencing any of these sensations, or if you have other questions regarding possible side effects.    Your Recovery    When your pain is under control, your body can better focus on healing. This is not the time to test your pain tolerance, or grin and  bear it.Work with your surgeon and nurse to make your recovery as speedy and pain-free as possible.   Follow the post-op orders your nurse gave you.   Eat a healthy diet and drink plenty of water. Surgery stresses your body; your body responds by needing more energy to heal.      Important Information About EXPAREL  Products that contain bupivacaine, like EXPAREL, may cause a temporary loss of  sensation or the ability to move in the area where bupivacaine  was injected.    Date Administered: ***  Time Administered: ***    Other Forms of Bupivicaine should not be administered within 96hrs following administration of EXPAREL.

## 2022-10-03 NOTE — OP NOTE
Date of Procedure: 10/03/2022    Procedure: Left SI Joint Fusion    Pre-op diagnosis: Sacroiliitis [M46.1]    Post-op diagnosis: Sacroiliitis [M46.1]     Physician: Henry Felix MD    Assistant: Kyle Goldman DO    Anesthestia: General    EBL: None    Specimens: None    All medications, allergies, and relevant histories were reviewed. No recent antibiotics or infections.  A time-out was taken to verify the correct patient, procedure, laterality, and appropriate medications/allergies.    PREOPERATIVE ANTIBIOTICS: 2 g ancef IV given 30 minutes prior to incision, see anesthesia record for time.    OPERATIVE PROCEDURE: The patient was brought to the operating room suite and MAC anesthesia  was performed. The patient was positioned prone on the operation table. The surgical site was prepped and draped with chlorhexidine. The image intensifier (C-arm) was brought into position and the LEFT SIJ was brought in view . The superior pole of the sacroiliac joint was identified by contralateral cephalo-oblique fluoroscopy. A 18 gauge, 3 1/2 inch spinal needle was slowly advanced through the sacroiliac joint capsule under fluoroscopic guidance. The needle position was confirmed using oblique, AP and lateral fluoroscopic imaging. a 1.5cm posterior midline incision over the S2 foramen lateral to SIJ. Performed soft tissue dissection down and through blunt finger dissection was able to identify the posterior superior Iliac spine. After passing a guidewire the same through the SIJ. I prepared the sacroiliac joint for a structural allograft implant by dilating, broaching and rasping the sacroiliac joint. Once joint was prepared, I inserted the structural allograft implant into the joint and packed orthobiologics behind the implant to increase opportunity for bone fusion.Once again this was confirmed using oblique, AP and lateral fluoroscopic imaging.  I concluded surgery by performing standard suture closing technique using 2.0  vicryl  followed by skin staples.     A sterile dressing was applied. No specimens collected.    Patient was taken to recovery in a stable condition with no change in neurological exam      COMPLICATIONS: There were no complications.     Future Management:   Follow up with my clinic in 1 week or sooner if needed    I have reviewed the notes, assessments, and/or procedures performed by fellow, I concur with her/his documentation of Twila Roman.    Henry Felix MD

## 2022-10-03 NOTE — PATIENT INSTRUCTIONS
Please call for excessive pain, fever, chills, bleeding, weakness, or any other concern.  No heavy lifting and no shower until follow up appointment.

## 2022-10-03 NOTE — PLAN OF CARE
Twila Andersonvj has met all discharge criteria from Phase II. Vital Signs are stable, ambulating  without difficulty. Discharge instructions given, patient verbalized understanding. Discharged from facility via wheelchair in stable condition.

## 2022-10-03 NOTE — TRANSFER OF CARE
"Anesthesia Transfer of Care Note    Patient: Twila Roman    Procedure(s) Performed: Procedure(s) (LRB):  FUSION, SACROILIAC JOINT LEFT PAINTEQ REP (Left)    Patient location: PACU    Anesthesia Type: general    Transport from OR: Transported from OR on room air with adequate spontaneous ventilation    Post pain: adequate analgesia    Post assessment: no apparent anesthetic complications and tolerated procedure well    Post vital signs: stable    Level of consciousness: awake, alert and oriented    Nausea/Vomiting: no nausea/vomiting    Complications: none    Transfer of care protocol was followed      Last vitals:   Visit Vitals  BP (!) 149/85 (BP Location: Right arm, Patient Position: Lying)   Pulse 85   Temp 36.7 °C (98 °F) (Oral)   Resp 16   Ht 5' 5" (1.651 m)   Wt 73 kg (160 lb 15 oz)   LMP 04/19/2015 (Approximate)   SpO2 97%   Breastfeeding No   BMI 26.78 kg/m²     "

## 2022-10-03 NOTE — ANESTHESIA POSTPROCEDURE EVALUATION
Anesthesia Post Evaluation    Patient: Twila Roman    Procedure(s) Performed: Procedure(s) (LRB):  FUSION, SACROILIAC JOINT LEFT PAINTEQ REP (Left)    Final Anesthesia Type: general      Patient location during evaluation: PACU  Patient participation: Yes- Able to Participate  Level of consciousness: awake and alert  Post-procedure vital signs: reviewed and stable  Pain management: adequate  Airway patency: patent    PONV status at discharge: No PONV  Anesthetic complications: no      Cardiovascular status: blood pressure returned to baseline  Respiratory status: unassisted  Hydration status: euvolemic  Follow-up not needed.          Vitals Value Taken Time   /59 10/03/22 1235   Temp 36.4 °C (97.5 °F) 10/03/22 1235   Pulse 76 10/03/22 1235   Resp 16 10/03/22 1235   SpO2 95 % 10/03/22 1235         Event Time   Out of Recovery 12:31:11         Pain/Micah Score: Pain Rating Prior to Med Admin: 7 (10/3/2022 11:51 AM)  Pain Rating Post Med Admin: 4 (10/3/2022 12:16 PM)  Micah Score: 10 (10/3/2022 12:35 PM)

## 2022-10-04 VITALS
HEART RATE: 83 BPM | SYSTOLIC BLOOD PRESSURE: 119 MMHG | RESPIRATION RATE: 16 BRPM | OXYGEN SATURATION: 95 % | HEIGHT: 65 IN | TEMPERATURE: 98 F | DIASTOLIC BLOOD PRESSURE: 72 MMHG | BODY MASS INDEX: 26.81 KG/M2 | WEIGHT: 160.94 LBS

## 2022-10-06 ENCOUNTER — PATIENT MESSAGE (OUTPATIENT)
Dept: BARIATRICS | Facility: CLINIC | Age: 57
End: 2022-10-06
Payer: COMMERCIAL

## 2022-10-07 ENCOUNTER — PATIENT MESSAGE (OUTPATIENT)
Dept: PAIN MEDICINE | Facility: CLINIC | Age: 57
End: 2022-10-07
Payer: COMMERCIAL

## 2022-10-10 ENCOUNTER — OFFICE VISIT (OUTPATIENT)
Dept: PAIN MEDICINE | Facility: CLINIC | Age: 57
End: 2022-10-10
Payer: COMMERCIAL

## 2022-10-10 VITALS
SYSTOLIC BLOOD PRESSURE: 125 MMHG | BODY MASS INDEX: 26.66 KG/M2 | HEART RATE: 68 BPM | DIASTOLIC BLOOD PRESSURE: 84 MMHG | HEIGHT: 65 IN | TEMPERATURE: 98 F | WEIGHT: 160 LBS

## 2022-10-10 DIAGNOSIS — G89.4 CHRONIC PAIN SYNDROME: Primary | ICD-10-CM

## 2022-10-10 DIAGNOSIS — M53.3 SACROILIAC JOINT DYSFUNCTION OF LEFT SIDE: ICD-10-CM

## 2022-10-10 PROCEDURE — 1159F PR MEDICATION LIST DOCUMENTED IN MEDICAL RECORD: ICD-10-PCS | Mod: CPTII,S$GLB,, | Performed by: NURSE PRACTITIONER

## 2022-10-10 PROCEDURE — 3079F PR MOST RECENT DIASTOLIC BLOOD PRESSURE 80-89 MM HG: ICD-10-PCS | Mod: CPTII,S$GLB,, | Performed by: NURSE PRACTITIONER

## 2022-10-10 PROCEDURE — 99999 PR PBB SHADOW E&M-EST. PATIENT-LVL IV: CPT | Mod: PBBFAC,,, | Performed by: NURSE PRACTITIONER

## 2022-10-10 PROCEDURE — 3074F SYST BP LT 130 MM HG: CPT | Mod: CPTII,S$GLB,, | Performed by: NURSE PRACTITIONER

## 2022-10-10 PROCEDURE — 3008F PR BODY MASS INDEX (BMI) DOCUMENTED: ICD-10-PCS | Mod: CPTII,S$GLB,, | Performed by: NURSE PRACTITIONER

## 2022-10-10 PROCEDURE — 3008F BODY MASS INDEX DOCD: CPT | Mod: CPTII,S$GLB,, | Performed by: NURSE PRACTITIONER

## 2022-10-10 PROCEDURE — 3074F PR MOST RECENT SYSTOLIC BLOOD PRESSURE < 130 MM HG: ICD-10-PCS | Mod: CPTII,S$GLB,, | Performed by: NURSE PRACTITIONER

## 2022-10-10 PROCEDURE — 99024 PR POST-OP FOLLOW-UP VISIT: ICD-10-PCS | Mod: S$GLB,,, | Performed by: NURSE PRACTITIONER

## 2022-10-10 PROCEDURE — 3044F HG A1C LEVEL LT 7.0%: CPT | Mod: CPTII,S$GLB,, | Performed by: NURSE PRACTITIONER

## 2022-10-10 PROCEDURE — 3079F DIAST BP 80-89 MM HG: CPT | Mod: CPTII,S$GLB,, | Performed by: NURSE PRACTITIONER

## 2022-10-10 PROCEDURE — 1159F MED LIST DOCD IN RCRD: CPT | Mod: CPTII,S$GLB,, | Performed by: NURSE PRACTITIONER

## 2022-10-10 PROCEDURE — 3044F PR MOST RECENT HEMOGLOBIN A1C LEVEL <7.0%: ICD-10-PCS | Mod: CPTII,S$GLB,, | Performed by: NURSE PRACTITIONER

## 2022-10-10 PROCEDURE — 1160F RVW MEDS BY RX/DR IN RCRD: CPT | Mod: CPTII,S$GLB,, | Performed by: NURSE PRACTITIONER

## 2022-10-10 PROCEDURE — 99999 PR PBB SHADOW E&M-EST. PATIENT-LVL IV: ICD-10-PCS | Mod: PBBFAC,,, | Performed by: NURSE PRACTITIONER

## 2022-10-10 PROCEDURE — 99024 POSTOP FOLLOW-UP VISIT: CPT | Mod: S$GLB,,, | Performed by: NURSE PRACTITIONER

## 2022-10-10 PROCEDURE — 1160F PR REVIEW ALL MEDS BY PRESCRIBER/CLIN PHARMACIST DOCUMENTED: ICD-10-PCS | Mod: CPTII,S$GLB,, | Performed by: NURSE PRACTITIONER
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
## 2022-10-17 ENCOUNTER — OFFICE VISIT (OUTPATIENT)
Dept: PAIN MEDICINE | Facility: CLINIC | Age: 57
End: 2022-10-17
Payer: COMMERCIAL

## 2022-10-17 VITALS
BODY MASS INDEX: 26.63 KG/M2 | HEART RATE: 75 BPM | DIASTOLIC BLOOD PRESSURE: 85 MMHG | SYSTOLIC BLOOD PRESSURE: 121 MMHG | HEIGHT: 65 IN | TEMPERATURE: 100 F

## 2022-10-17 DIAGNOSIS — M46.1 SACROILIITIS: ICD-10-CM

## 2022-10-17 DIAGNOSIS — M53.3 SACROILIAC JOINT DYSFUNCTION OF LEFT SIDE: ICD-10-CM

## 2022-10-17 DIAGNOSIS — G89.4 CHRONIC PAIN SYNDROME: Primary | ICD-10-CM

## 2022-10-17 PROCEDURE — 3079F DIAST BP 80-89 MM HG: CPT | Mod: CPTII,S$GLB,, | Performed by: NURSE PRACTITIONER

## 2022-10-17 PROCEDURE — 99024 PR POST-OP FOLLOW-UP VISIT: ICD-10-PCS | Mod: S$GLB,,, | Performed by: NURSE PRACTITIONER

## 2022-10-17 PROCEDURE — 3074F SYST BP LT 130 MM HG: CPT | Mod: CPTII,S$GLB,, | Performed by: NURSE PRACTITIONER

## 2022-10-17 PROCEDURE — 3044F PR MOST RECENT HEMOGLOBIN A1C LEVEL <7.0%: ICD-10-PCS | Mod: CPTII,S$GLB,, | Performed by: NURSE PRACTITIONER

## 2022-10-17 PROCEDURE — 3079F PR MOST RECENT DIASTOLIC BLOOD PRESSURE 80-89 MM HG: ICD-10-PCS | Mod: CPTII,S$GLB,, | Performed by: NURSE PRACTITIONER

## 2022-10-17 PROCEDURE — 99024 POSTOP FOLLOW-UP VISIT: CPT | Mod: S$GLB,,, | Performed by: NURSE PRACTITIONER

## 2022-10-17 PROCEDURE — 3074F PR MOST RECENT SYSTOLIC BLOOD PRESSURE < 130 MM HG: ICD-10-PCS | Mod: CPTII,S$GLB,, | Performed by: NURSE PRACTITIONER

## 2022-10-17 PROCEDURE — 99999 PR PBB SHADOW E&M-EST. PATIENT-LVL IV: ICD-10-PCS | Mod: PBBFAC,,, | Performed by: NURSE PRACTITIONER

## 2022-10-17 PROCEDURE — 1159F PR MEDICATION LIST DOCUMENTED IN MEDICAL RECORD: ICD-10-PCS | Mod: CPTII,S$GLB,, | Performed by: NURSE PRACTITIONER

## 2022-10-17 PROCEDURE — 1159F MED LIST DOCD IN RCRD: CPT | Mod: CPTII,S$GLB,, | Performed by: NURSE PRACTITIONER

## 2022-10-17 PROCEDURE — 99999 PR PBB SHADOW E&M-EST. PATIENT-LVL IV: CPT | Mod: PBBFAC,,, | Performed by: NURSE PRACTITIONER

## 2022-10-17 PROCEDURE — 3044F HG A1C LEVEL LT 7.0%: CPT | Mod: CPTII,S$GLB,, | Performed by: NURSE PRACTITIONER
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
## 2022-10-21 ENCOUNTER — PATIENT MESSAGE (OUTPATIENT)
Dept: PAIN MEDICINE | Facility: CLINIC | Age: 57
End: 2022-10-21
Payer: COMMERCIAL

## 2022-10-25 ENCOUNTER — OFFICE VISIT (OUTPATIENT)
Dept: PAIN MEDICINE | Facility: CLINIC | Age: 57
End: 2022-10-25
Payer: COMMERCIAL

## 2022-10-25 VITALS
BODY MASS INDEX: 26.66 KG/M2 | HEIGHT: 65 IN | HEART RATE: 72 BPM | DIASTOLIC BLOOD PRESSURE: 87 MMHG | SYSTOLIC BLOOD PRESSURE: 122 MMHG | RESPIRATION RATE: 18 BRPM | WEIGHT: 160 LBS

## 2022-10-25 DIAGNOSIS — M54.9 DORSALGIA, UNSPECIFIED: Primary | ICD-10-CM

## 2022-10-25 DIAGNOSIS — G89.4 CHRONIC PAIN SYNDROME: ICD-10-CM

## 2022-10-25 DIAGNOSIS — M53.3 SACROILIAC JOINT DYSFUNCTION OF LEFT SIDE: ICD-10-CM

## 2022-10-25 PROCEDURE — 1160F RVW MEDS BY RX/DR IN RCRD: CPT | Mod: CPTII,S$GLB,, | Performed by: NURSE PRACTITIONER

## 2022-10-25 PROCEDURE — 1160F PR REVIEW ALL MEDS BY PRESCRIBER/CLIN PHARMACIST DOCUMENTED: ICD-10-PCS | Mod: CPTII,S$GLB,, | Performed by: NURSE PRACTITIONER

## 2022-10-25 PROCEDURE — 3079F DIAST BP 80-89 MM HG: CPT | Mod: CPTII,S$GLB,, | Performed by: NURSE PRACTITIONER

## 2022-10-25 PROCEDURE — 99999 PR PBB SHADOW E&M-EST. PATIENT-LVL IV: CPT | Mod: PBBFAC,,, | Performed by: NURSE PRACTITIONER

## 2022-10-25 PROCEDURE — 1159F MED LIST DOCD IN RCRD: CPT | Mod: CPTII,S$GLB,, | Performed by: NURSE PRACTITIONER

## 2022-10-25 PROCEDURE — 99213 OFFICE O/P EST LOW 20 MIN: CPT | Mod: S$GLB,,, | Performed by: NURSE PRACTITIONER

## 2022-10-25 PROCEDURE — 3074F SYST BP LT 130 MM HG: CPT | Mod: CPTII,S$GLB,, | Performed by: NURSE PRACTITIONER

## 2022-10-25 PROCEDURE — 99999 PR PBB SHADOW E&M-EST. PATIENT-LVL IV: ICD-10-PCS | Mod: PBBFAC,,, | Performed by: NURSE PRACTITIONER

## 2022-10-25 PROCEDURE — 1159F PR MEDICATION LIST DOCUMENTED IN MEDICAL RECORD: ICD-10-PCS | Mod: CPTII,S$GLB,, | Performed by: NURSE PRACTITIONER

## 2022-10-25 PROCEDURE — 99213 PR OFFICE/OUTPT VISIT, EST, LEVL III, 20-29 MIN: ICD-10-PCS | Mod: S$GLB,,, | Performed by: NURSE PRACTITIONER

## 2022-10-25 PROCEDURE — 3074F PR MOST RECENT SYSTOLIC BLOOD PRESSURE < 130 MM HG: ICD-10-PCS | Mod: CPTII,S$GLB,, | Performed by: NURSE PRACTITIONER

## 2022-10-25 PROCEDURE — 3079F PR MOST RECENT DIASTOLIC BLOOD PRESSURE 80-89 MM HG: ICD-10-PCS | Mod: CPTII,S$GLB,, | Performed by: NURSE PRACTITIONER

## 2022-10-25 PROCEDURE — 3044F HG A1C LEVEL LT 7.0%: CPT | Mod: CPTII,S$GLB,, | Performed by: NURSE PRACTITIONER

## 2022-10-25 PROCEDURE — 3044F PR MOST RECENT HEMOGLOBIN A1C LEVEL <7.0%: ICD-10-PCS | Mod: CPTII,S$GLB,, | Performed by: NURSE PRACTITIONER
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
## 2022-11-01 NOTE — PROGRESS NOTES
Twila Roman, a 57 y.o. female, presents today for evaluation of her right knee.      History of Present Illness (HPI)  Location: anterior knee   Onset: chronic, insidious  Palliative:    relative rest   oral analgesics, OTC   Bracing- wu pull lite, mod relief   fPT- attempted, knee pain worsened   CSI iaknee right 22.06.02 L Rebecca- good improvement x3 weeks   VSI Euflexxa iaknee right 22.09.22 good improvement x6 weeks   Provocative: prolonged ambulation  Prior: none  Progression: plateau discomfort  Quality: sharp  Radiation: none  Severity: per nursing documentation  Timing: intermittent w/ use  Trauma: none    Review of Systems (ROS)  A 10+ review of systems was performed with pertinent positives and negatives noted above in the history of present illness. Other systems were negative unless otherwise specified.    Physical Examination (PE)  General:  The patient is alert and oriented x 3. Mood is pleasant. Observation of ears, eyes and nose reveal no gross abnormalities. HEENT: NCAT, sclera anicteric.   Lungs: Respirations are equal and unlabored.  Gait is coordinated. Patient can toe walk and heel walk without difficulty.    KNEE EXAMINATION    Observation/Inspection  Gait:   Nonantalgic   Alignment:  Neutral   Scars:   None   Muscle atrophy: Mild  Effusion:  None   Warmth:  None   Discoloration:   none     Tenderness / Crepitus (T / C):         T / C      T / C  Patella   - / -   Lateral joint line   - / -     Peripatellar medial  -  Medial joint line    + / -  Peripatellar lateral -  Medial plica   - / -  Patellar tendon -   Popliteal fossa   - / -  Quad tendon   -   Gastrocnemius   -  Prepatellar Bursa - / -   Quadricep   -  Tibial tubercle  -  Thigh/hamstring  -  Pes anserine/HS -  Fibula    -  ITB   - / -  Tibia     -  Tib/fib joint  - / -  LCL    -    MFC   - / -   MCL: Proximal  -    LFC   - / -   Distal    -          ROM: (* = pain)  PASSIVE   ACTIVE    Left :   5 / 0 / 145   5 / 0 / 145      Right :    5 / 0 / 145   5 / 0 / 145    Patellofemoral examination:  See above noted areas of tenderness.   Patella position    Subluxation / dislocation: Centered        Sup. / Inf;   Normal   Crepitus (PF):    Absent   Patellar Mobility:       Medial-lateral:   Normal    Superior-inferior:  Normal    Inferior tilt   Normal    Patellar tendon:  Normal   Lateral tilt:    Normal   J-sign:     None   Patellofemoral grind:   No pain     Meniscal Signs:     Pain on terminal extension:  +  Pain on terminal flexion:  +  Skylers maneuver:  +*  Squat     NT  Thesaly    NT    Ligament Examination:  ACL / Lachman:  WNL  PCL-Post.  drawer: normal 0 to 2mm  MCL- Valgus:  normal 0 to 2mm  LCL- Varus:    normal 0 to 2mm  Pivot shift:  guarding   Dial Test:   difference c/w other side   At 30° flexion: normal (< 5°)    At 90° flexion: normal (< 5°)   Reverse Pivot Shift:   normal (Equal)     Strength: (* = with pain) Painful Side  Quadriceps   5/5  Hamstrin/5    Extremity Neuro-vascular Examination:   Sensation:  Grossly intact to light touch all dermatomal regions.   Motor Function:  Fully intact motor function at hip, knee, foot and ankle    DTRs;  quadriceps and  achilles 2+.  No clonus and downgoing Babinski.    Vascular status:  DP and PT pulses 2+, brisk capillary refill, symmetric.     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, right   W/ med menisc tear    No evidence of neurologic pathology  No evidence of vascular pathology    Imaging studies reviewed:   X-ray knee, bilateral 22.05  MRI knee, right 22.05    Plan  We discussed the importance of appropriate diet, weight, and regular exercise    We discussed options including    Watchful waiting / relative rest    Physical therapy x   Injection therapy CSI iaknee r   Consultation    The patient chooses As above   x = prescribed  CSI = corticosteroid injection  VSI = viscosupplement injection  PRPI =  platelet rich plasma injection  ia = intra articular  R = right  L = left  B = bilateral   nfSx = surgical consultation was recommended, but patient is not interested in consultation at this time    Physical Therapy        Formal (fPT), @ [facility] facility p   Formal (fPT), @ [facility] facility        Homegoing (hgPT), per concurrent fPT recommendations    Homegoing (hgPT), per prior fPT recommendations t   Homegoing (hgPT), handout provided        w/  (atPT)    [blank] = not prescribed  x = prescribed  b = prescribed, and begin as indicated  t = continue as indicated  r = prescribed, and restart as indicated  p = completed prior as indicated  hs = prescribed, and with high school   col = prescribed, and with college or university   nfPT = physical therapy was recommended, but patient is not interested in PT at this time    Activity (e.g. sports, work) restrictions    [blank] = as tolerated  pt = per physical therapist  at = per   NWB = non weight bearing on affected lower extremity, with crutches assistance for ambulation    Bracing    [blank] = not prescribed  r = recommended, but not fit with at todays visit  f = prescribed and fit with at todays visit  t = continue as indicated  d = d/c  p = as needed  rare = use on rare, as-needed basis; advised against chronic use    Pain management    [blank] = No prescription necessary. A handout detailing dosing of appropriate   over-the-counter musculoskeletal analgesics was made available to the patient.   m = meloxicam x 14 days  mp = 14 day course of meloxicam prescribed prior    Follow up 12   [blank] = as needed  [number] = in [number] weeks  CSI = for corticosteroid injection  VSI = for viscosupplement injection or injection series  PRP = for platelet rich plasma injection or injection series  MRI = after MRI imaging  ns = should surgical options be deferred (no surgery)  o = appointment offered, deferred by  patient    Should symptoms worsen or fail to resolve, consider    Revisiting the above options and / or Have colleague scope knee     Vocation:

## 2022-11-02 ENCOUNTER — OFFICE VISIT (OUTPATIENT)
Dept: SPORTS MEDICINE | Facility: CLINIC | Age: 57
End: 2022-11-02
Payer: COMMERCIAL

## 2022-11-02 VITALS
SYSTOLIC BLOOD PRESSURE: 134 MMHG | BODY MASS INDEX: 26.66 KG/M2 | HEART RATE: 95 BPM | WEIGHT: 160 LBS | HEIGHT: 65 IN | TEMPERATURE: 99 F | DIASTOLIC BLOOD PRESSURE: 98 MMHG

## 2022-11-02 DIAGNOSIS — G89.29 CHRONIC PAIN OF RIGHT KNEE: ICD-10-CM

## 2022-11-02 DIAGNOSIS — M25.561 CHRONIC PAIN OF RIGHT KNEE: ICD-10-CM

## 2022-11-02 DIAGNOSIS — M23.231 DERANG OF MEDIAL MENISCUS DUE TO OLD TEAR/INJ, RIGHT KNEE: ICD-10-CM

## 2022-11-02 DIAGNOSIS — M17.11 PRIMARY OSTEOARTHRITIS OF RIGHT KNEE: Primary | ICD-10-CM

## 2022-11-02 PROCEDURE — 3008F BODY MASS INDEX DOCD: CPT | Mod: CPTII,S$GLB,, | Performed by: FAMILY MEDICINE

## 2022-11-02 PROCEDURE — 20611 LARGE JOINT ASPIRATION/INJECTION: R KNEE: ICD-10-PCS | Mod: RT,S$GLB,, | Performed by: FAMILY MEDICINE

## 2022-11-02 PROCEDURE — 3080F PR MOST RECENT DIASTOLIC BLOOD PRESSURE >= 90 MM HG: ICD-10-PCS | Mod: CPTII,S$GLB,, | Performed by: FAMILY MEDICINE

## 2022-11-02 PROCEDURE — 3044F PR MOST RECENT HEMOGLOBIN A1C LEVEL <7.0%: ICD-10-PCS | Mod: CPTII,S$GLB,, | Performed by: FAMILY MEDICINE

## 2022-11-02 PROCEDURE — 3075F PR MOST RECENT SYSTOLIC BLOOD PRESS GE 130-139MM HG: ICD-10-PCS | Mod: CPTII,S$GLB,, | Performed by: FAMILY MEDICINE

## 2022-11-02 PROCEDURE — 1160F RVW MEDS BY RX/DR IN RCRD: CPT | Mod: CPTII,S$GLB,, | Performed by: FAMILY MEDICINE

## 2022-11-02 PROCEDURE — 99999 PR PBB SHADOW E&M-EST. PATIENT-LVL IV: ICD-10-PCS | Mod: PBBFAC,,, | Performed by: FAMILY MEDICINE

## 2022-11-02 PROCEDURE — 1159F PR MEDICATION LIST DOCUMENTED IN MEDICAL RECORD: ICD-10-PCS | Mod: CPTII,S$GLB,, | Performed by: FAMILY MEDICINE

## 2022-11-02 PROCEDURE — 20611 DRAIN/INJ JOINT/BURSA W/US: CPT | Mod: RT,S$GLB,, | Performed by: FAMILY MEDICINE

## 2022-11-02 PROCEDURE — 3008F PR BODY MASS INDEX (BMI) DOCUMENTED: ICD-10-PCS | Mod: CPTII,S$GLB,, | Performed by: FAMILY MEDICINE

## 2022-11-02 PROCEDURE — 3075F SYST BP GE 130 - 139MM HG: CPT | Mod: CPTII,S$GLB,, | Performed by: FAMILY MEDICINE

## 2022-11-02 PROCEDURE — 99214 OFFICE O/P EST MOD 30 MIN: CPT | Mod: 25,S$GLB,, | Performed by: FAMILY MEDICINE

## 2022-11-02 PROCEDURE — 1159F MED LIST DOCD IN RCRD: CPT | Mod: CPTII,S$GLB,, | Performed by: FAMILY MEDICINE

## 2022-11-02 PROCEDURE — 1160F PR REVIEW ALL MEDS BY PRESCRIBER/CLIN PHARMACIST DOCUMENTED: ICD-10-PCS | Mod: CPTII,S$GLB,, | Performed by: FAMILY MEDICINE

## 2022-11-02 PROCEDURE — 3044F HG A1C LEVEL LT 7.0%: CPT | Mod: CPTII,S$GLB,, | Performed by: FAMILY MEDICINE

## 2022-11-02 PROCEDURE — 99214 PR OFFICE/OUTPT VISIT, EST, LEVL IV, 30-39 MIN: ICD-10-PCS | Mod: 25,S$GLB,, | Performed by: FAMILY MEDICINE

## 2022-11-02 PROCEDURE — 99999 PR PBB SHADOW E&M-EST. PATIENT-LVL IV: CPT | Mod: PBBFAC,,, | Performed by: FAMILY MEDICINE

## 2022-11-02 PROCEDURE — 3080F DIAST BP >= 90 MM HG: CPT | Mod: CPTII,S$GLB,, | Performed by: FAMILY MEDICINE

## 2022-11-02 RX ORDER — TRIAMCINOLONE ACETONIDE 40 MG/ML
40 INJECTION, SUSPENSION INTRA-ARTICULAR; INTRAMUSCULAR
Status: DISCONTINUED | OUTPATIENT
Start: 2022-11-02 | End: 2022-11-02 | Stop reason: HOSPADM

## 2022-11-02 RX ADMIN — TRIAMCINOLONE ACETONIDE 40 MG: 40 INJECTION, SUSPENSION INTRA-ARTICULAR; INTRAMUSCULAR at 03:11

## 2022-11-02 NOTE — PROCEDURES
"Large Joint Aspiration/Injection: R knee    Date/Time: 11/2/2022 3:15 PM  Performed by: Capo Prince MD  Authorized by: Capo Prince MD     Consent Done?:  Yes (Verbal)  Indications:  Pain  Site marked: the procedure site was marked    Timeout: prior to procedure the correct patient, procedure, and site was verified    Prep: patient was prepped and draped in usual sterile fashion      Details:  Needle Size:  20 G  Ultrasonic Guidance for needle placement?: Yes    Images are saved and documented.  Approach:  Lateral  Location:  Knee  Site:  R knee  Medications:  40 mg triamcinolone acetonide 40 mg/mL  Patient tolerance:  Patient tolerated the procedure well with no immediate complications     Description of ultrasound utilization for needle guidance:   Ultrasound guidance used for needle localization. Images saved and stored for documentation. The SUPRAPATELLAR BURSA / KNEE JOINT was visualized. Dynamic visualization of the 20g x 3.5" needle was continuous throughout the procedure.   "

## 2022-11-03 ENCOUNTER — PATIENT MESSAGE (OUTPATIENT)
Dept: SPORTS MEDICINE | Facility: CLINIC | Age: 57
End: 2022-11-03
Payer: COMMERCIAL

## 2022-11-28 ENCOUNTER — PATIENT MESSAGE (OUTPATIENT)
Dept: INTERNAL MEDICINE | Facility: CLINIC | Age: 57
End: 2022-11-28
Payer: COMMERCIAL

## 2022-12-06 ENCOUNTER — HOSPITAL ENCOUNTER (OUTPATIENT)
Dept: RADIOLOGY | Facility: HOSPITAL | Age: 57
Discharge: HOME | End: 2022-12-06
Attending: NURSE PRACTITIONER
Payer: COMMERCIAL

## 2022-12-06 DIAGNOSIS — Z91.89 AT HIGH RISK FOR BREAST CANCER: ICD-10-CM

## 2022-12-06 PROCEDURE — 77063 BREAST TOMOSYNTHESIS BI: CPT | Mod: TC

## 2022-12-06 PROCEDURE — 77063 MAMMO DIGITAL SCREENING BILAT WITH TOMO: ICD-10-PCS | Mod: 26,,, | Performed by: RADIOLOGY

## 2022-12-06 PROCEDURE — 77067 SCR MAMMO BI INCL CAD: CPT | Mod: 26,,, | Performed by: RADIOLOGY

## 2022-12-06 PROCEDURE — 77067 MAMMO DIGITAL SCREENING BILAT WITH TOMO: ICD-10-PCS | Mod: 26,,, | Performed by: RADIOLOGY

## 2022-12-06 PROCEDURE — 77063 BREAST TOMOSYNTHESIS BI: CPT | Mod: 26,,, | Performed by: RADIOLOGY

## 2022-12-06 PROCEDURE — 77067 SCR MAMMO BI INCL CAD: CPT | Mod: TC

## 2023-02-23 ENCOUNTER — PATIENT MESSAGE (OUTPATIENT)
Dept: INTERNAL MEDICINE | Facility: CLINIC | Age: 58
End: 2023-02-23
Payer: COMMERCIAL

## 2023-02-23 RX ORDER — AZITHROMYCIN 250 MG/1
TABLET, FILM COATED ORAL
Qty: 6 TABLET | Refills: 0 | Status: SHIPPED | OUTPATIENT
Start: 2023-02-23 | End: 2023-02-28

## 2023-02-23 NOTE — TELEPHONE ENCOUNTER
Pt states she is Covid+ symptoms include, fever, chills, headache and horrible cough. Symptoms started last night and would like a Rx called in.

## 2023-02-24 ENCOUNTER — PATIENT MESSAGE (OUTPATIENT)
Dept: INTERNAL MEDICINE | Facility: CLINIC | Age: 58
End: 2023-02-24
Payer: COMMERCIAL

## 2023-02-24 RX ORDER — BENZONATATE 200 MG/1
200 CAPSULE ORAL 3 TIMES DAILY PRN
Qty: 90 CAPSULE | Refills: 0 | Status: SHIPPED | OUTPATIENT
Start: 2023-02-24 | End: 2023-03-06

## 2023-03-01 ENCOUNTER — OFFICE VISIT (OUTPATIENT)
Dept: INTERNAL MEDICINE | Facility: CLINIC | Age: 58
End: 2023-03-01
Payer: COMMERCIAL

## 2023-03-01 VITALS
DIASTOLIC BLOOD PRESSURE: 76 MMHG | OXYGEN SATURATION: 98 % | BODY MASS INDEX: 25.83 KG/M2 | HEART RATE: 88 BPM | SYSTOLIC BLOOD PRESSURE: 124 MMHG | TEMPERATURE: 97 F | WEIGHT: 155.19 LBS | RESPIRATION RATE: 15 BRPM

## 2023-03-01 DIAGNOSIS — Z86.16 HISTORY OF COVID-19: Primary | ICD-10-CM

## 2023-03-01 DIAGNOSIS — R05.9 COUGH, UNSPECIFIED TYPE: ICD-10-CM

## 2023-03-01 PROCEDURE — 1159F MED LIST DOCD IN RCRD: CPT | Mod: CPTII,S$GLB,, | Performed by: INTERNAL MEDICINE

## 2023-03-01 PROCEDURE — 1160F PR REVIEW ALL MEDS BY PRESCRIBER/CLIN PHARMACIST DOCUMENTED: ICD-10-PCS | Mod: CPTII,S$GLB,, | Performed by: INTERNAL MEDICINE

## 2023-03-01 PROCEDURE — 1160F RVW MEDS BY RX/DR IN RCRD: CPT | Mod: CPTII,S$GLB,, | Performed by: INTERNAL MEDICINE

## 2023-03-01 PROCEDURE — 99999 PR PBB SHADOW E&M-EST. PATIENT-LVL III: CPT | Mod: PBBFAC,,, | Performed by: INTERNAL MEDICINE

## 2023-03-01 PROCEDURE — 3078F PR MOST RECENT DIASTOLIC BLOOD PRESSURE < 80 MM HG: ICD-10-PCS | Mod: CPTII,S$GLB,, | Performed by: INTERNAL MEDICINE

## 2023-03-01 PROCEDURE — 3008F PR BODY MASS INDEX (BMI) DOCUMENTED: ICD-10-PCS | Mod: CPTII,S$GLB,, | Performed by: INTERNAL MEDICINE

## 2023-03-01 PROCEDURE — 3078F DIAST BP <80 MM HG: CPT | Mod: CPTII,S$GLB,, | Performed by: INTERNAL MEDICINE

## 2023-03-01 PROCEDURE — 99214 PR OFFICE/OUTPT VISIT, EST, LEVL IV, 30-39 MIN: ICD-10-PCS | Mod: S$GLB,,, | Performed by: INTERNAL MEDICINE

## 2023-03-01 PROCEDURE — 3008F BODY MASS INDEX DOCD: CPT | Mod: CPTII,S$GLB,, | Performed by: INTERNAL MEDICINE

## 2023-03-01 PROCEDURE — 3074F PR MOST RECENT SYSTOLIC BLOOD PRESSURE < 130 MM HG: ICD-10-PCS | Mod: CPTII,S$GLB,, | Performed by: INTERNAL MEDICINE

## 2023-03-01 PROCEDURE — 1159F PR MEDICATION LIST DOCUMENTED IN MEDICAL RECORD: ICD-10-PCS | Mod: CPTII,S$GLB,, | Performed by: INTERNAL MEDICINE

## 2023-03-01 PROCEDURE — 3074F SYST BP LT 130 MM HG: CPT | Mod: CPTII,S$GLB,, | Performed by: INTERNAL MEDICINE

## 2023-03-01 PROCEDURE — 99999 PR PBB SHADOW E&M-EST. PATIENT-LVL III: ICD-10-PCS | Mod: PBBFAC,,, | Performed by: INTERNAL MEDICINE

## 2023-03-01 PROCEDURE — 99214 OFFICE O/P EST MOD 30 MIN: CPT | Mod: S$GLB,,, | Performed by: INTERNAL MEDICINE

## 2023-03-01 RX ORDER — PROMETHAZINE HYDROCHLORIDE AND DEXTROMETHORPHAN HYDROBROMIDE 6.25; 15 MG/5ML; MG/5ML
5 SYRUP ORAL EVERY 8 HOURS PRN
Qty: 180 ML | Refills: 0 | Status: SHIPPED | OUTPATIENT
Start: 2023-03-01 | End: 2023-03-11

## 2023-03-01 RX ORDER — ALBUTEROL SULFATE 90 UG/1
1-2 AEROSOL, METERED RESPIRATORY (INHALATION) EVERY 6 HOURS PRN
Qty: 18 G | Refills: 0 | Status: SHIPPED | OUTPATIENT
Start: 2023-03-01 | End: 2023-03-29

## 2023-03-01 NOTE — PROGRESS NOTES
Subjective:       Patient ID: Twila Roman is a 57 y.o. female.    Chief Complaint: COVID-19 Concerns    HPI    57-year-old female here for evaluate of complications of COVID.  She finished paxlovid on Monday.  She was also given a zpack and tessalon perrles.  She still has a cough and it hurts to cough.  Her cough is productive of yellow mucus.  She works in a restaurant.  She is not able to sleep.  She had fevers that were poorly responsive to tylenol and NSAIDs.    Review of Systems      Objective:      Physical Exam  Vitals reviewed.   Constitutional:       Appearance: She is well-developed.   HENT:      Head: Normocephalic and atraumatic.      Mouth/Throat:      Pharynx: No oropharyngeal exudate.   Eyes:      General: No scleral icterus.        Right eye: No discharge.         Left eye: No discharge.      Pupils: Pupils are equal, round, and reactive to light.   Neck:      Thyroid: No thyromegaly.      Trachea: No tracheal deviation.   Cardiovascular:      Rate and Rhythm: Normal rate and regular rhythm.      Heart sounds: Normal heart sounds. No murmur heard.    No friction rub. No gallop.   Pulmonary:      Effort: Pulmonary effort is normal. No respiratory distress.      Breath sounds: Normal breath sounds. No wheezing or rales.   Chest:      Chest wall: No tenderness.   Abdominal:      General: Bowel sounds are normal. There is no distension.      Palpations: Abdomen is soft. There is no mass.      Tenderness: There is no abdominal tenderness. There is no guarding or rebound.   Musculoskeletal:         General: No tenderness. Normal range of motion.      Cervical back: Normal range of motion and neck supple.   Skin:     General: Skin is warm and dry.      Coloration: Skin is not pale.      Findings: No erythema or rash.   Neurological:      Mental Status: She is alert and oriented to person, place, and time.   Psychiatric:         Behavior: Behavior normal.       Assessment:       1. History of  COVID-19    2. Cough, unspecified type  - albuterol (PROVENTIL/VENTOLIN HFA) 90 mcg/actuation inhaler; Inhale 1-2 puffs into the lungs every 6 (six) hours as needed for Wheezing.  Dispense: 18 g; Refill: 0  - X-Ray Chest PA And Lateral; Future      Plan:       1/2.  Check chest x-ray next 5-7 days if patient has cough fails to improve.  Albuterol and arrived as needed.  Discussed that patient the acute phase of COVID and will likely continue to improve from here.

## 2023-03-22 ENCOUNTER — PATIENT MESSAGE (OUTPATIENT)
Dept: INTERNAL MEDICINE | Facility: CLINIC | Age: 58
End: 2023-03-22
Payer: COMMERCIAL

## 2023-03-22 RX ORDER — LORAZEPAM 1 MG/1
TABLET ORAL
Qty: 60 TABLET | Refills: 1 | Status: SHIPPED | OUTPATIENT
Start: 2023-03-22 | End: 2023-07-10 | Stop reason: SDUPTHER

## 2023-03-22 RX ORDER — LORAZEPAM 1 MG/1
TABLET ORAL
Qty: 60 TABLET | Refills: 1 | Status: CANCELLED | OUTPATIENT
Start: 2023-03-22

## 2023-03-22 NOTE — TELEPHONE ENCOUNTER
No new care gaps identified.  [facility] Embedded Care Gaps. Reference number: [id number]. 3/22/2023   1:46:23 PM CDT

## 2023-04-18 ENCOUNTER — PATIENT MESSAGE (OUTPATIENT)
Dept: SPORTS MEDICINE | Facility: CLINIC | Age: 58
End: 2023-04-18
Payer: COMMERCIAL

## 2023-04-18 DIAGNOSIS — M17.11 PRIMARY OSTEOARTHRITIS OF RIGHT KNEE: Primary | ICD-10-CM

## 2023-04-19 ENCOUNTER — PATIENT MESSAGE (OUTPATIENT)
Dept: SPORTS MEDICINE | Facility: CLINIC | Age: 58
End: 2023-04-19
Payer: COMMERCIAL

## 2023-04-19 RX ORDER — MELOXICAM 7.5 MG/1
7.5 TABLET ORAL DAILY
Qty: 30 TABLET | Refills: 0 | Status: SHIPPED | OUTPATIENT
Start: 2023-04-19 | End: 2023-05-04 | Stop reason: SDUPTHER

## 2023-04-28 ENCOUNTER — TELEPHONE (OUTPATIENT)
Dept: SPORTS MEDICINE | Facility: CLINIC | Age: 58
End: 2023-04-28
Payer: COMMERCIAL

## 2023-04-28 DIAGNOSIS — M79.672 HEEL PAIN, BILATERAL: Primary | ICD-10-CM

## 2023-04-28 DIAGNOSIS — M79.671 HEEL PAIN, BILATERAL: Primary | ICD-10-CM

## 2023-04-28 NOTE — TELEPHONE ENCOUNTER
LVM for the Pt about her xrays for her visit. Asked the Pt to arrive 20mins early for the appt. Provided call back number for questions.

## 2023-05-03 NOTE — PROGRESS NOTES
"Subjective:     Twila Roman     Chief Complaint   Patient presents with    Left Ankle - Pain    Right Ankle - Pain         HPI    Olinda is a 57 y.o. female coming in today for bilateral L>R heel pain that began about 3 month(s) ago, referred by self. Pt. describes the pain as a 4/10 achy, burning pain that does not radiate. There was not a fall/injury/ or trauma associated with the onset of symptoms. 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  The pain is better with rest, ice and worse with walking, standing inactivity, getting out of bed. She notes tenderness to posterior heel and burning pain with wearing tennis shoes. She tried meloxicam daily for about a week without relief.      Pt also complains of continuing right knee pain. Since last visit her pain improved with Euflexxa series 9/22/22 and then deteriorated. She had a CSI 11/2/22 with Dr. Prince with no relief. Notes R knee "gave out" and she fell on Saturday. Pt describes the pain as a 4/10 aching pain that does not radiate. She is interested in repeating the Euflexxa series. Pt. Denies any other musculoskeletal complaints at this time.    Joint instability? no  Mechanical locking/clicking? no  Affecting ADL's? yes  Affecting sleep? yes    Occupation: owns [employer]      PAST MEDICAL HISTORY:   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      PAST SURGICAL HISTORY:   Past Surgical History:   Procedure Laterality Date    BACK SURGERY      spinal fusion    COLONOSCOPY N/A 12/11/2019    Procedure: COLONOSCOPY;  Surgeon: Caroline Swartz MD;  Location: [facility] ([address] [place] [address]);  Service: Endoscopy;  Laterality: N/A;    FUSION OF " SACROILIAC JOINT Left 10/3/2022    Procedure: FUSION, SACROILIAC JOINT;  Surgeon: Henry Felix MD;  Location: [facility] OR;  Service: Pain Management;  Laterality: Left;  LEFT  PAINTEQ REP    INJECTION, SACROILIAC JOINT Left 7/14/2022    Procedure: INJECTION,SACROILIAC JOINT, LEFT;  Surgeon: Henry Felix MD;  Location: [facility] PAIN MGT;  Service: Pain Management;  Laterality: Left;    INJECTION, SACROILIAC JOINT Left 8/16/2022    Procedure: INJECTION,SACROILIAC JOINT, LEFT DIAGNOSTIC SI WITHOUT STEROID;  Surgeon: Henry Felix MD;  Location: [facility] PAIN MGT;  Service: Pain Management;  Laterality: Left;    INJECTION, SACROILIAC JOINT Left 9/1/2022    Procedure: INJECTION,SACROILIAC JOINT LEFT DIAGNOSTIC WITH NO STEROIDS;  Surgeon: Henry Felix MD;  Location: [facility] PAIN MGT;  Service: Pain Management;  Laterality: Left;    KNEE ARTHROSCOPY W/ MENISCECTOMY Left 8/15/2018    Procedure: ARTHROSCOPY, KNEE, WITH MENISCECTOMY;  Surgeon: Handy Elkins MD;  Location: [facility] OR;  Service: Orthopedics;  Laterality: Left;  Video    ROBOT-ASSISTED LAPAROSCOPIC ABDOMINAL HYSTERECTOMY USING DA MARIA ESTHER XI N/A 10/20/2020    Procedure: XI ROBOTIC HYSTERECTOMY;  Surgeon: Danie Burns MD;  Location: [facility] OR;  Service: OB/GYN;  Laterality: N/A;    ROBOT-ASSISTED LAPAROSCOPIC SALPINGO-OOPHORECTOMY USING DA MARIA ESTHER XI Bilateral 10/20/2020    Procedure: XI ROBOTIC SALPINGO-OOPHORECTOMY;  Surgeon: Danie Burns MD;  Location: [facility] OR;  Service: OB/GYN;  Laterality: Bilateral;    TRANSFORAMINAL EPIDURAL INJECTION OF STEROID Right 7/18/2019    Procedure: INJECTION, STEROID, EPIDURAL, TRANSFORAMINAL APPROACH, L4 AND L5;  Surgeon: Daniel Seo MD;  Location: [facility] PAIN [facility]GT;  Service: Pain Management;  Laterality: Right;    TRANSFORAMINAL EPIDURAL INJECTION OF STEROID Right 3/16/2021    Procedure: INJECTION, STEROID, EPIDURAL, TRANSFORAMINAL APPROACH L4/5 AND L5/S1;  Surgeon: Daniel Seo MD;  Location: [facility];  Service:  Pain Management;  Laterality: Right;    TUMOR REMOVAL      right hand     FAMILY HISTORY:   Family History   Problem Relation Age of Onset    Ovarian cancer Mother 66         at 72    Heart disease Father         double bypass    Macular degeneration Father     Heart failure Father     No Known Problems Brother     No Known Problems Brother     Cancer Paternal Grandfather         lung (pt thinks), possible smoker    Breast cancer Paternal Cousin 20        father's sister's daughter, pt thinks bilateral, cousin is     Other Maternal Aunt         mat aunt had part of colon removed, pt does not think cancer    Breast cancer Maternal Aunt      SOCIAL HISTORY:   Social History     Socioeconomic History    Marital status:    Occupational History    Occupation: director of marketing     Employer: [employer]    Tobacco Use    Smoking status: Former    Smokeless tobacco: Never   Substance and Sexual Activity    Alcohol use: Yes     Alcohol/week: 5.0 standard drinks     Types: 5 Glasses of wine per week     Comment: one a day- red wine    Drug use: No    Sexual activity: Yes     Partners: Male     Comment: Boyfriend        MEDICATIONS:   Current Outpatient Medications:     albuterol (PROVENTIL/VENTOLIN HFA) 90 mcg/actuation inhaler, INHALE 1 TO 2 PUFFS INTO THE LUNGS EVERY 6 HOURS AS NEEDED FOR WHEEZING, Disp: 8.5 g, Rfl: 0    biotin 5,000 mcg TbDL, Take 1 capsule by mouth once daily., Disp: , Rfl:     buPROPion (WELLBUTRIN XL) 150 MG TB24 tablet, TAKE 1 TABLET BY MOUTH EVERY DAY, Disp: 30 tablet, Rfl: 11    buPROPion (WELLBUTRIN XL) 300 MG 24 hr tablet, Take 1 tablet (300 mg total) by mouth once daily., Disp: 30 tablet, Rfl: 11    cholecalciferol, vitamin D3, (VITAMIN D3) 50 mcg (2,000 unit) Tab, Take 1 tablet by mouth once daily., Disp: , Rfl:     FLAXSEED OIL MISC, by Misc.(Non-Drug; Combo Route) route., Disp: , Rfl:     fluticasone propionate (FLONASE) 50 mcg/actuation nasal spray, 2 sprays (100 mcg  "total) by Each Nostril route once daily., Disp: 16 g, Rfl: 11    LORazepam (ATIVAN) 1 MG tablet, TAKE 1 TABLET(1 MG) BY MOUTH EVERY 12 HOURS AS NEEDED, Disp: 60 tablet, Rfl: 1    meloxicam (MOBIC) 7.5 MG tablet, Take 1 tablet (7.5 mg total) by mouth once daily., Disp: 30 tablet, Rfl: 0    metoprolol succinate (TOPROL-XL) 25 MG 24 hr tablet, TAKE 1 TABLET(25 MG) BY MOUTH EVERY DAY, Disp: 90 tablet, Rfl: 3    montelukast (SINGULAIR) 10 mg tablet, TAKE 1 TABLET BY MOUTH EVERY EVENING, Disp: 30 tablet, Rfl: 1    multivitamin (THERAGRAN) per tablet, Take 1 tablet by mouth once daily., Disp: , Rfl:     selenium sulfide 2.25 % Sham, Use  qweek, Disp: 180 mL, Rfl: 5    tiZANidine (ZANAFLEX) 4 MG tablet, TAKE 1 TABLET BY MOUTH EVERY 12 HOURS AS NEEDED FOR MUSCLE SPASM, Disp: 60 tablet, Rfl: 2    valACYclovir (VALTREX) 500 MG tablet, Take 500 mg by mouth 2 (two) times daily., Disp: , Rfl:   No current facility-administered medications for this visit.    Facility-Administered Medications Ordered in Other Visits:     0.9%  NaCl infusion, 500 mL, Intravenous, Continuous, Inez Joiner MD  ALLERGIES:   Review of patient's allergies indicates:  No Known Allergies    Objective:     VITAL SIGNS: /80   Ht 5' 5" (1.651 m)   Wt 70.3 kg (155 lb)   LMP 01/01/2015 (Approximate)   BMI 25.79 kg/m²    General    Vitals reviewed.  Constitutional: She is oriented to person, place, and time. She appears well-developed and well-nourished.   Neurological: She is alert and oriented to person, place, and time.   Psychiatric: She has a normal mood and affect. Her behavior is normal.               MUSCULOSKELETAL EXAM  ANKLE: bilateral ANKLE  The affected ankle is compared to the contralateral ankle.    Observation:    + bilateral mid substance achilles edema, left worse than right  There is no errythema or ecchymosis.   Shoes reveal a normal wear pattern.  No structural deformities including pes planus/cavus, hallux valgus, or toe " deformities.  Negative too-many toes sign.    Normal callus pattern on the feet bilaterally.    No leg or intrinsic foot muscle atrophy.  Squatting reveals symmetric pronation of the bilateral feet, but with achilles tendon pain, L>R   Able to rise on toes with symmetric supination but with achilles tendon pain, L>R   Normal gait without evidence of antalgia.    ROM (* = with pain):  Active dorsiflexion to 20° on left and 20° on right  Active plantarflexion to 50° on left and 50° on right    Active ankle inversion to 35° on left and 35° on right  Active ankle eversion to 15° on left and 15° on right  Full active flexion/extension of the toes bilaterally.   + Heel cords tightness bilaterally.    Tenderness To Palpation:  No tenderness at the ATFL, CFL, PTFL, or deltoid ligaments  No tenderness over the distal anterior syndesmosis, distal tibia/fibula, fibular head/shaft  No tenderness at medial or lateral malleoli   No tenderness at navicular, cuboid, cuneiforms, talus, or calcaneous  No tenderness along the metatarsals or phalanges  No tenderness at the Achilles tendon calcaneal insertion  + bilateral midsubstance achilles tendon tenderness, L>R  No tenderness at the posterior tibial or peroneal tendons    Strength Testing (* = with pain):  Dorsiflexion - 5/5 on left and 5/5 on right  Platarflexion - 5/5 on left and 5/5 on right  Resisted Inversion - 5/5 on left and 5/5 on right  Resisted Eversion - 5/5 on left and 5/5 on right  Great Toe Extension - 5/5 on left and 5/5 on right  Great Toe Flexion - 5/5 on left and 5/5 on right    Special Tests:  Anterior talar drawer - negative and without dimpling  Talar tilt - negative  Reverse Talar tilt - negative    Heel tap test - negative  Distal tib/fib squeeze test - negative  External rotation stress (Kleiger) test - negative  Luis squeeze test - negative    Metatarsal squeeze test - negative  Midfoot stress test - negative  Calcaneal squeeze test - negative    No  subluxation of the peroneal tendons with resisted eversion.    Vascular/Sensory Exam:  DP and PT pulses intact bilaterally  No skin changes, no abnormal hair distribution  Sensation intact to light touch throughout the saphenous, sural, superficial peroneal, deep peroneal, and tibial nerve distributions  Negative Tinel's test over tarsal tunnel  2+/4 reflexes at L4 and S1 dermatomes  Capillary refill intact <2 seconds in all toes bilaterally.    Right KNEE EXAMINATION     Affected side is compared to contralateral knee      Observation:  No edema, erythema, ecchymosis, or effusion noted.  No muscle atrophy of the thighs and calves noted.  No obvious bony deformities noted.   No Genu valgus/varum noted.  No recurvatum noted.    No tibial internal/external torsion.    Posture:  Posterior pelvis tilt with loss of lumbar lordosis  Gait: Non-antalgic with Neutral ankle mechanics and Neutral medial arch  Poor right pelvic stability with left hip hiking compensatory pattern noted with single leg raise     Tenderness:  Patella - none                                    Lateral joint line - +  Quad tendon - none                            Medial joint line - +  Patellar tendon - none                         Medial plica - none  Tibial tubercle - none                          Lateral plica - none  Pes anserine - none                            MCL prox - none  Distal ITB - none                                 MCL distal - none  MFC - none                                         LCL prox - none  LFC - none                                          LCL distal - none  Tibia - none                                         Fibula - none     No obvious bursae, plicae, popliteal cysts, or tendon derangement palpated.                                                                            ROM (* = with pain):     Active extension to 0° on left without hyperextension, lag, crepitus, or patellar J sign.   Active extension to 0° on  right* without hyperextension, lag, crepitus, or patellar J sign.  Active flexion to 135° on left and 135° on right*     Strength(* = with pain):  Knee Flexion - 5/5 on left and 5/5 on right  Knee Extension - 5/5 on left and 5/5 on right  Hip Flexion - 5/5 on left and 5/5 on right  Hip Extension - 5/5 on left and 5/5 on right  Ankle dorsiflexion - 5/5 on left and 5/5 on right  Ankle Plantarflexion - 5/5 on left and 5/5 on right  Gluteus medius - 4+/5 on left and 4+/5 on right     Patellofemoral Exam:   Patellar ballottement - negative  Bulge sign - negative  Patellar grind - negative     No patellar laxity with medial and lateral translation   No apprehension with medial and lateral patellar translation.      Meniscus Testing:                              No pain with terminal flexion or extension at the join tlines  Skylers test -negative  Bounce home test - negative     Ligament Testing:  Lachman's test - negative  No laxity with anterior drawer.   No laxity with posterior drawer.    No posterior sag sign.   No laxity with varus testing at 0 and 30 degrees.  No laxity with valgus testing at 0 and 30 degrees.    TART (Tissue texture abnormality, Asymmetry,  Restriction of motion and/or Tenderness) changes:     Thoracic Spine   T1 Neutral   T2 Neutral   T3 Neutral   T4 Neutral   T5 Neutral   T6 Neutral   T7 Neutral   T8 Neutral   T9 Neutral   T10 Neutral   T11 FRS LEFT   T12 FRS LEFT     Rib cage: Neutral     Lumbar Spine   L1 Neutral   L2 Neutral   L3 Neutral   L4 FRS RIGHT   L5 FRS RIGHT       Pelvis:  Innominate:Right anterior rotation  Pubic bone:Right inferior pubic shear    Sacrum:Right unilateral extension    Lower Extremity:  Leg lengths symmetric following OMT to the pelvis    Location/joint Finding/restriction   Fibular head Posterior on left and right   Tibia Neutral   Talocrural joint Left   Subtalar Joint Left   Cuboid Neutral   Talo-navicular joint Left   Navicular-cuneiform joint Left   1st, 2nd,  3rd, 4th, 5th Cuneiform-metatarsal joint Neutral   1st, 2nd, 3rd, 4th, 5th metatarsal Neutral   1st, 2nd, 3rd, 4th, 5th phalange Neutral       Key   F= Flexed   E = Extended   R = Rotated   S = Sidebent   TTA = tissue texture abnormality     IMAGIN. X-ray ordered due to bilateral heel pain pain. (AP and lateral views) taken today.   2. X-ray images were reviewed personally by me and then directly with patient.  3. FINDINGS: X-ray images obtained demonstrate Mild pes cavus configuration bilaterally.   No fracture, no osseous lesions.No advanced degenerative change.The soft tissues appear normal. Post-operative posterior calcaneal cortical changes noted bilaterally from prior amador deformity removal.   4. IMPRESSION: Mild pes cavus configuration bilaterally. Post-operative posterior calcaneal cortical changes noted bilaterally from prior amador deformity removal.       Assessment:      Encounter Diagnoses   Name Primary?    Achilles tendonitis, bilateral Yes    Primary osteoarthritis of right knee     Somatic dysfunction of lumbar region     Sacral region somatic dysfunction     Somatic dysfunction of thoracic region     Somatic dysfunction of pelvic region     Somatic dysfunction of lower extremity           Plan:   1. Bilateral midsubstance Achilles tendinitis with associated biomechanical restrictions of the lower kinetic chain  - OMT performed today to address associated biomechanical restrictions  and HEP started.   - recommend heel cup wear for 2 weeks bilaterally in a firm soled shoe, then weaning out of heel cups as tolerated  - Rx given for Meloxicam 15 mg po qday x 14 days then prn for pain control. Pt. Advised to avoid all other NSAIDS while on this medication.  - discussed option of bilateral Achilles diagnostic musculoskeletal ultrasound for further evaluation as next step if symptoms persist  -  X-ray images of bilateral calcanei taken today (AP and lateral views) showed Mild pes cavus  configuration bilaterally. Post-operative posterior calcaneal cortical changes noted bilaterally from prior amador deformity removal. Images were personally reviewed with patient.    2. Recurrent right knee pain with previous right knee MRI noting a medial meniscus tear and patellofemoral chondromalacia, improved with previous Euflexxa injection series completed on 9/22/22. No relief with subsequent CSI.   - discussed with patient options of a ultrasound-guided VSI versus PRP injection s next steps and conservative therapy.  The patient like to proceed with a repeat VSI series.   - consider viscosupplementation injections versus PRP injection as conservative treatment next step if symptoms persist vs. Referral to orthopedic surgery for knee arthroscopy.   - Continue Meloxicam 15 mg po qday prn for pain control. Pt. Advised to avoid all other NSAIDS while on this medication.  -   Recommend ice of 20 minutes at a time as needed for pain control  - EDUCATION FOR PRP:    Education provided on the benefits, adverse effects, likely course of treatment and improvement, and post-injection expectations from PRP.  It generally takes 1-3 treatments to have long standing resolution. We reviewed that initially after treatment, pain may become worse and this is an expected response. We instructed that improvement may be experienced within the first two weeks and that most of the patient's improvement will come between weeks 6 and 12. If there is no improvement, we would not repeat. If less than 90% improvement is experienced at 12 weeks, we would likely repeat.    After face to face conversation, Olinda expressed understanding that $350 vs. $700 is due at time of check in for ACP vs. PRP procedure. Patient is aware that this procedure is not covered by insurance companies and does not qualify for financial assistance. Payment can be made via cash, check, debit or credit card.     -  X-ray images of right knee taken 5/11/22 (AP  bilateral standing, PA bilateral standing in flexion, bilateral merchants, and  bilateral lateral views) showed Kellgren-Ravi grade 2 OA bilaterally. Images were personally reviewed with patient.  - MRI  images of right knee taken 5/25/22 showed Medial meniscus vertical longitudinal tear at the junction of the body segment and posterior horn.  No tear of the lateral meniscus. Patellofemoral chondromalacia (Full-thickness cartilage loss of the central trochlea measuring 1.0 x 0.4 cm with mild subchondral marrow edema). Images were personally reviewed with patient.    3. OMT 5-6 regions. Oral consent obtained.  Reviewed benefits and potential side effects.   - OMT indicated today due to signs and symptoms as well as local and remote somatic dysfunction findings and their related neurokinetic, lymphatic, fascial and/or arteriovenous body connections.   - OMT techniques used: Myofascial Release, Muscle Energy, and Articulatory   - Treatment was tolerated well. Improvement noted in segmental mobility post-treatment in dysfunctional regions. There were no adverse events and no complications immediately following treatment.     3. Pt. Given the following HEP:  A)  Pelvic clock exercises given to do from the 6-12 o'clock positions:10-15 reps, twice daily. Hand out of exercise also given.   B) Clam shell exercises bilaterally: hold leg in abducted and externally rotated position for 5-10 seconds, repeat 5-10 times  C) Start with:   A) Isotonic calf contraction:  Hold toe raise for 30 sec, repeating 10 reps twice daily.  Handout given  Once non-painful, add in:  B) Bilateral Calf stretching with knee flexed and extended: hold stretch for 30 seconds, repeating 2-3 times on each side. Do stretch twice daily  Once non-painful, add in:  C) Eccentric calf stretches:  Lower heels off of a step slowly until reaching end rand plantarflexion, repeating 10 reps twice daily.  Handout given      41911 HOME EXERCISE PROGRAM (HEP):   The patient was taught a homegoing physical therapy regimen as described above. The patient demonstrated understanding of the exercises and proper technique of their execution. This interaction took 15 minutes.     4. Follow-up in 2 weeks for start of right knee Euflexxa injection series. Follow-up in 4 weeks for reevaluation of bilateral achilles tendonitis    5. Patient agreeable to today's plan and all questions were answered    This note is dictated using the M*Modal Fluency Direct word recognition program. There are word recognition mistakes that are occasionally missed on review.    Total time spent face-to face with patient counseling or coordinating care including prognosis, differential diagnosis, risks and benefits of treatment, instructions, compliance risk reductions as well as non-face-to-face time personally spent reviewing medial record, medical documentation, and coordination of care.     EST MINUTES X   [id number] 10-19    02930 20-29    69575 30-39    [id number] 40-54 x   NEW     92575 15-29    [id number] 30-44    99339 45-59    [id number] 60-74    PHONE      5-10    [id number] 11-20    [id number] 21-30

## 2023-05-04 ENCOUNTER — HOSPITAL ENCOUNTER (OUTPATIENT)
Dept: RADIOLOGY | Facility: HOSPITAL | Age: 58
Discharge: HOME | End: 2023-05-04
Attending: NEUROMUSCULOSKELETAL MEDICINE & OMM
Payer: COMMERCIAL

## 2023-05-04 ENCOUNTER — OFFICE VISIT (OUTPATIENT)
Dept: SPORTS MEDICINE | Facility: CLINIC | Age: 58
End: 2023-05-04
Payer: COMMERCIAL

## 2023-05-04 VITALS
WEIGHT: 155 LBS | DIASTOLIC BLOOD PRESSURE: 80 MMHG | SYSTOLIC BLOOD PRESSURE: 110 MMHG | BODY MASS INDEX: 25.83 KG/M2 | HEIGHT: 65 IN

## 2023-05-04 DIAGNOSIS — M99.04 SACRAL REGION SOMATIC DYSFUNCTION: ICD-10-CM

## 2023-05-04 DIAGNOSIS — M76.61 ACHILLES TENDONITIS, BILATERAL: Primary | ICD-10-CM

## 2023-05-04 DIAGNOSIS — M99.06 SOMATIC DYSFUNCTION OF LOWER EXTREMITY: ICD-10-CM

## 2023-05-04 DIAGNOSIS — M79.671 HEEL PAIN, BILATERAL: ICD-10-CM

## 2023-05-04 DIAGNOSIS — M79.672 HEEL PAIN, BILATERAL: ICD-10-CM

## 2023-05-04 DIAGNOSIS — M99.02 SOMATIC DYSFUNCTION OF THORACIC REGION: ICD-10-CM

## 2023-05-04 DIAGNOSIS — M76.62 ACHILLES TENDONITIS, BILATERAL: Primary | ICD-10-CM

## 2023-05-04 DIAGNOSIS — M99.05 SOMATIC DYSFUNCTION OF PELVIC REGION: ICD-10-CM

## 2023-05-04 DIAGNOSIS — M99.03 SOMATIC DYSFUNCTION OF LUMBAR REGION: ICD-10-CM

## 2023-05-04 DIAGNOSIS — M17.11 PRIMARY OSTEOARTHRITIS OF RIGHT KNEE: ICD-10-CM

## 2023-05-04 PROCEDURE — 98927 PR OSTEOPATHIC MANIP,5-6 BODY REGN: ICD-10-PCS | Mod: S$GLB,,, | Performed by: NEUROMUSCULOSKELETAL MEDICINE & OMM

## 2023-05-04 PROCEDURE — 3079F PR MOST RECENT DIASTOLIC BLOOD PRESSURE 80-89 MM HG: ICD-10-PCS | Mod: CPTII,S$GLB,, | Performed by: NEUROMUSCULOSKELETAL MEDICINE & OMM

## 2023-05-04 PROCEDURE — 99999 PR PBB SHADOW E&M-EST. PATIENT-LVL III: ICD-10-PCS | Mod: PBBFAC,,, | Performed by: NEUROMUSCULOSKELETAL MEDICINE & OMM

## 2023-05-04 PROCEDURE — 73650 X-RAY EXAM OF HEEL: CPT | Mod: 26,,, | Performed by: RADIOLOGY

## 2023-05-04 PROCEDURE — 97110 THERAPEUTIC EXERCISES: CPT | Mod: S$GLB,,, | Performed by: NEUROMUSCULOSKELETAL MEDICINE & OMM

## 2023-05-04 PROCEDURE — 1159F MED LIST DOCD IN RCRD: CPT | Mod: CPTII,S$GLB,, | Performed by: NEUROMUSCULOSKELETAL MEDICINE & OMM

## 2023-05-04 PROCEDURE — 3079F DIAST BP 80-89 MM HG: CPT | Mod: CPTII,S$GLB,, | Performed by: NEUROMUSCULOSKELETAL MEDICINE & OMM

## 2023-05-04 PROCEDURE — 98927 OSTEOPATH MANJ 5-6 REGIONS: CPT | Mod: S$GLB,,, | Performed by: NEUROMUSCULOSKELETAL MEDICINE & OMM

## 2023-05-04 PROCEDURE — 73650 XR CALCANEUS BILATERAL: ICD-10-PCS | Mod: 26,,, | Performed by: RADIOLOGY

## 2023-05-04 PROCEDURE — 1159F PR MEDICATION LIST DOCUMENTED IN MEDICAL RECORD: ICD-10-PCS | Mod: CPTII,S$GLB,, | Performed by: NEUROMUSCULOSKELETAL MEDICINE & OMM

## 2023-05-04 PROCEDURE — 99215 OFFICE O/P EST HI 40 MIN: CPT | Mod: 25,S$GLB,, | Performed by: NEUROMUSCULOSKELETAL MEDICINE & OMM

## 2023-05-04 PROCEDURE — 3008F PR BODY MASS INDEX (BMI) DOCUMENTED: ICD-10-PCS | Mod: CPTII,S$GLB,, | Performed by: NEUROMUSCULOSKELETAL MEDICINE & OMM

## 2023-05-04 PROCEDURE — 1160F PR REVIEW ALL MEDS BY PRESCRIBER/CLIN PHARMACIST DOCUMENTED: ICD-10-PCS | Mod: CPTII,S$GLB,, | Performed by: NEUROMUSCULOSKELETAL MEDICINE & OMM

## 2023-05-04 PROCEDURE — 73650 X-RAY EXAM OF HEEL: CPT | Mod: TC,50,PO

## 2023-05-04 PROCEDURE — 99999 PR PBB SHADOW E&M-EST. PATIENT-LVL III: CPT | Mod: PBBFAC,,, | Performed by: NEUROMUSCULOSKELETAL MEDICINE & OMM

## 2023-05-04 PROCEDURE — 3074F SYST BP LT 130 MM HG: CPT | Mod: CPTII,S$GLB,, | Performed by: NEUROMUSCULOSKELETAL MEDICINE & OMM

## 2023-05-04 PROCEDURE — 1160F RVW MEDS BY RX/DR IN RCRD: CPT | Mod: CPTII,S$GLB,, | Performed by: NEUROMUSCULOSKELETAL MEDICINE & OMM

## 2023-05-04 PROCEDURE — 99215 PR OFFICE/OUTPT VISIT, EST, LEVL V, 40-54 MIN: ICD-10-PCS | Mod: 25,S$GLB,, | Performed by: NEUROMUSCULOSKELETAL MEDICINE & OMM

## 2023-05-04 PROCEDURE — 97110 PR THERAPEUTIC EXERCISES: ICD-10-PCS | Mod: S$GLB,,, | Performed by: NEUROMUSCULOSKELETAL MEDICINE & OMM

## 2023-05-04 PROCEDURE — 3074F PR MOST RECENT SYSTOLIC BLOOD PRESSURE < 130 MM HG: ICD-10-PCS | Mod: CPTII,S$GLB,, | Performed by: NEUROMUSCULOSKELETAL MEDICINE & OMM

## 2023-05-04 PROCEDURE — 3008F BODY MASS INDEX DOCD: CPT | Mod: CPTII,S$GLB,, | Performed by: NEUROMUSCULOSKELETAL MEDICINE & OMM

## 2023-05-04 RX ORDER — MELOXICAM 7.5 MG/1
7.5 TABLET ORAL DAILY
Qty: 30 TABLET | Refills: 0 | Status: SHIPPED | OUTPATIENT
Start: 2023-05-04 | End: 2023-05-22

## 2023-05-21 DIAGNOSIS — M76.61 ACHILLES TENDONITIS, BILATERAL: ICD-10-CM

## 2023-05-21 DIAGNOSIS — M17.11 PRIMARY OSTEOARTHRITIS OF RIGHT KNEE: ICD-10-CM

## 2023-05-21 DIAGNOSIS — M76.62 ACHILLES TENDONITIS, BILATERAL: ICD-10-CM

## 2023-05-22 RX ORDER — MELOXICAM 7.5 MG/1
TABLET ORAL
Qty: 30 TABLET | Refills: 0 | Status: SHIPPED | OUTPATIENT
Start: 2023-05-22 | End: 2023-09-23

## 2023-05-25 ENCOUNTER — OFFICE VISIT (OUTPATIENT)
Dept: SPORTS MEDICINE | Facility: CLINIC | Age: 58
End: 2023-05-25
Payer: COMMERCIAL

## 2023-05-25 VITALS
BODY MASS INDEX: 25.83 KG/M2 | SYSTOLIC BLOOD PRESSURE: 114 MMHG | DIASTOLIC BLOOD PRESSURE: 81 MMHG | HEIGHT: 65 IN | WEIGHT: 155 LBS

## 2023-05-25 DIAGNOSIS — M17.11 PRIMARY OSTEOARTHRITIS OF RIGHT KNEE: Primary | ICD-10-CM

## 2023-05-25 PROCEDURE — 99999 PR PBB SHADOW E&M-EST. PATIENT-LVL III: CPT | Mod: PBBFAC,,, | Performed by: NEUROMUSCULOSKELETAL MEDICINE & OMM

## 2023-05-25 PROCEDURE — 20611 DRAIN/INJ JOINT/BURSA W/US: CPT | Mod: RT,S$GLB,, | Performed by: NEUROMUSCULOSKELETAL MEDICINE & OMM

## 2023-05-25 PROCEDURE — 99999 PR PBB SHADOW E&M-EST. PATIENT-LVL III: ICD-10-PCS | Mod: PBBFAC,,, | Performed by: NEUROMUSCULOSKELETAL MEDICINE & OMM

## 2023-05-25 PROCEDURE — 99499 NO LOS: ICD-10-PCS | Mod: S$GLB,,, | Performed by: NEUROMUSCULOSKELETAL MEDICINE & OMM

## 2023-05-25 PROCEDURE — 20611 PR DRAIN/ASP/INJECT MAJOR JOINT/BURSA W/US GUIDANCE: ICD-10-PCS | Mod: RT,S$GLB,, | Performed by: NEUROMUSCULOSKELETAL MEDICINE & OMM

## 2023-05-25 PROCEDURE — 99499 UNLISTED E&M SERVICE: CPT | Mod: S$GLB,,, | Performed by: NEUROMUSCULOSKELETAL MEDICINE & OMM

## 2023-05-25 NOTE — PROGRESS NOTES
"Subjective:     Twila Roman     No chief complaint on file.      Olinda is a 57 y.o. female coming in today for their 1st Euflexxa injection to the right knee.   Objective:     VITAL SIGNS: LMP 01/01/2015 (Approximate)      Euflexxa Injection Procedure #1     A time out was performed, including verification of patient ID, procedure, site and side, availability of information and equipment, review of safety issues, and agreement with consent, the procedure site was marked.    Location: Knee joint, right     Procedure: The patient was prepped aseptically with alcohol and chlorhexidine. Ethyl Chloride spray was used prior to skin puncture to help numb the superficial skin. After cold spray was applied, 2 cc's of 0.2% Naropin was injected into the skin and superficial tissue at the injection site using a 21 G, 2" needle to form an anesthetic tunnel and ensure proper needle placement into the right knee joint space. Using a hemostat, the syringe was exchanged with the Euflexxa syringe, and 2cc of Euflexxa was injected into the right knee joint. The patient was in the supine position during the duration of this procedure and the injection approach was from the superolateral aspect.     Ultrasound guidance was used for needle localization with SonMinboxte Edge 2, 9-L MHz linear probe(s). Images were saved and stored for documentation. The right knee joint was well visualized.  Dynamic visualization of the needle(s) was continuous throughout the procedure and maintained good position and correct needle placement.        Patient tolerance: The patient tolerated the procedure well with no immediate complications. There were no adverse reactions to the medication. Patient was instructed to apply ice to the joint for up to 20 minutes at a time and avoid strenuous activities for 24-36 hours following the injection. The patient was warned of possible blood pressure changes during that time. Following that time, the patient can " resume activities as prior to the injection.     The patient was reminded to call the clinic immediately for any adverse side effects as explained in clinic today.     Euflexxa:  Lot: [id number]  Exp: 4/2/24      Assessment:      Encounter Diagnosis   Name Primary?    Primary osteoarthritis of right knee Yes          Plan:     1.first Euflexxa injection of right knee received today (see procedure note above)  2. Follow-up in 1 week for 2nd injection of 3 injection series  3. Patient agreeable to today's plan and all questions were answered

## 2023-06-01 ENCOUNTER — OFFICE VISIT (OUTPATIENT)
Dept: SPORTS MEDICINE | Facility: CLINIC | Age: 58
End: 2023-06-01
Payer: COMMERCIAL

## 2023-06-01 VITALS — SYSTOLIC BLOOD PRESSURE: 101 MMHG | HEART RATE: 75 BPM | DIASTOLIC BLOOD PRESSURE: 75 MMHG

## 2023-06-01 DIAGNOSIS — M76.62 ACHILLES TENDINITIS OF BOTH LOWER EXTREMITIES: Primary | ICD-10-CM

## 2023-06-01 DIAGNOSIS — M76.61 ACHILLES TENDINITIS OF BOTH LOWER EXTREMITIES: Primary | ICD-10-CM

## 2023-06-01 DIAGNOSIS — M17.11 PRIMARY OSTEOARTHRITIS OF RIGHT KNEE: ICD-10-CM

## 2023-06-01 PROCEDURE — 1160F RVW MEDS BY RX/DR IN RCRD: CPT | Mod: CPTII,S$GLB,, | Performed by: NEUROMUSCULOSKELETAL MEDICINE & OMM

## 2023-06-01 PROCEDURE — 76882 PR  US,EXTREMITY,NONVASCULAR,REAL-TIME IMAGE,LIMITED: ICD-10-PCS | Mod: 59,S$GLB,, | Performed by: NEUROMUSCULOSKELETAL MEDICINE & OMM

## 2023-06-01 PROCEDURE — 3078F DIAST BP <80 MM HG: CPT | Mod: CPTII,S$GLB,, | Performed by: NEUROMUSCULOSKELETAL MEDICINE & OMM

## 2023-06-01 PROCEDURE — 3078F PR MOST RECENT DIASTOLIC BLOOD PRESSURE < 80 MM HG: ICD-10-PCS | Mod: CPTII,S$GLB,, | Performed by: NEUROMUSCULOSKELETAL MEDICINE & OMM

## 2023-06-01 PROCEDURE — 99214 OFFICE O/P EST MOD 30 MIN: CPT | Mod: 25,S$GLB,, | Performed by: NEUROMUSCULOSKELETAL MEDICINE & OMM

## 2023-06-01 PROCEDURE — 99214 PR OFFICE/OUTPT VISIT, EST, LEVL IV, 30-39 MIN: ICD-10-PCS | Mod: 25,S$GLB,, | Performed by: NEUROMUSCULOSKELETAL MEDICINE & OMM

## 2023-06-01 PROCEDURE — 1160F PR REVIEW ALL MEDS BY PRESCRIBER/CLIN PHARMACIST DOCUMENTED: ICD-10-PCS | Mod: CPTII,S$GLB,, | Performed by: NEUROMUSCULOSKELETAL MEDICINE & OMM

## 2023-06-01 PROCEDURE — 1159F PR MEDICATION LIST DOCUMENTED IN MEDICAL RECORD: ICD-10-PCS | Mod: CPTII,S$GLB,, | Performed by: NEUROMUSCULOSKELETAL MEDICINE & OMM

## 2023-06-01 PROCEDURE — 3074F SYST BP LT 130 MM HG: CPT | Mod: CPTII,S$GLB,, | Performed by: NEUROMUSCULOSKELETAL MEDICINE & OMM

## 2023-06-01 PROCEDURE — 76882 US LMTD JT/FCL EVL NVASC XTR: CPT | Mod: 59,S$GLB,, | Performed by: NEUROMUSCULOSKELETAL MEDICINE & OMM

## 2023-06-01 PROCEDURE — 99999 PR PBB SHADOW E&M-EST. PATIENT-LVL IV: CPT | Mod: PBBFAC,,, | Performed by: NEUROMUSCULOSKELETAL MEDICINE & OMM

## 2023-06-01 PROCEDURE — 20611 PR DRAIN/ASP/INJECT MAJOR JOINT/BURSA W/US GUIDANCE: ICD-10-PCS | Mod: RT,S$GLB,, | Performed by: NEUROMUSCULOSKELETAL MEDICINE & OMM

## 2023-06-01 PROCEDURE — 20611 DRAIN/INJ JOINT/BURSA W/US: CPT | Mod: RT,S$GLB,, | Performed by: NEUROMUSCULOSKELETAL MEDICINE & OMM

## 2023-06-01 PROCEDURE — 1159F MED LIST DOCD IN RCRD: CPT | Mod: CPTII,S$GLB,, | Performed by: NEUROMUSCULOSKELETAL MEDICINE & OMM

## 2023-06-01 PROCEDURE — 3074F PR MOST RECENT SYSTOLIC BLOOD PRESSURE < 130 MM HG: ICD-10-PCS | Mod: CPTII,S$GLB,, | Performed by: NEUROMUSCULOSKELETAL MEDICINE & OMM

## 2023-06-01 PROCEDURE — 99999 PR PBB SHADOW E&M-EST. PATIENT-LVL IV: ICD-10-PCS | Mod: PBBFAC,,, | Performed by: NEUROMUSCULOSKELETAL MEDICINE & OMM

## 2023-06-01 NOTE — PROGRESS NOTES
Subjective:     Twila Roman     Chief Complaint   Patient presents with    Right Knee - Injections         HPI  Pt comes in today for Euflexxa injection #2/3 in her right knee, as well as follow up of continuing bilateral L>R heel pain. Since last visit her pain has improved on the right but remained unchanged on the left. Reports increase in pain after walking [place] the beach. Pt describes the pain as a 8/10 aching/burning pain that does not radiate. Pt. Denies any other musculoskeletal complaints at this time.    Joint instability? no  Mechanical locking/clicking? no  Affecting ADL's? yes  Affecting sleep? yes    Occupation: owns [employer]    PAST MEDICAL HISTORY:   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      PAST SURGICAL HISTORY:   Past Surgical History:   Procedure Laterality Date    BACK SURGERY      spinal fusion    COLONOSCOPY N/A 12/11/2019    Procedure: COLONOSCOPY;  Surgeon: Caroline Swartz MD;  Location: [address]);  Service: Endoscopy;  Laterality: N/A;    FUSION OF SACROILIAC JOINT Left 10/3/2022    Procedure: FUSION, SACROILIAC JOINT;  Surgeon: Henry Felix MD;  Location: [facility] OR;  Service: Pain Management;  Laterality: Left;  LEFT  PAINTEQ REP    INJECTION, SACROILIAC JOINT Left 7/14/2022    Procedure: INJECTION,SACROILIAC JOINT, LEFT;  Surgeon: Henry Felix MD;  Location: [facility] PAIN MGT;  Service: Pain Management;  Laterality: Left;    INJECTION, SACROILIAC JOINT Left 8/16/2022    Procedure: INJECTION,SACROILIAC JOINT, LEFT DIAGNOSTIC SI WITHOUT STEROID;  Surgeon: Henry Felix MD;  Location: [facility] PAIN MGT;  Service: Pain Management;  Laterality: Left;    INJECTION, SACROILIAC JOINT Left 9/1/2022    Procedure: INJECTION,SACROILIAC JOINT LEFT DIAGNOSTIC WITH NO STEROIDS;  Surgeon: Henry Felix MD;  Location: [facility] PAIN MGT;  Service: Pain Management;   Laterality: Left;    KNEE ARTHROSCOPY W/ MENISCECTOMY Left 8/15/2018    Procedure: ARTHROSCOPY, KNEE, WITH MENISCECTOMY;  Surgeon: Handy Elikns MD;  Location: [facility] OR;  Service: Orthopedics;  Laterality: Left;  Video    ROBOT-ASSISTED LAPAROSCOPIC ABDOMINAL HYSTERECTOMY USING DA MARIA ESTHER XI N/A 10/20/2020    Procedure: XI ROBOTIC HYSTERECTOMY;  Surgeon: Danie Burns MD;  Location: [facility] OR;  Service: OB/GYN;  Laterality: N/A;    ROBOT-ASSISTED LAPAROSCOPIC SALPINGO-OOPHORECTOMY USING DA MARIA ESTHER XI Bilateral 10/20/2020    Procedure: XI ROBOTIC SALPINGO-OOPHORECTOMY;  Surgeon: Danie Burns MD;  Location: [facility] OR;  Service: OB/GYN;  Laterality: Bilateral;    TRANSFORAMINAL EPIDURAL INJECTION OF STEROID Right 2019    Procedure: INJECTION, STEROID, EPIDURAL, TRANSFORAMINAL APPROACH, L4 AND L5;  Surgeon: Daniel Seo MD;  Location: [facility] PAIN MGT;  Service: Pain Management;  Laterality: Right;    TRANSFORAMINAL EPIDURAL INJECTION OF STEROID Right 3/16/2021    Procedure: INJECTION, STEROID, EPIDURAL, TRANSFORAMINAL APPROACH L4/5 AND L5/S1;  Surgeon: Daniel Seo MD;  Location: [facility] PAIN MGT;  Service: Pain Management;  Laterality: Right;    TUMOR REMOVAL      right hand     FAMILY HISTORY:   Family History   Problem Relation Age of Onset    Ovarian cancer Mother 66         at 72    Heart disease Father         double bypass    Macular degeneration Father     Heart failure Father     No Known Problems Brother     No Known Problems Brother     Cancer Paternal Grandfather         lung (pt thinks), possible smoker    Breast cancer Paternal Cousin 20        father's sister's daughter, pt thinks bilateral, cousin is     Other Maternal Aunt         mat aunt had part of colon removed, pt does not think cancer    Breast cancer Maternal Aunt      SOCIAL HISTORY:   Social History     Socioeconomic History    Marital status:    Occupational History    Occupation:       Employer: [employer]    Tobacco Use    Smoking status: Former    Smokeless tobacco: Never   Substance and Sexual Activity    Alcohol use: Yes     Alcohol/week: 5.0 standard drinks     Types: 5 Glasses of wine per week     Comment: one a day- red wine    Drug use: No    Sexual activity: Yes     Partners: Male     Comment: Boyfriend        MEDICATIONS:   Current Outpatient Medications:     albuterol (PROVENTIL/VENTOLIN HFA) 90 mcg/actuation inhaler, INHALE 1 TO 2 PUFFS INTO THE LUNGS EVERY 6 HOURS AS NEEDED FOR WHEEZING, Disp: 8.5 g, Rfl: 0    biotin 5,000 mcg TbDL, Take 1 capsule by mouth once daily., Disp: , Rfl:     buPROPion (WELLBUTRIN XL) 150 MG TB24 tablet, TAKE 1 TABLET BY MOUTH EVERY DAY, Disp: 30 tablet, Rfl: 11    buPROPion (WELLBUTRIN XL) 300 MG 24 hr tablet, Take 1 tablet (300 mg total) by mouth once daily., Disp: 30 tablet, Rfl: 11    cholecalciferol, vitamin D3, (VITAMIN D3) 50 mcg (2,000 unit) Tab, Take 1 tablet by mouth once daily., Disp: , Rfl:     FLAXSEED OIL MISC, by Misc.(Non-Drug; Combo Route) route., Disp: , Rfl:     fluticasone propionate (FLONASE) 50 mcg/actuation nasal spray, 2 sprays (100 mcg total) by Each Nostril route once daily., Disp: 16 g, Rfl: 11    LORazepam (ATIVAN) 1 MG tablet, TAKE 1 TABLET(1 MG) BY MOUTH EVERY 12 HOURS AS NEEDED, Disp: 60 tablet, Rfl: 1    meloxicam (MOBIC) 7.5 MG tablet, TAKE 1 TABLET(7.5 MG) BY MOUTH EVERY DAY, Disp: 30 tablet, Rfl: 0    metoprolol succinate (TOPROL-XL) 25 MG 24 hr tablet, TAKE 1 TABLET(25 MG) BY MOUTH EVERY DAY, Disp: 90 tablet, Rfl: 3    montelukast (SINGULAIR) 10 mg tablet, TAKE 1 TABLET BY MOUTH EVERY EVENING, Disp: 30 tablet, Rfl: 1    multivitamin (THERAGRAN) per tablet, Take 1 tablet by mouth once daily., Disp: , Rfl:     selenium sulfide 2.25 % Sham, Use  qweek, Disp: 180 mL, Rfl: 5    tiZANidine (ZANAFLEX) 4 MG tablet, TAKE 1 TABLET BY MOUTH EVERY 12 HOURS AS NEEDED FOR MUSCLE SPASM, Disp: 60 tablet, Rfl: 2    valACYclovir (VALTREX) 500  MG tablet, Take 500 mg by mouth 2 (two) times daily., Disp: , Rfl:     Current Facility-Administered Medications:     sodium hyaluronate (EUFLEXXA) 10 mg/mL(mw 2.4 -3.6 million) injection 20 mg, 20 mg, Intra-articular, Weekly, Sona Fernandez DO, 20 mg at 06/01/23 1316    Facility-Administered Medications Ordered in Other Visits:     0.9%  NaCl infusion, 500 mL, Intravenous, Continuous, Inez Joiner MD  ALLERGIES:   Review of patient's allergies indicates:  No Known Allergies    Objective:     VITAL SIGNS: /75   Pulse 75   LMP 01/01/2015 (Approximate)    General    Vitals reviewed.  Constitutional: She is oriented to person, place, and time. She appears well-developed and well-nourished.   Neurological: She is alert and oriented to person, place, and time.   Psychiatric: She has a normal mood and affect. Her behavior is normal.               MUSCULOSKELETAL EXAM  ANKLE: bilateral ANKLE  The affected ankle is compared to the contralateral ankle.    Observation:    + bilateral mid substance achilles edema, left worse than right  There is no errythema or ecchymosis.   Shoes reveal a normal wear pattern.  No structural deformities including pes planus/cavus, hallux valgus, or toe deformities.  Negative too-many toes sign.    Normal callus pattern on the feet bilaterally.    No leg or intrinsic foot muscle atrophy.  Squatting reveals symmetric pronation of the bilateral feet, but with achilles tendon pain, L>R   Able to rise on toes with symmetric supination but with achilles tendon pain, L>R   Normal gait without evidence of antalgia.    ROM (* = with pain):  Active dorsiflexion to 20° on left and 20° on right  Active plantarflexion to 50° on left and 50° on right    Active ankle inversion to 35° on left and 35° on right  Active ankle eversion to 15° on left and 15° on right  Full active flexion/extension of the toes bilaterally.   + Heel cords tightness bilaterally.    Tenderness To Palpation:  No tenderness  at the ATFL, CFL, PTFL, or deltoid ligaments  No tenderness over the distal anterior syndesmosis, distal tibia/fibula, fibular head/shaft  No tenderness at medial or lateral malleoli   No tenderness at navicular, cuboid, cuneiforms, or talus  + mild left achilles insertional pain at calcaneus  No tenderness along the metatarsals or phalanges  No tenderness at the Achilles tendon calcaneal insertion  + bilateral midsubstance achilles tendon tenderness, L>R  No tenderness at the posterior tibial or peroneal tendons    Strength Testing (* = with pain):  Dorsiflexion - 5/5 on left and 5/5 on right  Platarflexion - 5/5 on left and 5/5 on right  Resisted Inversion - 5/5 on left and 5/5 on right  Resisted Eversion - 5/5 on left and 5/5 on right  Great Toe Extension - 5/5 on left and 5/5 on right  Great Toe Flexion - 5/5 on left and 5/5 on right    Special Tests:  Anterior talar drawer - negative and without dimpling  Talar tilt - negative  Reverse Talar tilt - negative    Heel tap test - negative  Distal tib/fib squeeze test - negative  External rotation stress (Kleiger) test - negative  Luis squeeze test - negative    Metatarsal squeeze test - negative  Midfoot stress test - negative  Calcaneal squeeze test - negative    No subluxation of the peroneal tendons with resisted eversion.    Vascular/Sensory Exam:  DP and PT pulses intact bilaterally  No skin changes, no abnormal hair distribution  Sensation intact to light touch throughout the saphenous, sural, superficial peroneal, deep peroneal, and tibial nerve distributions  Negative Tinel's test over tarsal tunnel  2+/4 reflexes at L4 and S1 dermatomes  Capillary refill intact <2 seconds in all toes bilaterally.    DIAGNOSTIC ULTRASOUND FOCUSED::   CLINICAL INDICATION:  left>right achilles pain    TECHNIQUE:  Real time ultrasound examination of the bilateral achilles was performed with SonCloudRunner I/O Edge 2, 9-L MHz linear probe(s).     FINDINGS: The images are of adequate  "diagnostic quality with identification of all echogenic structures made except for the vascular structures unless otherwise noted. There is sonographic evidence of hypoechoic edema at the midsubstance Achilles tendons bilaterally, left worse than right, with associated left sonopalpation in this area.  Calcaneal spurring noted bilaterally with associated ill-defined fibular pattern with mixed echogenicity of the distal Achilles tendons, left worse than right.  No appreciated hyperemia or clear tearing appreciated bilaterally. The rest of the sonographic examination was unremarkable.    IMPRESSION:  Sonographic evidence of bilateral midsubstance and insertional Achilles tendinosis with no clear tearing, left worse than right.     Ultrasound images were directly reviewed with the patient and then a treatment plan was discussed. Images were saved and stored for documentation.     Euflexxa Injection Procedure #2  A time out was performed, including verification of patient ID, procedure, site and side, availability of information and equipment, review of safety issues, and agreement with consent, the procedure site was marked.    Location: Knee joint, right     Procedure: The patient was prepped aseptically with alcohol and chlorhexidine. Ethyl Chloride spray was used prior to skin puncture to help numb the superficial skin. After cold spray was applied, 2 cc's of 0.2% Naropin was injected into the skin and superficial tissue at the injection site using a 21 G, 2" needle to form an anesthetic tunnel and ensure proper needle placement into the right knee joint space. Using a hemostat, the syringe was exchanged with the Euflexxa syringe, and 2cc of Euflexxa was injected into the right knee joint. The patient was in the supine position during the duration of this procedure and the injection approach was from the superolateral aspect.     Ultrasound guidance was used for needle localization with SonoSite Edge 2, 9-L MHz " linear probe(s). Images were saved and stored for documentation. The right knee joint was well visualized.  Dynamic visualization of the needle(s) was continuous throughout the procedure and maintained good position and correct needle placement.        Patient tolerance: The patient tolerated the procedure well with no immediate complications. There were no adverse reactions to the medication. Patient was instructed to apply ice to the joint for up to 20 minutes at a time and avoid strenuous activities for 24-36 hours following the injection. The patient was warned of possible blood pressure changes during that time. Following that time, the patient can resume activities as prior to the injection.     The patient was reminded to call the clinic immediately for any adverse side effects as explained in clinic today.     Euflexxa:  Lot: [id number]   Exp: 4/2/24      Assessment:      Encounter Diagnoses   Name Primary?    Primary osteoarthritis of right knee Yes    Left Achilles tendinitis     Achilles tendinitis of both lower extremities           Plan:   1. Persistent bilateral midsubstance Achilles tendinitis/tendinosis, left> right  - Referral to outpatient PT ([facility]) for progressive eccentric exercise program and pain relief modalities.   - recommend continued heel cup wear bilaterally in a firm soled shoe, then weaning out of heel cups as tolerated in PT  - Continue Meloxicam 7.5 mg po qday prn for pain control. Pt. Advised to avoid all other NSAIDS while on this medication.  -  X-ray images of bilateral calcanei taken 5/4/23 (AP and lateral views) showed Mild pes cavus configuration bilaterally. Post-operative posterior calcaneal cortical changes noted bilaterally from prior amador deformity removal. Images were personally reviewed with patient.  - Diagnostic ultrasound of bilateral achilles taken today showed sonographic evidence of bilateral midsubstance and insertional Achilles tendinosis with no  clear tearing, left worse than right.    2. Second Euflexxa injection of right knee received today (see procedure note above)    3.  Continue the following HEP:  A)  Pelvic clock exercises given to do from the 6-12 o'clock positions:10-15 reps, twice daily. Hand out of exercise also given.   B) Clam shell exercises bilaterally: hold leg in abducted and externally rotated position for 5-10 seconds, repeat 5-10 times  C) Start with:   A) Isotonic calf contraction:  Hold toe raise for 30 sec, repeating 10 reps twice daily.  Handout given  Once non-painful, add in:  B) Bilateral Calf stretching with knee flexed and extended: hold stretch for 30 seconds, repeating 2-3 times on each side. Do stretch twice daily  Once non-painful, add in:  C) Eccentric calf stretches:  Lower heels off of a step slowly until reaching end rand plantarflexion, repeating 10 reps twice daily.  Handout given       The patient was taught a homegoing physical therapy regimen as described above. The patient demonstrated understanding of the exercises and proper technique of their execution.     4. Follow-up in 1 weeks for third injection right knee Euflexxa injection series. Follow-up for bilateral achilles tendonitis upon completion of PT if pain persists or deteriorates     5. Patient agreeable to today's plan and all questions were answered    This note is dictated using the M*Modal Fluency Direct word recognition program. There are word recognition mistakes that are occasionally missed on review.    Total time spent face-to face with patient counseling or coordinating care including prognosis, differential diagnosis, risks and benefits of treatment, instructions, compliance risk reductions as well as non-face-to-face time personally spent reviewing medial record, medical documentation, and coordination of care.     EST MINUTES X   29114 10-19    [id number] 20-29    [id number] 30-39 x   [id number] 40-54    NEW     14378 15-29    [id number] 30-44    04423 45-59     [id number] 60-74    PHONE      5-10    [id number] 11-20    [id number] 21-30

## 2023-06-01 NOTE — PROGRESS NOTES
ULTRASOUND EXAM REPORT    Patient: Twila Roman, 57 y.o. female     FOCUSED::   1. Diagnostic Extremity - [facility]-Sports Ultrasound was recommended due to left>right achilles pain.  2. Diagnostic Extremity - [facility]-Sports Ultrasound Performed: Dr. Sona Fernandez, DO  Extremity Study: bilateral achilles    TECHNIQUE: Real time ultrasound examination of the left and right achilles was performed with SonoSite Edge 2, 9-L MHz linear probe(s). Images were saved and stored for documentation.     FINDINGS: The images are of adequate diagnostic quality with identification of all echogenic structures made except for the vascular structures unless otherwise noted. There is sonographic evidence of hypoechoic edema at the midsubstance Achilles tendons bilaterally, left worse than right, with associated left sonopalpation in this area.  Calcaneal spurring noted bilaterally with associated ill-defined fibular pattern with mixed echogenicity of the distal Achilles tendons, left worse than right.  No appreciated hyperemia or clear tearing appreciated bilaterally. The rest of the sonographic examination was unremarkable.    IMPRESSION:  Sonographic evidence of bilateral midsubstance and insertional Achilles tendinosis with no clear tearing, left worse than right.

## 2023-06-02 ENCOUNTER — CLINICAL SUPPORT (OUTPATIENT)
Dept: REHABILITATION | Facility: HOSPITAL | Age: 58
End: 2023-06-02
Payer: COMMERCIAL

## 2023-06-02 DIAGNOSIS — M79.671 HEEL PAIN, BILATERAL: ICD-10-CM

## 2023-06-02 DIAGNOSIS — M79.672 HEEL PAIN, BILATERAL: ICD-10-CM

## 2023-06-02 DIAGNOSIS — M76.62 ACHILLES TENDINITIS OF BOTH LOWER EXTREMITIES: ICD-10-CM

## 2023-06-02 DIAGNOSIS — M76.61 ACHILLES TENDINITIS OF BOTH LOWER EXTREMITIES: ICD-10-CM

## 2023-06-02 PROCEDURE — 97161 PT EVAL LOW COMPLEX 20 MIN: CPT

## 2023-06-02 PROCEDURE — 97110 THERAPEUTIC EXERCISES: CPT

## 2023-06-02 PROCEDURE — 97112 NEUROMUSCULAR REEDUCATION: CPT

## 2023-06-02 PROCEDURE — 97530 THERAPEUTIC ACTIVITIES: CPT

## 2023-06-02 NOTE — PLAN OF CARE
[facility]   Physical Therapy Initial Evaluation      Name: Twila Roman  Clinic Number: [id number]    Therapy Diagnosis:   Encounter Diagnoses   Name Primary?    Achilles tendinitis of both lower extremities     Heel pain, bilateral         Physician: Sona Fernandez DO    Physician Orders: PT Eval and Treat  Medical Diagnosis from Referral: M76.61,M76.62 (ICD-10-CM) - Achilles tendinitis of both lower extremities  Evaluation Date: 6/2/2023  Authorization Period Expiration: 12/31/23  Plan of Care Expiration: 8/30/23  Progress Note Due: 7/2/23  Visit # / Visits authorized: 1/ 1   FOTO: 1/3    Precautions: Standard     Time In: 10:10 am  Time Out: 11:00 am  Total Appointment Time (timed & untimed codes): 50 minutes    Subjective     Date of onset: Dec    History of current condition - Olinda reports: she was an  and states that the boots caused her to develop bone spurs. She had the haaglands surgery on both sides. She had a sitting job for a long time but she recently bought a restaurant and has been on her feet a lot more which caused pain. She was diagnosed with a tendinosis and sent to PT. Both sides are painful but the L>R.     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        Surgical History:   Twila Roman  has a past surgical history that includes Back surgery; Tumor removal; Knee arthroscopy w/  meniscectomy (Left, 8/15/2018); Transforaminal epidural injection of steroid (Right, 7/18/2019); Colonoscopy (N/A, 12/11/2019); Robot-assisted laparoscopic abdominal hysterectomy using da Angel Xi (N/A, 10/20/2020); Robot-assisted laparoscopic salpingo-oophorectomy using da Angel Xi (Bilateral, 10/20/2020); Transforaminal epidural injection of steroid (Right, 3/16/2021); injection, sacroiliac joint (Left, 7/14/2022); injection, sacroiliac joint (Left, 8/16/2022); injection, sacroiliac joint (Left, 9/1/2022); and Fusion of sacroiliac joint (Left, 10/3/2022).    Medications:   Twila has a current medication list which includes the following prescription(s): albuterol, biotin, bupropion, bupropion, cholecalciferol (vitamin d3), flaxseed oil, fluticasone propionate, lorazepam, meloxicam, metoprolol succinate, montelukast, multivitamin, selenium sulfide, tizanidine, and valacyclovir, and the following Facility-Administered Medications: sodium chloride 0.9% and sodium hyaluronate (euflexxa).    Allergies:   Review of patient's allergies indicates:  No Known Allergies     Objective      Observation: alert and oriented    Posture: normal    Active Range of Motion:   Ankle Right Left   DF at wall 3 cm 2cm   Plantarflexion WNL WNL   Inversion WNL WNL   Eversion WNL WNL      Strength:  Ankle Right Left   Dorsiflexion 4+/5 4+/5   Plantarflexion via single leg heel raise 5 reps 3 reps   Inversion 4+/5 4+/5   Eversion 4+/5 4+/5     Joint Mobility: hypomobile talocrural joint posteriorly     Palpation: Severe TTP all along the bilateral Achilles tendons    Sensation: hypersensitive    Functional Tests:   Double leg squat: increased pes plantus L   Single leg squat: loses balance, pes plantus, 7/10 pain bilaterally     Limitation/Restriction for FOTO ankle Survey    Therapist reviewed FOTO scores for Twila Roman on 6/2/2023.   FOTO documents entered into EPIC - see Media section.    Limitation Score: 48%         Treatment      Total Treatment time (time-based codes) separate from Evaluation: 18 minutes     Olinda received the treatments listed below:      therapeutic exercises to develop strength and ROM for 10 minutes including:  Double leg heel raises 3x20   1/2 kneeling soleus stretch - 5x10s    therapeutic activities to improve functional performance for 08 minutes, including:  Patient education on activity modification, condition, HEP, pain science      Patient Education and Home Exercises     Education provided:   - HEP, POC, answered patient questions    Written Home Exercises Provided: yes. Exercises were reviewed and Olinda was able to demonstrate them prior to the end of the session.  Olinda demonstrated good  understanding of the education provided. See EMR under Patient Instructions for exercises provided during therapy sessions.    Assessment     Twila is a 57 y.o. female referred to outpatient Physical Therapy with a medical diagnosis of M76.61,M76.62 (ICD-10-CM) - Achilles tendinitis of both lower extremities. Patient presents with complaints of continued bilateral Achilles pain consistent with referring dx limiting ADL's and functional activities. Upon evaluation patient presents with decreased ROM, joint mobility and flexibility restrictions, decreased strength and motor control contributing to limited functional status at this time. Patient would benefit from appropriate manual therapy, mobility, flexibility, strengthening and NM re-education in order to address the before-mentioned deficits and return to PLOF with walking for work.       Patient prognosis is Good.   Patient will benefit from skilled outpatient Physical Therapy to address the deficits stated above and in the chart below, provide patient /family education, and to maximize patientt's level of independence.     Plan of care discussed with patient: Yes  Patient's spiritual, cultural and educational needs considered and patient is agreeable to the plan of  care and goals as stated below:     Anticipated Barriers for therapy: none    Medical Necessity is demonstrated by the following  History  Co-morbidities and personal factors that may impact the plan of care [] LOW: no personal factors / co-morbidities  [x] MODERATE: 1-2 personal factors / co-morbidities  [] HIGH: 3+ personal factors / co-morbidities    Moderate / High Support Documentation:   Anxiety   Bronchitis   Family history of ovarian cancer   Hypertension   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  2. Pt will demo an even 1/2 kneeling soleus wall test bilaterally.  3. Increase strength by 1/3 MMT grade for  areas of limitation  to increase tolerance for ADL and work activities.  4. Pt to tolerate HEP to improve ROM and independence with ADL's    Long Term Goals: 8 weeks  1.Report decreased ankle pain  < / =  3/10 at worst to increase tolerance for work.   2.Patient goal: return to being able to work a full shift at work with minimal increases in pain.   3.pt will demonstrate appropriate SL calf raise endurance compared to age norms with incline calf raise test for improved ankle function  4. Pt will report at CJ level (20-40% impaired) on Modified FIM score for mobility to demonstrate increase in LE function and mobility in home and community environment.        Plan     Plan of care Certification: 6/2/2023 to 8/30/23.    Outpatient Physical Therapy 2 times  weekly for 8 weeks to include the following interventions: Gait Training, Manual Therapy, Neuromuscular Re-ed, Therapeutic Activities, and Therapeutic Exercise.     Nathan Duong PT

## 2023-06-06 NOTE — PROGRESS NOTES
"Subjective:     Twila Roman     Chief Complaint   Patient presents with    Right Knee - Pain       Olinda is a 57 y.o. female coming in today for their 3rd Euflexxa injection to the right knee.   Objective:     VITAL SIGNS: /76   Pulse 83   Ht 5' 5" (1.651 m)   Wt 70.3 kg (155 lb)   LMP 01/01/2015 (Approximate)   BMI 25.79 kg/m²      Euflexxa Injection Procedure #3     A time out was performed, including verification of patient ID, procedure, site and side, availability of information and equipment, review of safety issues, and agreement with consent, the procedure site was marked.    Location: Knee joint, right     Procedure: The patient was prepped aseptically with alcohol and chlorhexidine. Ethyl Chloride spray was used prior to skin puncture to help numb the superficial skin. After cold spray was applied, 2 cc's of 0.2% Naropin was injected into the skin and superficial tissue at the injection site using a 21 G, 2" needle to form an anesthetic tunnel and ensure proper needle placement into the right knee joint space. Using a hemostat, the syringe was exchanged with the Euflexxa syringe, and 2cc of Euflexxa was injected into the right knee joint. The patient was in the supine position during the duration of this procedure and the injection approach was from the superolateral aspect.     Ultrasound guidance was used for needle localization with SonoSite Edge 2, 9-L MHz linear probe(s). Images were saved and stored for documentation. The right knee joint was well visualized.  Dynamic visualization of the needle(s) was continuous throughout the procedure and maintained good position and correct needle placement.        Patient tolerance: The patient tolerated the procedure well with no immediate complications. There were no adverse reactions to the medication. Patient was instructed to apply ice to the joint for up to 20 minutes at a time and avoid strenuous activities for 24-36 hours following the " injection. The patient was warned of possible blood pressure changes during that time. Following that time, the patient can resume activities as prior to the injection.     The patient was reminded to call the clinic immediately for any adverse side effects as explained in clinic today.     Euflexxa:  Lot: [id number]  Exp: 4/2/24      Assessment:      Encounter Diagnosis   Name Primary?    Primary osteoarthritis of right knee Yes            Plan:     1.third Euflexxa injection of right knee received today (see procedure note above)  2. Follow-up in 4 months  3. Patient agreeable to today's plan and all questions were answered

## 2023-06-08 ENCOUNTER — OFFICE VISIT (OUTPATIENT)
Dept: SPORTS MEDICINE | Facility: CLINIC | Age: 58
End: 2023-06-08
Payer: COMMERCIAL

## 2023-06-08 VITALS
DIASTOLIC BLOOD PRESSURE: 76 MMHG | WEIGHT: 155 LBS | HEART RATE: 83 BPM | BODY MASS INDEX: 25.83 KG/M2 | HEIGHT: 65 IN | SYSTOLIC BLOOD PRESSURE: 107 MMHG

## 2023-06-08 DIAGNOSIS — M17.11 PRIMARY OSTEOARTHRITIS OF RIGHT KNEE: Primary | ICD-10-CM

## 2023-06-08 PROCEDURE — 20611 DRAIN/INJ JOINT/BURSA W/US: CPT | Mod: RT,S$GLB,, | Performed by: NEUROMUSCULOSKELETAL MEDICINE & OMM

## 2023-06-08 PROCEDURE — 99499 NO LOS: ICD-10-PCS | Mod: S$GLB,,, | Performed by: NEUROMUSCULOSKELETAL MEDICINE & OMM

## 2023-06-08 PROCEDURE — 99499 UNLISTED E&M SERVICE: CPT | Mod: S$GLB,,, | Performed by: NEUROMUSCULOSKELETAL MEDICINE & OMM

## 2023-06-08 PROCEDURE — 20611 PR DRAIN/ASP/INJECT MAJOR JOINT/BURSA W/US GUIDANCE: ICD-10-PCS | Mod: RT,S$GLB,, | Performed by: NEUROMUSCULOSKELETAL MEDICINE & OMM

## 2023-06-08 PROCEDURE — 99999 PR PBB SHADOW E&M-EST. PATIENT-LVL IV: CPT | Mod: PBBFAC,,, | Performed by: NEUROMUSCULOSKELETAL MEDICINE & OMM

## 2023-06-08 PROCEDURE — 99999 PR PBB SHADOW E&M-EST. PATIENT-LVL IV: ICD-10-PCS | Mod: PBBFAC,,, | Performed by: NEUROMUSCULOSKELETAL MEDICINE & OMM

## 2023-06-14 ENCOUNTER — CLINICAL SUPPORT (OUTPATIENT)
Dept: REHABILITATION | Facility: HOSPITAL | Age: 58
End: 2023-06-14
Payer: COMMERCIAL

## 2023-06-14 DIAGNOSIS — M79.671 HEEL PAIN, BILATERAL: Primary | ICD-10-CM

## 2023-06-14 DIAGNOSIS — M79.672 HEEL PAIN, BILATERAL: Primary | ICD-10-CM

## 2023-06-14 PROCEDURE — 97110 THERAPEUTIC EXERCISES: CPT

## 2023-06-14 PROCEDURE — 97530 THERAPEUTIC ACTIVITIES: CPT

## 2023-06-14 PROCEDURE — 97140 MANUAL THERAPY 1/> REGIONS: CPT

## 2023-06-14 NOTE — PROGRESS NOTES
[facility]   Physical Therapy Treatment Note      Name: Twila Roman  Clinic Number: [id number]    Therapy Diagnosis:   Encounter Diagnosis   Name Primary?    Heel pain, bilateral Yes     Physician: Sona Fernandez DO    Visit Date: 6/14/2023    Physician Orders: PT Eval and Treat  Medical Diagnosis from Referral: M76.61,M76.62 (ICD-10-CM) - Achilles tendinitis of both lower extremities  Evaluation Date: 6/2/2023  Authorization Period Expiration: 12/31/23  Plan of Care Expiration: 8/30/23  Progress Note Due: 7/2/23  Visit # / Visits authorized: 1/ 20  FOTO: 1/3  PTA Visit #: 0/5     Time In: 1:00 pm  Time Out: 2:00 pm  Total Billable Time: 60 minutes    Subjective     Pt reports: Her Achilles tendons are still painful. She is now also dealing with pain in soul of her L foot.   She was compliant with home exercise program.  Response to previous treatment: xiomara eval well  Functional change: ongiong     Pain: not verbalized/10  Location: bilateral Achilles    Objective      Objective Measures updated at progress report unless specified.     Treatment     Olinda received the treatments listed below:      therapeutic exercises to develop strength and ROM for 30 minutes including:  Calf stretch with towel 4x30s   Single leg eccentric  heel raises - 2x10  Seated heel raises 25# - 3x20  Seated eversion with YTB - 2x fatigue    manual therapy techniques: Joint mobilizations and Soft tissue Mobilization were applied to the: bilateral ankles for 10 minutes, including:  Talocrural mobs Grade 4-5   Posterior talocrural mob Grades 3-4    therapeutic activities to improve functional performance for 15 minutes, including:  Upright bike - 10 mins   Patient education on upgrading shoe wear      Patient Education and Home Exercises       Education provided:   - HEP, POC, answered patient questions    Written Home Exercises Provided: yes. Exercises were reviewed and Olinda was able to demonstrate them prior  to the end of the session.  Olinda demonstrated good  understanding of the education provided. See EMR under Patient Instructions for exercises provided during therapy sessions    Assessment     Olinda is still dealing with similar symptoms as previously, but she is re-educated that treatment for a tendinopathy is not a quick fix. Unclear why she is getting pain in the plantar surface of her L foot at this time, but we will monitor. Did well with the progression of heel raises today.     Olinda Is progressing well towards her goals.   Pt prognosis is Fair.     Pt will continue to benefit from skilled outpatient physical therapy to address the deficits listed in the problem list box on initial evaluation, provide pt/family education and to maximize pt's level of independence in the home and community environment.     Pt's spiritual, cultural and educational needs considered and pt agreeable to plan of care and goals.     Anticipated barriers to physical therapy: chronicity of pain    GOALS: Short Term Goals:  4 weeks  1.Report decreased ankle pain  < / =  5/10 at worst  to increase tolerance for work.  2. Pt will demo an even 1/2 kneeling soleus wall test bilaterally.  3. Increase strength by 1/3 MMT grade for  areas of limitation  to increase tolerance for ADL and work activities.  4. Pt to tolerate HEP to improve ROM and independence with ADL's     Long Term Goals: 8 weeks  1.Report decreased ankle pain  < / =  3/10 at worst to increase tolerance for work.   2.Patient goal: return to being able to work a full shift at work with minimal increases in pain.   3.pt will demonstrate appropriate SL calf raise endurance compared to age norms with incline calf raise test for improved ankle function  4. Pt will report at CJ level (20-40% impaired) on Modified FIM score for mobility to demonstrate increase in LE function and mobility in home and community environment.      Plan     Cont POC    Nathan Duong, PT

## 2023-06-19 RX ORDER — BUPROPION HYDROCHLORIDE 300 MG/1
TABLET ORAL
Qty: 30 TABLET | Refills: 11 | Status: SHIPPED | OUTPATIENT
Start: 2023-06-19 | End: 2023-07-10 | Stop reason: SDUPTHER

## 2023-06-19 NOTE — TELEPHONE ENCOUNTER
Refill Routing Note   Medication(s) are not appropriate for processing by [facility] for the following reason(s):      Drug-drug interaction    [facility] action(s):  Quick Discontinue None identified   Medication Therapy Plan: Duplicate Medication/Therapy: buPROPion      Appointments  past 12m or future 3m with PCP    Date Provider   Last Visit   9/29/2022 Michelle Farias DO   Next Visit   Visit date not found Michelle Farias DO   ED visits in past 90 days: 0        Note composed:9:27 AM 06/19/2023

## 2023-06-19 NOTE — TELEPHONE ENCOUNTER
No care due was identified.  [facility] Embedded Care Due Messages. Reference number: [id number].   6/19/2023 5:33:38 AM CDT

## 2023-06-23 ENCOUNTER — CLINICAL SUPPORT (OUTPATIENT)
Dept: REHABILITATION | Facility: HOSPITAL | Age: 58
End: 2023-06-23
Payer: COMMERCIAL

## 2023-06-23 DIAGNOSIS — M79.671 HEEL PAIN, BILATERAL: Primary | ICD-10-CM

## 2023-06-23 DIAGNOSIS — M79.672 HEEL PAIN, BILATERAL: Primary | ICD-10-CM

## 2023-06-23 PROCEDURE — 97110 THERAPEUTIC EXERCISES: CPT

## 2023-06-23 PROCEDURE — 97530 THERAPEUTIC ACTIVITIES: CPT

## 2023-06-23 PROCEDURE — 97140 MANUAL THERAPY 1/> REGIONS: CPT

## 2023-06-23 NOTE — PROGRESS NOTES
[facility]   Physical Therapy Treatment Note      Name: Twila Roman  Clinic Number: [id number]    Therapy Diagnosis:   Encounter Diagnosis   Name Primary?    Heel pain, bilateral Yes     Physician: Sona Fernandez DO    Visit Date: 6/23/2023    Physician Orders: PT Eval and Treat  Medical Diagnosis from Referral: M76.61,M76.62 (ICD-10-CM) - Achilles tendinitis of both lower extremities  Evaluation Date: 6/2/2023  Authorization Period Expiration: 12/31/23  Plan of Care Expiration: 8/30/23  Progress Note Due: 7/2/23  Visit # / Visits authorized: 2/ 20  FOTO: 1/3  PTA Visit #: 0/5     Time In: 10:00 am  Time Out: 11:00 am  Total Billable Time: 60 minutes    Subjective     Pt reports: she went on vacation and was on her feet a lot. Therefore she is sore.   She was compliant with home exercise program.  Response to previous treatment: xiomara eval well  Functional change: ongiong     Pain: not verbalized/10  Location: bilateral Achilles    Objective      Objective Measures updated at progress report unless specified.     Treatment     Olinda received the treatments listed below:      therapeutic exercises to develop strength and ROM for 14 minutes including:  Double leg heel raises off half foam 2x fatigue  Seated eversion with YTB - 2x fatigue        NP:   Calf stretch with towel 4x30s   Single leg eccentric  heel raises - 2x10  Seated heel raises 25# - 3x20    manual therapy techniques: Joint mobilizations and Soft tissue Mobilization were applied to the: bilateral ankles for 38 minutes, including:  Patient received Dry needling using a semi-standardized protocol targeting trigger points in the following structures: bilateral gastroc and achilles tendons. Needles were left in in situ for 10min with electric stimulation set at 2 hz and 150 bill sec, with a continuous, biphasic waveform. After the session, the needles were removed with no adverse events. Written and verbal consent given prior  to the session.      therapeutic activities to improve functional performance for 08 minutes, including:  Lateral step down 6''       Patient Education and Home Exercises       Education provided:   - HEP, POC, answered patient questions    Written Home Exercises Provided: yes. Exercises were reviewed and Olinda was able to demonstrate them prior to the end of the session.  Olinda demonstrated good  understanding of the education provided. See EMR under Patient Instructions for exercises provided during therapy sessions    Assessment     Trial run with TrP DN today. Tolerated the treatment fairly well but did report discomfort throughout the E-stim process. Followed DN with loaded gastroc/soleus activity which was xiomara well. Will monitor response next visit to DN.     Olinda Is progressing well towards her goals.   Pt prognosis is Fair.     Pt will continue to benefit from skilled outpatient physical therapy to address the deficits listed in the problem list box on initial evaluation, provide pt/family education and to maximize pt's level of independence in the home and community environment.     Pt's spiritual, cultural and educational needs considered and pt agreeable to plan of care and goals.     Anticipated barriers to physical therapy: chronicity of pain    GOALS: Short Term Goals:  4 weeks  1.Report decreased ankle pain  < / =  5/10 at worst  to increase tolerance for work.  2. Pt will demo an even 1/2 kneeling soleus wall test bilaterally.  3. Increase strength by 1/3 MMT grade for  areas of limitation  to increase tolerance for ADL and work activities.  4. Pt to tolerate HEP to improve ROM and independence with ADL's     Long Term Goals: 8 weeks  1.Report decreased ankle pain  < / =  3/10 at worst to increase tolerance for work.   2.Patient goal: return to being able to work a full shift at work with minimal increases in pain.   3.pt will demonstrate appropriate SL calf raise endurance compared to age norms  with incline calf raise test for improved ankle function  4. Pt will report at CJ level (20-40% impaired) on Modified FIM score for mobility to demonstrate increase in LE function and mobility in home and community environment.      Plan     Cont POC    Nathan Duong, PT

## 2023-06-30 ENCOUNTER — CLINICAL SUPPORT (OUTPATIENT)
Dept: REHABILITATION | Facility: HOSPITAL | Age: 58
End: 2023-06-30
Payer: COMMERCIAL

## 2023-06-30 DIAGNOSIS — M79.672 HEEL PAIN, BILATERAL: Primary | ICD-10-CM

## 2023-06-30 DIAGNOSIS — M79.671 HEEL PAIN, BILATERAL: Primary | ICD-10-CM

## 2023-06-30 PROCEDURE — 97110 THERAPEUTIC EXERCISES: CPT

## 2023-06-30 PROCEDURE — 97112 NEUROMUSCULAR REEDUCATION: CPT

## 2023-06-30 NOTE — PROGRESS NOTES
[facility]   Physical Therapy Treatment Note      Name: Twila Roman  Clinic Number: [id number]    Therapy Diagnosis:   Encounter Diagnosis   Name Primary?    Heel pain, bilateral Yes     Physician: Sona Fernandez DO    Visit Date: 6/30/2023    Physician Orders: PT Eval and Treat  Medical Diagnosis from Referral: M76.61,M76.62 (ICD-10-CM) - Achilles tendinitis of both lower extremities  Evaluation Date: 6/2/2023  Authorization Period Expiration: 12/31/23  Plan of Care Expiration: 8/30/23  Progress Note Due: 7/2/23  Visit # / Visits authorized: 2/ 20  FOTO: 1/3  PTA Visit #: 0/5     Time In: 10:10 am  Time Out: 11:00 am  Total Billable Time: 60 minutes    Subjective     Pt reports: thinks that the needling helped a little. Just bought a new pair of garcía and feels like they are hurting her heel  She was compliant with home exercise program.  Response to previous treatment: xiomara eval well  Functional change: ongiong     Pain: not verbalized/10  Location: bilateral Achilles    Objective      Objective Measures updated at progress report unless specified.     Treatment     Olinda received the treatments listed below:      therapeutic exercises to develop strength and ROM for 23 minutes including:  Single leg heel raise standing - 3x6  Single leg heel raise seated 25# 2x fatigue   Seated inversion with greyTB - 2x fatigue    NP:   Calf stretch with towel 4x30s   Single leg eccentric  heel raises - 2x10  Seated heel raises 25# - 3x20    manual therapy techniques: Joint mobilizations and Soft tissue Mobilization were applied to the: bilateral ankles for 23 minutes, including:  Patient received Dry needling using a semi-standardized protocol targeting trigger points in the following structures: bilateral gastroc and achilles tendons. Needles were left in in situ for 10min with electric stimulation set at 2 hz and 150 bill sec, with a continuous, biphasic waveform. After the session, the  needles were removed with no adverse events. Written and verbal consent given prior to the session.      therapeutic activities to improve functional performance for 00 minutes, including:      Patient Education and Home Exercises       Education provided:   - HEP, POC, answered patient questions    Written Home Exercises Provided: yes. Exercises were reviewed and Olinda was able to demonstrate them prior to the end of the session.  Olinda demonstrated good  understanding of the education provided. See EMR under Patient Instructions for exercises provided during therapy sessions    Assessment     Continued with TrP DN today since it seems to be helping. Pt xiomara this treatment much better today. Able to perform sets of 6 single leg heel raises with minimal pain today.     Olinda Is progressing well towards her goals.   Pt prognosis is Fair.     Pt will continue to benefit from skilled outpatient physical therapy to address the deficits listed in the problem list box on initial evaluation, provide pt/family education and to maximize pt's level of independence in the home and community environment.     Pt's spiritual, cultural and educational needs considered and pt agreeable to plan of care and goals.     Anticipated barriers to physical therapy: chronicity of pain    GOALS: Short Term Goals:  4 weeks  1.Report decreased ankle pain  < / =  5/10 at worst  to increase tolerance for work.  2. Pt will demo an even 1/2 kneeling soleus wall test bilaterally.  3. Increase strength by 1/3 MMT grade for  areas of limitation  to increase tolerance for ADL and work activities.  4. Pt to tolerate HEP to improve ROM and independence with ADL's     Long Term Goals: 8 weeks  1.Report decreased ankle pain  < / =  3/10 at worst to increase tolerance for work.   2.Patient goal: return to being able to work a full shift at work with minimal increases in pain.   3.pt will demonstrate appropriate SL calf raise endurance compared to age norms  with incline calf raise test for improved ankle function  4. Pt will report at CJ level (20-40% impaired) on Modified FIM score for mobility to demonstrate increase in LE function and mobility in home and community environment.      Plan     Cont POC    Nathan Duong, PT

## 2023-07-06 ENCOUNTER — PATIENT MESSAGE (OUTPATIENT)
Dept: INTERNAL MEDICINE | Facility: CLINIC | Age: 58
End: 2023-07-06
Payer: COMMERCIAL

## 2023-07-07 ENCOUNTER — CLINICAL SUPPORT (OUTPATIENT)
Dept: REHABILITATION | Facility: HOSPITAL | Age: 58
End: 2023-07-07
Payer: COMMERCIAL

## 2023-07-07 DIAGNOSIS — M79.671 HEEL PAIN, BILATERAL: Primary | ICD-10-CM

## 2023-07-07 DIAGNOSIS — M79.672 HEEL PAIN, BILATERAL: Primary | ICD-10-CM

## 2023-07-07 PROCEDURE — 97110 THERAPEUTIC EXERCISES: CPT

## 2023-07-07 PROCEDURE — 97140 MANUAL THERAPY 1/> REGIONS: CPT

## 2023-07-07 NOTE — PROGRESS NOTES
[facility]   Physical Therapy Treatment Note      Name: Twila Roman  Clinic Number: [id number]    Therapy Diagnosis:   Encounter Diagnosis   Name Primary?    Heel pain, bilateral Yes     Physician: Sona Fernandez DO    Visit Date: 7/7/2023    Physician Orders: PT Eval and Treat  Medical Diagnosis from Referral: M76.61,M76.62 (ICD-10-CM) - Achilles tendinitis of both lower extremities  Evaluation Date: 6/2/2023  Authorization Period Expiration: 12/31/23  Plan of Care Expiration: 8/30/23  Progress Note Due: 7/2/23  Visit # / Visits authorized: 4/ 20  FOTO: 1/3  PTA Visit #: 0/5     Time In: 10:00 am  Time Out: 10:46 am  Total Billable Time: 46 minutes    Subjective     Pt reports: feels that her stamina is improving. Getting used to the new shoes  She was compliant with home exercise program.  Response to previous treatment: xiomara eval well  Functional change: ongiong     Pain: not verbalized/10  Location: bilateral Achilles    Objective      Objective Measures updated at progress report unless specified.     Treatment     Olinda received the treatments listed below:      therapeutic exercises to develop strength and ROM for 23 minutes including:  Single leg heel raise standing with deficit- 3x6-10  Single leg heel raise seated 50# 2x10  Walking on toes with assistance - 2x20ft  Sidestepping with band 2 x 5ft YTB    NP:   Calf stretch with towel 4x30s   Single leg eccentric  heel raises - 2x10  Seated heel raises 25# - 3x20    manual therapy techniques: Joint mobilizations and Soft tissue Mobilization were applied to the: bilateral ankles for 23 minutes, including:  Patient received Dry needling using a semi-standardized protocol targeting trigger points in the following structures: bilateral gastroc and achilles tendons. Needles were left in in situ for 10min with electric stimulation set at 2 hz and 150 bill sec, with a continuous, biphasic waveform. After the session, the needles were  removed with no adverse events. Written and verbal consent given prior to the session.      therapeutic activities to improve functional performance for 00 minutes, including:      Patient Education and Home Exercises       Education provided:   - HEP, POC, answered patient questions    Written Home Exercises Provided: yes. Exercises were reviewed and Olinda was able to demonstrate them prior to the end of the session.  Olinda demonstrated good  understanding of the education provided. See EMR under Patient Instructions for exercises provided during therapy sessions    Assessment     Continued with TrP DN today since it seems to be helping. Able to tolerate walking on toes today with assistance. Also able to progress SL heel raise standing and seated. Progressing well per POC.     Olinda Is progressing well towards her goals.   Pt prognosis is Fair.     Pt will continue to benefit from skilled outpatient physical therapy to address the deficits listed in the problem list box on initial evaluation, provide pt/family education and to maximize pt's level of independence in the home and community environment.     Pt's spiritual, cultural and educational needs considered and pt agreeable to plan of care and goals.     Anticipated barriers to physical therapy: chronicity of pain    GOALS: Short Term Goals:  4 weeks  1.Report decreased ankle pain  < / =  5/10 at worst  to increase tolerance for work.  2. Pt will demo an even 1/2 kneeling soleus wall test bilaterally.  3. Increase strength by 1/3 MMT grade for  areas of limitation  to increase tolerance for ADL and work activities.  4. Pt to tolerate HEP to improve ROM and independence with ADL's     Long Term Goals: 8 weeks  1.Report decreased ankle pain  < / =  3/10 at worst to increase tolerance for work.   2.Patient goal: return to being able to work a full shift at work with minimal increases in pain.   3.pt will demonstrate appropriate SL calf raise endurance compared to  age norms with incline calf raise test for improved ankle function  4. Pt will report at CJ level (20-40% impaired) on Modified FIM score for mobility to demonstrate increase in LE function and mobility in home and community environment.      Plan     Cont POC    Nathan Duong, PT

## 2023-07-10 ENCOUNTER — OFFICE VISIT (OUTPATIENT)
Dept: INTERNAL MEDICINE | Facility: CLINIC | Age: 58
End: 2023-07-10
Payer: COMMERCIAL

## 2023-07-10 VITALS
HEART RATE: 89 BPM | TEMPERATURE: 97 F | RESPIRATION RATE: 16 BRPM | HEIGHT: 65 IN | DIASTOLIC BLOOD PRESSURE: 60 MMHG | BODY MASS INDEX: 22.81 KG/M2 | OXYGEN SATURATION: 100 % | SYSTOLIC BLOOD PRESSURE: 110 MMHG | WEIGHT: 136.88 LBS

## 2023-07-10 DIAGNOSIS — I15.9 SECONDARY HYPERTENSION: ICD-10-CM

## 2023-07-10 DIAGNOSIS — F41.9 ANXIETY: Primary | ICD-10-CM

## 2023-07-10 LAB
AMPHET+METHAMPHET UR QL: NEGATIVE
BARBITURATES UR QL SCN>200 NG/ML: NEGATIVE
BENZODIAZ UR QL SCN>200 NG/ML: NEGATIVE
BZE UR QL SCN: NEGATIVE
CANNABINOIDS UR QL SCN: NEGATIVE
CREAT UR-MCNC: 305 MG/DL (ref 15–325)
ETHANOL UR-MCNC: <10 MG/DL
METHADONE UR QL SCN>300 NG/ML: NEGATIVE
OPIATES UR QL SCN: NEGATIVE
PCP UR QL SCN>25 NG/ML: NEGATIVE
TOXICOLOGY INFORMATION: NORMAL

## 2023-07-10 PROCEDURE — 1159F MED LIST DOCD IN RCRD: CPT | Mod: CPTII,S$GLB,, | Performed by: INTERNAL MEDICINE

## 2023-07-10 PROCEDURE — 3078F PR MOST RECENT DIASTOLIC BLOOD PRESSURE < 80 MM HG: ICD-10-PCS | Mod: CPTII,S$GLB,, | Performed by: INTERNAL MEDICINE

## 2023-07-10 PROCEDURE — 99214 PR OFFICE/OUTPT VISIT, EST, LEVL IV, 30-39 MIN: ICD-10-PCS | Mod: S$GLB,,, | Performed by: INTERNAL MEDICINE

## 2023-07-10 PROCEDURE — 3074F SYST BP LT 130 MM HG: CPT | Mod: CPTII,S$GLB,, | Performed by: INTERNAL MEDICINE

## 2023-07-10 PROCEDURE — 1159F PR MEDICATION LIST DOCUMENTED IN MEDICAL RECORD: ICD-10-PCS | Mod: CPTII,S$GLB,, | Performed by: INTERNAL MEDICINE

## 2023-07-10 PROCEDURE — 99214 OFFICE O/P EST MOD 30 MIN: CPT | Mod: S$GLB,,, | Performed by: INTERNAL MEDICINE

## 2023-07-10 PROCEDURE — 3008F PR BODY MASS INDEX (BMI) DOCUMENTED: ICD-10-PCS | Mod: CPTII,S$GLB,, | Performed by: INTERNAL MEDICINE

## 2023-07-10 PROCEDURE — 99999 PR PBB SHADOW E&M-EST. PATIENT-LVL III: CPT | Mod: PBBFAC,,, | Performed by: INTERNAL MEDICINE

## 2023-07-10 PROCEDURE — 80307 DRUG TEST PRSMV CHEM ANLYZR: CPT | Performed by: INTERNAL MEDICINE

## 2023-07-10 PROCEDURE — 99999 PR PBB SHADOW E&M-EST. PATIENT-LVL III: ICD-10-PCS | Mod: PBBFAC,,, | Performed by: INTERNAL MEDICINE

## 2023-07-10 PROCEDURE — 3074F PR MOST RECENT SYSTOLIC BLOOD PRESSURE < 130 MM HG: ICD-10-PCS | Mod: CPTII,S$GLB,, | Performed by: INTERNAL MEDICINE

## 2023-07-10 PROCEDURE — 1160F RVW MEDS BY RX/DR IN RCRD: CPT | Mod: CPTII,S$GLB,, | Performed by: INTERNAL MEDICINE

## 2023-07-10 PROCEDURE — 1160F PR REVIEW ALL MEDS BY PRESCRIBER/CLIN PHARMACIST DOCUMENTED: ICD-10-PCS | Mod: CPTII,S$GLB,, | Performed by: INTERNAL MEDICINE

## 2023-07-10 PROCEDURE — 3008F BODY MASS INDEX DOCD: CPT | Mod: CPTII,S$GLB,, | Performed by: INTERNAL MEDICINE

## 2023-07-10 PROCEDURE — 3078F DIAST BP <80 MM HG: CPT | Mod: CPTII,S$GLB,, | Performed by: INTERNAL MEDICINE

## 2023-07-10 RX ORDER — LORAZEPAM 1 MG/1
TABLET ORAL
Qty: 60 TABLET | Refills: 0 | Status: SHIPPED | OUTPATIENT
Start: 2023-07-10 | End: 2023-09-20

## 2023-07-10 RX ORDER — BUPROPION HYDROCHLORIDE 300 MG/1
300 TABLET ORAL DAILY
Qty: 90 TABLET | Refills: 3 | Status: SHIPPED | OUTPATIENT
Start: 2023-07-10

## 2023-07-10 RX ORDER — BUPROPION HYDROCHLORIDE 150 MG/1
150 TABLET ORAL DAILY
Qty: 90 TABLET | Refills: 3 | Status: SHIPPED | OUTPATIENT
Start: 2023-07-10

## 2023-07-10 RX ORDER — METOPROLOL SUCCINATE 25 MG/1
25 TABLET, EXTENDED RELEASE ORAL DAILY
Qty: 90 TABLET | Refills: 3 | Status: SHIPPED | OUTPATIENT
Start: 2023-07-10

## 2023-07-10 NOTE — PROGRESS NOTES
Subjective:       Patient ID: Twila Roman is a 57 y.o. female.    Chief Complaint: Follow-up (Discuss med)    HPI    57-year-old female here to discuss medications.    Patient has anxiety and is on Wellbutrin 450 mg, Ativan 1 mg twice daily as needed.  She uses the ativan 2 mg at night.  She is on Singulair after a bad case of bronchitis and was put on singulair 3 years ago with this..      HTN - Patient is currently on Toprol-XL 25 mg. She does not check her BP at home. Side effects of medications note: none. Denies headaches, blurred vision, chest pain, shortness of breath, nausea.    She has gone to a weight loss clinic and is on semaglutide 0.25 mg a week.    Review of Systems      Objective:      Physical Exam  Vitals reviewed.   Constitutional:       Appearance: She is well-developed.   HENT:      Head: Normocephalic and atraumatic.      Mouth/Throat:      Pharynx: No oropharyngeal exudate.   Eyes:      General: No scleral icterus.        Right eye: No discharge.         Left eye: No discharge.      Pupils: Pupils are equal, round, and reactive to light.   Neck:      Thyroid: No thyromegaly.      Trachea: No tracheal deviation.   Cardiovascular:      Rate and Rhythm: Normal rate and regular rhythm.      Heart sounds: Normal heart sounds. No murmur heard.    No friction rub. No gallop.   Pulmonary:      Effort: Pulmonary effort is normal. No respiratory distress.      Breath sounds: Normal breath sounds. No wheezing or rales.   Chest:      Chest wall: No tenderness.   Abdominal:      General: Bowel sounds are normal. There is no distension.      Palpations: Abdomen is soft. There is no mass.      Tenderness: There is no abdominal tenderness. There is no guarding or rebound.   Musculoskeletal:         General: No tenderness. Normal range of motion.      Cervical back: Normal range of motion and neck supple.   Skin:     General: Skin is warm and dry.      Coloration: Skin is not pale.      Findings: No  erythema or rash.   Neurological:      Mental Status: She is alert and oriented to person, place, and time.   Psychiatric:         Behavior: Behavior normal.       Assessment:       1. Anxiety    2. Secondary hypertension      Plan:       1. Continue Wellbutrin 450 mg, Ativan 1-2 mg nightly as needed.  Encouraged patient to try decrease usage to half tablets at night.  Urine tox screen done.  Contract signed.    2. Continue Toprol-XL 25 mg

## 2023-07-12 ENCOUNTER — CLINICAL SUPPORT (OUTPATIENT)
Dept: REHABILITATION | Facility: HOSPITAL | Age: 58
End: 2023-07-12
Payer: COMMERCIAL

## 2023-07-12 DIAGNOSIS — M79.672 HEEL PAIN, BILATERAL: Primary | ICD-10-CM

## 2023-07-12 DIAGNOSIS — M79.671 HEEL PAIN, BILATERAL: Primary | ICD-10-CM

## 2023-07-12 PROCEDURE — 97110 THERAPEUTIC EXERCISES: CPT

## 2023-07-12 PROCEDURE — 97140 MANUAL THERAPY 1/> REGIONS: CPT

## 2023-07-12 NOTE — PROGRESS NOTES
[facility]   Physical Therapy Treatment Note      Name: Twila Roman  Clinic Number: [id number]    Therapy Diagnosis:   Encounter Diagnosis   Name Primary?    Heel pain, bilateral Yes     Physician: Sona Fernandez DO    Visit Date: 7/12/2023    Physician Orders: PT Eval and Treat  Medical Diagnosis from Referral: M76.61,M76.62 (ICD-10-CM) - Achilles tendinitis of both lower extremities  Evaluation Date: 6/2/2023  Authorization Period Expiration: 12/31/23  Plan of Care Expiration: 8/30/23  Progress Note Due: 7/2/23  Visit # / Visits authorized: 5/ 20  FOTO: 1/3  PTA Visit #: 0/5     Time In: 10:14 am (late arrival)  Time Out: 11:00 am  Total Billable Time: 46 minutes    Subjective     Pt reports: still feels that her stamina is improving. But she thinks she flared it up working a 12 hours shift yesterday.  She was compliant with home exercise program.  Response to previous treatment: xiomara eval well  Functional change: ongiong     Pain: not verbalized/10  Location: bilateral Achilles    Objective      Objective Measures updated at progress report unless specified.     Treatment     Olinda received the treatments listed below:      therapeutic exercises to develop strength and ROM for 23 minutes including:  Single leg heel raise standing with deficit- 3x6-10  Single leg heel raise seated 50# 2x10  Walking on toes with assistance - 2x20ft    NP:   Calf stretch with towel 4x30s   Single leg eccentric  heel raises - 2x10  Seated heel raises 25# - 3x20    manual therapy techniques: Joint mobilizations and Soft tissue Mobilization were applied to the: bilateral ankles for 23 minutes, including:  Patient received Dry needling using a semi-standardized protocol targeting trigger points in the following structures: bilateral gastroc and achilles tendons. Needles were left in in situ for 10min with electric stimulation set at 2 hz and 150 bill sec, with a continuous, biphasic waveform. After the  session, the needles were removed with no adverse events. Written and verbal consent given prior to the session.      therapeutic activities to improve functional performance for 00 minutes, including:      Patient Education and Home Exercises       Education provided:   - HEP, POC, answered patient questions    Written Home Exercises Provided: yes. Exercises were reviewed and Olinda was able to demonstrate them prior to the end of the session.  Olinda demonstrated good  understanding of the education provided. See EMR under Patient Instructions for exercises provided during therapy sessions    Assessment     Olinda continues to arrive to therapy with moderate levels of irritability. There has only been slight carryover of improvement at this time. She is, however, able to xiomara more load for interventions indicating improvements in strength and endurance.     Olinda Is progressing well towards her goals.   Pt prognosis is Fair.     Pt will continue to benefit from skilled outpatient physical therapy to address the deficits listed in the problem list box on initial evaluation, provide pt/family education and to maximize pt's level of independence in the home and community environment.     Pt's spiritual, cultural and educational needs considered and pt agreeable to plan of care and goals.     Anticipated barriers to physical therapy: chronicity of pain    GOALS: Short Term Goals:  4 weeks  1.Report decreased ankle pain  < / =  5/10 at worst  to increase tolerance for work.  2. Pt will demo an even 1/2 kneeling soleus wall test bilaterally.  3. Increase strength by 1/3 MMT grade for  areas of limitation  to increase tolerance for ADL and work activities.  4. Pt to tolerate HEP to improve ROM and independence with ADL's     Long Term Goals: 8 weeks  1.Report decreased ankle pain  < / =  3/10 at worst to increase tolerance for work.   2.Patient goal: return to being able to work a full shift at work with minimal increases in  pain.   3.pt will demonstrate appropriate SL calf raise endurance compared to age norms with incline calf raise test for improved ankle function  4. Pt will report at CJ level (20-40% impaired) on Modified FIM score for mobility to demonstrate increase in LE function and mobility in home and community environment.      Plan     Cont POC    Nathan Duong, PT

## 2023-07-19 DIAGNOSIS — G89.4 CHRONIC PAIN SYNDROME: ICD-10-CM

## 2023-07-19 RX ORDER — TIZANIDINE 4 MG/1
TABLET ORAL
Qty: 60 TABLET | Refills: 2 | Status: SHIPPED | OUTPATIENT
Start: 2023-07-19 | End: 2023-11-06

## 2023-07-24 ENCOUNTER — CLINICAL SUPPORT (OUTPATIENT)
Dept: REHABILITATION | Facility: HOSPITAL | Age: 58
End: 2023-07-24
Payer: COMMERCIAL

## 2023-07-24 DIAGNOSIS — M79.672 HEEL PAIN, BILATERAL: Primary | ICD-10-CM

## 2023-07-24 DIAGNOSIS — M79.671 HEEL PAIN, BILATERAL: Primary | ICD-10-CM

## 2023-07-24 PROCEDURE — 97110 THERAPEUTIC EXERCISES: CPT

## 2023-07-24 PROCEDURE — 97140 MANUAL THERAPY 1/> REGIONS: CPT

## 2023-07-24 NOTE — PROGRESS NOTES
[facility]   Physical Therapy Treatment Note      Name: Twila Roman  Clinic Number: [id number]    Therapy Diagnosis:   Encounter Diagnosis   Name Primary?    Heel pain, bilateral Yes       Physician: Sona Fernandez DO    Visit Date: 7/24/2023    Physician Orders: PT Eval and Treat  Medical Diagnosis from Referral: M76.61,M76.62 (ICD-10-CM) - Achilles tendinitis of both lower extremities  Evaluation Date: 6/2/2023  Authorization Period Expiration: 12/31/23  Plan of Care Expiration: 8/30/23  Progress Note Due: 7/2/23  Visit # / Visits authorized: 6/ 20  FOTO: 1/3  PTA Visit #: 0/5     Time In: 10:17 am (late arrival)  Time Out: 11:00 am  Total Billable Time: 43 minutes    Subjective     Pt reports: Her endurance and strength is better, main is still lingering  She was compliant with home exercise program.  Response to previous treatment: xiomara eval well  Functional change: ongiong     Pain: 7/10  Location: bilateral Achilles    Objective      Single leg heel raises: bilaterally 14 each    Treatment     Olinda received the treatments listed below:      therapeutic exercises to develop strength and ROM for 15 minutes including:  Single leg heel raise standing: 3x fatigue   Single leg heel raise seated 50# 3x fatigue    NP:   Calf stretch with towel 4x30s   Single leg eccentric  heel raises - 2x10  Seated heel raises 25# - 3x20    manual therapy techniques: Joint mobilizations and Soft tissue Mobilization were applied to the: bilateral ankles for 27 minutes, including:  Patient received Dry needling using a semi-standardized protocol targeting trigger points in the following structures: bilateral gastroc and achilles tendons. Needles were left in in situ for 10min with electric stimulation set at 2 hz and 150 bill sec, with a continuous, biphasic waveform. After the session, the needles were removed with no adverse events. Written and verbal consent given prior to the  session.      therapeutic activities to improve functional performance for 00 minutes, including:      Patient Education and Home Exercises       Education provided:   - HEP, POC, answered patient questions    Written Home Exercises Provided: yes. Exercises were reviewed and Olinda was able to demonstrate them prior to the end of the session.  Olinda demonstrated good  understanding of the education provided. See EMR under Patient Instructions for exercises provided during therapy sessions    Assessment     Per strength assessment, Olinda has made excellent improvements in plantar flexion strength. However, her pain levels are still lingering at this time. She returns to the doctor next week to determine the next course of action.     Olinda Is progressing well towards her goals.   Pt prognosis is Fair.     Pt will continue to benefit from skilled outpatient physical therapy to address the deficits listed in the problem list box on initial evaluation, provide pt/family education and to maximize pt's level of independence in the home and community environment.     Pt's spiritual, cultural and educational needs considered and pt agreeable to plan of care and goals.     Anticipated barriers to physical therapy: chronicity of pain    GOALS: Short Term Goals:  4 weeks  1.Report decreased ankle pain  < / =  5/10 at worst  to increase tolerance for work.  2. Pt will demo an even 1/2 kneeling soleus wall test bilaterally.  3. Increase strength by 1/3 MMT grade for  areas of limitation  to increase tolerance for ADL and work activities.  4. Pt to tolerate HEP to improve ROM and independence with ADL's     Long Term Goals: 8 weeks  1.Report decreased ankle pain  < / =  3/10 at worst to increase tolerance for work.   2.Patient goal: return to being able to work a full shift at work with minimal increases in pain.   3.pt will demonstrate appropriate SL calf raise endurance compared to age norms with incline calf raise test for  improved ankle function  4. Pt will report at CJ level (20-40% impaired) on Modified FIM score for mobility to demonstrate increase in LE function and mobility in home and community environment.      Plan     Cont POC    Nathan Duong, PT

## 2023-07-28 ENCOUNTER — CLINICAL SUPPORT (OUTPATIENT)
Dept: REHABILITATION | Facility: HOSPITAL | Age: 58
End: 2023-07-28
Payer: COMMERCIAL

## 2023-07-28 DIAGNOSIS — M79.671 HEEL PAIN, BILATERAL: Primary | ICD-10-CM

## 2023-07-28 DIAGNOSIS — M79.672 HEEL PAIN, BILATERAL: Primary | ICD-10-CM

## 2023-07-28 PROCEDURE — 97110 THERAPEUTIC EXERCISES: CPT

## 2023-07-28 PROCEDURE — 97140 MANUAL THERAPY 1/> REGIONS: CPT

## 2023-07-28 NOTE — PROGRESS NOTES
[facility]   Physical Therapy Treatment Note      Name: Twila Roman  Clinic Number: [id number]    Therapy Diagnosis:   Encounter Diagnosis   Name Primary?    Heel pain, bilateral Yes       Physician: Sona Fernandez DO    Visit Date: 7/28/2023    Physician Orders: PT Eval and Treat  Medical Diagnosis from Referral: M76.61,M76.62 (ICD-10-CM) - Achilles tendinitis of both lower extremities  Evaluation Date: 6/2/2023  Authorization Period Expiration: 12/31/23  Plan of Care Expiration: 8/30/23  Progress Note Due: 7/2/23  Visit # / Visits authorized: 6/ 20  FOTO: 1/3  PTA Visit #: 0/5     Time In: 10:17 am (late arrival)  Time Out: 11:00 am  Total Billable Time: 43 minutes    Subjective     Pt reports: Her endurance and strength is better, main is still lingering  She was compliant with home exercise program.  Response to previous treatment: xiomara eval well  Functional change: ongiong     Pain: 5/10  Location: bilateral Achilles    Objective      Single leg heel raises: bilaterally 14 each    Treatment     Olinda received the treatments listed below:      therapeutic exercises to develop strength and ROM for 30 minutes including:  Single leg heel raise standing off step: 3x fatigue   Sneaky lunges - 3x8  Walking heel raises 2x fatigue   Calf stretch with towel 4x30s    manual therapy techniques: Joint mobilizations and Soft tissue Mobilization were applied to the: bilateral ankles for 15 minutes, including:  Ankle assessment  Posterior talus mobs   Ankle distraction Grade 5    Patient Education and Home Exercises       Education provided:   - HEP, POC, answered patient questions    Written Home Exercises Provided: yes. Exercises were reviewed and Olinda was able to demonstrate them prior to the end of the session.  Olinda demonstrated good  understanding of the education provided. See EMR under Patient Instructions for exercises provided during therapy sessions    Assessment     Per strength  assessment, Olinda has made excellent improvements in plantar flexion strength. However, her pain levels are still lingering at this time. She returns to the doctor next week to determine the next course of action.     Olinda Is progressing well towards her goals.   Pt prognosis is Fair.     Pt will continue to benefit from skilled outpatient physical therapy to address the deficits listed in the problem list box on initial evaluation, provide pt/family education and to maximize pt's level of independence in the home and community environment.     Pt's spiritual, cultural and educational needs considered and pt agreeable to plan of care and goals.     Anticipated barriers to physical therapy: chronicity of pain    GOALS: Short Term Goals:  4 weeks  1.Report decreased ankle pain  < / =  5/10 at worst  to increase tolerance for work.  2. Pt will demo an even 1/2 kneeling soleus wall test bilaterally.  3. Increase strength by 1/3 MMT grade for  areas of limitation  to increase tolerance for ADL and work activities.  4. Pt to tolerate HEP to improve ROM and independence with ADL's     Long Term Goals: 8 weeks  1.Report decreased ankle pain  < / =  3/10 at worst to increase tolerance for work.   2.Patient goal: return to being able to work a full shift at work with minimal increases in pain.   3.pt will demonstrate appropriate SL calf raise endurance compared to age norms with incline calf raise test for improved ankle function  4. Pt will report at CJ level (20-40% impaired) on Modified FIM score for mobility to demonstrate increase in LE function and mobility in home and community environment.      Plan     Cont POC    Nathan Duong, PT

## 2023-08-02 ENCOUNTER — TELEPHONE (OUTPATIENT)
Dept: SPORTS MEDICINE | Facility: CLINIC | Age: 58
End: 2023-08-02
Payer: COMMERCIAL

## 2023-08-02 ENCOUNTER — OFFICE VISIT (OUTPATIENT)
Dept: SPORTS MEDICINE | Facility: CLINIC | Age: 58
End: 2023-08-02
Payer: COMMERCIAL

## 2023-08-02 VITALS
DIASTOLIC BLOOD PRESSURE: 84 MMHG | HEIGHT: 65 IN | BODY MASS INDEX: 22.66 KG/M2 | SYSTOLIC BLOOD PRESSURE: 126 MMHG | WEIGHT: 136 LBS

## 2023-08-02 DIAGNOSIS — G57.51 TARSAL TUNNEL SYNDROME, RIGHT: ICD-10-CM

## 2023-08-02 DIAGNOSIS — M67.873 ACHILLES TENDINOSIS OF RIGHT ANKLE: Primary | ICD-10-CM

## 2023-08-02 DIAGNOSIS — M17.11 PRIMARY OSTEOARTHRITIS OF RIGHT KNEE: ICD-10-CM

## 2023-08-02 DIAGNOSIS — M99.06 SOMATIC DYSFUNCTION OF LOWER EXTREMITY: ICD-10-CM

## 2023-08-02 DIAGNOSIS — M67.874 ACHILLES TENDINOSIS OF LEFT ANKLE: ICD-10-CM

## 2023-08-02 PROCEDURE — 99999 PR PBB SHADOW E&M-EST. PATIENT-LVL III: CPT | Mod: PBBFAC,,, | Performed by: NEUROMUSCULOSKELETAL MEDICINE & OMM

## 2023-08-02 PROCEDURE — 3074F PR MOST RECENT SYSTOLIC BLOOD PRESSURE < 130 MM HG: ICD-10-PCS | Mod: CPTII,S$GLB,, | Performed by: NEUROMUSCULOSKELETAL MEDICINE & OMM

## 2023-08-02 PROCEDURE — 98925 OSTEOPATH MANJ 1-2 REGIONS: CPT | Mod: S$GLB,,, | Performed by: NEUROMUSCULOSKELETAL MEDICINE & OMM

## 2023-08-02 PROCEDURE — 99999 PR PBB SHADOW E&M-EST. PATIENT-LVL III: ICD-10-PCS | Mod: PBBFAC,,, | Performed by: NEUROMUSCULOSKELETAL MEDICINE & OMM

## 2023-08-02 PROCEDURE — 99215 PR OFFICE/OUTPT VISIT, EST, LEVL V, 40-54 MIN: ICD-10-PCS | Mod: 25,S$GLB,, | Performed by: NEUROMUSCULOSKELETAL MEDICINE & OMM

## 2023-08-02 PROCEDURE — 3008F BODY MASS INDEX DOCD: CPT | Mod: CPTII,S$GLB,, | Performed by: NEUROMUSCULOSKELETAL MEDICINE & OMM

## 2023-08-02 PROCEDURE — 3008F PR BODY MASS INDEX (BMI) DOCUMENTED: ICD-10-PCS | Mod: CPTII,S$GLB,, | Performed by: NEUROMUSCULOSKELETAL MEDICINE & OMM

## 2023-08-02 PROCEDURE — 1159F PR MEDICATION LIST DOCUMENTED IN MEDICAL RECORD: ICD-10-PCS | Mod: CPTII,S$GLB,, | Performed by: NEUROMUSCULOSKELETAL MEDICINE & OMM

## 2023-08-02 PROCEDURE — 3074F SYST BP LT 130 MM HG: CPT | Mod: CPTII,S$GLB,, | Performed by: NEUROMUSCULOSKELETAL MEDICINE & OMM

## 2023-08-02 PROCEDURE — 99215 OFFICE O/P EST HI 40 MIN: CPT | Mod: 25,S$GLB,, | Performed by: NEUROMUSCULOSKELETAL MEDICINE & OMM

## 2023-08-02 PROCEDURE — 3079F DIAST BP 80-89 MM HG: CPT | Mod: CPTII,S$GLB,, | Performed by: NEUROMUSCULOSKELETAL MEDICINE & OMM

## 2023-08-02 PROCEDURE — 1159F MED LIST DOCD IN RCRD: CPT | Mod: CPTII,S$GLB,, | Performed by: NEUROMUSCULOSKELETAL MEDICINE & OMM

## 2023-08-02 PROCEDURE — 3079F PR MOST RECENT DIASTOLIC BLOOD PRESSURE 80-89 MM HG: ICD-10-PCS | Mod: CPTII,S$GLB,, | Performed by: NEUROMUSCULOSKELETAL MEDICINE & OMM

## 2023-08-02 PROCEDURE — 98925 PR OSTEOPATHIC MANIP,1-2 BODY REGN: ICD-10-PCS | Mod: S$GLB,,, | Performed by: NEUROMUSCULOSKELETAL MEDICINE & OMM

## 2023-08-02 NOTE — TELEPHONE ENCOUNTER
----- Message from Vani Cochran sent at 8/2/2023 11:13 AM CDT -----  Contact: [phone] Patient  Patient would like to get medical advice.  Symptoms (please be specific):   pt states she is going to be late(12:00) for her appt due to having to make a delivery. Pt is asking if she can be seen later today. Pt did not want to accept the next appt of 08/30/23  How long have you had these symptoms: N/A  Would you like a call back, or a response through your [facility] portal?:   call back  Pharmacy name and phone # (copy from chart):   N/A  Comments:

## 2023-08-02 NOTE — TELEPHONE ENCOUNTER
Moved appt to 1pm today. Confirmed appt date, time and location and patient states understanding.     Maxine Patel MS, OTC  Clinical Assistant to Dr. Sona Fernandez

## 2023-08-02 NOTE — PROGRESS NOTES
"Subjective:     Twila Andersonvj     Chief Complaint   Patient presents with    Follow-up         HPI      Olinda is a 58 y.o. female coming in today for bilateral achilles pain. Since last visit the pain has remained unchanged. Currently right is hurting worse. She completed therapy with some relief but overall her pain is about the same. Notes new "nodule" at right achilles. She is having pain and numbness in her toes. The pain is better with rest, ice and worse with walking, standing inactivity, getting out of bed. Pt. describes the pain as a 6/10 achy pain that does not radiate. There has not been any new a fall/injury/ or traumas since last visit.  Pt. denies any new musculoskeletal complaints at this time.       Pt also complains of right knee pain. She reports good relief with Euflexxa series completed 6/8/23, noting decreased instability and pain. Pt describes the pain as a 2/10 aching pain that does not radiate. The pain is better with rest, NSAIDs and worse with activity, standing. Pt. Denies any other musculoskeletal complaints at this time.    Joint instability? no  Mechanical locking/clicking? no  Affecting ADL's? yes  Affecting sleep? yes    Occupation: owns [employer]    Procedures reviewed:   6/2/22 - CSI right knee good improvement x3 weeks  9/22/22- VSI Euflexxa right knee - good improvement x6   11/2/22- CSI R knee - Dr. Prince- no relief  6/8/23- VSI Euflexxa R knee- good relief, ongoing    Office note from 6/8/23 reviewed     PAST MEDICAL HISTORY:   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      PAST SURGICAL HISTORY:   Past Surgical History:   Procedure Laterality Date    BACK SURGERY      spinal fusion    COLONOSCOPY N/A 12/11/2019    Procedure: COLONOSCOPY;  Surgeon: Caroline Swartz MD;  Location: [facility] ([address] [place] [address]);  Service: Endoscopy;  Laterality: N/A;    FUSION OF " SACROILIAC JOINT Left 10/3/2022    Procedure: FUSION, SACROILIAC JOINT;  Surgeon: Henry Felix MD;  Location: [facility] OR;  Service: Pain Management;  Laterality: Left;  LEFT  PAINTEQ REP    INJECTION, SACROILIAC JOINT Left 7/14/2022    Procedure: INJECTION,SACROILIAC JOINT, LEFT;  Surgeon: Henry Felix MD;  Location: [facility] PAIN MGT;  Service: Pain Management;  Laterality: Left;    INJECTION, SACROILIAC JOINT Left 8/16/2022    Procedure: INJECTION,SACROILIAC JOINT, LEFT DIAGNOSTIC SI WITHOUT STEROID;  Surgeon: Henry Felix MD;  Location: [facility] PAIN MGT;  Service: Pain Management;  Laterality: Left;    INJECTION, SACROILIAC JOINT Left 9/1/2022    Procedure: INJECTION,SACROILIAC JOINT LEFT DIAGNOSTIC WITH NO STEROIDS;  Surgeon: Henry Felix MD;  Location: [facility] PAIN MGT;  Service: Pain Management;  Laterality: Left;    KNEE ARTHROSCOPY W/ MENISCECTOMY Left 8/15/2018    Procedure: ARTHROSCOPY, KNEE, WITH MENISCECTOMY;  Surgeon: Handy Elkins MD;  Location: [facility] OR;  Service: Orthopedics;  Laterality: Left;  Video    ROBOT-ASSISTED LAPAROSCOPIC ABDOMINAL HYSTERECTOMY USING DA MARIA ESTHER XI N/A 10/20/2020    Procedure: XI ROBOTIC HYSTERECTOMY;  Surgeon: Danie Burns MD;  Location: [facility] OR;  Service: OB/GYN;  Laterality: N/A;    ROBOT-ASSISTED LAPAROSCOPIC SALPINGO-OOPHORECTOMY USING DA MARIA ESTHER XI Bilateral 10/20/2020    Procedure: XI ROBOTIC SALPINGO-OOPHORECTOMY;  Surgeon: Danie Burns MD;  Location: [facility] OR;  Service: OB/GYN;  Laterality: Bilateral;    TRANSFORAMINAL EPIDURAL INJECTION OF STEROID Right 7/18/2019    Procedure: INJECTION, STEROID, EPIDURAL, TRANSFORAMINAL APPROACH, L4 AND L5;  Surgeon: Daniel Seo MD;  Location: [facility] PAIN MGT;  Service: Pain Management;  Laterality: Right;    TRANSFORAMINAL EPIDURAL INJECTION OF STEROID Right 3/16/2021    Procedure: INJECTION, STEROID, EPIDURAL, TRANSFORAMINAL APPROACH L4/5 AND L5/S1;  Surgeon: Daniel Seo MD;  Location: [facility];  Service:  Pain Management;  Laterality: Right;    TUMOR REMOVAL      right hand     FAMILY HISTORY:   Family History   Problem Relation Age of Onset    Ovarian cancer Mother 66         at 72    Heart disease Father         double bypass    Macular degeneration Father     Heart failure Father     No Known Problems Brother     No Known Problems Brother     Cancer Paternal Grandfather         lung (pt thinks), possible smoker    Breast cancer Paternal Cousin 20        father's sister's daughter, pt thinks bilateral, cousin is     Other Maternal Aunt         mat aunt had part of colon removed, pt does not think cancer    Breast cancer Maternal Aunt      SOCIAL HISTORY:   Social History     Socioeconomic History    Marital status:    Occupational History    Occupation: director of marketing     Employer: [employer]    Tobacco Use    Smoking status: Former     Current packs/day: 0.00    Smokeless tobacco: Never   Substance and Sexual Activity    Alcohol use: Yes     Alcohol/week: 5.0 standard drinks of alcohol     Types: 5 Glasses of wine per week     Comment: one a day- red wine    Drug use: No    Sexual activity: Yes     Partners: Male     Comment: Boyfriend      Social Determinants of Health     Financial Resource Strain: Low Risk  (2021)    Overall Financial Resource Strain (CARDIA)     Difficulty of Paying Living Expenses: Not hard at all   Food Insecurity: No Food Insecurity (2021)    Hunger Vital Sign     Worried About Running Out of Food in the Last Year: Never true     Ran Out of Food in the Last Year: Never true   Transportation Needs: No Transportation Needs (2021)    PRAPARE - Transportation     Lack of Transportation (Medical): No     Lack of Transportation (Non-Medical): No   Physical Activity: Insufficiently Active (2021)    Exercise Vital Sign     Days of Exercise per Week: 2 days     Minutes of Exercise per Session: 20 min   Stress: Stress Concern Present (2021)     [employer] - Occupational Stress Questionnaire     Feeling of Stress : To some extent   Social Connections: Unknown (12/6/2021)    Social Connection and Isolation Panel [NHANES]     Frequency of Communication with Friends and Family: Twice a week     Frequency of Social Gatherings with Friends and Family: Twice a week     Active Member of Clubs or Organizations: No     Attends Club or Organization Meetings: Never     Marital Status:    Housing Stability: Low Risk  (12/6/2021)    Housing Stability Vital Sign     Unable to Pay for Housing in the Last Year: No     Number of Places Lived in the Last Year: 1     Unstable Housing in the Last Year: No       MEDICATIONS:   Current Outpatient Medications:     albuterol (PROVENTIL/VENTOLIN HFA) 90 mcg/actuation inhaler, INHALE 1 TO 2 PUFFS INTO THE LUNGS EVERY 6 HOURS AS NEEDED FOR WHEEZING, Disp: 8.5 g, Rfl: 0    biotin 5,000 mcg TbDL, Take 1 capsule by mouth once daily., Disp: , Rfl:     buPROPion (WELLBUTRIN XL) 150 MG TB24 tablet, Take 1 tablet (150 mg total) by mouth once daily., Disp: 90 tablet, Rfl: 3    buPROPion (WELLBUTRIN XL) 300 MG 24 hr tablet, Take 1 tablet (300 mg total) by mouth once daily., Disp: 90 tablet, Rfl: 3    cholecalciferol, vitamin D3, (VITAMIN D3) 50 mcg (2,000 unit) Tab, Take 1 tablet by mouth once daily., Disp: , Rfl:     FLAXSEED OIL MISC, by Misc.(Non-Drug; Combo Route) route., Disp: , Rfl:     fluticasone propionate (FLONASE) 50 mcg/actuation nasal spray, 2 sprays (100 mcg total) by Each Nostril route once daily., Disp: 16 g, Rfl: 11    LORazepam (ATIVAN) 1 MG tablet, TAKE 1 TABLET(1 MG) BY MOUTH EVERY 12 HOURS AS NEEDED, Disp: 60 tablet, Rfl: 0    meloxicam (MOBIC) 7.5 MG tablet, TAKE 1 TABLET(7.5 MG) BY MOUTH EVERY DAY, Disp: 30 tablet, Rfl: 0    metoprolol succinate (TOPROL-XL) 25 MG 24 hr tablet, Take 1 tablet (25 mg total) by mouth once daily., Disp: 90 tablet, Rfl: 3    multivitamin (THERAGRAN) per  "tablet, Take 1 tablet by mouth once daily., Disp: , Rfl:     selenium sulfide 2.25 % Sham, Use  qweek, Disp: 180 mL, Rfl: 5    tiZANidine (ZANAFLEX) 4 MG tablet, TAKE 1 TABLET BY MOUTH EVERY 12 HOURS AS NEEDED FOR MUSCLE SPASM, Disp: 60 tablet, Rfl: 2    valACYclovir (VALTREX) 500 MG tablet, Take 500 mg by mouth 2 (two) times daily., Disp: , Rfl:   No current facility-administered medications for this visit.    Facility-Administered Medications Ordered in Other Visits:     0.9%  NaCl infusion, 500 mL, Intravenous, Continuous, Inez Joiner MD  ALLERGIES:   Review of patient's allergies indicates:  No Known Allergies    Objective:     VITAL SIGNS: /84   Ht 5' 5" (1.651 m)   Wt 61.7 kg (136 lb)   LMP 01/01/2015 (Approximate)   BMI 22.63 kg/m²    General    Vitals reviewed.  Constitutional: She is oriented to person, place, and time. She appears well-developed and well-nourished.   Neurological: She is alert and oriented to person, place, and time.   Psychiatric: She has a normal mood and affect. Her behavior is normal.                 MUSCULOSKELETAL EXAM  ANKLE: bilateral ANKLE  The affected ankle is compared to the contralateral ankle.     Observation:    + bilateral mid substance achilles edema, right worse than left   There is no errythema or ecchymosis.   Shoes reveal a normal wear pattern.  No structural deformities including pes planus/cavus, hallux valgus, or toe deformities.  Negative too-many toes sign.    Normal callus pattern on the feet bilaterally.    No leg or intrinsic foot muscle atrophy.  Squatting reveals symmetric pronation of the bilateral feet, but with achilles tendon pain, R>L   Able to rise on toes with symmetric supination but with achilles tendon pain, R>L  + R antalgic gait     ROM (* = with pain):  Active dorsiflexion to 20° on left and 20° on right  Active plantarflexion to 50° on left and 50° on right    Active ankle inversion to 35° on left and 35° on right  Active ankle " eversion to 15° on left and 15° on right  Full active flexion/extension of the toes bilaterally.   + Heel cords tightness bilaterally.     Tenderness To Palpation:  No tenderness at the ATFL, CFL, PTFL, or deltoid ligaments  No tenderness over the distal anterior syndesmosis, distal tibia/fibula, fibular head/shaft  No tenderness at medial or lateral malleoli   No tenderness at navicular, cuboid, cuneiforms, or talus  No achilles insertional pain at calcaneus  No tenderness along the metatarsals or phalanges  No tenderness at the Achilles tendon calcaneal insertion  + bilateral midsubstance achilles tendon tenderness, R>L  No tenderness at the posterior tibial or peroneal tendons     Strength Testing (* = with pain):  Dorsiflexion - 5/5 on left and 5/5 on right  Platarflexion - 5/5 on left and 5/5 on right  Resisted Inversion - 5/5 on left and 5/5 on right  Resisted Eversion - 5/5 on left and 5/5 on right  Great Toe Extension - 5/5 on left and 5/5 on right  Great Toe Flexion - 5/5 on left and 5/5 on right     Special Tests:  Anterior talar drawer - negative and without dimpling  Talar tilt - negative  Reverse Talar tilt - negative     Heel tap test - negative  Distal tib/fib squeeze test - negative  External rotation stress (Kleiger) test - negative  Luis squeeze test - negative     Metatarsal squeeze test - negative  Midfoot stress test - negative  Calcaneal squeeze test - negative     No subluxation of the peroneal tendons with resisted eversion.     Vascular/Sensory Exam:  DP and PT pulses intact bilaterally  No skin changes, no abnormal hair distribution  Decreased sensation to light touch in the medial planar nerve distribution on right  Positive Tinel's test over tarsal tunnel on right  Capillary refill intact <2 seconds in all toes bilaterally.    Right KNEE EXAMINATION     Affected side is compared to contralateral knee      Observation:  No edema, erythema, ecchymosis, or effusion noted.  No muscle  atrophy of the thighs and calves noted.  No obvious bony deformities noted.   No Genu valgus/varum noted.  No recurvatum noted.    No tibial internal/external torsion.    Posture:  Posterior pelvis tilt with loss of lumbar lordosis  Gait: Non-antalgic with Neutral ankle mechanics and Neutral medial arch  Poor right pelvic stability with left hip hiking compensatory pattern noted with single leg raise     Tenderness:  Patella - none                                    Lateral joint line - none  Quad tendon - none                            Medial joint line - +  Patellar tendon - none                         Medial plica - none  Tibial tubercle - none                          Lateral plica - none  Pes anserine - none                            MCL prox - none  Distal ITB - none                                 MCL distal - none  MFC - none                                         LCL prox - none  LFC - none                                          LCL distal - none  Tibia - none                                         Fibula - none     No obvious bursae, plicae, popliteal cysts, or tendon derangement palpated.                                                                            ROM (* = with pain):     Active extension to 0° on left without hyperextension, lag, crepitus, or patellar J sign.   Active extension to 0° on right* without hyperextension, lag, crepitus, or patellar J sign.  Active flexion to 135° on left and 135° on right     Strength(* = with pain):  Knee Flexion - 5/5 on left and 5/5 on right  Knee Extension - 5/5 on left and 5/5 on right  Hip Flexion - 5/5 on left and 5/5 on right  Hip Extension - 5/5 on left and 5/5 on right  Ankle dorsiflexion - 5/5 on left and 5/5 on right  Ankle Plantarflexion - 5/5 on left and 5/5 on right     Patellofemoral Exam:   Patellar ballottement - negative  Bulge sign - negative  Patellar grind - negative     No patellar laxity with medial and lateral translation   No  apprehension with medial and lateral patellar translation.      Meniscus Testing:                              No pain with terminal flexion or extension at the join tlines  Skylers test -negative  Bounce home test - negative     Ligament Testing:  Lachman's test - negative  No laxity with anterior drawer.   No laxity with posterior drawer.    No posterior sag sign.   No laxity with varus testing at 0 and 30 degrees.  No laxity with valgus testing at 0 and 30 degrees.    TART (Tissue texture abnormality, Asymmetry,  Restriction of motion and/or Tenderness) changes:      Lower Extremity:    Location/joint Finding/restriction   Fibular head Neutral    Tibia Neutral   Talocrural joint Neutral   Subtalar Joint Neutral   Cuboid Neutral   Talo-navicular joint Right   Navicular-cuneiform joint Right   2nd and 3rd, Cuneiform-metatarsal joint Right   2nd on  3rd metatarsal Bilateral, right Trigger band (TB) fascial distortion     2nd and 3rd phalange Right     Key   F= Flexed   E = Extended   R = Rotated   S = Sidebent   TTA = tissue texture abnormality     Assessment:      Encounter Diagnoses   Name Primary?    Achilles tendinosis of right ankle Yes    Tarsal tunnel syndrome, right     Achilles tendinosis of left ankle     Primary osteoarthritis of right knee     Somatic dysfunction of lower extremity             Plan:   1. Persistent bilateral midsubstance Achilles tendinosis, R>L on exam today, now with associated right tarsal tunnel syndrome. Unresponsive to conservative tx of formal PT, HEP, and supportive shoe wear   - disused option of RIGHT minimally invasive tenotomy with Tenjet for midsubstance tendinopathy noted on prior [facility] US vs. Foot and ankle orthopedic surgical consultation. Pt. Would like to proceed with [place].   - recommend continued HEP from fPT and supportive shoe wear  - OMT performed today to address associated biomechanical and fascial restrictions.  -  X-ray images of bilateral calcanei taken 5/4/23  (AP and lateral views) showed Mild pes cavus configuration bilaterally. Post-operative posterior calcaneal cortical changes noted bilaterally from prior amador deformity removal. Images were personally reviewed with patient.  - Diagnostic ultrasound of bilateral achilles taken 6/1/23 showed sonographic evidence of bilateral midsubstance and insertional Achilles tendinosis with no clear tearing, left worse than right.    2. Right knee pain with previous right knee MRI noting a medial meniscus tear and patellofemoral chondromalacia, improved with Euflexxa injection series completed on 6/8/23. No relief with previous CSI.   - discussed with patient option of repeated ultrasound-guided VSI on or after 12/9/23 as needed for right knee pain for continued conservative tx.   - Continue Meloxicam 7.5 mg po qday prn for pain control. Pt. Advised to avoid all other NSAIDS while on this medication..  -   Recommend ice of 20 minutes at a time as needed for pain control  -  X-ray images of right knee taken 5/11/22 (AP bilateral standing, PA bilateral standing in flexion, bilateral merchants, and  bilateral lateral views) showed Kellgren-Ravi grade 2 OA bilaterally. Images were personally reviewed with patient.  - MRI  images of right knee taken 5/25/22 showed Medial meniscus vertical longitudinal tear at the junction of the body segment and posterior horn.  No tear of the lateral meniscus. Patellofemoral chondromalacia (Full-thickness cartilage loss of the central trochlea measuring 1.0 x 0.4 cm with mild subchondral marrow edema). Images were personally reviewed with patient.    3. OMT 1-2 regions. Oral consent obtained.  Reviewed benefits and potential side effects.   - OMT indicated today due to signs and symptoms as well as local and remote somatic dysfunction findings and their related neurokinetic, lymphatic, fascial and/or arteriovenous body connections.   - OMT techniques used: Fascial Distortion Model and  Articulatory   - Treatment was tolerated well. Improvement noted in segmental mobility post-treatment in dysfunctional regions. There were no adverse events and no complications immediately following treatment.     4. Follow-up in 3 weeks for Right achilles tendon Tenjet minimally invasive tenotomy. Follow-up as needed for right knee pain.     5. Patient agreeable to today's plan and all questions were answered    This note is dictated using the M*Modal Fluency Direct word recognition program. There are word recognition mistakes that are occasionally missed on review.    Total time spent face-to face with patient counseling or coordinating care including prognosis, differential diagnosis, risks and benefits of treatment, instructions, compliance risk reductions as well as non-face-to-face time personally spent reviewing medial record, medical documentation, and coordination of care.     EST MINUTES X   27467 10-19    49093 20-29    [id number] 30-39    [id number] 40-54 x   NEW     01407 15-29    60458 30-44    [id number] 45-59    [id number] 60-74    PHONE      5-10    10809 11-20    [id number] 21-30

## 2023-08-11 ENCOUNTER — PATIENT MESSAGE (OUTPATIENT)
Dept: SPORTS MEDICINE | Facility: CLINIC | Age: 58
End: 2023-08-11
Payer: COMMERCIAL

## 2023-08-14 ENCOUNTER — PATIENT MESSAGE (OUTPATIENT)
Dept: SPORTS MEDICINE | Facility: CLINIC | Age: 58
End: 2023-08-14
Payer: COMMERCIAL

## 2023-08-14 DIAGNOSIS — M67.873 ACHILLES TENDINOSIS OF RIGHT ANKLE: Primary | ICD-10-CM

## 2023-08-14 DIAGNOSIS — G57.51 TARSAL TUNNEL SYNDROME, RIGHT: ICD-10-CM

## 2023-08-14 RX ORDER — GABAPENTIN 100 MG/1
CAPSULE ORAL
Qty: 189 CAPSULE | Refills: 0 | Status: CANCELLED | OUTPATIENT
Start: 2023-08-14 | End: 2023-09-11

## 2023-08-14 NOTE — TELEPHONE ENCOUNTER
Called pt to discuss tarsal tunnel symptoms- still having pain and numbness in her foot/heel. Discussed option of starting SLWB wear prior to scheduled tenjet procedure on 8/25/23    Maxine Patel MS, OTC  Clinical Assistant to Dr. Sona Fernandez

## 2023-08-15 NOTE — TELEPHONE ENCOUNTER
Considered tx of tarsal tunnel syndrome with gabapentin, but will hold off on this given regular, long term lorazepam rx. Plan to start SLWB to unload tibial nerve prior to scheduled procedure.

## 2023-08-23 ENCOUNTER — PATIENT MESSAGE (OUTPATIENT)
Dept: SPORTS MEDICINE | Facility: CLINIC | Age: 58
End: 2023-08-23
Payer: COMMERCIAL

## 2023-08-24 DIAGNOSIS — G57.51 TARSAL TUNNEL SYNDROME, RIGHT: ICD-10-CM

## 2023-08-24 DIAGNOSIS — M67.873 ACHILLES TENDINOSIS OF RIGHT ANKLE: Primary | ICD-10-CM

## 2023-08-24 NOTE — PROGRESS NOTES
"Twila Prakash is a 58 y.o. female coming in today for a right Achilles minimally invasive tenotomy, as discussed at prior visit, for her mid-substance achilles tendinosis. Written consent obtained prior to procedure. Of note, pt. Has associated right tarsal tunnel syndrome that was noted prior to the procedure.     Objective:       TENJET Operative Note:    PATIENT NAME: Twila Roman    MEDICAL RECORD NUMBER: [id number]    AGE: 58 y.o.    INFORMED CONSENT: I verify that I personally obtained Twila Roman's consent which was signed and uploaded into [employer].     TIMEOUT: Audible timeout was performed at 13:30.   At this time, the team confirmed the correct patient, correct procedure and correct site with laterality. The patient's allergies were reviewed. The procedure site was marked. The procedure team checked for proper functioning of devices and supplies to be used for the procedure. The procedure team reviewed the relevant diagnostic tests pertinent to the procedure.  Confirmed by SMA Maxine Patel    PHYSICIAN: Dr. Sona Fernandez DO    LOCATION: [facility], [place].      PROCEDURE: TenJet Tenotomy procedure for tendinosis of the right achilles tendon    ANESTHESIA: local    PREOPERATIVE DIAGNOSIS: Mid-substance Achilles tendinosis of right ankle    POSTOPERATIVE DIAGNOSIS: Mid-substance Achilles tendinosis of right ankle    PROCEDURE DESCRIPTION:    The treatment area was cleaned and draped in sterile fashion. Using sterile technique, a Son[employer]ber[employer] ultrasound unit with a 15-4 MHz linear probe  was used to successfully localize the area of pathology to be treated today. A grace with a sterile marker was placed, on the skin, at the area of maximum tenderness. Then the treatment area was cleaned and draped in sterile fashion. A 22 gauge 1.5" in needle was visualized under ultrasound administering 5.5 cc of anesthetic 1% lidocaine, to locally anesthetize the " treatment area as well as hydrodissect the kager's fat pad deep to the achilles tendon. A separate 25 gauge needle was then utilized to create a skin wheel using 0.5 cc of 1% lidocaine with epinephrine 1.5cm from the treatment area. An 11 gauge scalpel was used to make a small puncture incision in the area of the skin wheel, and ultrasound was utilized to visualize the scalpel at the target area. The 3 inch tenotomy needle was inserted into the pathologic tissue under direct ultrasound guidance, and tenotomy was performed with degenerative tendinotic tissue removed. Upon completion, gentle compression was applied to the site with sterile gauze. Adhesive strips, occlusive dressing, and compression bandage were then applied.    Patient left the procedure room in satisfactory condition having tolerated the procedure well.    CUTTING TIME: 6 mins    ESTIMATED BLOOD LOSS: <5 ml    COMPLICATIONS: none    POSTOPERATIVE PLAN:  Post procedure hand-out and PT protocol given.      Patient voiced understanding of these instructions.      08/25/2023      Assessment:      Encounter Diagnoses   Name Primary?    Achilles tendinosis of right ankle Yes    Tarsal tunnel syndrome, right           Plan:     1. Patient received a minimally invasive tenotomy of the right Achilles tendon for her midsubstance Achilles tendinosis.  Of note, patient was noted to have right tarsal tunnel syndrome symptoms on exam, prior to procedure today.    - patient instructed to remove Tegaderm bandage in 72 hours, anterior remove Steri-Strips if they do not fall off on their own after 14 days.  Avoid right ankle submersion until incision completely healed, likely in 2 weeks' time.   - recommend use of tall walking boot for 14 days, nonweightbearing for the 1st 7 days, then weight-bearing as tolerated for the next 7 days  - Recommend Extra-strength Tylenol (Acetaminophen 500mg) 2 tables every 4-6 hours, but not to exceed 3000mg (6 pills) in a day.  If pain  is not controlled by exercise and Tylenol, patient instructed to take tramadol 50 mg Q 6 hr p.r.n. for pain control instead.  Patient instructed to avoid her p.r.n. prescribed Ativan while taking tramadol.  - Referral to outpatient PT ([facility]) placed for post-tenotomy rehab protocol in 2 weeks.     2. Follow-up in 6 weeks for reevaluation    3. Patient agreeable to today's plan and all questions were answered    This note is dictated using the M*Modal Fluency Direct word recognition program. There are word recognition mistakes that are occasionally missed on review.

## 2023-08-25 ENCOUNTER — OFFICE VISIT (OUTPATIENT)
Dept: SPORTS MEDICINE | Facility: CLINIC | Age: 58
End: 2023-08-25
Payer: COMMERCIAL

## 2023-08-25 VITALS
SYSTOLIC BLOOD PRESSURE: 104 MMHG | HEART RATE: 84 BPM | WEIGHT: 136 LBS | DIASTOLIC BLOOD PRESSURE: 73 MMHG | HEIGHT: 65 IN | BODY MASS INDEX: 22.66 KG/M2

## 2023-08-25 DIAGNOSIS — G57.51 TARSAL TUNNEL SYNDROME, RIGHT: ICD-10-CM

## 2023-08-25 DIAGNOSIS — M67.873 ACHILLES TENDINOSIS OF RIGHT ANKLE: Primary | ICD-10-CM

## 2023-08-25 PROCEDURE — 99999 PR PBB SHADOW E&M-EST. PATIENT-LVL IV: CPT | Mod: PBBFAC,,, | Performed by: NEUROMUSCULOSKELETAL MEDICINE & OMM

## 2023-08-25 PROCEDURE — 99499 NO LOS: ICD-10-PCS | Mod: S$GLB,,, | Performed by: NEUROMUSCULOSKELETAL MEDICINE & OMM

## 2023-08-25 PROCEDURE — 76942 PR U/S GUIDANCE FOR NEEDLE GUIDANCE: ICD-10-PCS | Mod: S$GLB,,, | Performed by: NEUROMUSCULOSKELETAL MEDICINE & OMM

## 2023-08-25 PROCEDURE — 27605 INCISION OF ACHILLES TENDON: CPT | Mod: RT,S$GLB,, | Performed by: NEUROMUSCULOSKELETAL MEDICINE & OMM

## 2023-08-25 PROCEDURE — 99999 PR PBB SHADOW E&M-EST. PATIENT-LVL IV: ICD-10-PCS | Mod: PBBFAC,,, | Performed by: NEUROMUSCULOSKELETAL MEDICINE & OMM

## 2023-08-25 PROCEDURE — 99499 UNLISTED E&M SERVICE: CPT | Mod: S$GLB,,, | Performed by: NEUROMUSCULOSKELETAL MEDICINE & OMM

## 2023-08-25 PROCEDURE — 76942 ECHO GUIDE FOR BIOPSY: CPT | Mod: S$GLB,,, | Performed by: NEUROMUSCULOSKELETAL MEDICINE & OMM

## 2023-08-25 PROCEDURE — 27605 PR INCIS ACHILLES TENDON+LOCAL ANESTH: ICD-10-PCS | Mod: RT,S$GLB,, | Performed by: NEUROMUSCULOSKELETAL MEDICINE & OMM

## 2023-08-25 RX ORDER — TRAMADOL HYDROCHLORIDE 50 MG/1
50 TABLET ORAL EVERY 6 HOURS PRN
Qty: 20 TABLET | Refills: 0 | Status: SHIPPED | OUTPATIENT
Start: 2023-08-25 | End: 2023-09-23

## 2023-08-28 ENCOUNTER — PATIENT MESSAGE (OUTPATIENT)
Dept: SPORTS MEDICINE | Facility: CLINIC | Age: 58
End: 2023-08-28
Payer: COMMERCIAL

## 2023-08-28 DIAGNOSIS — G57.51 TARSAL TUNNEL SYNDROME, RIGHT: Primary | ICD-10-CM

## 2023-08-28 RX ORDER — GABAPENTIN 100 MG/1
CAPSULE ORAL
Qty: 288 CAPSULE | Refills: 0 | Status: SHIPPED | OUTPATIENT
Start: 2023-08-28 | End: 2023-08-28

## 2023-08-28 RX ORDER — GABAPENTIN 100 MG/1
CAPSULE ORAL
Qty: 288 CAPSULE | Refills: 0 | Status: SHIPPED | OUTPATIENT
Start: 2023-08-28 | End: 2023-08-29

## 2023-08-28 NOTE — TELEPHONE ENCOUNTER
Patient having increased tarsal tunnel nerve pain in walking boot following Achilles Tenjet procedure on 8/26/23.  Prescription given for gabapentin starting at 100 mg p.o. t.i.d. for 2 days, then increasing to 200 mg p.o. t.i.d. for 2 days, then increasing to 300 mg p.o. t.i.d. for 30 days.  Patient instructed to stop at therapeutic dose if symptoms relieved prior to reaching 300 mg p.o. t.i.d. dosage.  Patient instructed to avoid prescribed lorazepam or any other benzodiazepines on this medication.  Patient in agreeance to this plan.

## 2023-08-29 ENCOUNTER — PATIENT MESSAGE (OUTPATIENT)
Dept: SPORTS MEDICINE | Facility: CLINIC | Age: 58
End: 2023-08-29
Payer: COMMERCIAL

## 2023-08-29 RX ORDER — GABAPENTIN 100 MG/1
CAPSULE ORAL
Qty: 288 CAPSULE | Refills: 0 | Status: SHIPPED | OUTPATIENT
Start: 2023-08-29 | End: 2023-09-23 | Stop reason: SINTOL

## 2023-08-29 RX ORDER — GABAPENTIN 100 MG/1
CAPSULE ORAL
Qty: 288 CAPSULE | Refills: 0 | Status: SHIPPED | OUTPATIENT
Start: 2023-08-29 | End: 2023-08-29

## 2023-09-06 ENCOUNTER — PATIENT MESSAGE (OUTPATIENT)
Dept: SPORTS MEDICINE | Facility: CLINIC | Age: 58
End: 2023-09-06
Payer: COMMERCIAL

## 2023-09-08 ENCOUNTER — CLINICAL SUPPORT (OUTPATIENT)
Dept: REHABILITATION | Facility: HOSPITAL | Age: 58
End: 2023-09-08
Payer: COMMERCIAL

## 2023-09-08 DIAGNOSIS — G57.51 TARSAL TUNNEL SYNDROME, RIGHT: ICD-10-CM

## 2023-09-08 DIAGNOSIS — R26.9 GAIT ABNORMALITY: ICD-10-CM

## 2023-09-08 DIAGNOSIS — R29.898 DECREASED STRENGTH OF LOWER EXTREMITY: Primary | ICD-10-CM

## 2023-09-08 DIAGNOSIS — M25.671 DECREASED RANGE OF MOTION OF RIGHT ANKLE: ICD-10-CM

## 2023-09-08 DIAGNOSIS — M67.873 ACHILLES TENDINOSIS OF RIGHT ANKLE: ICD-10-CM

## 2023-09-08 PROCEDURE — 97161 PT EVAL LOW COMPLEX 20 MIN: CPT

## 2023-09-08 PROCEDURE — 97110 THERAPEUTIC EXERCISES: CPT

## 2023-09-08 PROCEDURE — 97140 MANUAL THERAPY 1/> REGIONS: CPT

## 2023-09-10 PROBLEM — R26.9 GAIT ABNORMALITY: Status: ACTIVE | Noted: 2023-09-10

## 2023-09-10 PROBLEM — M25.671 DECREASED RANGE OF MOTION OF RIGHT ANKLE: Status: ACTIVE | Noted: 2023-09-10

## 2023-09-10 NOTE — PLAN OF CARE
[facility]   Physical Therapy Initial Evaluation      Name: Twila Roman  Clinic Number: [id number]    Therapy Diagnosis:   Encounter Diagnoses   Name Primary?    Achilles tendinosis of right ankle     Tarsal tunnel syndrome, right     Gait abnormality     Decreased strength of lower extremity Yes    Decreased range of motion of right ankle      Physician: Sona Fernandez DO     Physician Orders: PT Eval and Treat   Medical Diagnosis from Referral:   M67.873 (ICD-10-CM) - Achilles tendinosis of right ankle   G57.51 (ICD-10-CM) - Tarsal tunnel syndrome, right     Evaluation Date: 9/8/2023  Authorization Period Expiration: 10/15/2023  Plan of Care Expiration: 11/25/2023  Progress Note Due: 10/10/2023  Visit # / Visits authorized: 1/1   FOTO: 1/3    Note from Referral:  Phase 1: Protective Phase (Days 0-14)  Tall boot 24/7 for 14 days  Non weight bearing x1 week  Partial weight bearing week 2 (with gentle ROM, ankle pumps)     Phase II: Restorative Phase (Days 14-28)  2:1 ratio of eccentric:concentric exercises, low weight, high reps  High volume to stimulate metabolic activity   Pain should remain below 3-4/10 and return to baseline at 24hr (physician can be made aware if consistently painful, MSK-US for hyperemia)  Avoid IASTM early on!     Phase III: Re-Load (Days 28-60)  Progress to repetitive activities with re-education of load management  Return to work/sport activity expected 6 weeks, athletic competition at 8 weeks  Expect progressive results within 3-4 months from procedure  Some level of pain may be present for up to 12 months, even as improvement is noted    Precautions: Standard     Time In: 1:05 pm  Time Out: 2:03 pm  Total Appointment Time (timed & untimed codes): 58 minutes    Subjective     Date of onset: TenJet procedure to R achilles 8/25/2023    History of current condition - Olinda reports: She had a amador surgery to her big toe in her 20s and would occasionally  feel a burning pain but mainly ignored it as her job required her to sit most of the time. 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 She has pain in her achilles bilaterally withe the left tending to be worse than the right but after finishing about 2 months of PT started having tarsal tunnel symptoms in her right ankle deciding to undergo right ankle TenJet procedure on 8/25/2023. Since the procedure she has been in a boot and non weightbearing for the first week and started putting weight through it the second week. She reports that the tarsal tunnel has been continuing to bother her causing her to take her boot off as well as her toes are still numb (2nd and 3rd toes mainly). She is more concerned about the function of her feet with the tarsal tunnel as the achilles has significantly improved since the procedure. She describes the pain in the medial ankle from tarsal tunnel as burning pain and numbness in her feet. She reports the achilles is doing pretty good and if it bothers her any she has iced it some.   She reports a past medical history of low back surgery thinks a disc replacement or spacer L4-5 and L5-S1 and then in October of last year had an SI joint spacer put in, a previous left knee surgery and gel injections in her right knee.     Imaging: See Epic     Prior Therapy: Yes, not since this procedure  Social History: Lives with family    Occupation: Owns a restaurant   Prior Level of Function: Independent in ADLs and job duties with mild to moderate achilles discomfort   Current Level of Function: Ambulating with CAM boot, difficulty with gait, ADLs, and job duties.     Pain:  Current 0/10, worst 9/10, best 0/10  (just numbness in the feet)   Location: right achilles/ankle    Description: Dull, Throbbing, and Numb  Aggravating Factors: Standing and Walking  Easing Factors: pain medication, ice, and rest    Patients goals: To resolve her tarsal tunnel  symptoms and improve her gait and strength to maximize the hopeful success of her TenJet procedure.      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        Surgical History:   Twila Roman  has a past surgical history that includes Back surgery; Tumor removal; Knee arthroscopy w/ meniscectomy (Left, 8/15/2018); Transforaminal epidural injection of steroid (Right, 7/18/2019); Colonoscopy (N/A, 12/11/2019); Robot-assisted laparoscopic abdominal hysterectomy using da Angel Xi (N/A, 10/20/2020); Robot-assisted laparoscopic salpingo-oophorectomy using da Angel Xi (Bilateral, 10/20/2020); Transforaminal epidural injection of steroid (Right, 3/16/2021); injection, sacroiliac joint (Left, 7/14/2022); injection, sacroiliac joint (Left, 8/16/2022); injection, sacroiliac joint (Left, 9/1/2022); and Fusion of sacroiliac joint (Left, 10/3/2022).    Medications:   Twila has a current medication list which includes the following prescription(s): albuterol, biotin, bupropion, bupropion, cholecalciferol (vitamin d3), flaxseed oil, fluticasone propionate, gabapentin, lorazepam, meloxicam, metoprolol succinate, multivitamin, selenium sulfide, tizanidine, tramadol, and valacyclovir, and the following Facility-Administered Medications: sodium chloride 0.9%.    Allergies:   Review of patient's allergies indicates:  No Known Allergies     Objective    *Patient is 2 weeks and 0 days s/p TenJet to R achilles as of 9/8/2023*    Observation: Patient presents to clinic with an overall pleasant general affect who does not appear to be in any acute distress.     Gait: Patient ambulates into clinic independently with CAM boot donned on right lower extremity and displays noticeable lateral lean to left to help with swing on right, decreased ernie, and contralateral hip drop. Patient ambulated without CAM boot and displays  increased lateral lean to right during stance, collapse into increased pronation on the right, decreased step length on left to offload right, and decreased push-off on right.   *Of note patient reported increased tingling in her toes with ambulation with CAM boot.*    Dermatomes:   Right Left    L2 Intact Intact    L3 Intact Intact   L4 Decreased  Intact   L5 Decreased  Intact   S1 Intact Intact   S2 Intact Intact     Myotomes:   Right Left    L2 5/5 5/5   L3 5/5 5/5   L4 4/5 5/5   L5 3/5 5/5   S1 4/5 5/5   S2 5/5 5/5     Reflexes:   Right Left    Patellar  2+ 2+   Achilles  NT 2+     Range of Motion:  Lumbar   Percentage  Pain   Flexion 70% No Pain   Extension 60% Slight Discomfort    Right Sidebend  75% No Pain   Left Sidebend 75% No Pain   Right Rotation 90% No Pain   Left Rotation  90% No Pain    *With extension range of motion noticeable increased extension with slight rotation to the right.    Hip   Right  Left    Flexion 95 degrees  100 degrees    Internal Rotation 30 degrees  30 degrees    External Rotation  30 degrees  40 degrees      Knee    Right Left    Flexion 125 degrees  125 degrees    Extension 0 degrees  0 degrees      Ankle   Right AROM/PROM Left AROM/PROM    Dorsiflexion -2 degrees (lack) / 2 degrees 5 degrees / 8 degrees    Plantarflexion 35 degrees / 40 degrees  45 degrees / 50 degrees    Inversion 20 degrees / 25 degrees  30 degrees / 35 degrees    Eversion 5 degrees / 8 degrees  15 degrees / 18 degrees      Lower Extremity Strength (MMT):   Right Left   Hip Flexion 3+/5 4-/5   Hip Abduction 3/5 3+/5   Knee Flexion 4/5 4+/5   Knee Extension 4/5 4+/5   Dorsiflexion 3-/5 4/5   Plantarflexion (supine) 3-/5 4/5   Inversion 3-/5 4-/5   Eversion 3-/5 4-/5   FHL 2+/5 3+/5   FDL 3-/5 3+/5     Joint Mobility: Decreased talocrural AP mobility with right greater than left, decreased subtalar joint mobility on right.      Palpation: No notable significant tenderness to palpation. Increased discomfort with  pressure medial ankle in tarsal tunnel region and dorsal surface of right ankle.     Flexibility: Not tested, further assessment at follow-up sessions.     Special Tests:  - SLR: (-) on L for patients symptoms but positive for adverse neural tension, (-) on R for general and tibial nerve bias   - Tinel's: (-) on L, (+) on R    Intake Outcome Measure for FOTO Ankle Survey    Therapist reviewed FOTO scores for Twila Roman on 9/8/2023.   FOTO documents entered into [employer] - see Media section.    Intake Score: 57%          Treatment     Total Treatment time (time-based codes) separate from Evaluation: 24 minutes     Olinda received the treatments listed below:      therapeutic exercises to develop strength, endurance, ROM, flexibility, posture, and core stabilization for 15 minutes including:    Pt education on PT POC, Prognosis, and HEP  Pt education on adverse neural tension, importance of offloading nervous system and progressing with weightbearing out of boot to not overload  Sidelying clamshells with YellowTB 1x10 reps 3 sec holds   LAQ 1x10 reps 3 sec holds   Ankle pumps 1x15 reps   Seated short arches (doming) 1x10 reps 5 sec holds       manual therapy techniques: Joint mobilizations and Soft tissue Mobilization were applied to the: hip, knee, ankle for 09 minutes, including:    (R) Short axis hip distraction grade I-II  (R) EOM knee distraction   (R) Talocrural AP mobilizations open pack position grade I-II  *Poor-Fair tolerance to manual therapy*    neuromuscular re-education activities to improve: Balance, Coordination, Kinesthetic, Sense, Proprioception, and Posture for 00 minutes. The following activities were included:      therapeutic activities to improve functional performance for 00  minutes, including:      gait training to improve functional mobility and safety for 00 minutes, including:          Patient Education and Home Exercises     Education provided:   - PT POC, Prognosis, and HEP     Written  Home Exercises Provided: yes. Exercises were reviewed and Olinda was able to demonstrate them prior to the end of the session.  Olinda demonstrated good  understanding of the education provided. See EMR under Patient Instructions for exercises provided during therapy sessions.    Assessment     Twila is a 58 y.o. female referred to outpatient Physical Therapy with a medical diagnosis of M67.873 (ICD-10-CM) - Achilles tendinosis of right ankle and G57.51 (ICD-10-CM) - Tarsal tunnel syndrome, right. Patient presents 2 weeks status post TenJet procedure to right ankle. Patient presents with decreased range of motion, decreased strength, gait abnormality, adverse neural tension, and functional limitations with ADLs and job duties. Patient would benefit from skilled PT intervention to address above stated deficits and improve overall functional mobility and return to prior level of function.     Patient prognosis is Good.   Patient will benefit from skilled outpatient Physical Therapy to address the deficits stated above and in the chart below, provide patient /family education, and to maximize patientt's level of independence.     Plan of care discussed with patient: Yes  Patient's spiritual, cultural and educational needs considered and patient is agreeable to the plan of care and goals as stated below:     Anticipated Barriers for therapy: Scheduling, job demands, adverse neural tension    Medical Necessity is demonstrated by the following  History  Co-morbidities and personal factors that may impact the plan of care [] LOW: no personal factors / co-morbidities  [] MODERATE: 1-2 personal factors / co-morbidities  [x] HIGH: 3+ personal factors / co-morbidities    Moderate / High Support Documentation: Anxiety, HTN, age, previous lumbar and SI surgeries      Examination  Body Structures and Functions, activity limitations and participation restrictions that may impact the plan of care [] LOW: addressing 1-2 elements  []  MODERATE: 3+ elements  [x] HIGH: 3+ elements (please support below)    Moderate / High Support Documentation: range of motion, strength, gait, posture, motor control, adverse neural tension, neuromuscular re-education, balance/proprioception      Clinical Presentation [x] LOW: stable  [] MODERATE: Evolving  [] HIGH: Unstable     Decision Making/ Complexity Score: low       Goals:  Short Term Goals: 4 weeks   Patient will be independent in initial HEP to help supplement PT.   Patient will improve right ankle range of motion to symmetrical to left to help with gait mechanics.   Patient will demonstrate normal gait mechanics without pain to help improve quality of life.     Long Term Goals: 8-10 weeks   Patient will be independent in updated HEP to help supplement PT and maintain gains made in PT.   Patient will improve FOTO score to >/= predicted score (70%) to show improvement in condition.   Patient will demonstrate a negative tinel's to demonstrate improvement in adverse neural tension.  Patient will improve ankle strength to >/= 4-/5 to help with job duties.   Plan     Plan of care Certification: 9/8/2023 to 11/25/2023.    Outpatient Physical Therapy 1-2 times weekly for 10 weeks to include the following interventions: Electrical Stimulation , Gait Training, Manual Therapy, Moist Heat/ Ice, Neuromuscular Re-ed, Patient Education, Self Care, Therapeutic Activities, and Therapeutic Exercise.     Rylie Shankar, PT, DPT, OCS

## 2023-09-13 ENCOUNTER — PATIENT MESSAGE (OUTPATIENT)
Dept: SPORTS MEDICINE | Facility: CLINIC | Age: 58
End: 2023-09-13
Payer: COMMERCIAL

## 2023-09-13 DIAGNOSIS — G57.51 TARSAL TUNNEL SYNDROME, RIGHT: Primary | ICD-10-CM

## 2023-09-13 RX ORDER — METHYLPREDNISOLONE 4 MG/1
TABLET ORAL
Qty: 21 EACH | Refills: 0 | Status: SHIPPED | OUTPATIENT
Start: 2023-09-13 | End: 2023-09-23

## 2023-09-14 ENCOUNTER — CLINICAL SUPPORT (OUTPATIENT)
Dept: REHABILITATION | Facility: HOSPITAL | Age: 58
End: 2023-09-14
Payer: COMMERCIAL

## 2023-09-14 DIAGNOSIS — M25.671 DECREASED RANGE OF MOTION OF RIGHT ANKLE: ICD-10-CM

## 2023-09-14 DIAGNOSIS — R26.9 GAIT ABNORMALITY: Primary | ICD-10-CM

## 2023-09-14 PROCEDURE — 97140 MANUAL THERAPY 1/> REGIONS: CPT

## 2023-09-14 PROCEDURE — 97112 NEUROMUSCULAR REEDUCATION: CPT

## 2023-09-14 PROCEDURE — 97110 THERAPEUTIC EXERCISES: CPT

## 2023-09-14 NOTE — PROGRESS NOTES
[facility]   Physical Therapy Treatment Note     Name: Twila Roman  Clinic Number: [id number]    Therapy Diagnosis:   Encounter Diagnoses   Name Primary?    Gait abnormality Yes    Decreased range of motion of right ankle      Physician: Sona Fernandez DO    Visit Date: 9/14/2023  Physician Orders: PT Eval and Treat   Medical Diagnosis from Referral:   M67.873 (ICD-10-CM) - Achilles tendinosis of right ankle   G57.51 (ICD-10-CM) - Tarsal tunnel syndrome, right      Evaluation Date: 9/8/2023  Authorization Period Expiration: 10/15/2023  Plan of Care Expiration: 11/25/2023  Progress Note Due: 10/10/2023  Visit # / Visits authorized: 10/20  This POC: 1 (+eval)  FOTO: 1/3    PTA Visit #: 0/5     FOTO first follow up:   FOTO second follow up:     Note from Referral:  Phase 1: Protective Phase (Days 0-14)  Tall boot 24/7 for 14 days  Non weight bearing x1 week  Partial weight bearing week 2 (with gentle ROM, ankle pumps)     Phase II: Restorative Phase (Days 14-28)  2:1 ratio of eccentric:concentric exercises, low weight, high reps  High volume to stimulate metabolic activity   Pain should remain below 3-4/10 and return to baseline at 24hr (physician can be made aware if consistently painful, MSK-US for hyperemia)  Avoid IASTM early on!     Phase III: Re-Load (Days 28-60)  Progress to repetitive activities with re-education of load management  Return to work/sport activity expected 6 weeks, athletic competition at 8 weeks    Time In: 3:03 pm  Time Out: 3:58 pm  Total Billable Time: 56 minutes    SUBJECTIVE     Pt reports: She is ok, achilles is feeling better but she is still really worried about her tarsal tunnel symptoms and going back to work.   She was compliant with home exercise program.  Response to previous treatment: Independent in HEP   Functional change: Ongoing     Pain: 3/10  Location: right foot      OBJECTIVE     Objective Measures updated at progress report unless  "specified.     Treatment     Olinda received the treatments listed below:      therapeutic exercises to develop strength, endurance, ROM, flexibility, posture, and core stabilization for 14 minutes including:    Sidelying clamshells GreenTB 2 x 12  reps   LAQ 3 x 12 reps 4#   Sidelying hip abduction 3 x 12 reps      manual therapy techniques: Joint mobilizations and Soft tissue Mobilization were applied to the: Hip, knee, ankle for 17 minutes, including:    (R) Short axis hip distraction grade II-III  (R) EOM knee distraction   (R) Gentle talocrural distraction mobilizations and AP mobilizations grade I-II     neuromuscular re-education activities to improve: Balance, Coordination, Kinesthetic, Sense, Proprioception, and Posture for 23 minutes. The following activities were included:    Seated short arches 2 x 10 x 5" holds   Towel crunches to tolerance x3'  Ankle PF BlueTB 3 x 12-15 reps   Nerve glides 1 x 15 reps   Seated heel raises 3 x 12-15 reps   Gait mechanics working heel to toe to normalize mechanics     therapeutic activities to improve functional performance for 00  minutes, including:      gait training to improve functional mobility and safety for 00 minutes, including:            Patient Education and Home Exercises     Home Exercises Provided and Patient Education Provided     Education provided:   - PT POC, Prognosis, and HEP   - Adverse neural tension and time takes to heal   - Progressive loading and not overloading with returning to work     Written Home Exercises Provided: Patient instructed to cont prior HEP. Exercises were reviewed and Olinda was able to demonstrate them prior to the end of the session.  Olinda demonstrated good  understanding of the education provided. See EMR under Patient Instructions for exercises provided during therapy sessions    ASSESSMENT     "Olinda" tolerated first physical therapy session fairly. She continues with subjective reports of improvement in achilles symptoms " but continues with adverse neural tension limiting her weightbearing tolerance and ADL/therex tolerance. Continued emphasis with manual therapy trying to offload her nervous system to which she tolerated fairly. She reports minimal relief in her tarsal tunnel symptoms. Progressed with some loading of her achilles adding band as she was unable to tolerate increased loading secondary to her tarsal tunnel symptoms. Continued proximal strengthening and addressed gait mechanics. Educated on importance of offloading her nervous system and finding ways to do that at work as well as exercises at home. Will continue to monitor and progress as able.     Olinda Is progressing well towards her goals.   Pt prognosis is Good.     Pt will continue to benefit from skilled outpatient physical therapy to address the deficits listed in the problem list box on initial evaluation, provide pt/family education and to maximize pt's level of independence in the home and community environment.     Pt's spiritual, cultural and educational needs considered and pt agreeable to plan of care and goals.     Anticipated barriers to physical therapy: [place], job demands, adverse neural tension    Goals:   Short Term Goals: 4 weeks (Progressing, not met)  Patient will be independent in initial HEP to help supplement PT.   Patient will improve right ankle range of motion to symmetrical to left to help with gait mechanics.   Patient will demonstrate normal gait mechanics without pain to help improve quality of life.      Long Term Goals: 8-10 weeks (Progressing, not met)  Patient will be independent in updated HEP to help supplement PT and maintain gains made in PT.   Patient will improve FOTO score to >/= predicted score (70%) to show improvement in condition.   Patient will demonstrate a negative tinel's to demonstrate improvement in adverse neural tension.  Patient will improve ankle strength to >/= 4-/5 to help with job duties.     PLAN   Plan  of care Certification: 9/8/2023 to 11/25/2023.    Continue with current plan of care.     Rylie Shankar, PT, DPT, OCS

## 2023-09-20 ENCOUNTER — CLINICAL SUPPORT (OUTPATIENT)
Dept: REHABILITATION | Facility: HOSPITAL | Age: 58
End: 2023-09-20
Payer: COMMERCIAL

## 2023-09-20 DIAGNOSIS — M25.671 DECREASED RANGE OF MOTION OF RIGHT ANKLE: ICD-10-CM

## 2023-09-20 DIAGNOSIS — R26.9 GAIT ABNORMALITY: Primary | ICD-10-CM

## 2023-09-20 PROCEDURE — 97112 NEUROMUSCULAR REEDUCATION: CPT

## 2023-09-20 PROCEDURE — 97140 MANUAL THERAPY 1/> REGIONS: CPT

## 2023-09-20 PROCEDURE — 97110 THERAPEUTIC EXERCISES: CPT

## 2023-09-20 RX ORDER — LORAZEPAM 1 MG/1
TABLET ORAL
Qty: 60 TABLET | Refills: 0 | Status: SHIPPED | OUTPATIENT
Start: 2023-09-20 | End: 2023-11-06

## 2023-09-20 NOTE — PROGRESS NOTES
[facility]PAT[facility] TH[facility]LL[facility]   Physical Therapy Treatment Note     Name: Twila Roman  Clinic Number: [id number]    Therapy Diagnosis:   Encounter Diagnoses   Name Primary?    Gait abnormality Yes    Decreased range of motion of right ankle      Physician: Sona Fernandez DO    Visit Date: 9/20/2023  Physician Orders: PT Eval and Treat   Medical Diagnosis from Referral:   M67.873 (ICD-10-CM) - Achilles tendinosis of right ankle   G57.51 (ICD-10-CM) - Tarsal tunnel syndrome, right      Evaluation Date: 9/8/2023  Authorization Period Expiration: 10/15/2023  Plan of Care Expiration: 11/25/2023  Progress Note Due: 10/10/2023  Visit # / Visits authorized: 10/20  This POC: 1 (+eval)  FOTO: 1/3    PTA Visit #: 0/5     FOTO first follow up:   FOTO second follow up:     Note from Referral:  Phase 1: Protective Phase (Days 0-14)  Tall boot 24/7 for 14 days  Non weight bearing x1 week  Partial weight bearing week 2 (with gentle ROM, ankle pumps)     Phase II: Restorative Phase (Days 14-28)  2:1 ratio of eccentric:concentric exercises, low weight, high reps  High volume to stimulate metabolic activity   Pain should remain below 3-4/10 and return to baseline at 24hr (physician can be made aware if consistently painful, MSK-US for hyperemia)  Avoid IASTM early on!     Phase III: Re-Load (Days 28-60)  Progress to repetitive activities with re-education of load management  Return to work/sport activity expected 6 weeks, athletic competition at 8 weeks    Time In: 15:10  Time Out: 16:00  Total Billable Time: 50 minutes    SUBJECTIVE     Pt reports: still having severe pain, and increased numbness/tingling that increased from last time.     She was compliant with home exercise program.  Response to previous treatment: Independent in HEP   Functional change: Ongoing     Pain: 3/10  Location: right foot      OBJECTIVE     Objective Measures updated at progress report unless specified.     Treatment     Olinda received the  "treatments listed below:      therapeutic exercises to develop strength, endurance, ROM, flexibility, posture, and core stabilization for 13 minutes including:    Sidelying clamshells GreenTB 2 x 12  reps   Ankle DF/IV yellow TB 3x10 each    NP  LAQ 3 x 12 reps 4#   Sidelying hip abduction 3 x 12 reps      manual therapy techniques: Joint mobilizations and Soft tissue Mobilization were applied to the: Hip, knee, ankle for 12 minutes, including:    TC mobilization - not tolerated well  Right tib/fib mobilizations prox/distal  Right hip short axis    neuromuscular re-education activities to improve: Balance, Coordination, Kinesthetic, Sense, Proprioception, and Posture for 24 minutes. The following activities were included:  Assessment  Seated short arches 2 x 10 x 5" holds   Towel crunches to tolerance x3'  Bridges with arch doming 3x10x5"  Pt education    NP  Ankle PF BlueTB 3 x 12-15 reps   Nerve glides 1 x 15 reps   Seated heel raises 3 x 12-15 reps   Gait mechanics working heel to toe to normalize mechanics     therapeutic activities to improve functional performance for 00  minutes, including:      gait training to improve functional mobility and safety for 00 minutes, including:            Patient Education and Home Exercises     Home Exercises Provided and Patient Education Provided     Education provided:   - PT POC, Prognosis, and HEP   - Adverse neural tension and time takes to heal   - Progressive loading and not overloading with returning to work     Written Home Exercises Provided: Patient instructed to cont prior HEP. Exercises were reviewed and Olinda was able to demonstrate them prior to the end of the session.  Olinda demonstrated good  understanding of the education provided. See EMR under Patient Instructions for exercises provided during therapy sessions    ASSESSMENT     Olinda still presenting with ankle pain but improved achilles symptoms. New symptoms of anterior ankle and distal lateral lower " leg pain. Symptoms still present with tarsal tunnel but also potential peroneal nerve involvement. Did not tolerate well with ankle mobilizations. Attempted to work on foot intrinsics with not increasing neural symptoms. Will look to progress as tolerated.     Olinda Is progressing well towards her goals.   Pt prognosis is Good.     Pt will continue to benefit from skilled outpatient physical therapy to address the deficits listed in the problem list box on initial evaluation, provide pt/family education and to maximize pt's level of independence in the home and community environment.     Pt's spiritual, cultural and educational needs considered and pt agreeable to plan of care and goals.     Anticipated barriers to physical therapy: [place], job demands, adverse neural tension    Goals:   Short Term Goals: 4 weeks (Progressing, not met)  Patient will be independent in initial HEP to help supplement PT.   Patient will improve right ankle range of motion to symmetrical to left to help with gait mechanics.   Patient will demonstrate normal gait mechanics without pain to help improve quality of life.      Long Term Goals: 8-10 weeks (Progressing, not met)  Patient will be independent in updated HEP to help supplement PT and maintain gains made in PT.   Patient will improve FOTO score to >/= predicted score (70%) to show improvement in condition.   Patient will demonstrate a negative tinel's to demonstrate improvement in adverse neural tension.  Patient will improve ankle strength to >/= 4-/5 to help with job duties.     PLAN   Plan of care Certification: 9/8/2023 to 11/25/2023.    Continue with current plan of care.     Jose Keenan, PT, DPT, OCS

## 2023-09-20 NOTE — TELEPHONE ENCOUNTER
No care due was identified.  [facility] Embedded Care Due Messages. Reference number: [id number].   9/20/2023 12:10:27 PM CDT

## 2023-09-21 ENCOUNTER — PATIENT MESSAGE (OUTPATIENT)
Dept: SPORTS MEDICINE | Facility: CLINIC | Age: 58
End: 2023-09-21
Payer: COMMERCIAL

## 2023-09-21 ENCOUNTER — OFFICE VISIT (OUTPATIENT)
Dept: SPORTS MEDICINE | Facility: CLINIC | Age: 58
End: 2023-09-21
Payer: COMMERCIAL

## 2023-09-21 VITALS
HEART RATE: 87 BPM | DIASTOLIC BLOOD PRESSURE: 82 MMHG | BODY MASS INDEX: 22.66 KG/M2 | HEIGHT: 65 IN | SYSTOLIC BLOOD PRESSURE: 108 MMHG | WEIGHT: 136 LBS

## 2023-09-21 DIAGNOSIS — G57.51 TARSAL TUNNEL SYNDROME OF RIGHT SIDE: Primary | ICD-10-CM

## 2023-09-21 DIAGNOSIS — M67.873 ACHILLES TENDINOSIS OF RIGHT ANKLE: ICD-10-CM

## 2023-09-21 PROCEDURE — 99999 PR PBB SHADOW E&M-EST. PATIENT-LVL III: CPT | Mod: PBBFAC,,, | Performed by: NEUROMUSCULOSKELETAL MEDICINE & OMM

## 2023-09-21 PROCEDURE — 3008F PR BODY MASS INDEX (BMI) DOCUMENTED: ICD-10-PCS | Mod: CPTII,S$GLB,, | Performed by: NEUROMUSCULOSKELETAL MEDICINE & OMM

## 2023-09-21 PROCEDURE — 1159F MED LIST DOCD IN RCRD: CPT | Mod: CPTII,S$GLB,, | Performed by: NEUROMUSCULOSKELETAL MEDICINE & OMM

## 2023-09-21 PROCEDURE — 3008F BODY MASS INDEX DOCD: CPT | Mod: CPTII,S$GLB,, | Performed by: NEUROMUSCULOSKELETAL MEDICINE & OMM

## 2023-09-21 PROCEDURE — 1160F PR REVIEW ALL MEDS BY PRESCRIBER/CLIN PHARMACIST DOCUMENTED: ICD-10-PCS | Mod: CPTII,S$GLB,, | Performed by: NEUROMUSCULOSKELETAL MEDICINE & OMM

## 2023-09-21 PROCEDURE — 99214 OFFICE O/P EST MOD 30 MIN: CPT | Mod: 24,S$GLB,, | Performed by: NEUROMUSCULOSKELETAL MEDICINE & OMM

## 2023-09-21 PROCEDURE — 3079F PR MOST RECENT DIASTOLIC BLOOD PRESSURE 80-89 MM HG: ICD-10-PCS | Mod: CPTII,S$GLB,, | Performed by: NEUROMUSCULOSKELETAL MEDICINE & OMM

## 2023-09-21 PROCEDURE — 1160F RVW MEDS BY RX/DR IN RCRD: CPT | Mod: CPTII,S$GLB,, | Performed by: NEUROMUSCULOSKELETAL MEDICINE & OMM

## 2023-09-21 PROCEDURE — 3074F SYST BP LT 130 MM HG: CPT | Mod: CPTII,S$GLB,, | Performed by: NEUROMUSCULOSKELETAL MEDICINE & OMM

## 2023-09-21 PROCEDURE — 3079F DIAST BP 80-89 MM HG: CPT | Mod: CPTII,S$GLB,, | Performed by: NEUROMUSCULOSKELETAL MEDICINE & OMM

## 2023-09-21 PROCEDURE — 1159F PR MEDICATION LIST DOCUMENTED IN MEDICAL RECORD: ICD-10-PCS | Mod: CPTII,S$GLB,, | Performed by: NEUROMUSCULOSKELETAL MEDICINE & OMM

## 2023-09-21 PROCEDURE — 3074F PR MOST RECENT SYSTOLIC BLOOD PRESSURE < 130 MM HG: ICD-10-PCS | Mod: CPTII,S$GLB,, | Performed by: NEUROMUSCULOSKELETAL MEDICINE & OMM

## 2023-09-21 PROCEDURE — 99999 PR PBB SHADOW E&M-EST. PATIENT-LVL III: ICD-10-PCS | Mod: PBBFAC,,, | Performed by: NEUROMUSCULOSKELETAL MEDICINE & OMM

## 2023-09-21 PROCEDURE — 99214 PR OFFICE/OUTPT VISIT, EST, LEVL IV, 30-39 MIN: ICD-10-PCS | Mod: 24,S$GLB,, | Performed by: NEUROMUSCULOSKELETAL MEDICINE & OMM

## 2023-09-21 NOTE — PROGRESS NOTES
ULTRASOUND EXAM REPORT    Patient: Twila Roman, 58 y.o. female     FOCUSED::   1. Diagnostic Extremity - MSK-Sports Ultrasound was recommended due to right tarsal tunnel syndrome.  2. Diagnostic Extremity - MSK-Sports Ultrasound Performed: Dr. Sona Fernandez, DO Extremity Study: right ankle, left ankle comparison views.    TECHNIQUE: Real time ultrasound examination of the right medial ankle was performed with SonoSite Edge 2, 9-L MHz linear probe(s). Real time ultrasound examination of the left medial ankle was performed for comparison. Images were saved and stored for documentation.     FINDINGS: The images are of adequate diagnostic quality with identification of all echogenic structures made except for the vascular structures unless otherwise noted. There is  sonographic evidence of nerve irregularities at the right tarsal tunnel. The right tibial nerve measures 0.75 cm x 0.21 cm x 1.85 cm at the tarsal tunnel compared to left tibial nerve measures 0.58 cm x 0.17 cm x 1.53 cm at the tarsal tunnel.  In addition to right tibial nerve enlargement at the tarsal tunnel compared to the left, irregular nerve patterning also appreciated with enlarged, hypoechoic fascicles as well as some surrounding hypoechoic edema.  Patient noted increase pain upon sonopalpation in this area as well.  The rest of the sonographic examination was unremarkable.    IMPRESSION:  Right tibial nerve irritation at the level of the tarsal tunnel, consistent with tarsal tunnel syndrome

## 2023-09-21 NOTE — PROGRESS NOTES
Subjective:     Twila Roman     Chief Complaint   Patient presents with    Right Foot - Pain     HPI    Olinda is a 58 y.o. female coming in today for right achilles pain, 4 weeks s/p minimally invasive tenotomy. Since last visit the pain has Improved in the achilles with the tenjet, but pt c/o continued tarsal tunnel symptoms. She is attending PT. She is wearing the boot for ambulation without relief of pain. Pt c/o burning and numbness to the medial ankle and foot, as well as dorsum of her foot. The pain is better with rest, ice and worse with walking, weight bearing. Pt. describes the pain as a 2/10 achy, burning pain that does not radiate. There has not been any new a fall/injury/ or traumas since last visit.  Pt. denies any new musculoskeletal complaints at this time.       Joint instability? no  Mechanical locking/clicking? no  Affecting ADL's? yes  Affecting sleep? yes    Occupation: owns "[employer]    Procedures reviewed:   8/25/23- R Achilles Tenjet    Office note from 6/8/23 reviewed     PAST MEDICAL HISTORY:   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      PAST SURGICAL HISTORY:   Past Surgical History:   Procedure Laterality Date    BACK SURGERY      spinal fusion    COLONOSCOPY N/A 12/11/2019    Procedure: COLONOSCOPY;  Surgeon: Caroline Swartz MD;  Location: [address]);  Service: Endoscopy;  Laterality: N/A;    FUSION OF SACROILIAC JOINT Left 10/3/2022    Procedure: FUSION, SACROILIAC JOINT;  Surgeon: Henry Felix MD;  Location: [facility] OR;  Service: Pain Management;  Laterality: Left;  LEFT  PAINTEQ REP    INJECTION, SACROILIAC JOINT Left 7/14/2022    Procedure: INJECTION,SACROILIAC JOINT, LEFT;  Surgeon: Henry Felix MD;  Location: [facility] PAIN MGT;  Service: Pain Management;  Laterality: Left;    INJECTION, SACROILIAC JOINT Left 8/16/2022    Procedure:  INJECTION,SACROILIAC JOINT, LEFT DIAGNOSTIC SI WITHOUT STEROID;  Surgeon: Henry Felix MD;  Location: [facility] PAIN MGT;  Service: Pain Management;  Laterality: Left;    INJECTION, SACROILIAC JOINT Left 2022    Procedure: INJECTION,SACROILIAC JOINT LEFT DIAGNOSTIC WITH NO STEROIDS;  Surgeon: Henry Felix MD;  Location: [facility] PAIN MGT;  Service: Pain Management;  Laterality: Left;    KNEE ARTHROSCOPY W/ MENISCECTOMY Left 8/15/2018    Procedure: ARTHROSCOPY, KNEE, WITH MENISCECTOMY;  Surgeon: Handy Elkins MD;  Location: [facility] OR;  Service: Orthopedics;  Laterality: Left;  Video    ROBOT-ASSISTED LAPAROSCOPIC ABDOMINAL HYSTERECTOMY USING DA MARIA ESTHER XI N/A 10/20/2020    Procedure: XI ROBOTIC HYSTERECTOMY;  Surgeon: Danie Burns MD;  Location: [facility];  Service: OB/GYN;  Laterality: N/A;    ROBOT-ASSISTED LAPAROSCOPIC SALPINGO-OOPHORECTOMY USING DA MARIA ESTHER XI Bilateral 10/20/2020    Procedure: XI ROBOTIC SALPINGO-OOPHORECTOMY;  Surgeon: Danie Burns MD;  Location: [facility];  Service: OB/GYN;  Laterality: Bilateral;    TRANSFORAMINAL EPIDURAL INJECTION OF STEROID Right 2019    Procedure: INJECTION, STEROID, EPIDURAL, TRANSFORAMINAL APPROACH, L4 AND L5;  Surgeon: Daniel Seo MD;  Location: [facility] PAIN MGT;  Service: Pain Management;  Laterality: Right;    TRANSFORAMINAL EPIDURAL INJECTION OF STEROID Right 3/16/2021    Procedure: INJECTION, STEROID, EPIDURAL, TRANSFORAMINAL APPROACH L4/5 AND L5/S1;  Surgeon: Daniel Seo MD;  Location: [facility] PAIN MGT;  Service: Pain Management;  Laterality: Right;    TUMOR REMOVAL      right hand     FAMILY HISTORY:   Family History   Problem Relation Age of Onset    Ovarian cancer Mother 66         at 72    Heart disease Father         double bypass    Macular degeneration Father     Heart failure Father     No Known Problems Brother     No Known Problems Brother     Cancer Paternal Grandfather         lung (pt thinks), possible smoker    Breast cancer  Paternal Cousin 20        father's sister's daughter, pt thinks bilateral, cousin is     Other Maternal Aunt         mat aunt had part of colon removed, pt does not think cancer    Breast cancer Maternal Aunt      SOCIAL HISTORY:   Social History     Socioeconomic History    Marital status:    Occupational History    Occupation:      Employer: [employer]    Tobacco Use    Smoking status: Former    Smokeless tobacco: Never   Substance and Sexual Activity    Alcohol use: Yes     Alcohol/week: 5.0 standard drinks of alcohol     Types: 5 Glasses of wine per week     Comment: one a day- red wine    Drug use: No    Sexual activity: Yes     Partners: Male     Comment: Boyfriend      Social Determinants of Health     Financial Resource Strain: Low Risk  (2023)    Overall Financial Resource Strain (CARDIA)     Difficulty of Paying Living Expenses: Not very hard   Food Insecurity: No Food Insecurity (2023)    Hunger Vital Sign     Worried About Running Out of Food in the Last Year: Never true     Ran Out of Food in the Last Year: Never true   Transportation Needs: No Transportation Needs (2023)    PRAPARE - Transportation     Lack of Transportation (Medical): No     Lack of Transportation (Non-Medical): No   Physical Activity: Sufficiently Active (2023)    Exercise Vital Sign     Days of Exercise per Week: 5 days     Minutes of Exercise per Session: 40 min   Stress: Stress Concern Present (2023)    [place] [employer] - Occupational Stress Questionnaire     Feeling of Stress : To some extent   Social Connections: Unknown (2023)    Social Connection and Isolation Panel [NHANES]     Frequency of Communication with Friends and Family: More than three times a week     Frequency of Social Gatherings with Friends and Family: Twice a week     Active Member of Clubs or Organizations: No     Attends Club or Organization Meetings: Never     Marital  Status:    Housing Stability: Low Risk  (9/22/2023)    Housing Stability Vital Sign     Unable to Pay for Housing in the Last Year: No     Number of Places Lived in the Last Year: 1     Unstable Housing in the Last Year: No       MEDICATIONS:   Current Outpatient Medications:     albuterol (PROVENTIL/VENTOLIN HFA) 90 mcg/actuation inhaler, INHALE 1 TO 2 PUFFS INTO THE LUNGS EVERY 6 HOURS AS NEEDED FOR WHEEZING, Disp: 8.5 g, Rfl: 0    biotin 5,000 mcg TbDL, Take 1 capsule by mouth once daily., Disp: , Rfl:     buPROPion (WELLBUTRIN XL) 150 MG TB24 tablet, Take 1 tablet (150 mg total) by mouth once daily., Disp: 90 tablet, Rfl: 3    buPROPion (WELLBUTRIN XL) 300 MG 24 hr tablet, Take 1 tablet (300 mg total) by mouth once daily., Disp: 90 tablet, Rfl: 3    cholecalciferol, vitamin D3, (VITAMIN D3) 50 mcg (2,000 unit) Tab, Take 1 tablet by mouth once daily., Disp: , Rfl:     FLAXSEED OIL MISC, by Misc.(Non-Drug; Combo Route) route., Disp: , Rfl:     fluticasone propionate (FLONASE) 50 mcg/actuation nasal spray, 2 sprays (100 mcg total) by Each Nostril route once daily., Disp: 16 g, Rfl: 11    gabapentin (NEURONTIN) 100 MG capsule, Take 1 capsule (100 mg total) by mouth 3 (three) times daily for 2 days, THEN 2 capsules (200 mg total) 3 (three) times daily for 2 days, THEN 3 capsules (300 mg total) 3 (three) times daily., Disp: 288 capsule, Rfl: 0    LORazepam (ATIVAN) 1 MG tablet, TAKE 1 TABLET(1 MG) BY MOUTH EVERY 12 HOURS AS NEEDED, Disp: 60 tablet, Rfl: 0    meloxicam (MOBIC) 7.5 MG tablet, TAKE 1 TABLET(7.5 MG) BY MOUTH EVERY DAY, Disp: 30 tablet, Rfl: 0    methylPREDNISolone (MEDROL DOSEPACK) 4 mg tablet, use as directed, Disp: 21 each, Rfl: 0    metoprolol succinate (TOPROL-XL) 25 MG 24 hr tablet, Take 1 tablet (25 mg total) by mouth once daily., Disp: 90 tablet, Rfl: 3    multivitamin (THERAGRAN) per tablet, Take 1 tablet by mouth once daily., Disp: , Rfl:     selenium sulfide 2.25 % Sham, Use  qweek, Disp:  "180 mL, Rfl: 5    tiZANidine (ZANAFLEX) 4 MG tablet, TAKE 1 TABLET BY MOUTH EVERY 12 HOURS AS NEEDED FOR MUSCLE SPASM, Disp: 60 tablet, Rfl: 2    traMADoL (ULTRAM) 50 mg tablet, Take 1 tablet (50 mg total) by mouth every 6 (six) hours as needed for Pain., Disp: 20 tablet, Rfl: 0    valACYclovir (VALTREX) 500 MG tablet, Take 500 mg by mouth 2 (two) times daily., Disp: , Rfl:   No current facility-administered medications for this visit.    Facility-Administered Medications Ordered in Other Visits:     0.9%  NaCl infusion, 500 mL, Intravenous, Continuous, Inez Joiner MD  ALLERGIES:   Review of patient's allergies indicates:  No Known Allergies    Objective:     VITAL SIGNS: /82   Pulse 87   Ht 5' 5" (1.651 m)   Wt 61.7 kg (136 lb)   LMP 01/01/2015 (Approximate)   BMI 22.63 kg/m²    General    Vitals reviewed.  Constitutional: She is oriented to person, place, and time. She appears well-developed and well-nourished.   Neurological: She is alert and oriented to person, place, and time.   Psychiatric: She has a normal mood and affect. Her behavior is normal.           MUSCULOSKELETAL EXAM  ANKLE: right ANKLE  The affected ankle is compared to the contralateral ankle.     Observation:    + left mid substance achilles edema, improved on right  Previous incision from mac procedure at the right distal Achilles is healed, well approximated, and with no signs of infection  There is no errythema or ecchymosis.   Shoes reveal a normal wear pattern.  No structural deformities including pes planus/cavus, hallux valgus, or toe deformities.  Negative too-many toes sign.    Normal callus pattern on the feet bilaterally.    No leg or intrinsic foot muscle atrophy.  + R antalgic gait 2/2 to medial ankle pain     ROM (* = with pain):  Active dorsiflexion to 20° on left and 20° on right  Active plantarflexion to 50° on left and 50° on right  Active ankle inversion to 35° on left and 35° on right  Active ankle eversion to 15° " on left and 15° on right* (medial ankle pain)    Full active flexion of the toes bilaterally.  Slight decreased extension right toes with the exception of the great toe.     Tenderness To Palpation:  No tenderness at the ATFL, CFL, PTFL, or deltoid ligaments  No tenderness over the distal anterior syndesmosis, distal tibia/fibula, fibular head/shaft  + mild tenderness posterior to the fibular head in the are of the common peroneal nerve  No tenderness at medial or lateral malleoli   No tenderness at navicular, cuboid, cuneiforms, or talus  No achilles insertional pain at calcaneus  No tenderness along the metatarsals or phalanges  No tenderness at the Achilles tendon calcaneal insertion  No right mid substance achilles tendon tenderness  No tenderness at the posterior tibial or peroneal tendons  + tenderness at the tarsal tunnel in the region of the tibial nerve     Strength Testing (* = with pain):  Dorsiflexion - 5/5 on left and 5/5 on right  Platarflexion - 5/5 on left and 5/5 on right  Resisted Inversion - 5/5 on left and 5/5 on right  Resisted Eversion - 5/5 on left and 5/5 on right*  Great Toe Extension - 5/5 on left and 5/5 on right  Great Toe Flexion - 5/5 on left and 5/5 on right     Special Tests:  Anterior talar drawer - negative and without dimpling  Talar tilt - negative  Reverse Talar tilt - negative     Heel tap test - negative  Distal tib/fib squeeze test - negative  External rotation stress (Kleiger) test - negative  Luis squeeze test - negative     Metatarsal squeeze test - negative  Midfoot stress test - negative  Calcaneal squeeze test - negative     No subluxation of the peroneal tendons with resisted eversion.     Vascular/Sensory Exam:  DP and PT pulses intact bilaterally  No skin changes, no abnormal hair distribution  Negative supine straight leg raise bilaterally  Decreased sensation to light touch in the tibial nerve distribution on right as well as some superficial peroneal nerve  irritation  Positive Tinel's test over tarsal tunnel on right, reproducing symptoms of medial ankle pain and distal toe tingling  Capillary refill intact <2 seconds in all toes bilaterally.    DIAGNOSTIC ULTRASOUND FOCUSED::   CLINICAL INDICATION:  right medial ankle pain    TECHNIQUE: Real time ultrasound examination of the right medial ankle was performed with SonSpeakingPalte Edge 2, 9-L MHz linear probe(s). Real time ultrasound examination of the left medial ankle was performed for comparison. Images were saved and stored for documentation.     FINDINGS: The images are of adequate diagnostic quality with identification of all echogenic structures made except for the vascular structures unless otherwise noted. There is  sonographic evidence of nerve irregularities at the right tarsal tunnel. The right tibial nerve measures 0.75 cm x 0.21 cm x 1.85 cm at the tarsal tunnel compared to left tibial nerve measures 0.58 cm x 0.17 cm x 1.53 cm at the tarsal tunnel.  In addition to right tibial nerve enlargement at the tarsal tunnel compared to the left, irregular nerve patterning also appreciated with enlarged, hypoechoic fascicles as well as some surrounding hypoechoic edema.  Patient noted increase pain upon sonopalpation in this area as well.  The rest of the sonographic examination was unremarkable.    IMPRESSION:  Right tibial nerve irritation at the level of the tarsal tunnel, consistent with tarsal tunnel syndrome    Ultrasound images were directly reviewed with the patient and then a treatment plan was discussed. Images were saved and stored for documentation.     Assessment:      Encounter Diagnoses   Name Primary?    Tarsal tunnel syndrome of right side Yes    Achilles tendinosis of right ankle         Plan:   1. Persistent right tarsal tunnel syndrome symptoms with correlating nerve irritation findings on today's limited diagnostic musculoskeletal ultrasound.  Pain unrelieved with rest, NSAIDs, or recent Medrol  Dosepak.  Patient unable to tolerate gabapentin prescription due to side effects.  Mild superficial peroneal nerve irritation suspected from physical exam today as well, likely from prolonged boot wear, which she is no longer using given her improvement of right Achilles tendinosis symptoms.  No evident lumbar radicular symptoms appreciated on physical exam today.  - recommend continuing to avoid right walking boot wear.  - discussed with patient option of ultrasound-guided injection and hydrodissection of the tibial nerve at the level of the tarsal tunnel as a next step in attempting to calm down her irritation in at this level  - discussed option of EMG for further evaluation if symptoms persist following CSI and hydrodissection  - limited diagnostic musculoskeletal ultrasound performed today showed sonographic evidence of right tibial nerve irritation at the level of the tarsal tunnel, consistent with tarsal tunnel syndrome    2. Right midsubstance Achilles tendinosis improved s/p [place] on 8/25/23, progressing will with formal PT in the area, with the exception of persistent right tarsal tunnel syndrome as detailed above.  Incision from procedure well healed on exam today.  - recommend continued progression with fPT as scheduled and supportive shoe wear  -  X-ray images of bilateral calcanei taken 5/4/23 (AP and lateral views) showed Mild pes cavus configuration bilaterally. Post-operative posterior calcaneal cortical changes noted bilaterally from prior amador deformity removal. Images were personally reviewed with patient.  - Diagnostic ultrasound of bilateral achilles taken 6/1/23 showed sonographic evidence of bilateral midsubstance and insertional Achilles tendinosis with no clear tearing, left worse than right.    3. Follow-up in 2 weeks for right tibial nerve CSI and hydrodissection at the level of the tarsal tunnel    4. Patient agreeable to today's plan and all questions were answered    This note is  dictated using the [employer] Fluency Direct word recognition program. There are word recognition mistakes that are occasionally missed on review.

## 2023-09-22 ENCOUNTER — PATIENT MESSAGE (OUTPATIENT)
Dept: SPORTS MEDICINE | Facility: CLINIC | Age: 58
End: 2023-09-22
Payer: COMMERCIAL

## 2023-09-27 ENCOUNTER — OFFICE VISIT (OUTPATIENT)
Dept: NEUROLOGY | Facility: CLINIC | Age: 58
End: 2023-09-27
Payer: COMMERCIAL

## 2023-09-27 VITALS
HEART RATE: 75 BPM | DIASTOLIC BLOOD PRESSURE: 69 MMHG | HEIGHT: 65 IN | WEIGHT: 127.88 LBS | BODY MASS INDEX: 21.31 KG/M2 | SYSTOLIC BLOOD PRESSURE: 111 MMHG

## 2023-09-27 DIAGNOSIS — G57.50 TARSAL TUNNEL SYNDROME, UNSPECIFIED LATERALITY: Primary | ICD-10-CM

## 2023-09-27 DIAGNOSIS — I10 HYPERTENSION: ICD-10-CM

## 2023-09-27 DIAGNOSIS — R20.8 BURNING SENSATION OF FOOT: ICD-10-CM

## 2023-09-27 PROCEDURE — 1159F PR MEDICATION LIST DOCUMENTED IN MEDICAL RECORD: ICD-10-PCS | Mod: CPTII,S$GLB,, | Performed by: STUDENT IN AN ORGANIZED HEALTH CARE EDUCATION/TRAINING PROGRAM

## 2023-09-27 PROCEDURE — 1159F MED LIST DOCD IN RCRD: CPT | Mod: CPTII,S$GLB,, | Performed by: STUDENT IN AN ORGANIZED HEALTH CARE EDUCATION/TRAINING PROGRAM

## 2023-09-27 PROCEDURE — 3008F PR BODY MASS INDEX (BMI) DOCUMENTED: ICD-10-PCS | Mod: CPTII,S$GLB,, | Performed by: STUDENT IN AN ORGANIZED HEALTH CARE EDUCATION/TRAINING PROGRAM

## 2023-09-27 PROCEDURE — 99999 PR PBB SHADOW E&M-EST. PATIENT-LVL III: ICD-10-PCS | Mod: PBBFAC,,, | Performed by: STUDENT IN AN ORGANIZED HEALTH CARE EDUCATION/TRAINING PROGRAM

## 2023-09-27 PROCEDURE — 3074F SYST BP LT 130 MM HG: CPT | Mod: CPTII,S$GLB,, | Performed by: STUDENT IN AN ORGANIZED HEALTH CARE EDUCATION/TRAINING PROGRAM

## 2023-09-27 PROCEDURE — 99999 PR PBB SHADOW E&M-EST. PATIENT-LVL III: CPT | Mod: PBBFAC,,, | Performed by: STUDENT IN AN ORGANIZED HEALTH CARE EDUCATION/TRAINING PROGRAM

## 2023-09-27 PROCEDURE — 3074F PR MOST RECENT SYSTOLIC BLOOD PRESSURE < 130 MM HG: ICD-10-PCS | Mod: CPTII,S$GLB,, | Performed by: STUDENT IN AN ORGANIZED HEALTH CARE EDUCATION/TRAINING PROGRAM

## 2023-09-27 PROCEDURE — 3078F PR MOST RECENT DIASTOLIC BLOOD PRESSURE < 80 MM HG: ICD-10-PCS | Mod: CPTII,S$GLB,, | Performed by: STUDENT IN AN ORGANIZED HEALTH CARE EDUCATION/TRAINING PROGRAM

## 2023-09-27 PROCEDURE — 3078F DIAST BP <80 MM HG: CPT | Mod: CPTII,S$GLB,, | Performed by: STUDENT IN AN ORGANIZED HEALTH CARE EDUCATION/TRAINING PROGRAM

## 2023-09-27 PROCEDURE — 3008F BODY MASS INDEX DOCD: CPT | Mod: CPTII,S$GLB,, | Performed by: STUDENT IN AN ORGANIZED HEALTH CARE EDUCATION/TRAINING PROGRAM

## 2023-09-27 PROCEDURE — 99205 PR OFFICE/OUTPT VISIT, NEW, LEVL V, 60-74 MIN: ICD-10-PCS | Mod: S$GLB,,, | Performed by: STUDENT IN AN ORGANIZED HEALTH CARE EDUCATION/TRAINING PROGRAM

## 2023-09-27 PROCEDURE — 99205 OFFICE O/P NEW HI 60 MIN: CPT | Mod: S$GLB,,, | Performed by: STUDENT IN AN ORGANIZED HEALTH CARE EDUCATION/TRAINING PROGRAM

## 2023-09-27 NOTE — PROGRESS NOTES
Chief Complaint and Duration     Chief Complaint   Patient presents with    Tarsal tunnel syndrome        History of Present Illness     Twila Roman is a 58 y.o.  female, currently seeing sports Medicine, here for nerve conduction study/EMG for tarsal tunnel syndrome.  Patient with a history of back surgery at L5-S1, history of ice skating.  Long periods of time standing on her feet.    Review of patient's allergies indicates:  No Known Allergies  Current Outpatient Medications   Medication Sig Dispense Refill    biotin 5,000 mcg TbDL Take 1 capsule by mouth once daily.      buPROPion (WELLBUTRIN XL) 150 MG TB24 tablet Take 1 tablet (150 mg total) by mouth once daily. 90 tablet 3    buPROPion (WELLBUTRIN XL) 300 MG 24 hr tablet Take 1 tablet (300 mg total) by mouth once daily. 90 tablet 3    cholecalciferol, vitamin D3, (VITAMIN D3) 50 mcg (2,000 unit) Tab Take 1 tablet by mouth once daily.      FLAXSEED OIL MISC by Misc.(Non-Drug; Combo Route) route.      LORazepam (ATIVAN) 1 MG tablet TAKE 1 TABLET(1 MG) BY MOUTH EVERY 12 HOURS AS NEEDED 60 tablet 0    metoprolol succinate (TOPROL-XL) 25 MG 24 hr tablet Take 1 tablet (25 mg total) by mouth once daily. 90 tablet 3    multivitamin (THERAGRAN) per tablet Take 1 tablet by mouth once daily.      tiZANidine (ZANAFLEX) 4 MG tablet TAKE 1 TABLET BY MOUTH EVERY 12 HOURS AS NEEDED FOR MUSCLE SPASM 60 tablet 2     No current facility-administered medications for this visit.     Facility-Administered Medications Ordered in Other Visits   Medication Dose Route Frequency Provider Last Rate Last Admin    0.9%  NaCl infusion  500 mL Intravenous Continuous Inez Joiner MD           Medical History     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      Past Surgical History:   Procedure Laterality Date    BACK SURGERY      spinal fusion    COLONOSCOPY N/A 12/11/2019     Procedure: COLONOSCOPY;  Surgeon: Carolien Swartz MD;  Location: [facility] ENDO (4TH FLR);  Service: Endoscopy;  Laterality: N/A;    FUSION OF SACROILIAC JOINT Left 10/3/2022    Procedure: FUSION, SACROILIAC JOINT;  Surgeon: Henry Felix MD;  Location: [facility] OR;  Service: Pain Management;  Laterality: Left;  LEFT  PAINTEQ REP    INJECTION, SACROILIAC JOINT Left 7/14/2022    Procedure: INJECTION,SACROILIAC JOINT, LEFT;  Surgeon: Henry Felix MD;  Location: [facility] PAIN MGT;  Service: Pain Management;  Laterality: Left;    INJECTION, SACROILIAC JOINT Left 8/16/2022    Procedure: INJECTION,SACROILIAC JOINT, LEFT DIAGNOSTIC SI WITHOUT STEROID;  Surgeon: Henry Felix MD;  Location: [facility] PAIN MGT;  Service: Pain Management;  Laterality: Left;    INJECTION, SACROILIAC JOINT Left 9/1/2022    Procedure: INJECTION,SACROILIAC JOINT LEFT DIAGNOSTIC WITH NO STEROIDS;  Surgeon: Henry Felix MD;  Location: [facility] PAIN MGT;  Service: Pain Management;  Laterality: Left;    KNEE ARTHROSCOPY W/ MENISCECTOMY Left 8/15/2018    Procedure: ARTHROSCOPY, KNEE, WITH MENISCECTOMY;  Surgeon: Handy Elkins MD;  Location: [facility] OR;  Service: Orthopedics;  Laterality: Left;  Video    ROBOT-ASSISTED LAPAROSCOPIC ABDOMINAL HYSTERECTOMY USING DA MARIA ESTHER XI N/A 10/20/2020    Procedure: XI ROBOTIC HYSTERECTOMY;  Surgeon: Danie Burns MD;  Location: [facility] OR;  Service: OB/GYN;  Laterality: N/A;    ROBOT-ASSISTED LAPAROSCOPIC SALPINGO-OOPHORECTOMY USING DA MARIA ESTHER XI Bilateral 10/20/2020    Procedure: XI ROBOTIC SALPINGO-OOPHORECTOMY;  Surgeon: Danie Burns MD;  Location: [facility] OR;  Service: OB/GYN;  Laterality: Bilateral;    TRANSFORAMINAL EPIDURAL INJECTION OF STEROID Right 7/18/2019    Procedure: INJECTION, STEROID, EPIDURAL, TRANSFORAMINAL APPROACH, L4 AND L5;  Surgeon: Daniel Seo MD;  Location: [facility] PAIN MGT;  Service: Pain Management;  Laterality: Right;    TRANSFORAMINAL EPIDURAL INJECTION OF STEROID Right 3/16/2021     Procedure: INJECTION, STEROID, EPIDURAL, TRANSFORAMINAL APPROACH L4/5 AND L5/S1;  Surgeon: Daniel Seo MD;  Location: [facility];  Service: Pain Management;  Laterality: Right;    TUMOR REMOVAL      right hand     Family History   Problem Relation Age of Onset    Ovarian cancer Mother 66         at 72    Heart disease Father         double bypass    Macular degeneration Father     Heart failure Father     No Known Problems Brother     No Known Problems Brother     Cancer Paternal Grandfather         lung (pt thinks), possible smoker    Breast cancer Paternal Cousin 20        father's sister's daughter, pt thinks bilateral, cousin is     Other Maternal Aunt         mat aunt had part of colon removed, pt does not think cancer    Breast cancer Maternal Aunt      Social History     Socioeconomic History    Marital status:    Occupational History    Occupation:      Employer: [employer]    Tobacco Use    Smoking status: Former    Smokeless tobacco: Never   Substance and Sexual Activity    Alcohol use: Yes     Alcohol/week: 5.0 standard drinks of alcohol     Types: 5 Glasses of wine per week     Comment: one a day- red wine    Drug use: No    Sexual activity: Yes     Partners: Male     Comment: Boyfriend      Social Determinants of Health     Financial Resource Strain: Low Risk  (2023)    Overall Financial Resource Strain (CARDIA)     Difficulty of Paying Living Expenses: Not very hard   Food Insecurity: No Food Insecurity (2023)    Hunger Vital Sign     Worried About Running Out of Food in the Last Year: Never true     Ran Out of Food in the Last Year: Never true   Transportation Needs: No Transportation Needs (2023)    PRAPARE - Transportation     Lack of Transportation (Medical): No     Lack of Transportation (Non-Medical): No   Physical Activity: Sufficiently Active (2023)    Exercise Vital Sign     Days of Exercise per Week: 5 days     Minutes of  Exercise per Session: 40 min   Stress: Stress Concern Present (9/22/2023)    [employer] - Occupational Stress Questionnaire     Feeling of Stress : To some extent   Social Connections: Unknown (9/22/2023)    Social Connection and Isolation Panel [NHANES]     Frequency of Communication with Friends and Family: More than three times a week     Frequency of Social Gatherings with Friends and Family: Twice a week     Active Member of Clubs or Organizations: No     Attends Club or Organization Meetings: Never     Marital Status:    Housing Stability: Low Risk  (9/22/2023)    Housing Stability Vital Sign     Unable to Pay for Housing in the Last Year: No     Number of Places Lived in the Last Year: 1     Unstable Housing in the Last Year: No       Exam     Vitals:    09/27/23 1119   BP: 111/69   Pulse: 75      Physical Exam:  General: Not in acute distress. Not ill-appearing.   HENT: Normocephalic and atraumatic. Moist mucous membranes.  Eyes: Conjunctivae normal.   Pulmonary: Pulmonary effort is normal.   Abdominal: Abdomen is soft and flat.   Skin: Skin is warm and dry. No rashes.   Psychiatric: Mood normal.        Neurologic Exam   Mental status: oriented to person, place, and time  Attention: Normal. Concentration: normal.  Speech: speech is normal.  Cranial Nerves: PERRL, EOMI intact, V1-V3 Facial sensation intact. Symmetric facies. Hearing grossly intact. Palate and uvula midline, symmetric. No tongue deviation. Trapezius strength intact.     Motor exam: bulk and tone normal. Strength grossly 5/5 in upper and lower extremities    Sensory exam: light touch intact    Gait exam: normal  Coordination: normal    Tremor: none  Cogwheel rigidity: none    Labs and Imaging     Labs: reviewed  No results found for this or any previous visit (from the past 24 hour(s)).      Imaging:   I have personally reviewed the images performed.     Patient had ultrasound of her bilateral Achilles on  July 1, 2023 that showed evidence of bilateral midsubstance an insertional Achilles tendinosis with no clear tearing left worse than right    Assessment and Plan     Problem List Items Addressed This Visit          Neuro    Burning sensation of the foot    Overview     Dx updated per 2019 IMO Load         Relevant Orders    EMG W/ ULTRASOUND AND NERVE CONDUCTION TEST 2 Extremities    Tarsal tunnel syndrome - Primary    Relevant Orders    EMG W/ ULTRASOUND AND NERVE CONDUCTION TEST 2 Extremities     This is a 58-year-old female with a history of tarsal tunnel syndrome diagnosed bilaterally by sports Medicine, has had diagnostic ultrasound of bilateral Achilles on June 1, 2023.  Plan for right tibial nerve CSI and hydrodissection of the level of the tarsal tunnel.  Nerve conduction study EMG ordered to assess for other compressions or contributing factors.  Patient does not have diabetes, had surgery at L5-S1.    Follow-up:  On EMG    Time spent on this encounter: 60 minutes. This includes face to face time and non-face to face time preparing to see the patient (eg, review of tests), obtaining and/or reviewing separately obtained history, documenting clinical information in the electronic or other health record, independently interpreting results and communicating results to the patient/family/caregiver, or care coordinator.     This note was created by combination of typed  and M-Modal dictation. Transcription and phonetic errors may be present.  If there are any questions, please contact me.

## 2023-09-28 ENCOUNTER — CLINICAL SUPPORT (OUTPATIENT)
Dept: REHABILITATION | Facility: HOSPITAL | Age: 58
End: 2023-09-28
Payer: COMMERCIAL

## 2023-09-28 ENCOUNTER — PATIENT MESSAGE (OUTPATIENT)
Dept: SPORTS MEDICINE | Facility: CLINIC | Age: 58
End: 2023-09-28
Payer: COMMERCIAL

## 2023-09-28 ENCOUNTER — PATIENT MESSAGE (OUTPATIENT)
Dept: INTERNAL MEDICINE | Facility: CLINIC | Age: 58
End: 2023-09-28
Payer: COMMERCIAL

## 2023-09-28 DIAGNOSIS — Z00.00 ANNUAL PHYSICAL EXAM: Primary | ICD-10-CM

## 2023-09-28 DIAGNOSIS — R26.9 GAIT ABNORMALITY: Primary | ICD-10-CM

## 2023-09-28 DIAGNOSIS — M25.671 DECREASED RANGE OF MOTION OF RIGHT ANKLE: ICD-10-CM

## 2023-09-28 PROCEDURE — 97110 THERAPEUTIC EXERCISES: CPT

## 2023-09-28 PROCEDURE — 97112 NEUROMUSCULAR REEDUCATION: CPT

## 2023-09-28 PROCEDURE — 97140 MANUAL THERAPY 1/> REGIONS: CPT

## 2023-09-29 ENCOUNTER — LAB VISIT (OUTPATIENT)
Dept: LAB | Facility: HOSPITAL | Age: 58
End: 2023-09-29
Attending: INTERNAL MEDICINE
Payer: COMMERCIAL

## 2023-09-29 DIAGNOSIS — Z00.00 ANNUAL PHYSICAL EXAM: ICD-10-CM

## 2023-09-29 DIAGNOSIS — I10 HYPERTENSION: ICD-10-CM

## 2023-09-29 LAB
ALBUMIN SERPL BCP-MCNC: 4.1 G/DL (ref 3.5–5.2)
ALBUMIN SERPL BCP-MCNC: 4.1 G/DL (ref 3.5–5.2)
ALP SERPL-CCNC: 49 U/L (ref 55–135)
ALP SERPL-CCNC: 49 U/L (ref 55–135)
ALT SERPL W/O P-5'-P-CCNC: 29 U/L (ref 10–44)
ALT SERPL W/O P-5'-P-CCNC: 29 U/L (ref 10–44)
ANION GAP SERPL CALC-SCNC: 8 MMOL/L (ref 8–16)
ANION GAP SERPL CALC-SCNC: 8 MMOL/L (ref 8–16)
AST SERPL-CCNC: 26 U/L (ref 10–40)
AST SERPL-CCNC: 26 U/L (ref 10–40)
BASOPHILS # BLD AUTO: 0.05 K/UL (ref 0–0.2)
BASOPHILS NFR BLD: 0.8 % (ref 0–1.9)
BILIRUB SERPL-MCNC: 0.7 MG/DL (ref 0.1–1)
BILIRUB SERPL-MCNC: 0.7 MG/DL (ref 0.1–1)
BUN SERPL-MCNC: 10 MG/DL (ref 6–20)
BUN SERPL-MCNC: 10 MG/DL (ref 6–20)
CALCIUM SERPL-MCNC: 9.3 MG/DL (ref 8.7–10.5)
CALCIUM SERPL-MCNC: 9.3 MG/DL (ref 8.7–10.5)
CHLORIDE SERPL-SCNC: 107 MMOL/L (ref 95–110)
CHLORIDE SERPL-SCNC: 107 MMOL/L (ref 95–110)
CHOLEST SERPL-MCNC: 219 MG/DL (ref 120–199)
CHOLEST/HDLC SERPL: 3.4 {RATIO} (ref 2–5)
CO2 SERPL-SCNC: 25 MMOL/L (ref 23–29)
CO2 SERPL-SCNC: 25 MMOL/L (ref 23–29)
CREAT SERPL-MCNC: 0.8 MG/DL (ref 0.5–1.4)
CREAT SERPL-MCNC: 0.8 MG/DL (ref 0.5–1.4)
DIFFERENTIAL METHOD: ABNORMAL
EOSINOPHIL # BLD AUTO: 0.1 K/UL (ref 0–0.5)
EOSINOPHIL NFR BLD: 1.5 % (ref 0–8)
ERYTHROCYTE [DISTWIDTH] IN BLOOD BY AUTOMATED COUNT: 13.3 % (ref 11.5–14.5)
EST. GFR  (NO RACE VARIABLE): >60 ML/MIN/1.73 M^2
EST. GFR  (NO RACE VARIABLE): >60 ML/MIN/1.73 M^2
ESTIMATED AVG GLUCOSE: 100 MG/DL (ref 68–131)
GLUCOSE SERPL-MCNC: 123 MG/DL (ref 70–110)
GLUCOSE SERPL-MCNC: 123 MG/DL (ref 70–110)
HBA1C MFR BLD: 5.1 % (ref 4–5.6)
HCT VFR BLD AUTO: 41.2 % (ref 37–48.5)
HDLC SERPL-MCNC: 64 MG/DL (ref 40–75)
HDLC SERPL: 29.2 % (ref 20–50)
HGB BLD-MCNC: 13.4 G/DL (ref 12–16)
IMM GRANULOCYTES # BLD AUTO: 0.02 K/UL (ref 0–0.04)
IMM GRANULOCYTES NFR BLD AUTO: 0.3 % (ref 0–0.5)
LDLC SERPL CALC-MCNC: 128.6 MG/DL (ref 63–159)
LYMPHOCYTES # BLD AUTO: 1.7 K/UL (ref 1–4.8)
LYMPHOCYTES NFR BLD: 27.1 % (ref 18–48)
MCH RBC QN AUTO: 31.5 PG (ref 27–31)
MCHC RBC AUTO-ENTMCNC: 32.5 G/DL (ref 32–36)
MCV RBC AUTO: 97 FL (ref 82–98)
MONOCYTES # BLD AUTO: 0.7 K/UL (ref 0.3–1)
MONOCYTES NFR BLD: 10.9 % (ref 4–15)
NEUTROPHILS # BLD AUTO: 3.6 K/UL (ref 1.8–7.7)
NEUTROPHILS NFR BLD: 59.4 % (ref 38–73)
NONHDLC SERPL-MCNC: 155 MG/DL
NRBC BLD-RTO: 0 /100 WBC
PLATELET # BLD AUTO: 243 K/UL (ref 150–450)
PMV BLD AUTO: 10.1 FL (ref 9.2–12.9)
POTASSIUM SERPL-SCNC: 3.7 MMOL/L (ref 3.5–5.1)
POTASSIUM SERPL-SCNC: 3.7 MMOL/L (ref 3.5–5.1)
PROT SERPL-MCNC: 6.7 G/DL (ref 6–8.4)
PROT SERPL-MCNC: 6.7 G/DL (ref 6–8.4)
RBC # BLD AUTO: 4.26 M/UL (ref 4–5.4)
SODIUM SERPL-SCNC: 140 MMOL/L (ref 136–145)
SODIUM SERPL-SCNC: 140 MMOL/L (ref 136–145)
TRIGL SERPL-MCNC: 132 MG/DL (ref 30–150)
TSH SERPL DL<=0.005 MIU/L-ACNC: 2.44 UIU/ML (ref 0.4–4)
WBC # BLD AUTO: 6.08 K/UL (ref 3.9–12.7)

## 2023-09-29 PROCEDURE — 36415 COLL VENOUS BLD VENIPUNCTURE: CPT | Mod: PO | Performed by: INTERNAL MEDICINE

## 2023-09-29 PROCEDURE — 80061 LIPID PANEL: CPT | Performed by: INTERNAL MEDICINE

## 2023-09-29 PROCEDURE — 85025 COMPLETE CBC W/AUTO DIFF WBC: CPT | Performed by: INTERNAL MEDICINE

## 2023-09-29 PROCEDURE — 80053 COMPREHEN METABOLIC PANEL: CPT | Performed by: INTERNAL MEDICINE

## 2023-09-29 PROCEDURE — 84443 ASSAY THYROID STIM HORMONE: CPT | Performed by: INTERNAL MEDICINE

## 2023-09-29 PROCEDURE — 83036 HEMOGLOBIN GLYCOSYLATED A1C: CPT | Performed by: INTERNAL MEDICINE

## 2023-10-02 ENCOUNTER — OFFICE VISIT (OUTPATIENT)
Dept: INTERNAL MEDICINE | Facility: CLINIC | Age: 58
End: 2023-10-02
Payer: COMMERCIAL

## 2023-10-02 ENCOUNTER — PATIENT MESSAGE (OUTPATIENT)
Dept: SPORTS MEDICINE | Facility: CLINIC | Age: 58
End: 2023-10-02
Payer: COMMERCIAL

## 2023-10-02 VITALS
SYSTOLIC BLOOD PRESSURE: 112 MMHG | TEMPERATURE: 97 F | OXYGEN SATURATION: 98 % | WEIGHT: 129 LBS | HEIGHT: 65 IN | HEART RATE: 81 BPM | RESPIRATION RATE: 18 BRPM | DIASTOLIC BLOOD PRESSURE: 66 MMHG | BODY MASS INDEX: 21.49 KG/M2

## 2023-10-02 DIAGNOSIS — F41.9 ANXIETY: ICD-10-CM

## 2023-10-02 DIAGNOSIS — Z00.00 ANNUAL PHYSICAL EXAM: Primary | ICD-10-CM

## 2023-10-02 DIAGNOSIS — I15.9 SECONDARY HYPERTENSION: ICD-10-CM

## 2023-10-02 PROCEDURE — 3074F SYST BP LT 130 MM HG: CPT | Mod: CPTII,S$GLB,, | Performed by: INTERNAL MEDICINE

## 2023-10-02 PROCEDURE — 1159F MED LIST DOCD IN RCRD: CPT | Mod: CPTII,S$GLB,, | Performed by: INTERNAL MEDICINE

## 2023-10-02 PROCEDURE — 3008F PR BODY MASS INDEX (BMI) DOCUMENTED: ICD-10-PCS | Mod: CPTII,S$GLB,, | Performed by: INTERNAL MEDICINE

## 2023-10-02 PROCEDURE — 1160F RVW MEDS BY RX/DR IN RCRD: CPT | Mod: CPTII,S$GLB,, | Performed by: INTERNAL MEDICINE

## 2023-10-02 PROCEDURE — 3008F BODY MASS INDEX DOCD: CPT | Mod: CPTII,S$GLB,, | Performed by: INTERNAL MEDICINE

## 2023-10-02 PROCEDURE — 3044F HG A1C LEVEL LT 7.0%: CPT | Mod: CPTII,S$GLB,, | Performed by: INTERNAL MEDICINE

## 2023-10-02 PROCEDURE — 99999 PR PBB SHADOW E&M-EST. PATIENT-LVL IV: ICD-10-PCS | Mod: PBBFAC,,, | Performed by: INTERNAL MEDICINE

## 2023-10-02 PROCEDURE — 3078F DIAST BP <80 MM HG: CPT | Mod: CPTII,S$GLB,, | Performed by: INTERNAL MEDICINE

## 2023-10-02 PROCEDURE — 3078F PR MOST RECENT DIASTOLIC BLOOD PRESSURE < 80 MM HG: ICD-10-PCS | Mod: CPTII,S$GLB,, | Performed by: INTERNAL MEDICINE

## 2023-10-02 PROCEDURE — 99999 PR PBB SHADOW E&M-EST. PATIENT-LVL IV: CPT | Mod: PBBFAC,,, | Performed by: INTERNAL MEDICINE

## 2023-10-02 PROCEDURE — 99396 PR PREVENTIVE VISIT,EST,40-64: ICD-10-PCS | Mod: S$GLB,,, | Performed by: INTERNAL MEDICINE

## 2023-10-02 PROCEDURE — 3044F PR MOST RECENT HEMOGLOBIN A1C LEVEL <7.0%: ICD-10-PCS | Mod: CPTII,S$GLB,, | Performed by: INTERNAL MEDICINE

## 2023-10-02 PROCEDURE — 3074F PR MOST RECENT SYSTOLIC BLOOD PRESSURE < 130 MM HG: ICD-10-PCS | Mod: CPTII,S$GLB,, | Performed by: INTERNAL MEDICINE

## 2023-10-02 PROCEDURE — 99396 PREV VISIT EST AGE 40-64: CPT | Mod: S$GLB,,, | Performed by: INTERNAL MEDICINE

## 2023-10-02 PROCEDURE — 1160F PR REVIEW ALL MEDS BY PRESCRIBER/CLIN PHARMACIST DOCUMENTED: ICD-10-PCS | Mod: CPTII,S$GLB,, | Performed by: INTERNAL MEDICINE

## 2023-10-02 PROCEDURE — 1159F PR MEDICATION LIST DOCUMENTED IN MEDICAL RECORD: ICD-10-PCS | Mod: CPTII,S$GLB,, | Performed by: INTERNAL MEDICINE

## 2023-10-02 RX ORDER — VARICELLA-ZOSTER GE VAC,2 OF 2 50 MCG
1 VIAL (EA) INTRAMUSCULAR ONCE
Qty: 1 EACH | Refills: 1 | Status: SHIPPED | OUTPATIENT
Start: 2023-10-02 | End: 2023-10-02

## 2023-10-02 NOTE — PROGRESS NOTES
Subjective:       Patient ID: Twila Roman is a 58 y.o. female.    Chief Complaint: Annual Exam    HPI    58 y.o. female here for annual exam.     Cholesterol: total 219  Vaccines: Influenza - needs; Tetanus - 2015; Zoster - needs; COVID - 2 done  Sexual Screening:  active  STD screening:  no concern  Eye exam: needs  Mammogram: UTD  Gyn exam: had hysterectomy in 2020 (LISE) told no longer needed  Colonoscopy: 2019, due 2029  A1c: 5.1    Exercise: as much as she can.  She has a pool.  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  She had a procedure on her tendon.  She had it done on another foot.  She developed tarsal tunnel syndrome on her other foot.  Diet: home cooked    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      Past Surgical History:   Procedure Laterality Date    BACK SURGERY      spinal fusion    COLONOSCOPY N/A 12/11/2019    Procedure: COLONOSCOPY;  Surgeon: Caroline Swartz MD;  Location: [facility] ([address] [place] [address]);  Service: Endoscopy;  Laterality: N/A;    FUSION OF SACROILIAC JOINT Left 10/3/2022    Procedure: FUSION, SACROILIAC JOINT;  Surgeon: Henry Felix MD;  Location: [facility] OR;  Service: Pain Management;  Laterality: Left;  LEFT  PAINTEQ REP    INJECTION, SACROILIAC JOINT Left 7/14/2022    Procedure: INJECTION,SACROILIAC JOINT, LEFT;  Surgeon: Henry Felix MD;  Location: [facility] PAIN MGT;  Service: Pain Management;  Laterality: Left;    INJECTION, SACROILIAC JOINT Left 8/16/2022    Procedure: INJECTION,SACROILIAC JOINT, LEFT DIAGNOSTIC SI WITHOUT STEROID;  Surgeon: Henry Felix MD;  Location: [facility] PAIN MGT;  Service: Pain Management;  Laterality: Left;    INJECTION, SACROILIAC JOINT Left 9/1/2022    Procedure: INJECTION,SACROILIAC JOINT LEFT DIAGNOSTIC WITH NO STEROIDS;  Surgeon: Henry Felix MD;  Location: [facility] PAIN MGT;  Service: Pain Management;  Laterality: Left;    KNEE ARTHROSCOPY  W/ MENISCECTOMY Left 8/15/2018    Procedure: ARTHROSCOPY, KNEE, WITH MENISCECTOMY;  Surgeon: Handy Elkins MD;  Location: [facility] OR;  Service: Orthopedics;  Laterality: Left;  Video    ROBOT-ASSISTED LAPAROSCOPIC ABDOMINAL HYSTERECTOMY USING DA MARIA ESTHER XI N/A 10/20/2020    Procedure: XI ROBOTIC HYSTERECTOMY;  Surgeon: Danie Burns MD;  Location: [facility] OR;  Service: OB/GYN;  Laterality: N/A;    ROBOT-ASSISTED LAPAROSCOPIC SALPINGO-OOPHORECTOMY USING DA MARIA ESTHER XI Bilateral 10/20/2020    Procedure: XI ROBOTIC SALPINGO-OOPHORECTOMY;  Surgeon: Danie Burns MD;  Location: [facility] OR;  Service: OB/GYN;  Laterality: Bilateral;    TRANSFORAMINAL EPIDURAL INJECTION OF STEROID Right 7/18/2019    Procedure: INJECTION, STEROID, EPIDURAL, TRANSFORAMINAL APPROACH, L4 AND L5;  Surgeon: Daniel Seo MD;  Location: [facility] PAIN MGT;  Service: Pain Management;  Laterality: Right;    TRANSFORAMINAL EPIDURAL INJECTION OF STEROID Right 3/16/2021    Procedure: INJECTION, STEROID, EPIDURAL, TRANSFORAMINAL APPROACH L4/5 AND L5/S1;  Surgeon: Daniel Seo MD;  Location: [facility] PAIN MGT;  Service: Pain Management;  Laterality: Right;    TUMOR REMOVAL      right hand     Social History     Socioeconomic History    Marital status:    Occupational History    Occupation:      Employer: [employer]    Tobacco Use    Smoking status: Former    Smokeless tobacco: Never   Substance and Sexual Activity    Alcohol use: Yes     Alcohol/week: 5.0 standard drinks of alcohol     Types: 5 Glasses of wine per week     Comment: 3 drinks a week    Drug use: No    Sexual activity: Yes     Partners: Male     Comment:      Social Determinants of Health     Financial Resource Strain: Low Risk  (9/22/2023)    Overall Financial Resource Strain (CARDIA)     Difficulty of Paying Living Expenses: Not very hard   Food Insecurity: No Food Insecurity (9/22/2023)    Hunger Vital Sign     Worried About Running Out of Food in the Last Year:  Never true     Ran Out of Food in the Last Year: Never true   Transportation Needs: No Transportation Needs (9/22/2023)    PRAPARE - Transportation     Lack of Transportation (Medical): No     Lack of Transportation (Non-Medical): No   Physical Activity: Sufficiently Active (9/22/2023)    Exercise Vital Sign     Days of Exercise per Week: 5 days     Minutes of Exercise per Session: 40 min   Stress: Stress Concern Present (9/22/2023)    [employer] - Occupational Stress Questionnaire     Feeling of Stress : To some extent   Social Connections: Unknown (9/22/2023)    Social Connection and Isolation Panel [NHANES]     Frequency of Communication with Friends and Family: More than three times a week     Frequency of Social Gatherings with Friends and Family: Twice a week     Active Member of Clubs or Organizations: No     Attends Club or Organization Meetings: Never     Marital Status:    Housing Stability: Low Risk  (9/22/2023)    Housing Stability Vital Sign     Unable to Pay for Housing in the Last Year: No     Number of Places Lived in the Last Year: 1     Unstable Housing in the Last Year: No     Review of patient's allergies indicates:  No Known Allergies  Olinda VASQUEZ. Hivj had no medications administered during this visit.    Review of Systems      Objective:      Physical Exam  Vitals reviewed.   Constitutional:       Appearance: She is well-developed.   HENT:      Head: Normocephalic and atraumatic.      Mouth/Throat:      Pharynx: No oropharyngeal exudate.   Eyes:      General: No scleral icterus.        Right eye: No discharge.         Left eye: No discharge.      Pupils: Pupils are equal, round, and reactive to light.   Neck:      Thyroid: No thyromegaly.      Trachea: No tracheal deviation.   Cardiovascular:      Rate and Rhythm: Normal rate and regular rhythm.      Heart sounds: Normal heart sounds. No murmur heard.     No friction rub. No gallop.   Pulmonary:      Effort:  Pulmonary effort is normal. No respiratory distress.      Breath sounds: Normal breath sounds. No wheezing or rales.   Chest:      Chest wall: No tenderness.   Abdominal:      General: Bowel sounds are normal. There is no distension.      Palpations: Abdomen is soft. There is no mass.      Tenderness: There is no abdominal tenderness. There is no guarding or rebound.   Musculoskeletal:         General: No tenderness. Normal range of motion.      Cervical back: Normal range of motion and neck supple.   Skin:     General: Skin is warm and dry.      Coloration: Skin is not pale.      Findings: No erythema or rash.   Neurological:      Mental Status: She is alert and oriented to person, place, and time.   Psychiatric:         Behavior: Behavior normal.         Assessment:       1. Annual physical exam  - Ambulatory referral/consult to Optometry; Future    2. Secondary hypertension    3. Anxiety      Plan:       1. Reviewed lab work with patient.  Discussed diet and exercise.  Discussed vaccines.  2. Continue Toprol-XL 25 mg p.o., Ativan 1 mg twice daily as needed.  Return to clinic in 6 months or sooner if needed.  3.  Continue Wellbutrin 450 mg daily.

## 2023-10-04 ENCOUNTER — CLINICAL SUPPORT (OUTPATIENT)
Dept: REHABILITATION | Facility: HOSPITAL | Age: 58
End: 2023-10-04
Payer: COMMERCIAL

## 2023-10-04 ENCOUNTER — PATIENT MESSAGE (OUTPATIENT)
Dept: SPORTS MEDICINE | Facility: CLINIC | Age: 58
End: 2023-10-04
Payer: COMMERCIAL

## 2023-10-04 ENCOUNTER — PATIENT MESSAGE (OUTPATIENT)
Dept: REHABILITATION | Facility: HOSPITAL | Age: 58
End: 2023-10-04

## 2023-10-04 ENCOUNTER — OFFICE VISIT (OUTPATIENT)
Dept: URGENT CARE | Facility: CLINIC | Age: 58
End: 2023-10-04
Payer: COMMERCIAL

## 2023-10-04 VITALS
HEIGHT: 65 IN | BODY MASS INDEX: 21.33 KG/M2 | TEMPERATURE: 99 F | HEART RATE: 75 BPM | DIASTOLIC BLOOD PRESSURE: 82 MMHG | WEIGHT: 128 LBS | RESPIRATION RATE: 20 BRPM | OXYGEN SATURATION: 98 % | SYSTOLIC BLOOD PRESSURE: 122 MMHG

## 2023-10-04 DIAGNOSIS — S80.211A ABRASION, RIGHT KNEE, INITIAL ENCOUNTER: ICD-10-CM
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
## 2023-10-04 DIAGNOSIS — S60.221A CONTUSION OF RIGHT HAND, INITIAL ENCOUNTER: Primary | ICD-10-CM

## 2023-10-04 DIAGNOSIS — M25.671 DECREASED RANGE OF MOTION OF RIGHT ANKLE: ICD-10-CM

## 2023-10-04 DIAGNOSIS — R26.9 GAIT ABNORMALITY: Primary | ICD-10-CM

## 2023-10-04 DIAGNOSIS — S69.91XA HAND INJURY, RIGHT, INITIAL ENCOUNTER: ICD-10-CM

## 2023-10-04 PROCEDURE — 73562 X-RAY EXAM OF KNEE 3: CPT | Mod: FY,RT,S$GLB, | Performed by: RADIOLOGY

## 2023-10-04 PROCEDURE — 73562 XR KNEE 3 VIEW RIGHT: ICD-10-PCS | Mod: FY,RT,S$GLB, | Performed by: RADIOLOGY

## 2023-10-04 PROCEDURE — 73130 XR HAND COMPLETE 3 VIEW RIGHT: ICD-10-PCS | Mod: FY,RT,S$GLB, | Performed by: RADIOLOGY

## 2023-10-04 PROCEDURE — 99213 PR OFFICE/OUTPT VISIT, EST, LEVL III, 20-29 MIN: ICD-10-PCS | Mod: S$GLB,,, | Performed by: PHYSICIAN ASSISTANT

## 2023-10-04 PROCEDURE — 99213 OFFICE O/P EST LOW 20 MIN: CPT | Mod: S$GLB,,, | Performed by: PHYSICIAN ASSISTANT

## 2023-10-04 PROCEDURE — 97140 MANUAL THERAPY 1/> REGIONS: CPT

## 2023-10-04 PROCEDURE — 73130 X-RAY EXAM OF HAND: CPT | Mod: FY,RT,S$GLB, | Performed by: RADIOLOGY

## 2023-10-04 NOTE — PATIENT INSTRUCTIONS
- Rest.    - Drink plenty of fluids.    - Acetaminophen (tylenol) or Ibuprofen (advil,motrin) as directed as needed for fever/pain. Avoid tylenol if you have a history of liver disease. Do not take ibuprofen if you have a history of GI bleeding, kidney disease, or if you take blood thinners.     - watch for signs of infection including increased redness, swelling, discharge, increased pain, red streaking, fever.  Seek medical attention immediately if these occur.    - Ice for 15-20 minutes at a time for the next 24-48 hours.    - Elevate when possible.     - wash gently with soap and water daily.  Apply antibiotic ointment 2 to 3 times a day.    - Follow up with your PCP or specialty clinic as directed in the next 1-2 weeks if not improved or as needed.  You can call [phone] to schedule an appointment with the appropriate provider.    - Go to the ER or seek medical attention immediately if you develop new or worsening symptoms.     - You must understand that you have received an Urgent Care treatment only and that you may be released before all of your medical problems are known or treated.   - You, the patient, will arrange for follow up care as instructed.   - If your condition worsens or fails to improve we recommend that you receive another evaluation at the ER immediately or contact your PCP to discuss your concerns or return here.

## 2023-10-04 NOTE — PROGRESS NOTES
"Subjective:      Patient ID: Twila Roman is a 58 y.o. female.    Vitals:  height is 5' 5" (1.651 m) and weight is 58.1 kg (128 lb). Her temperature is 98.9 °F (37.2 °C). Her blood pressure is 122/82 and her pulse is 75. Her respiration is 20 and oxygen saturation is 98%.     Chief Complaint: Fall    This is a 58 y.o. female   who presents today with a chief complaint of a fall that happened a day ago. She states that she was at a party when her foot twisted and she fell forward and injured her right knee and right hand.  She scraped her right knee and fell onto right outstretched hand.  Has some pain to right hand and soreness to right knee as well. She hasn't taken any medication to help relieve her symptoms.  Last Tdap 2017.    Fall  The accident occurred 12 to 24 hours ago. The fall occurred while walking and while standing. She landed on Hard floor. There was no blood loss. The point of impact was the right knee. The pain is present in the right hand. The pain is at a severity of 6/10. The pain is moderate. The symptoms are aggravated by movement and flexion. Pertinent negatives include no abdominal pain, bowel incontinence, fever, headaches, hearing loss, hematuria, loss of consciousness, nausea, numbness, tingling, visual change or vomiting. She has tried nothing for the symptoms.     Constitution: Negative for chills, sweating, fatigue and fever.   HENT:  Negative for congestion and sore throat.    Neck: Negative for neck pain and neck stiffness.   Cardiovascular:  Negative for chest pain, leg swelling and palpitations.   Eyes:  Negative for eye itching, eye pain and eye redness.   Respiratory:  Negative for cough, sputum production and shortness of breath.    Gastrointestinal:  Negative for abdominal pain, nausea, vomiting, diarrhea and bowel incontinence.   Genitourinary:  Negative for dysuria, frequency, urgency and hematuria.   Musculoskeletal:  Positive for pain, trauma and joint pain. Negative for " joint swelling.   Skin:  Positive for abrasion. Negative for color change, rash and erythema.   Neurological:  Negative for dizziness, headaches, disorientation, altered mental status, loss of consciousness, numbness and tingling.   Psychiatric/Behavioral:  Negative for altered mental status and disorientation.       Objective:     Physical Exam   Constitutional: She is oriented to person, place, and time. She appears well-developed.  Non-toxic appearance. She does not appear ill. No distress.   HENT:   Head: Normocephalic and atraumatic. Head is without abrasion, without contusion and without laceration.   Ears:   Right Ear: External ear normal.   Left Ear: External ear normal.   Nose: Nose normal.   Mouth/Throat: Oropharynx is clear and moist and mucous membranes are normal.   Eyes: Conjunctivae, EOM and lids are normal. Pupils are equal, round, and reactive to light.   Neck: Trachea normal and phonation normal. Neck supple.   Cardiovascular: Normal rate, regular rhythm and normal heart sounds.   Pulmonary/Chest: Effort normal and breath sounds normal. No stridor. No respiratory distress.   Musculoskeletal: Normal range of motion.         General: Normal range of motion.        Hands:         Legs:    Neurological: She is alert and oriented to person, place, and time.   Skin: Skin is warm, dry, intact, not diaphoretic and no rash. Capillary refill takes less than 2 seconds. No abrasion, No burn, No bruising, No erythema and No ecchymosis   Psychiatric: Her speech is normal and behavior is normal. Judgment and thought content normal.   Nursing note and vitals reviewed.    XR KNEE 3 VIEW RIGHT    Result Date: 10/4/2023  EXAMINATION: XR KNEE 3 VIEW RIGHT CLINICAL HISTORY: Unspecified fall, initial encounter TECHNIQUE: AP, lateral, and Merchant views of the right knee were performed. COMPARISON: Standard image of May 11, 2022 and after review of MRI of right knee May 25, 2022 FINDINGS: No acute fractures.  No definite  narrowing of medial or lateral tibiofemoral or patellofemoral articulations.  Some minimal spurring about tibial spines and posterior patella.  No suprapatellar bursal effusion or prepatellar soft tissue swelling.  Right knee findings do not appear significantly changed to earlier exam.     As above Electronically signed by: Chalino Chandra Date:    10/04/2023 Time:    15:49    XR HAND COMPLETE 3 VIEW RIGHT    Result Date: 10/4/2023  EXAMINATION: XR HAND COMPLETE 3 VIEW RIGHT CLINICAL HISTORY: Unspecified injury of right wrist, hand and finger(s), initial encounter TECHNIQUE: PA, lateral, and oblique views of the right hand were performed. COMPARISON: None FINDINGS: No acute fractures or malalignment.  Some minimal degenerative spurring at the 1st carpal metacarpal articulation, thumb IP articulation, and 2nd and 3rd finger D IP articulations.  Tiny focus of periarticular soft tissue calcification seen to the ulnar side of the index finger D IP articulation.  3 mm focus of soft tissue calcifications seen to the ulnar side of the distal aspect of the middle phalanx of the 3rd finger.  Question mild soft tissue swelling dorsally at the level of the MCP articulations less so metacarpals.     As above Electronically signed by: Chalino Chandra Date:    10/04/2023 Time:    15:27       Assessment:     1. Contusion of right hand, initial encounter    2. Hand injury, right, initial encounter    3. Fall, initial encounter    4. Abrasion, right knee, initial encounter        Plan:     I have read both x-rays and discussed results with patient.  No acute fracture.  - Discussed ddx, home care, tx options, and given follow up precautions.  I have reviewed the patient's chart to view previous visits, labs, and imaging to assess PMH and look for any trends or previous treatments.    Contusion of right hand, initial encounter    Hand injury, right, initial encounter  -     XR HAND COMPLETE 3 VIEW RIGHT; Future; Expected date:  10/04/2023    Fall, initial encounter  -     XR KNEE 3 VIEW RIGHT; Future; Expected date: 10/04/2023    Abrasion, right knee, initial encounter        Patient Instructions   - Rest.    - Drink plenty of fluids.    - Acetaminophen (tylenol) or Ibuprofen (advil,motrin) as directed as needed for fever/pain. Avoid tylenol if you have a history of liver disease. Do not take ibuprofen if you have a history of GI bleeding, kidney disease, or if you take blood thinners.     - watch for signs of infection including increased redness, swelling, discharge, increased pain, red streaking, fever.  Seek medical attention immediately if these occur.    - Ice for 15-20 minutes at a time for the next 24-48 hours.    - Elevate when possible.     - wash gently with soap and water daily.  Apply antibiotic ointment 2 to 3 times a day.    - Follow up with your PCP or specialty clinic as directed in the next 1-2 weeks if not improved or as needed.  You can call [phone] to schedule an appointment with the appropriate provider.    - Go to the ER or seek medical attention immediately if you develop new or worsening symptoms.     - You must understand that you have received an Urgent Care treatment only and that you may be released before all of your medical problems are known or treated.   - You, the patient, will arrange for follow up care as instructed.   - If your condition worsens or fails to improve we recommend that you receive another evaluation at the ER immediately or contact your PCP to discuss your concerns or return here.

## 2023-10-06 ENCOUNTER — OFFICE VISIT (OUTPATIENT)
Dept: SPORTS MEDICINE | Facility: CLINIC | Age: 58
End: 2023-10-06
Payer: COMMERCIAL

## 2023-10-06 VITALS
WEIGHT: 128.06 LBS | SYSTOLIC BLOOD PRESSURE: 122 MMHG | HEIGHT: 65 IN | DIASTOLIC BLOOD PRESSURE: 82 MMHG | BODY MASS INDEX: 21.34 KG/M2 | HEART RATE: 75 BPM

## 2023-10-06 DIAGNOSIS — G57.51 TARSAL TUNNEL SYNDROME OF RIGHT SIDE: Primary | ICD-10-CM

## 2023-10-06 PROCEDURE — 76942 ECHO GUIDE FOR BIOPSY: CPT | Mod: S$GLB,,, | Performed by: NEUROMUSCULOSKELETAL MEDICINE & OMM

## 2023-10-06 PROCEDURE — 99499 UNLISTED E&M SERVICE: CPT | Mod: S$GLB,,, | Performed by: NEUROMUSCULOSKELETAL MEDICINE & OMM

## 2023-10-06 PROCEDURE — 64450 PR NERVE BLOCK INJ, ANES/STEROID, OTHER PERIPHERAL: ICD-10-PCS | Mod: RT,S$GLB,, | Performed by: NEUROMUSCULOSKELETAL MEDICINE & OMM

## 2023-10-06 PROCEDURE — 64450 NJX AA&/STRD OTHER PN/BRANCH: CPT | Mod: RT,S$GLB,, | Performed by: NEUROMUSCULOSKELETAL MEDICINE & OMM

## 2023-10-06 PROCEDURE — 99499 NO LOS: ICD-10-PCS | Mod: S$GLB,,, | Performed by: NEUROMUSCULOSKELETAL MEDICINE & OMM

## 2023-10-06 PROCEDURE — 99999 PR PBB SHADOW E&M-EST. PATIENT-LVL III: ICD-10-PCS | Mod: PBBFAC,,, | Performed by: NEUROMUSCULOSKELETAL MEDICINE & OMM

## 2023-10-06 PROCEDURE — 99999 PR PBB SHADOW E&M-EST. PATIENT-LVL III: CPT | Mod: PBBFAC,,, | Performed by: NEUROMUSCULOSKELETAL MEDICINE & OMM

## 2023-10-06 PROCEDURE — 76942 PR U/S GUIDANCE FOR NEEDLE GUIDANCE: ICD-10-PCS | Mod: S$GLB,,, | Performed by: NEUROMUSCULOSKELETAL MEDICINE & OMM

## 2023-10-06 RX ORDER — TRIAMCINOLONE ACETONIDE 40 MG/ML
20 INJECTION, SUSPENSION INTRA-ARTICULAR; INTRAMUSCULAR
Status: COMPLETED | OUTPATIENT
Start: 2023-10-06 | End: 2023-10-06

## 2023-10-06 RX ADMIN — TRIAMCINOLONE ACETONIDE 20 MG: 40 INJECTION, SUSPENSION INTRA-ARTICULAR; INTRAMUSCULAR at 03:10

## 2023-10-06 NOTE — PROGRESS NOTES
"Subjective:     Twila Roman     Chief Complaint   Patient presents with    Right Foot - Pain     Olinda is a 58 y.o. female coming in today for their  right tarsal nerve diagnostic injection  Objective:     VITAL SIGNS: /82   Pulse 75   Ht 5' 5" (1.651 m)   Wt 58.1 kg (128 lb 1.4 oz)   LMP 01/01/2015 (Approximate)   BMI 21.31 kg/m²    General    Vitals reviewed.  Constitutional: She is oriented to person, place, and time. She appears well-developed and well-nourished.   Neurological: She is alert and oriented to person, place, and time.   Psychiatric: She has a normal mood and affect. Her behavior is normal.             MUSCULOSKELETAL EXAM  ANKLE: right ANKLE  The affected ankle is compared to the contralateral ankle.     Observation:    Previous incision from mac procedure at the right distal Achilles is healed, well approximated, and with no signs of infection  There is no errythema or ecchymosis.   Shoes reveal a normal wear pattern.  No structural deformities including pes planus/cavus, hallux valgus, or toe deformities.  Negative too-many toes sign.    Normal callus pattern on the feet bilaterally.    No leg or intrinsic foot muscle atrophy.  + R antalgic gait 2/2 to medial ankle pain     ROM (* = with pain):  Active dorsiflexion to 20° on left and 20° on right  Active plantarflexion to 50° on left and 50° on right  Active ankle inversion to 35° on left and 35° on right  Active ankle eversion to 15° on left and 15° on right* (medial ankle pain)    Full active flexion of the toes bilaterally.  Slight decreased extension right toes with the exception of the great toe.     Tenderness To Palpation:  No tenderness at the ATFL, CFL, PTFL, or deltoid ligaments  No tenderness over the distal anterior syndesmosis, distal tibia/fibula, fibular head/shaft  No tenderness posterior to the fibular head in the are of the common peroneal nerve  No tenderness at medial or lateral malleoli   No tenderness at " navicular, cuboid, cuneiforms, or talus  No achilles insertional pain at calcaneus  No tenderness along the metatarsals or phalanges  No tenderness at the Achilles tendon calcaneal insertion  No right mid substance achilles tendon tenderness  No tenderness at the posterior tibial or peroneal tendons  + tenderness at the tarsal tunnel in the region of the tibial nerve     Strength Testing (* = with pain):  Dorsiflexion - 5/5 on left and 5/5 on right  Platarflexion - 5/5 on left and 5/5 on right  Resisted Inversion - 5/5 on left and 5/5 on right  Resisted Eversion - 5/5 on left and 5/5 on right*  Great Toe Extension - 5/5 on left and 5/5 on right  Great Toe Flexion - 5/5 on left and 5/5 on right     Special Tests:  Anterior talar drawer - negative and without dimpling  Talar tilt - negative  Reverse Talar tilt - negative     Heel tap test - negative  Distal tib/fib squeeze test - negative  External rotation stress (Kleiger) test - negative  Luis squeeze test - negative     Metatarsal squeeze test - negative  Midfoot stress test - negative  Calcaneal squeeze test - negative     No subluxation of the peroneal tendons with resisted eversion.     Vascular/Sensory Exam:  DP and PT pulses intact bilaterally  No skin changes, no abnormal hair distribution  Negative supine straight leg raise bilaterally  Decreased sensation to light touch in the tibial nerve distribution on right as well as some superficial peroneal nerve irritation  Positive Tinel's test over tarsal tunnel on right, reproducing symptoms of medial ankle pain and distal toe tingling  Capillary refill intact <2 seconds in all toes bilaterally.    DIAGNOSTIC INJECTION PROCEDURE:    PRE-PROCEDURAL DIAGNOSIS:  Right tarsal tunnel syndrome    PROCEDURE TYPE: Diagnostic tibial nerve Injection and hydrodissection - Ultrasound Guided -- right.     RISKS, BENEFITS, ADVERSE EFFECTS: Oral  informed consent was obtained. We reviewed that while this is inherently a  "safe procedure, there are risks involved. The specific risks depend on the tissue or nerve being treated.     RISKS INCLUDE, but are not limited to: COMMON: Soreness and/or moderately increased pain, with bruising for the next 1-3 days. RARE: Permanent nerve injury resulting in permanent pain or numbness, reactions to the local anesthetic (numbness in or near the mouth, metallic taste in mouth, visual or auditory disturbances, seizures, coma, respiratory arrest, cardiovascular depression, chest pain, arrhythmias), reactions to the corticosteroid (permanent skin discoloration or scarring, fluid and electrolyte alterations, hyperglycemia, gastrointestinal upset, bone demineralization), bleeding, scarring, infection, allergic reaction, and lack of improvement or worsening of symptoms.    POTENTIAL BENEFITS include: reduction or elimination of pain and greater function.     PROCEDURE DETAILS: The right tibial nerve was injected in the fascial layer above and below at the level of the tarsal tunnel and just distally. These layers were identified under MSK Ultrasound. The tibial artery and vein were identified via color doppler prior to injection.    Patient Position: Right Lateral Recumbent   Preparation: Sterile precautions with alcohol and Chlorihexadine.   Anesthesia: Local:  Ethyl Chloride spray was used prior to skin puncture to help numb the superficial skin.  Injection: All under Direct MSK US guidance. A 1.5" 25G needle was advanced in plane while observing the tibial nerve. The tibial nerve location was confirmed prior to injection. The needle was advanced to the fascial plane above and under the tibial nerve. Once the fascial plane was confirmed, a total 8 cc was injected: 1 syringes of 1 cc  50% dextrose, 10 cc Normal Saline, 0.5 cc Kenalog (40mg/cc), and 1 cc of 1% lidocaine.  Medication: 40 mg triamcinolone acetonide 40 mg/mL, 1% lidocaine    Description of ultrasound utilization for needle " guidance:  Ultrasound guidance was used for needle localization with SonA Little Easier Recoveryte Edge 2, 9-L MHz linear probe(s). Images were saved and stored for documentation. The tibial nerve and surrounding structures were visualized. Dynamic visualization of the needle(s) was continuous throughout the procedure and maintained good position and correct needle placement.      CONDITION: Patient tolerated the procedure well with no immediate complications. Discharged in stable condition with a sterile dressing.     Triamcinolone:  NDC: [address]-8  LOT: [id number]  EXP: 06/2025    Assessment:      Encounter Diagnosis   Name Primary?    Tarsal tunnel syndrome of right side Yes          Plan:   1. Right tibial nerve diagnostic injection and hydrodissection performed today (see details above).   - POST-INJECTION INSTRUCTIONS:  recommend use of short leg walking boot for comfort over the weekend, then transitioning to a firm soled shoe. Patient was educated and asked to keep a diary of post-injection pain as well.     2. Follow-up in 6 weeks for reevaluation    3. Patient agreeable to today's plan and all questions were answered

## 2023-10-15 NOTE — PROGRESS NOTES
[facility]   Physical Therapy Treatment Note     Name: Twila Roman  Clinic Number: [id number]    Therapy Diagnosis:   Encounter Diagnoses   Name Primary?    Gait abnormality Yes    Decreased range of motion of right ankle      Physician: Sona Fernandez DO    Visit Date: 10/4/2023  Physician Orders: PT Eval and Treat   Medical Diagnosis from Referral:   M67.873 (ICD-10-CM) - Achilles tendinosis of right ankle   G57.51 (ICD-10-CM) - Tarsal tunnel syndrome, right      Evaluation Date: 9/8/2023  Authorization Period Expiration: 10/15/2023  Plan of Care Expiration: 11/25/2023  Progress Note Due: 10/10/2023  Visit # / Visits authorized: 11/20  This POC: 1 (+eval)  FOTO: 1/3    PTA Visit #: 0/5     FOTO first follow up:   FOTO second follow up:     Note from Referral:  Phase 1: Protective Phase (Days 0-14)  Tall boot 24/7 for 14 days  Non weight bearing x1 week  Partial weight bearing week 2 (with gentle ROM, ankle pumps)     Phase II: Restorative Phase (Days 14-28)  2:1 ratio of eccentric:concentric exercises, low weight, high reps  High volume to stimulate metabolic activity   Pain should remain below 3-4/10 and return to baseline at 24hr (physician can be made aware if consistently painful, MSK-US for hyperemia)  Avoid IASTM early on!     Phase III: Re-Load (Days 28-60)  Progress to repetitive activities with re-education of load management  Return to work/sport activity expected 6 weeks, athletic competition at 8 weeks    Time In: 15:15  Time Out: 15:25  Total Billable Time: 10 minutes    SUBJECTIVE     Pt reports: had a fall when she was working and landed on her right knee and hand. Having increased pain in her leg and hand today.    She was compliant with home exercise program.  Response to previous treatment: Independent in HEP   Functional change: Ongoing     Pain: 3/10  Location: right foot      OBJECTIVE     Objective Measures updated at progress report unless specified.  "    Observation: ambulation with lateral trunk lean, antalgic gait.     Knee ROM: 0-110 pain at end range    Palpation: significant TTP on fibular head, inferior patellar pole. Also TTP along right right 2nd MCP.     Treatment     Olinda received the treatments listed below:      therapeutic exercises to develop strength, endurance, ROM, flexibility, posture, and core stabilization for 0 minutes including:    Sidelying clamshells GreenTB 2 x 12  reps   Ankle DF/IV yellow TB 3x10 each    NP  LAQ 3 x 12 reps 4#   Sidelying hip abduction 3 x 12 reps      manual therapy techniques: Joint mobilizations and Soft tissue Mobilization were applied to the: Hip, knee, ankle for 12 minutes, including:  Assessment as above    np  TC mobilization - not tolerated well  Right tib/fib mobilizations prox/distal  Right hip short axis    neuromuscular re-education activities to improve: Balance, Coordination, Kinesthetic, Sense, Proprioception, and Posture for 0 minutes. The following activities were included:    Seated short arches 2 x 10 x 5" holds   Towel crunches to tolerance x3'  Bridges with arch doming 3x10x5"  Pt education    NP  Ankle PF BlueTB 3 x 12-15 reps   Nerve glides 1 x 15 reps   Seated heel raises 3 x 12-15 reps   Gait mechanics working heel to toe to normalize mechanics     therapeutic activities to improve functional performance for 00  minutes, including:      gait training to improve functional mobility and safety for 00 minutes, including:            Patient Education and Home Exercises     Home Exercises Provided and Patient Education Provided     Education provided:   - PT POC, Prognosis, and HEP   - Adverse neural tension and time takes to heal   - Progressive loading and not overloading with returning to work     Written Home Exercises Provided: Patient instructed to cont prior HEP. Exercises were reviewed and Olinda was able to demonstrate them prior to the end of the session.  Olinda demonstrated good  " understanding of the education provided. See EMR under Patient Instructions for exercises provided during therapy sessions    ASSESSMENT     Olinda presented today with knee and hand pain. Following assessment she met the criteria for [place] knee rules and is appropriate for a referral for an x-ray. She also was getting pain in her hand that was concerning as well. She is getting a procedure on her right LE on Friday and will follow up following x-rays and procedure.     Olinda Is progressing well towards her goals.   Pt prognosis is Good.     Pt will continue to benefit from skilled outpatient physical therapy to address the deficits listed in the problem list box on initial evaluation, provide pt/family education and to maximize pt's level of independence in the home and community environment.     Pt's spiritual, cultural and educational needs considered and pt agreeable to plan of care and goals.     Anticipated barriers to physical therapy: [place], job demands, adverse neural tension    Goals:   Short Term Goals: 4 weeks (Progressing, not met)  Patient will be independent in initial HEP to help supplement PT.   Patient will improve right ankle range of motion to symmetrical to left to help with gait mechanics.   Patient will demonstrate normal gait mechanics without pain to help improve quality of life.      Long Term Goals: 8-10 weeks (Progressing, not met)  Patient will be independent in updated HEP to help supplement PT and maintain gains made in PT.   Patient will improve FOTO score to >/= predicted score (70%) to show improvement in condition.   Patient will demonstrate a negative tinel's to demonstrate improvement in adverse neural tension.  Patient will improve ankle strength to >/= 4-/5 to help with job duties.     PLAN   Plan of care Certification: 9/8/2023 to 11/25/2023.    Continue with current plan of care.     Jose Keenan, PT, DPT, OCS

## 2023-10-16 ENCOUNTER — TELEPHONE (OUTPATIENT)
Dept: NEUROLOGY | Facility: CLINIC | Age: 58
End: 2023-10-16
Payer: COMMERCIAL

## 2023-10-18 ENCOUNTER — OFFICE VISIT (OUTPATIENT)
Dept: PODIATRY | Facility: CLINIC | Age: 58
End: 2023-10-18
Payer: COMMERCIAL

## 2023-10-18 VITALS
DIASTOLIC BLOOD PRESSURE: 89 MMHG | HEART RATE: 80 BPM | WEIGHT: 130.06 LBS | BODY MASS INDEX: 21.67 KG/M2 | HEIGHT: 65 IN | SYSTOLIC BLOOD PRESSURE: 144 MMHG

## 2023-10-18 DIAGNOSIS — G57.81 NERVE ENTRAPMENT OF LOWER LIMB, RIGHT: ICD-10-CM

## 2023-10-18 DIAGNOSIS — M79.671 RIGHT FOOT PAIN: ICD-10-CM

## 2023-10-18 DIAGNOSIS — G57.51 TARSAL TUNNEL SYNDROME OF RIGHT SIDE: Primary | ICD-10-CM

## 2023-10-18 PROCEDURE — 99203 PR OFFICE/OUTPT VISIT, NEW, LEVL III, 30-44 MIN: ICD-10-PCS | Mod: 25,S$GLB,, | Performed by: PODIATRIST

## 2023-10-18 PROCEDURE — 99999 PR PBB SHADOW E&M-EST. PATIENT-LVL III: ICD-10-PCS | Mod: PBBFAC,,, | Performed by: PODIATRIST

## 2023-10-18 PROCEDURE — 3077F SYST BP >= 140 MM HG: CPT | Mod: CPTII,S$GLB,, | Performed by: PODIATRIST

## 2023-10-18 PROCEDURE — 64450 NJX AA&/STRD OTHER PN/BRANCH: CPT | Mod: RT,S$GLB,, | Performed by: PODIATRIST

## 2023-10-18 PROCEDURE — 64450 PR NERVE BLOCK INJ, ANES/STEROID, OTHER PERIPHERAL: ICD-10-PCS | Mod: RT,S$GLB,, | Performed by: PODIATRIST

## 2023-10-18 PROCEDURE — 3044F PR MOST RECENT HEMOGLOBIN A1C LEVEL <7.0%: ICD-10-PCS | Mod: CPTII,S$GLB,, | Performed by: PODIATRIST

## 2023-10-18 PROCEDURE — 3079F PR MOST RECENT DIASTOLIC BLOOD PRESSURE 80-89 MM HG: ICD-10-PCS | Mod: CPTII,S$GLB,, | Performed by: PODIATRIST

## 2023-10-18 PROCEDURE — 99203 OFFICE O/P NEW LOW 30 MIN: CPT | Mod: 25,S$GLB,, | Performed by: PODIATRIST

## 2023-10-18 PROCEDURE — 3044F HG A1C LEVEL LT 7.0%: CPT | Mod: CPTII,S$GLB,, | Performed by: PODIATRIST

## 2023-10-18 PROCEDURE — 3077F PR MOST RECENT SYSTOLIC BLOOD PRESSURE >= 140 MM HG: ICD-10-PCS | Mod: CPTII,S$GLB,, | Performed by: PODIATRIST

## 2023-10-18 PROCEDURE — 99999 PR PBB SHADOW E&M-EST. PATIENT-LVL III: CPT | Mod: PBBFAC,,, | Performed by: PODIATRIST

## 2023-10-18 PROCEDURE — 3008F BODY MASS INDEX DOCD: CPT | Mod: CPTII,S$GLB,, | Performed by: PODIATRIST

## 2023-10-18 PROCEDURE — 3079F DIAST BP 80-89 MM HG: CPT | Mod: CPTII,S$GLB,, | Performed by: PODIATRIST

## 2023-10-18 PROCEDURE — 3008F PR BODY MASS INDEX (BMI) DOCUMENTED: ICD-10-PCS | Mod: CPTII,S$GLB,, | Performed by: PODIATRIST

## 2023-10-19 ENCOUNTER — OFFICE VISIT (OUTPATIENT)
Dept: SPORTS MEDICINE | Facility: CLINIC | Age: 58
End: 2023-10-19
Payer: COMMERCIAL

## 2023-10-19 VITALS
HEART RATE: 78 BPM | HEIGHT: 65 IN | SYSTOLIC BLOOD PRESSURE: 129 MMHG | DIASTOLIC BLOOD PRESSURE: 85 MMHG | WEIGHT: 130 LBS | BODY MASS INDEX: 21.66 KG/M2

## 2023-10-19 DIAGNOSIS — S83.241D OTHER TEAR OF MEDIAL MENISCUS OF RIGHT KNEE, UNSPECIFIED WHETHER OLD OR CURRENT TEAR, SUBSEQUENT ENCOUNTER: ICD-10-CM

## 2023-10-19 DIAGNOSIS — M22.41 CHONDROMALACIA OF PATELLOFEMORAL JOINT, RIGHT: ICD-10-CM

## 2023-10-19 DIAGNOSIS — G57.51 TARSAL TUNNEL SYNDROME, RIGHT: ICD-10-CM

## 2023-10-19 DIAGNOSIS — M17.11 PRIMARY OSTEOARTHRITIS OF RIGHT KNEE: Primary | ICD-10-CM

## 2023-10-19 DIAGNOSIS — M67.873 ACHILLES TENDINOSIS OF RIGHT ANKLE: ICD-10-CM

## 2023-10-19 PROCEDURE — 3008F PR BODY MASS INDEX (BMI) DOCUMENTED: ICD-10-PCS | Mod: CPTII,S$GLB,, | Performed by: NEUROMUSCULOSKELETAL MEDICINE & OMM

## 2023-10-19 PROCEDURE — 20611 PR DRAIN/ASP/INJECT MAJOR JOINT/BURSA W/US GUIDANCE: ICD-10-PCS | Mod: RT,S$GLB,, | Performed by: NEUROMUSCULOSKELETAL MEDICINE & OMM

## 2023-10-19 PROCEDURE — 1159F PR MEDICATION LIST DOCUMENTED IN MEDICAL RECORD: ICD-10-PCS | Mod: CPTII,S$GLB,, | Performed by: NEUROMUSCULOSKELETAL MEDICINE & OMM

## 2023-10-19 PROCEDURE — 99999 PR PBB SHADOW E&M-EST. PATIENT-LVL III: ICD-10-PCS | Mod: PBBFAC,,, | Performed by: NEUROMUSCULOSKELETAL MEDICINE & OMM

## 2023-10-19 PROCEDURE — 1159F MED LIST DOCD IN RCRD: CPT | Mod: CPTII,S$GLB,, | Performed by: NEUROMUSCULOSKELETAL MEDICINE & OMM

## 2023-10-19 PROCEDURE — 3079F PR MOST RECENT DIASTOLIC BLOOD PRESSURE 80-89 MM HG: ICD-10-PCS | Mod: CPTII,S$GLB,, | Performed by: NEUROMUSCULOSKELETAL MEDICINE & OMM

## 2023-10-19 PROCEDURE — 1160F PR REVIEW ALL MEDS BY PRESCRIBER/CLIN PHARMACIST DOCUMENTED: ICD-10-PCS | Mod: CPTII,S$GLB,, | Performed by: NEUROMUSCULOSKELETAL MEDICINE & OMM

## 2023-10-19 PROCEDURE — 99215 PR OFFICE/OUTPT VISIT, EST, LEVL V, 40-54 MIN: ICD-10-PCS | Mod: 25,S$GLB,, | Performed by: NEUROMUSCULOSKELETAL MEDICINE & OMM

## 2023-10-19 PROCEDURE — 20611 DRAIN/INJ JOINT/BURSA W/US: CPT | Mod: RT,S$GLB,, | Performed by: NEUROMUSCULOSKELETAL MEDICINE & OMM

## 2023-10-19 PROCEDURE — 3074F SYST BP LT 130 MM HG: CPT | Mod: CPTII,S$GLB,, | Performed by: NEUROMUSCULOSKELETAL MEDICINE & OMM

## 2023-10-19 PROCEDURE — 3008F BODY MASS INDEX DOCD: CPT | Mod: CPTII,S$GLB,, | Performed by: NEUROMUSCULOSKELETAL MEDICINE & OMM

## 2023-10-19 PROCEDURE — 3044F HG A1C LEVEL LT 7.0%: CPT | Mod: CPTII,S$GLB,, | Performed by: NEUROMUSCULOSKELETAL MEDICINE & OMM

## 2023-10-19 PROCEDURE — 3074F PR MOST RECENT SYSTOLIC BLOOD PRESSURE < 130 MM HG: ICD-10-PCS | Mod: CPTII,S$GLB,, | Performed by: NEUROMUSCULOSKELETAL MEDICINE & OMM

## 2023-10-19 PROCEDURE — 99215 OFFICE O/P EST HI 40 MIN: CPT | Mod: 25,S$GLB,, | Performed by: NEUROMUSCULOSKELETAL MEDICINE & OMM

## 2023-10-19 PROCEDURE — 3044F PR MOST RECENT HEMOGLOBIN A1C LEVEL <7.0%: ICD-10-PCS | Mod: CPTII,S$GLB,, | Performed by: NEUROMUSCULOSKELETAL MEDICINE & OMM

## 2023-10-19 PROCEDURE — 3079F DIAST BP 80-89 MM HG: CPT | Mod: CPTII,S$GLB,, | Performed by: NEUROMUSCULOSKELETAL MEDICINE & OMM

## 2023-10-19 PROCEDURE — 1160F RVW MEDS BY RX/DR IN RCRD: CPT | Mod: CPTII,S$GLB,, | Performed by: NEUROMUSCULOSKELETAL MEDICINE & OMM

## 2023-10-19 PROCEDURE — 99999 PR PBB SHADOW E&M-EST. PATIENT-LVL III: CPT | Mod: PBBFAC,,, | Performed by: NEUROMUSCULOSKELETAL MEDICINE & OMM

## 2023-10-19 RX ORDER — TRIAMCINOLONE ACETONIDE 40 MG/ML
40 INJECTION, SUSPENSION INTRA-ARTICULAR; INTRAMUSCULAR
Status: COMPLETED | OUTPATIENT
Start: 2023-10-19 | End: 2023-10-19

## 2023-10-19 RX ADMIN — TRIAMCINOLONE ACETONIDE 40 MG: 40 INJECTION, SUSPENSION INTRA-ARTICULAR; INTRAMUSCULAR at 03:10

## 2023-10-19 NOTE — PROGRESS NOTES
Subjective:     Twila Roman     Chief Complaint   Patient presents with    Right Knee - Pain     5/10    Right Ankle - Pain     1/10     HPI    Olinda is a 58 y.o. female coming in today for right achilles pain, 8 weeks s/p minimally invasive tenotomy. Since last visit the pain has Improved in the achilles with the tenjet, but pt c/o continued tarsal tunnel symptoms. After hydrodissection on 10/6/23 she experienced pain relief for 1 day, numbness remains unchanged. She was unable to have an EMG because the machines are broken. She saw Dr. Queen in podiatry yesterday who did a diagnostic nerve block/CSI. She did experience short term relief with this. She discussed a tarsal tunnel release with Dr. Queen as a next step for there foot pain and weakness. The pain is better with rest, ice and worse with walking, weight bearing. Pt. describes the pain as a 4/10 achy, burning, numb pain that does radiate from her medial ankle to 3-5 toes. There has not been any new a fall/injury/ or traumas since last visit.  Pt. denies any new musculoskeletal complaints at this time.       Olinda is also coming in today for right knee pain. 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  The pain is better with rest, NSAIDs, ice, CSI, VSI and worse with activity, standing. Pt. describes the pain as a 5/10 achy pain that does not radiate. There has not been any new a fall/injury/ or traumas since last visit.  Pt. denies any new musculoskeletal complaints at this time.     Joint instability? no  Mechanical locking/clicking? no  Affecting ADL's? yes  Affecting sleep? yes    Occupation: owns "[employer]    Procedures reviewed:   8/25/23- R Achilles Tenjet - ongoing relief  10/6/23- R tibial nerve hydrodissection- one day of pain relief    Procedures reviewed:   6/2/22 - CSI right knee good improvement x3 weeks  9/22/22- VSI Euflexxa right knee - good improvement x6   11/2/22- CSI R knee -   Niki- no relief  6/8/23- VSI Euflexxa R knee- good relief, ongoing    Office note from 10/6/23 reviewed     PAST MEDICAL HISTORY:   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      PAST SURGICAL HISTORY:   Past Surgical History:   Procedure Laterality Date    BACK SURGERY      spinal fusion    COLONOSCOPY N/A 12/11/2019    Procedure: COLONOSCOPY;  Surgeon: Caroline Swartz MD;  Location: [address]);  Service: Endoscopy;  Laterality: N/A;    FUSION OF SACROILIAC JOINT Left 10/3/2022    Procedure: FUSION, SACROILIAC JOINT;  Surgeon: Henry Felix MD;  Location: [facility] OR;  Service: Pain Management;  Laterality: Left;  LEFT  PAINTEQ REP    INJECTION, SACROILIAC JOINT Left 7/14/2022    Procedure: INJECTION,SACROILIAC JOINT, LEFT;  Surgeon: Henry Felix MD;  Location: [facility] PAIN MGT;  Service: Pain Management;  Laterality: Left;    INJECTION, SACROILIAC JOINT Left 8/16/2022    Procedure: INJECTION,SACROILIAC JOINT, LEFT DIAGNOSTIC SI WITHOUT STEROID;  Surgeon: Henry Felix MD;  Location: [facility] PAIN MGT;  Service: Pain Management;  Laterality: Left;    INJECTION, SACROILIAC JOINT Left 9/1/2022    Procedure: INJECTION,SACROILIAC JOINT LEFT DIAGNOSTIC WITH NO STEROIDS;  Surgeon: Henry Felix MD;  Location: [facility] PAIN MGT;  Service: Pain Management;  Laterality: Left;    KNEE ARTHROSCOPY W/ MENISCECTOMY Left 8/15/2018    Procedure: ARTHROSCOPY, KNEE, WITH MENISCECTOMY;  Surgeon: Handy Elkins MD;  Location: [facility] OR;  Service: Orthopedics;  Laterality: Left;  Video    ROBOT-ASSISTED LAPAROSCOPIC ABDOMINAL HYSTERECTOMY USING DA MARIA ESTHER XI N/A 10/20/2020    Procedure: XI ROBOTIC HYSTERECTOMY;  Surgeon: Danie Burns MD;  Location: [facility];  Service: OB/GYN;  Laterality: N/A;    ROBOT-ASSISTED LAPAROSCOPIC SALPINGO-OOPHORECTOMY USING DA MARIA ESTHER XI Bilateral 10/20/2020    Procedure: XI ROBOTIC  SALPINGO-OOPHORECTOMY;  Surgeon: Danie Burns MD;  Location: [facility] OR;  Service: OB/GYN;  Laterality: Bilateral;    TRANSFORAMINAL EPIDURAL INJECTION OF STEROID Right 2019    Procedure: INJECTION, STEROID, EPIDURAL, TRANSFORAMINAL APPROACH, L4 AND L5;  Surgeon: Daniel Seo MD;  Location: [facility] PAIN MGT;  Service: Pain Management;  Laterality: Right;    TRANSFORAMINAL EPIDURAL INJECTION OF STEROID Right 3/16/2021    Procedure: INJECTION, STEROID, EPIDURAL, TRANSFORAMINAL APPROACH L4/5 AND L5/S1;  Surgeon: Daniel Seo MD;  Location: [facility] PAIN MGT;  Service: Pain Management;  Laterality: Right;    TUMOR REMOVAL      right hand     FAMILY HISTORY:   Family History   Problem Relation Age of Onset    Ovarian cancer Mother 66         at 72    Heart disease Father         double bypass    Macular degeneration Father     Heart failure Father     No Known Problems Brother     No Known Problems Brother     Cancer Paternal Grandfather         lung (pt thinks), possible smoker    Breast cancer Paternal Cousin 20        father's sister's daughter, pt thinks bilateral, cousin is     Other Maternal Aunt         mat aunt had part of colon removed, pt does not think cancer    Breast cancer Maternal Aunt      SOCIAL HISTORY:   Social History     Socioeconomic History    Marital status:    Occupational History    Occupation:      Employer: [employer]    Tobacco Use    Smoking status: Former    Smokeless tobacco: Never   Substance and Sexual Activity    Alcohol use: Yes     Alcohol/week: 5.0 standard drinks of alcohol     Types: 5 Glasses of wine per week     Comment: 3 drinks a week    Drug use: No    Sexual activity: Yes     Partners: Male     Comment:      Social Determinants of Health     Financial Resource Strain: Low Risk  (10/19/2023)    Overall Financial Resource Strain (CARDIA)     Difficulty of Paying Living Expenses: Not very hard   Food Insecurity: No Food  Insecurity (10/19/2023)    Hunger Vital Sign     Worried About Running Out of Food in the Last Year: Never true     Ran Out of Food in the Last Year: Never true   Transportation Needs: No Transportation Needs (10/19/2023)    PRAPARE - Transportation     Lack of Transportation (Medical): No     Lack of Transportation (Non-Medical): No   Physical Activity: Insufficiently Active (10/19/2023)    Exercise Vital Sign     Days of Exercise per Week: 5 days     Minutes of Exercise per Session: 20 min   Stress: Stress Concern Present (10/19/2023)    [employer] - Occupational Stress Questionnaire     Feeling of Stress : To some extent   Social Connections: Unknown (10/19/2023)    Social Connection and Isolation Panel [NHANES]     Frequency of Communication with Friends and Family: Three times a week     Frequency of Social Gatherings with Friends and Family: Twice a week     Active Member of Clubs or Organizations: No     Attends Club or Organization Meetings: Never     Marital Status:    Housing Stability: Low Risk  (10/19/2023)    Housing Stability Vital Sign     Unable to Pay for Housing in the Last Year: No     Number of Places Lived in the Last Year: 1     Unstable Housing in the Last Year: No       MEDICATIONS:   Current Outpatient Medications:     biotin 5,000 mcg TbDL, Take 1 capsule by mouth once daily., Disp: , Rfl:     buPROPion (WELLBUTRIN XL) 150 MG TB24 tablet, Take 1 tablet (150 mg total) by mouth once daily., Disp: 90 tablet, Rfl: 3    buPROPion (WELLBUTRIN XL) 300 MG 24 hr tablet, Take 1 tablet (300 mg total) by mouth once daily., Disp: 90 tablet, Rfl: 3    cholecalciferol, vitamin D3, (VITAMIN D3) 50 mcg (2,000 unit) Tab, Take 1 tablet by mouth once daily., Disp: , Rfl:     FLAXSEED OIL MISC, by Misc.(Non-Drug; Combo Route) route., Disp: , Rfl:     LORazepam (ATIVAN) 1 MG tablet, TAKE 1 TABLET(1 MG) BY MOUTH EVERY 12 HOURS AS NEEDED, Disp: 60 tablet, Rfl: 0    metoprolol  "succinate (TOPROL-XL) 25 MG 24 hr tablet, Take 1 tablet (25 mg total) by mouth once daily., Disp: 90 tablet, Rfl: 3    multivitamin (THERAGRAN) per tablet, Take 1 tablet by mouth once daily., Disp: , Rfl:     tiZANidine (ZANAFLEX) 4 MG tablet, TAKE 1 TABLET BY MOUTH EVERY 12 HOURS AS NEEDED FOR MUSCLE SPASM, Disp: 60 tablet, Rfl: 2  No current facility-administered medications for this visit.    Facility-Administered Medications Ordered in Other Visits:     0.9%  NaCl infusion, 500 mL, Intravenous, Continuous, Inez Joiner MD  ALLERGIES:   Review of patient's allergies indicates:  No Known Allergies    Objective:     VITAL SIGNS: /85   Pulse 78   Ht 5' 5" (1.651 m)   Wt 59 kg (130 lb)   LMP 01/01/2015 (Approximate)   BMI 21.63 kg/m²    General    Vitals reviewed.  Constitutional: She is oriented to person, place, and time. She appears well-developed and well-nourished.   Neurological: She is alert and oriented to person, place, and time.   Psychiatric: She has a normal mood and affect. Her behavior is normal.           MUSCULOSKELETAL EXAM  ANKLE: right ANKLE  The affected ankle is compared to the contralateral ankle.     Observation:    + left mid substance achilles edema, improved on right  Previous incision from mac procedure at the right distal Achilles is healed, well approximated, and with no signs of infection  There is no errythema or ecchymosis.   Shoes reveal a normal wear pattern.  No structural deformities including pes planus/cavus, hallux valgus, or toe deformities.  Negative too-many toes sign.    Normal callus pattern on the feet bilaterally.    No leg or intrinsic foot muscle atrophy.  + R antalgic gait 2/2 to medial ankle pain     ROM (* = with pain):  Active dorsiflexion to 20° on left and 20° on right  Active plantarflexion to 50° on left and 50° on right  Active ankle inversion to 35° on left and 35° on right  Active ankle eversion to 15° on left and 15° on right* (medial ankle pain) "    Full active flexion of the toes bilaterally.  Slight decreased extension right toes with the exception of the great toe.     Tenderness To Palpation:  No tenderness at the ATFL, CFL, PTFL, or deltoid ligaments  No tenderness over the distal anterior syndesmosis, distal tibia/fibula, fibular head/shaft  No tenderness at medial or lateral malleoli   No tenderness at navicular, cuboid, cuneiforms, or talus  No achilles insertional pain at calcaneus  No tenderness along the metatarsals or phalanges  No tenderness at the Achilles tendon calcaneal insertion  No right mid substance achilles tendon tenderness  No tenderness at the posterior tibial or peroneal tendons  + tenderness at the tarsal tunnel in the region of the tibial nerve     Strength Testing (* = with pain):  Dorsiflexion - 5/5 on left and 5-/5 on right  Platarflexion - 5/5 on left and 5/5 on right  Resisted Inversion - 5/5 on left and 5/5 on right  Resisted Eversion - 5/5 on left and 5/5 on right*  Great Toe Extension - 5/5 on left and 5/5 on right  Great Toe Flexion - 5/5 on left and 5/5 on right     Special Tests:  Anterior talar drawer - negative and without dimpling  Talar tilt - negative  Reverse Talar tilt - negative     Heel tap test - negative  Distal tib/fib squeeze test - negative  External rotation stress (Kleiger) test - negative  Luis squeeze test - negative     Metatarsal squeeze test - negative  Midfoot stress test - negative  Calcaneal squeeze test - negative     No subluxation of the peroneal tendons with resisted eversion.     Vascular/Sensory Exam:  DP and PT pulses intact bilaterally  No skin changes, no abnormal hair distribution  Negative supine straight leg raise bilaterally  Decreased sensation to light touch in the tibial nerve distribution on right as well as some superficial peroneal nerve irritation  Positive Tinel's test over tarsal tunnel on right, reproducing symptoms of medial ankle pain and distal toe  tingling  Capillary refill intact <2 seconds in all toes bilaterally.    Right KNEE EXAMINATION      Affected side is compared to contralateral knee      Observation:  No edema, erythema, ecchymosis, or effusion noted.  No muscle atrophy of the thighs and calves noted.  No obvious bony deformities noted.   No Genu valgus/varum noted.  No recurvatum noted.    No tibial internal/external torsion.    Posture:  Posterior pelvis tilt with loss of lumbar lordosis  Gait: right antalgic with Neutral ankle mechanics and Neutral medial arch  + right anterior knee abrasion noted     Tenderness:  Patella - none                                    Lateral joint line - none  Quad tendon - none                            Medial joint line - +  Patellar tendon - none                         Medial plica - none  Tibial tubercle - none                          Lateral plica - none  Pes anserine - none                            MCL prox - none  Distal ITB - none                                 MCL distal - none  MFC - none                                         LCL prox - none  LFC - none                                          LCL distal - none  Tibia - none                                         Fibula - none     No obvious bursae, plicae, popliteal cysts, or tendon derangement palpated.                                                                            ROM (* = with pain):     Active extension to 0° on left without hyperextension, lag, crepitus, or patellar J sign.   Active extension to 0° on right* without hyperextension, lag, crepitus, or patellar J sign.  Active flexion to 135° on left and 135° on right*     Strength(* = with pain):  Knee Flexion - 5/5 on left and 5/5 on right  Knee Extension - 5/5 on left and 5/5 on right  Hip Flexion - 5/5 on left and 5/5 on right  Hip Extension - 5/5 on left and 5/5 on right  Ankle dorsiflexion - 5/5 on left and 5/5 on right  Ankle Plantarflexion - 5/5 on left and 5/5 on right      Patellofemoral Exam:   Patellar ballottement - negative  Bulge sign - negative  Patellar grind - negative     No patellar laxity with medial and lateral translation   No apprehension with medial and lateral patellar translation.      Meniscus Testing:                              + pain with terminal flexion or extension at the medial joint line  Skylers test -negative  Bounce home test - negative     Ligament Testing:  Lachman's test - negative  No laxity with anterior drawer.   No laxity with posterior drawer.    No posterior sag sign.   No laxity with varus testing at 0 and 30 degrees.  No laxity with valgus testing at 0 and 30 degrees.    Large Joint Aspiration/Injection  Knee joint, right    Performed by: GIUSEPPE DAHL  Authorized by: GIUSEPPE DAHL  Consent Done?: Yes (Verbal)  Indications: Pain  Site marked: The procedure site was marked   Timeout: Prior to procedure the correct patient, procedure, and site was verified     Location: Knee joint, right  Prep: Patient was prepped with Chlorhexidine and alcohol.  Skin anesthetic: Ethyl Chloride spray was used prior to skin puncture.  Ultrasound Guidance for needle placement: yes  Procedure: After local anesthetic was applied, the 21G, 2 needle was used to enter the right knee joint capsule under US guidance. A 3 cc mixture of 1 cc of 40 mg/ml triamcinolone acetonide and 2 cc of 0.2% Naropin was injected into the right knee joint.   Approach: superolateral  Medications: 40 mg triamcinolone acetonide 40 mg/mL  Patient tolerance: Patient tolerated the procedure well with no immediate complications    Ultrasound guidance was used for needle localization with SonLiquid5te Edge 2, 9-L MHz linear probe(s). Images were saved and stored for documentation. The knee joint was visualized. Short and long axis images of the anterior right knee were taken prior to injection.Dynamic visualization of the needle(s) was continuous throughout the procedure and maintained  good position and correct needle placement.      Triamcinolone:  NDC: [address]-2  LOT: [id number]  EXP: 04/2025    Assessment:      Encounter Diagnoses   Name Primary?    Primary osteoarthritis of right knee Yes    Chondromalacia of patellofemoral joint, right     Other tear of medial meniscus of right knee, unspecified whether old or current tear, subsequent encounter     Tarsal tunnel syndrome, right     Achilles tendinosis of right ankle           Plan:   1. Persistent right tarsal tunnel syndrome symptoms s/p recent hydrodisseciton and recent CSI by podiatry.  Other failed treatments have included rest, NSAIDs, and a Medrol Dosepak. Patient unable to tolerate gabapentin prescription due to side effects. No evident lumbar radicular symptoms appreciated on physical exam today.  - recommend EMG for further evaluation as well as follow-up with Dr. Queen for further discussion of tarsal tunnel release  - limited diagnostic musculoskeletal ultrasound performed 9/21/23 showed sonographic evidence of right tibial nerve irritation at the level of the tarsal tunnel, consistent with tarsal tunnel syndrome    2. Right midsubstance Achilles tendinosis improved s/p [place] on 8/25/23  - recommend continued progression with fPT as scheduled and supportive shoe wear  -  X-ray images of bilateral calcanei taken 5/4/23 (AP and lateral views) showed Mild pes cavus configuration bilaterally. Post-operative posterior calcaneal cortical changes noted bilaterally from prior amador deformity removal. Images were personally reviewed with patient.  - Diagnostic ultrasound of bilateral achilles taken 6/1/23 showed sonographic evidence of bilateral midsubstance and insertional Achilles tendinosis with no clear tearing, left worse than right.    3. Flair of right knee pain s/p fall without any current mechanical symptoms. Previous right knee MRI noting a medial meniscus tear and patellofemoral chondromalacia, improved with Euflexxa injection  series completed on 6/8/23. No relief with previous CSI.   - discussed with patient option of repeated ultrasound-guided CSI due to acute flair of pain. Pt. Can repeat VSI on or after 12/9/23 as needed for right knee pain for continued conservative tx vs. Orthobioligc injections (PRP vs. Adipose) vs. Ortho consult of knee arthroscope  - Continue OTC NSAIDS as needed for pain control.   -   Recommend ice of 20 minutes at a time as needed for pain control  - consider formal PT for increased knee stability if symptoms persists  -  X-ray images of right knee taken 5/11/22 (AP bilateral standing, PA bilateral standing in flexion, bilateral merchants, and  bilateral lateral views) showed Kellgren-Ravi grade 2 OA bilaterally. Images were personally reviewed with patient.  - MRI  images of right knee taken 5/25/22 showed Medial meniscus vertical longitudinal tear at the junction of the body segment and posterior horn.  No tear of the lateral meniscus. Patellofemoral chondromalacia (Full-thickness cartilage loss of the central trochlea measuring 1.0 x 0.4 cm with mild subchondral marrow edema). Images were personally reviewed with patient.    4. Follow-up as needed if pain deteriorates or new issue arises    5. Patient agreeable to today's plan and all questions were answered    This note is dictated using the M*Modal Fluency Direct word recognition program. There are word recognition mistakes that are occasionally missed on review.      Total time spent face-to face with patient counseling or coordinating care including prognosis, differential diagnosis, risks and benefits of treatment, instructions, compliance risk reductions as well as non-face-to-face time personally spent reviewing medial record, medical documentation, and coordination of care.     EST MINUTES X   [id number] 10-19    [id number] 20-29    [id number] 30-39    [id number] 40-54 x   NEW     [id number] 15-29    [id number] 30-44    [id number] 45-59    02394 60-74    PHONE      5-10     [id number] 11-20    [id number] 21-30

## 2023-10-20 ENCOUNTER — PATIENT MESSAGE (OUTPATIENT)
Dept: PODIATRY | Facility: CLINIC | Age: 58
End: 2023-10-20
Payer: COMMERCIAL

## 2023-10-21 NOTE — PROGRESS NOTES
[facility]   Physical Therapy Treatment Note     Name: Twila Roman  Clinic Number: [id number]    Therapy Diagnosis:   Encounter Diagnoses   Name Primary?    Gait abnormality Yes    Decreased range of motion of right ankle      Physician: Sona Fernandez DO    Visit Date: 9/28/2023  Physician Orders: PT Eval and Treat   Medical Diagnosis from Referral:   M67.873 (ICD-10-CM) - Achilles tendinosis of right ankle   G57.51 (ICD-10-CM) - Tarsal tunnel syndrome, right      Evaluation Date: 9/8/2023  Authorization Period Expiration: 10/15/2023  Plan of Care Expiration: 11/25/2023  Progress Note Due: 10/10/2023  Visit # / Visits authorized: 11/20  This POC: 5 (+eval)  FOTO: 1/3    PTA Visit #: 0/5     FOTO first follow up:   FOTO second follow up:     Note from Referral:  Phase 1: Protective Phase (Days 0-14)  Tall boot 24/7 for 14 days  Non weight bearing x1 week  Partial weight bearing week 2 (with gentle ROM, ankle pumps)     Phase II: Restorative Phase (Days 14-28)  2:1 ratio of eccentric:concentric exercises, low weight, high reps  High volume to stimulate metabolic activity   Pain should remain below 3-4/10 and return to baseline at 24hr (physician can be made aware if consistently painful, MSK-US for hyperemia)  Avoid IASTM early on!     Phase III: Re-Load (Days 28-60)  Progress to repetitive activities with re-education of load management  Return to work/sport activity expected 6 weeks, athletic competition at 8 weeks    Time In: 3:03 pm  Time Out: 3:58 pm  Total Billable Time: 55 minutes    SUBJECTIVE     Pt reports: She feels her achilles pain is better but she is very worried about the tarsal tunnel and not having any improvement.  She was compliant with home exercise program.  Response to previous treatment: Independent in HEP   Functional change: Ongoing     Pain: 3/10  Location: right foot      OBJECTIVE     Objective Measures updated at progress report unless specified.  "    Treatment       Olinda received the treatments listed below:      therapeutic exercises to develop strength, endurance, ROM, flexibility, posture, and core stabilization for 10 minutes including:    Sidelying clamshells BlueTB 2 x 12  reps   Ankle DF/IV YellowTB 3 x 10 each    Not Performed:  LAQ 3 x 12 reps 4#   Sidelying hip abduction 3 x 12 reps      manual therapy techniques: Joint mobilizations and Soft tissue Mobilization were applied to the: Hip, knee, ankle for 12 minutes, including:    Short axis hip distraction   Right tib/fib mobilizations prox/distal  Open pack position TC mobilizations grade I-II     Not Today:  TC mobilization - not tolerated well    neuromuscular re-education activities to improve: Balance, Coordination, Kinesthetic, Sense, Proprioception, and Posture for 33 minutes. The following activities were included:    Ankles Assessment  Nerve glides 1 x 15 reps   Peroneal resistance YellowTB 3 x 8 reps   Seated short arches 2 x 10 x 5" holds   Seated 1st toe abduction 2 x 10 reps   Heel raises standing with tennis ball 3 x 8 reps   Seated heel raises 15# 3 x 8 x 3" holds with tennis ball     Not Performed:  Towel crunches to tolerance x3'  Bridges with arch doming 3x10x5"  Ankle PF BlueTB 3 x 12-15 reps   Gait mechanics working heel to toe to normalize mechanics     therapeutic activities to improve functional performance for 00  minutes, including:      gait training to improve functional mobility and safety for 00 minutes, including:      Patient Education and Home Exercises     Home Exercises Provided and Patient Education Provided     Education provided:   - PT POC, Prognosis, and HEP   - Adverse neural tension and time takes to heal   - Progressive loading and not overloading with returning to work     Written Home Exercises Provided: Patient instructed to cont prior HEP. Exercises were reviewed and Olinda was able to demonstrate them prior to the end of the session.  Olinda demonstrated " good  understanding of the education provided. See EMR under Patient Instructions for exercises provided during therapy sessions    ASSESSMENT     Olinda continues with increased ankle pain with tarsal tunnel symptoms with improvement in achilles symptoms. Manual therapy utilized to try and offload her nervous system with fair tolerance. She continues to be progressed with foot intrinsics and progressed as tolerated with achilles loading though limited with neural symptoms. Will continue to monitor and progress as able.     Olinda Is progressing well towards her goals.   Pt prognosis is Good.     Pt will continue to benefit from skilled outpatient physical therapy to address the deficits listed in the problem list box on initial evaluation, provide pt/family education and to maximize pt's level of independence in the home and community environment.     Pt's spiritual, cultural and educational needs considered and pt agreeable to plan of care and goals.     Anticipated barriers to physical therapy: [place], job demands, adverse neural tension    Goals:   Short Term Goals: 4 weeks (Progressing, not met)  Patient will be independent in initial HEP to help supplement PT.   Patient will improve right ankle range of motion to symmetrical to left to help with gait mechanics.   Patient will demonstrate normal gait mechanics without pain to help improve quality of life.      Long Term Goals: 8-10 weeks (Progressing, not met)  Patient will be independent in updated HEP to help supplement PT and maintain gains made in PT.   Patient will improve FOTO score to >/= predicted score (70%) to show improvement in condition.   Patient will demonstrate a negative tinel's to demonstrate improvement in adverse neural tension.  Patient will improve ankle strength to >/= 4-/5 to help with job duties.     PLAN   Plan of care Certification: 9/8/2023 to 11/25/2023.    Continue with current plan of care.     Rylie Shaknar, PT, DPT, OCS

## 2023-10-23 ENCOUNTER — TELEPHONE (OUTPATIENT)
Dept: PODIATRY | Facility: CLINIC | Age: 58
End: 2023-10-23
Payer: COMMERCIAL

## 2023-10-23 ENCOUNTER — TELEPHONE (OUTPATIENT)
Dept: NEUROLOGY | Facility: CLINIC | Age: 58
End: 2023-10-23
Payer: COMMERCIAL

## 2023-10-23 ENCOUNTER — HOSPITAL ENCOUNTER (OUTPATIENT)
Dept: RADIOLOGY | Facility: HOSPITAL | Age: 58
Discharge: HOME | End: 2023-10-23
Attending: PODIATRIST
Payer: COMMERCIAL

## 2023-10-23 DIAGNOSIS — M79.671 RIGHT FOOT PAIN: ICD-10-CM

## 2023-10-23 DIAGNOSIS — G57.81 NERVE ENTRAPMENT OF LOWER LIMB, RIGHT: ICD-10-CM

## 2023-10-23 DIAGNOSIS — G57.51 TARSAL TUNNEL SYNDROME OF RIGHT SIDE: ICD-10-CM

## 2023-10-23 PROCEDURE — 73721 MRI ANKLE WITHOUT CONTRAST RIGHT: ICD-10-PCS | Mod: 26,RT,, | Performed by: RADIOLOGY

## 2023-10-23 PROCEDURE — 73721 MRI JNT OF LWR EXTRE W/O DYE: CPT | Mod: 26,RT,, | Performed by: RADIOLOGY

## 2023-10-23 PROCEDURE — 73721 MRI JNT OF LWR EXTRE W/O DYE: CPT | Mod: TC,RT

## 2023-10-23 NOTE — TELEPHONE ENCOUNTER
----- Message from Estevan Queen DPM sent at 10/22/2023  5:29 PM CDT -----  Please see if we can get this patient's EMG study done prior to her current scheduled appointment on 11/27 pelvic thank you.

## 2023-10-23 NOTE — TELEPHONE ENCOUNTER
Spoke to patient and was able to offer an appointment for her EMG Procedure here at [facility] on [facility] on Friday, Nov 10,2023 at 9am. Patient accepted the appointment and also asked that I keep her on the wait list in case something sooner should become available. An appointment letter is being placed in the mail to the address on file.

## 2023-10-23 NOTE — TELEPHONE ENCOUNTER
----- Message from Maxine Patel sent at 10/19/2023  3:00 PM CDT -----  Good afternoon,  This patient is scheduled for her EMG at [facility] on 11/27 but would like to get something sooner. Are you able to  sooner at [facility]?    Thanks!  Maxine Patel, MS, OTC  Clinical Assistant to Dr. Sona Fernandez

## 2023-10-26 ENCOUNTER — TELEPHONE (OUTPATIENT)
Dept: PODIATRY | Facility: CLINIC | Age: 58
End: 2023-10-26
Payer: COMMERCIAL

## 2023-10-26 NOTE — TELEPHONE ENCOUNTER
----- Message from Estevan Queen DPM sent at 10/26/2023  4:37 PM CDT -----  MRI shows no evidence of tarsal tunnel mass, recommend moving forward with tarsal tunnel release pending EMG results.  Patient to follow-up to discuss possible surgery/EMG results once available.  Thank you.  ----- Message -----  From: Interface, Rad Results In  Sent: 10/24/2023   7:31 AM CDT  To: Estevan Queen DPM

## 2023-10-28 NOTE — PROGRESS NOTES
Subjective:      Patient ID: Twila Roman is a 58 y.o. female.    Chief Complaint:   Foot Pain (Right foot tarsal tunnel syndrome )    Twila is a 58 y.o. female who presents to the podiatry clinic  with complaint of  right foot pain. Onset of the symptoms was several months ago. Precipitating event: increased activity. Current symptoms include:  Tingling, burning, and numbness . Aggravating factors: any weight bearing. Symptoms have gradually worsened. Patient has had no prior foot problems. Evaluation to date: Ortho consult:  Sports Medicine therapy/treatment and EMG is currently ordered . Treatment to date: avoidance of offending activity. Patients rates pain 10/10 on pain scale.    Review of Systems   Constitutional: Negative for chills, decreased appetite, fever and malaise/fatigue.   HENT:  Negative for congestion, hearing loss, nosebleeds and tinnitus.    Eyes:  Negative for double vision, pain, photophobia and visual disturbance.   Cardiovascular:  Negative for chest pain, claudication, cyanosis and leg swelling.   Respiratory:  Negative for cough, hemoptysis, shortness of breath and wheezing.    Endocrine: Negative for cold intolerance and heat intolerance.   Hematologic/Lymphatic: Negative for adenopathy and bleeding problem.   Skin:  Negative for color change, dry skin, itching, nail changes and suspicious lesions.   Musculoskeletal:  Negative for arthritis, joint pain, myalgias and stiffness.   Gastrointestinal:  Negative for abdominal pain, jaundice, nausea and vomiting.   Genitourinary:  Negative for dysuria, frequency and hematuria.   Neurological:  Positive for numbness, paresthesias and sensory change. Negative for difficulty with concentration and loss of balance.   Psychiatric/Behavioral:  Negative for altered mental status, hallucinations and suicidal ideas. The patient is not nervous/anxious.    Allergic/Immunologic: Negative for environmental allergies and persistent infections.          Objective:      Physical Exam          Assessment:       Encounter Diagnoses   Name Primary?    Tarsal tunnel syndrome of right side Yes    Right foot pain     Nerve entrapment of lower limb, right      Independent visualization of imaging was performed.  Results were reviewed in detail with patient.       Plan:       Twila was seen today for foot pain.    Diagnoses and all orders for this visit:    Tarsal tunnel syndrome of right side  -     MRI Ankle Without Contrast Right; Future    Right foot pain  -     MRI Ankle Without Contrast Right; Future    Nerve entrapment of lower limb, right  -     MRI Ankle Without Contrast Right; Future      I counseled the patient on her conditions, their implications and medical management.    The nature of the condition, options for management, as well as potential risks and complications were discussed in detail with patient. Patient was amenable to my recommendations and left my office fully informed and will follow up as instructed or sooner if necessary.      Nerve injection:    Performed by:  Estevan Queen DPM    Preop diagnosis:  Neuropathy symptoms/paresthesias/pain    The area overlying the right posterior tibial nerve(s) was sterilely prepped, verbal consent was obtained, 2 cc's of 0.5% Marcaine plain was infiltrated around the affected nerve(s) for a diagnostic nerve block.  This was well tolerated with no complications.        Discussed options for peripheral neuropathy/nerve entrapment syndrome including nerve block therapy, surgical nerve entrapment decompression procedures, and various vitamins and supplementation available shown to improve nerve function.    Follow-up to discuss affected this nerve block/EMG results/discuss possible tarsal tunnel release right ankle.

## 2023-11-02 ENCOUNTER — PATIENT MESSAGE (OUTPATIENT)
Dept: PODIATRY | Facility: CLINIC | Age: 58
End: 2023-11-02
Payer: COMMERCIAL

## 2023-11-06 RX ORDER — CYCLOBENZAPRINE HCL 5 MG
5 TABLET ORAL 3 TIMES DAILY PRN
Qty: 20 TABLET | Refills: 0 | Status: SHIPPED | OUTPATIENT
Start: 2023-11-06 | End: 2023-11-26

## 2023-11-06 RX ORDER — LORAZEPAM 1 MG/1
TABLET ORAL
Qty: 60 TABLET | Refills: 0 | Status: SHIPPED | OUTPATIENT
Start: 2023-11-06 | End: 2023-12-26

## 2023-11-06 NOTE — TELEPHONE ENCOUNTER
No care due was identified.  [facility] Embedded Care Due Messages. Reference number: [id number].   11/05/2023 6:26:39 PM CST

## 2023-11-10 ENCOUNTER — PROCEDURE VISIT (OUTPATIENT)
Dept: NEUROLOGY | Facility: CLINIC | Age: 58
End: 2023-11-10
Payer: COMMERCIAL

## 2023-11-10 DIAGNOSIS — G57.50 TARSAL TUNNEL SYNDROME, UNSPECIFIED LATERALITY: ICD-10-CM

## 2023-11-10 DIAGNOSIS — R20.8 BURNING SENSATION OF FOOT: ICD-10-CM

## 2023-11-10 PROCEDURE — 95886 MUSC TEST DONE W/N TEST COMP: CPT | Mod: S$GLB,,, | Performed by: PSYCHIATRY & NEUROLOGY

## 2023-11-10 PROCEDURE — 95912 NRV CNDJ TEST 11-12 STUDIES: CPT | Mod: S$GLB,,, | Performed by: PSYCHIATRY & NEUROLOGY

## 2023-11-10 PROCEDURE — 95886 PR EMG COMPLETE, W/ NERVE CONDUCTION STUDIES, 5+ MUSCLES: ICD-10-PCS | Mod: S$GLB,,, | Performed by: PSYCHIATRY & NEUROLOGY

## 2023-11-10 PROCEDURE — 95912 PR NERVE CONDUCTION STUDY; 11 -12 STUDIES: ICD-10-PCS | Mod: S$GLB,,, | Performed by: PSYCHIATRY & NEUROLOGY

## 2023-11-13 ENCOUNTER — PATIENT MESSAGE (OUTPATIENT)
Dept: PODIATRY | Facility: CLINIC | Age: 58
End: 2023-11-13
Payer: COMMERCIAL

## 2023-11-14 ENCOUNTER — TELEPHONE (OUTPATIENT)
Dept: PODIATRY | Facility: CLINIC | Age: 58
End: 2023-11-14
Payer: COMMERCIAL

## 2023-11-14 NOTE — TELEPHONE ENCOUNTER
Message from Dr. Queen was given to patient appointment scheduled for 11/20. Recommend tarsal tunnel release procedure.  Please have patient come in to review EMG and discuss procedure further and sign consent if she is interested in moving forward.  Thank you

## 2023-11-20 ENCOUNTER — TELEPHONE (OUTPATIENT)
Dept: PODIATRY | Facility: CLINIC | Age: 58
End: 2023-11-20
Payer: COMMERCIAL

## 2023-11-20 NOTE — TELEPHONE ENCOUNTER
Spoke with pt. Informed pt that provider is out of clinic and  that I will speak to provider and reach out to r/s her once I do so. Pt verbalized understanding.

## 2023-11-21 ENCOUNTER — PATIENT MESSAGE (OUTPATIENT)
Dept: PODIATRY | Facility: CLINIC | Age: 58
End: 2023-11-21
Payer: COMMERCIAL

## 2023-11-27 ENCOUNTER — OFFICE VISIT (OUTPATIENT)
Dept: PODIATRY | Facility: CLINIC | Age: 58
End: 2023-11-27
Payer: COMMERCIAL

## 2023-11-27 VITALS
BODY MASS INDEX: 21.67 KG/M2 | WEIGHT: 130.06 LBS | HEIGHT: 65 IN | HEART RATE: 76 BPM | SYSTOLIC BLOOD PRESSURE: 154 MMHG | DIASTOLIC BLOOD PRESSURE: 89 MMHG

## 2023-11-27 DIAGNOSIS — G57.51 TARSAL TUNNEL SYNDROME OF RIGHT SIDE: ICD-10-CM

## 2023-11-27 DIAGNOSIS — M79.671 RIGHT FOOT PAIN: Primary | ICD-10-CM

## 2023-11-27 PROCEDURE — 99214 PR OFFICE/OUTPT VISIT, EST, LEVL IV, 30-39 MIN: ICD-10-PCS | Mod: S$GLB,,, | Performed by: PODIATRIST

## 2023-11-27 PROCEDURE — 3044F HG A1C LEVEL LT 7.0%: CPT | Mod: CPTII,S$GLB,, | Performed by: PODIATRIST

## 2023-11-27 PROCEDURE — 3077F SYST BP >= 140 MM HG: CPT | Mod: CPTII,S$GLB,, | Performed by: PODIATRIST

## 2023-11-27 PROCEDURE — 3008F BODY MASS INDEX DOCD: CPT | Mod: CPTII,S$GLB,, | Performed by: PODIATRIST

## 2023-11-27 PROCEDURE — 1159F PR MEDICATION LIST DOCUMENTED IN MEDICAL RECORD: ICD-10-PCS | Mod: CPTII,S$GLB,, | Performed by: PODIATRIST

## 2023-11-27 PROCEDURE — 99999 PR PBB SHADOW E&M-EST. PATIENT-LVL IV: ICD-10-PCS | Mod: PBBFAC,,, | Performed by: PODIATRIST

## 2023-11-27 PROCEDURE — 3008F PR BODY MASS INDEX (BMI) DOCUMENTED: ICD-10-PCS | Mod: CPTII,S$GLB,, | Performed by: PODIATRIST

## 2023-11-27 PROCEDURE — 3079F PR MOST RECENT DIASTOLIC BLOOD PRESSURE 80-89 MM HG: ICD-10-PCS | Mod: CPTII,S$GLB,, | Performed by: PODIATRIST

## 2023-11-27 PROCEDURE — 3077F PR MOST RECENT SYSTOLIC BLOOD PRESSURE >= 140 MM HG: ICD-10-PCS | Mod: CPTII,S$GLB,, | Performed by: PODIATRIST

## 2023-11-27 PROCEDURE — 1159F MED LIST DOCD IN RCRD: CPT | Mod: CPTII,S$GLB,, | Performed by: PODIATRIST

## 2023-11-27 PROCEDURE — 99999 PR PBB SHADOW E&M-EST. PATIENT-LVL IV: CPT | Mod: PBBFAC,,, | Performed by: PODIATRIST

## 2023-11-27 PROCEDURE — 3079F DIAST BP 80-89 MM HG: CPT | Mod: CPTII,S$GLB,, | Performed by: PODIATRIST

## 2023-11-27 PROCEDURE — 3044F PR MOST RECENT HEMOGLOBIN A1C LEVEL <7.0%: ICD-10-PCS | Mod: CPTII,S$GLB,, | Performed by: PODIATRIST

## 2023-11-27 PROCEDURE — 99214 OFFICE O/P EST MOD 30 MIN: CPT | Mod: S$GLB,,, | Performed by: PODIATRIST

## 2023-11-27 RX ORDER — TRAMADOL HYDROCHLORIDE 50 MG/1
50 TABLET ORAL EVERY 12 HOURS PRN
Qty: 30 TABLET | Refills: 0 | Status: SHIPPED | OUTPATIENT
Start: 2023-11-27 | End: 2024-01-05

## 2023-11-27 NOTE — LETTER
November 30, 2023    Twila Roman  [address] [place] [address]  [place] [address]             [id number]  [address]  [place] [address]  Phone: [phone]   November 30, 2023     Patient: Twila Roman   YOB: 1965   Date of Visit: 11/27/2023       To Whom it May Concern:    Twila Roman was seen in my clinic on 11/27/2023.   Twila is being treated for severe right ankle pain secondary to a nerve entrapment syndrome.  MRI was necessary to review asap due to upcoming surgery to rule out possible soft tissue mass/tumor and to aid in surgical planning. Unfortunately in this scenario, the patient needed the MRI before the approval was received.  Please take this into consideration and thank you for your understanding with this matter.    If you have any questions or concerns, please don't hesitate to call.    Sincerely,         Estevan Queen DPM

## 2023-11-30 ENCOUNTER — TELEPHONE (OUTPATIENT)
Dept: PODIATRY | Facility: CLINIC | Age: 58
End: 2023-11-30
Payer: COMMERCIAL

## 2023-11-30 ENCOUNTER — TELEPHONE (OUTPATIENT)
Dept: INTERNAL MEDICINE | Facility: CLINIC | Age: 58
End: 2023-11-30
Payer: COMMERCIAL

## 2023-11-30 NOTE — TELEPHONE ENCOUNTER
bernice Roman is scheduled to have a surgery  with Dr. Queen  on 01/05.  We request surgical clearance.  The patient is scheduled for 12/07 . We request a  EKG, CBC, BMP, and any other testing deemed necessary by your office. When completed please note chart stating whether the patient is cleared., along with any results to audrey Bhandari.  Please contact us if you have any questions.  Our office number is [phone].

## 2023-11-30 NOTE — TELEPHONE ENCOUNTER
Left voice message for patient to give our office a call back at [phone]. Help with scheduling surgery for Dr. Queen.

## 2023-11-30 NOTE — TELEPHONE ENCOUNTER
Spoke to pt and discussed 01/05 surgery date. Also advised pt to contact their PCP to schedule an appointment to be cleared for surgery. Then advised pt that someone will call them the day before surgery between 3p-5p with the surgery arrival time and instructions. Pt verbalized understanding and call ended.

## 2023-12-05 NOTE — PROGRESS NOTES
Subjective:      Patient ID: Twila Roman is a 58 y.o. female.    Chief Complaint:   Foot Problem (Surgery consent signing)    Twila is a 58 y.o. female who presents to the podiatry clinic  with complaint of  right foot pain. Onset of the symptoms was several months ago. Precipitating event: increased activity. Current symptoms include:  Tingling, burning, and numbness . Aggravating factors: any weight bearing. Symptoms have gradually worsened. Patient has had no prior foot problems. Evaluation to date: Ortho consult:  Sports Medicine therapy/treatment and EMG is currently ordered . Treatment to date: avoidance of offending activity. Patients rates pain 10/10 on pain scale.  She did well with the injection.  She would like to discuss surgical intervention for tarsal tunnel.    Review of Systems   Constitutional: Negative for chills, decreased appetite, fever and malaise/fatigue.   HENT:  Negative for congestion, hearing loss, nosebleeds and tinnitus.    Eyes:  Negative for double vision, pain, photophobia and visual disturbance.   Cardiovascular:  Negative for chest pain, claudication, cyanosis and leg swelling.   Respiratory:  Negative for cough, hemoptysis, shortness of breath and wheezing.    Endocrine: Negative for cold intolerance and heat intolerance.   Hematologic/Lymphatic: Negative for adenopathy and bleeding problem.   Skin:  Negative for color change, dry skin, itching, nail changes and suspicious lesions.   Musculoskeletal:  Negative for arthritis, joint pain, myalgias and stiffness.   Gastrointestinal:  Negative for abdominal pain, jaundice, nausea and vomiting.   Genitourinary:  Negative for dysuria, frequency and hematuria.   Neurological:  Positive for numbness, paresthesias and sensory change. Negative for difficulty with concentration and loss of balance.   Psychiatric/Behavioral:  Negative for altered mental status, hallucinations and suicidal ideas. The patient is not nervous/anxious.     Allergic/Immunologic: Negative for environmental allergies and persistent infections.           Objective:      Physical Exam          Assessment:       Encounter Diagnoses   Name Primary?    Tarsal tunnel syndrome of right side     Right foot pain Yes     Independent visualization of imaging was performed.  Results were reviewed in detail with patient.       Plan:       Twila was seen today for foot problem.    Diagnoses and all orders for this visit:    Right foot pain  -     Case Request Operating Room: RELEASE, TARSAL TUNNEL    Tarsal tunnel syndrome of right side  -     Case Request Operating Room: RELEASE, TARSAL TUNNEL    Other orders  -     traMADoL (ULTRAM) 50 mg tablet; Take 1 tablet (50 mg total) by mouth every 12 (twelve) hours as needed.      I counseled the patient on her conditions, their implications and medical management.    The nature of the condition, options for management, as well as potential risks and complications were discussed in detail with patient. Patient was amenable to my recommendations and left my office fully informed and will follow up as instructed or sooner if necessary.          Discussed options for peripheral neuropathy/nerve entrapment syndrome including nerve block therapy, surgical nerve entrapment decompression procedures, and various vitamins and supplementation available shown to improve nerve function.    Independent visualization of imaging was performed.  Results were reviewed in detail with patient.    The patient has decided to forego any further conservative treatment and opted for surgical intervention.  Alternative treatments, and benefits of surgery were discussed with the patient.  Risks and complications, including but not limited to: pain, swelling, numbness, infection, failure to achieve the stated goals, recurrence of problem, nerve and blood vessel damage, scar tissue formation, further need of surgery, loss of limb or life, was discussed in detail  with the patient.  All questions asked were answered, and written informed consent was sign and received. The patient will undergo tarsal tunnel release right foot.

## 2023-12-12 ENCOUNTER — HOSPITAL ENCOUNTER (OUTPATIENT)
Dept: RADIOLOGY | Facility: HOSPITAL | Age: 58
Discharge: HOME | End: 2023-12-12
Attending: INTERNAL MEDICINE
Payer: COMMERCIAL

## 2023-12-12 VITALS — WEIGHT: 130 LBS | BODY MASS INDEX: 21.66 KG/M2 | HEIGHT: 65 IN

## 2023-12-12 DIAGNOSIS — Z12.31 ENCOUNTER FOR SCREENING MAMMOGRAM FOR BREAST CANCER: ICD-10-CM

## 2023-12-12 PROCEDURE — 77067 SCR MAMMO BI INCL CAD: CPT | Mod: TC

## 2023-12-12 PROCEDURE — 77063 BREAST TOMOSYNTHESIS BI: CPT | Mod: 26,,, | Performed by: RADIOLOGY

## 2023-12-12 PROCEDURE — 77063 MAMMO DIGITAL SCREENING BILAT WITH TOMO: ICD-10-PCS | Mod: 26,,, | Performed by: RADIOLOGY

## 2023-12-12 PROCEDURE — 77067 MAMMO DIGITAL SCREENING BILAT WITH TOMO: ICD-10-PCS | Mod: 26,,, | Performed by: RADIOLOGY

## 2023-12-12 PROCEDURE — 77067 SCR MAMMO BI INCL CAD: CPT | Mod: 26,,, | Performed by: RADIOLOGY

## 2023-12-13 ENCOUNTER — OFFICE VISIT (OUTPATIENT)
Dept: INTERNAL MEDICINE | Facility: CLINIC | Age: 58
End: 2023-12-13
Payer: COMMERCIAL

## 2023-12-13 ENCOUNTER — LAB VISIT (OUTPATIENT)
Dept: LAB | Facility: HOSPITAL | Age: 58
End: 2023-12-13
Payer: COMMERCIAL

## 2023-12-13 VITALS
DIASTOLIC BLOOD PRESSURE: 88 MMHG | WEIGHT: 125.44 LBS | SYSTOLIC BLOOD PRESSURE: 128 MMHG | OXYGEN SATURATION: 98 % | HEIGHT: 65 IN | HEART RATE: 78 BPM | RESPIRATION RATE: 18 BRPM | BODY MASS INDEX: 20.9 KG/M2 | TEMPERATURE: 99 F

## 2023-12-13 DIAGNOSIS — Z01.818 ENCOUNTER FOR PRE-OPERATIVE EXAMINATION: Primary | ICD-10-CM

## 2023-12-13 DIAGNOSIS — Z01.818 ENCOUNTER FOR PRE-OPERATIVE EXAMINATION: ICD-10-CM

## 2023-12-13 LAB
ALBUMIN SERPL BCP-MCNC: 4.4 G/DL (ref 3.5–5.2)
ALP SERPL-CCNC: 58 U/L (ref 55–135)
ALT SERPL W/O P-5'-P-CCNC: 46 U/L (ref 10–44)
ANION GAP SERPL CALC-SCNC: 9 MMOL/L (ref 8–16)
AST SERPL-CCNC: 38 U/L (ref 10–40)
BASOPHILS # BLD AUTO: 0.07 K/UL (ref 0–0.2)
BASOPHILS NFR BLD: 1 % (ref 0–1.9)
BILIRUB SERPL-MCNC: 0.8 MG/DL (ref 0.1–1)
BUN SERPL-MCNC: 12 MG/DL (ref 6–20)
CALCIUM SERPL-MCNC: 9.6 MG/DL (ref 8.7–10.5)
CHLORIDE SERPL-SCNC: 105 MMOL/L (ref 95–110)
CO2 SERPL-SCNC: 26 MMOL/L (ref 23–29)
CREAT SERPL-MCNC: 0.8 MG/DL (ref 0.5–1.4)
DIFFERENTIAL METHOD: ABNORMAL
EOSINOPHIL # BLD AUTO: 0.1 K/UL (ref 0–0.5)
EOSINOPHIL NFR BLD: 1.1 % (ref 0–8)
ERYTHROCYTE [DISTWIDTH] IN BLOOD BY AUTOMATED COUNT: 12.1 % (ref 11.5–14.5)
EST. GFR  (NO RACE VARIABLE): >60 ML/MIN/1.73 M^2
GLUCOSE SERPL-MCNC: 89 MG/DL (ref 70–110)
HCT VFR BLD AUTO: 42 % (ref 37–48.5)
HGB BLD-MCNC: 14 G/DL (ref 12–16)
IMM GRANULOCYTES # BLD AUTO: 0.03 K/UL (ref 0–0.04)
IMM GRANULOCYTES NFR BLD AUTO: 0.4 % (ref 0–0.5)
LYMPHOCYTES # BLD AUTO: 2.1 K/UL (ref 1–4.8)
LYMPHOCYTES NFR BLD: 29.8 % (ref 18–48)
MCH RBC QN AUTO: 31.5 PG (ref 27–31)
MCHC RBC AUTO-ENTMCNC: 33.3 G/DL (ref 32–36)
MCV RBC AUTO: 95 FL (ref 82–98)
MONOCYTES # BLD AUTO: 0.7 K/UL (ref 0.3–1)
MONOCYTES NFR BLD: 10.6 % (ref 4–15)
NEUTROPHILS # BLD AUTO: 4 K/UL (ref 1.8–7.7)
NEUTROPHILS NFR BLD: 57.1 % (ref 38–73)
NRBC BLD-RTO: 0 /100 WBC
PLATELET # BLD AUTO: 284 K/UL (ref 150–450)
PMV BLD AUTO: 9.5 FL (ref 9.2–12.9)
POTASSIUM SERPL-SCNC: 3.9 MMOL/L (ref 3.5–5.1)
PROT SERPL-MCNC: 7.1 G/DL (ref 6–8.4)
RBC # BLD AUTO: 4.44 M/UL (ref 4–5.4)
SODIUM SERPL-SCNC: 140 MMOL/L (ref 136–145)
WBC # BLD AUTO: 6.99 K/UL (ref 3.9–12.7)

## 2023-12-13 PROCEDURE — 99999 PR PBB SHADOW E&M-EST. PATIENT-LVL IV: ICD-10-PCS | Mod: PBBFAC,,, | Performed by: NURSE PRACTITIONER

## 2023-12-13 PROCEDURE — 99999 PR PBB SHADOW E&M-EST. PATIENT-LVL IV: CPT | Mod: PBBFAC,,, | Performed by: NURSE PRACTITIONER

## 2023-12-13 PROCEDURE — 3074F SYST BP LT 130 MM HG: CPT | Mod: CPTII,S$GLB,, | Performed by: NURSE PRACTITIONER

## 2023-12-13 PROCEDURE — 93005 ELECTROCARDIOGRAM TRACING: CPT | Mod: S$GLB,,, | Performed by: NURSE PRACTITIONER

## 2023-12-13 PROCEDURE — 93010 ELECTROCARDIOGRAM REPORT: CPT | Mod: S$GLB,,, | Performed by: INTERNAL MEDICINE

## 2023-12-13 PROCEDURE — 93010 EKG 12-LEAD: ICD-10-PCS | Mod: S$GLB,,, | Performed by: INTERNAL MEDICINE

## 2023-12-13 PROCEDURE — 3044F HG A1C LEVEL LT 7.0%: CPT | Mod: CPTII,S$GLB,, | Performed by: NURSE PRACTITIONER

## 2023-12-13 PROCEDURE — 1160F PR REVIEW ALL MEDS BY PRESCRIBER/CLIN PHARMACIST DOCUMENTED: ICD-10-PCS | Mod: CPTII,S$GLB,, | Performed by: NURSE PRACTITIONER

## 2023-12-13 PROCEDURE — 1159F MED LIST DOCD IN RCRD: CPT | Mod: CPTII,S$GLB,, | Performed by: NURSE PRACTITIONER

## 2023-12-13 PROCEDURE — 3079F DIAST BP 80-89 MM HG: CPT | Mod: CPTII,S$GLB,, | Performed by: NURSE PRACTITIONER

## 2023-12-13 PROCEDURE — 85025 COMPLETE CBC W/AUTO DIFF WBC: CPT | Performed by: NURSE PRACTITIONER

## 2023-12-13 PROCEDURE — 93005 EKG 12-LEAD: ICD-10-PCS | Mod: S$GLB,,, | Performed by: NURSE PRACTITIONER

## 2023-12-13 PROCEDURE — 3044F PR MOST RECENT HEMOGLOBIN A1C LEVEL <7.0%: ICD-10-PCS | Mod: CPTII,S$GLB,, | Performed by: NURSE PRACTITIONER

## 2023-12-13 PROCEDURE — 99214 OFFICE O/P EST MOD 30 MIN: CPT | Mod: S$GLB,,, | Performed by: NURSE PRACTITIONER

## 2023-12-13 PROCEDURE — 3074F PR MOST RECENT SYSTOLIC BLOOD PRESSURE < 130 MM HG: ICD-10-PCS | Mod: CPTII,S$GLB,, | Performed by: NURSE PRACTITIONER

## 2023-12-13 PROCEDURE — 36415 COLL VENOUS BLD VENIPUNCTURE: CPT | Mod: PO | Performed by: NURSE PRACTITIONER

## 2023-12-13 PROCEDURE — 1160F RVW MEDS BY RX/DR IN RCRD: CPT | Mod: CPTII,S$GLB,, | Performed by: NURSE PRACTITIONER

## 2023-12-13 PROCEDURE — 1159F PR MEDICATION LIST DOCUMENTED IN MEDICAL RECORD: ICD-10-PCS | Mod: CPTII,S$GLB,, | Performed by: NURSE PRACTITIONER

## 2023-12-13 PROCEDURE — 80053 COMPREHEN METABOLIC PANEL: CPT | Performed by: NURSE PRACTITIONER

## 2023-12-13 PROCEDURE — 3008F PR BODY MASS INDEX (BMI) DOCUMENTED: ICD-10-PCS | Mod: CPTII,S$GLB,, | Performed by: NURSE PRACTITIONER

## 2023-12-13 PROCEDURE — 3079F PR MOST RECENT DIASTOLIC BLOOD PRESSURE 80-89 MM HG: ICD-10-PCS | Mod: CPTII,S$GLB,, | Performed by: NURSE PRACTITIONER

## 2023-12-13 PROCEDURE — 99214 PR OFFICE/OUTPT VISIT, EST, LEVL IV, 30-39 MIN: ICD-10-PCS | Mod: S$GLB,,, | Performed by: NURSE PRACTITIONER

## 2023-12-13 PROCEDURE — 3008F BODY MASS INDEX DOCD: CPT | Mod: CPTII,S$GLB,, | Performed by: NURSE PRACTITIONER
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
## 2023-12-21 DIAGNOSIS — G57.51 TARSAL TUNNEL SYNDROME OF RIGHT SIDE: Primary | ICD-10-CM

## 2023-12-26 RX ORDER — LORAZEPAM 1 MG/1
TABLET ORAL
Qty: 60 TABLET | Refills: 0 | Status: SHIPPED | OUTPATIENT
Start: 2023-12-26 | End: 2024-03-11

## 2023-12-26 NOTE — TELEPHONE ENCOUNTER
No care due was identified.  [facility] Embedded Care Due Messages. Reference number: [id number].   12/26/2023 12:25:36 PM CST

## 2024-01-03 ENCOUNTER — TELEPHONE (OUTPATIENT)
Dept: PODIATRY | Facility: CLINIC | Age: 59
End: 2024-01-03
Payer: COMMERCIAL

## 2024-01-04 ENCOUNTER — ANESTHESIA EVENT (OUTPATIENT)
Dept: SURGERY | Facility: HOSPITAL | Age: 59
End: 2024-01-04
Payer: COMMERCIAL

## 2024-01-04 ENCOUNTER — TELEPHONE (OUTPATIENT)
Dept: PODIATRY | Facility: CLINIC | Age: 59
End: 2024-01-04
Payer: COMMERCIAL

## 2024-01-04 DIAGNOSIS — G57.51 TARSAL TUNNEL SYNDROME OF RIGHT SIDE: Primary | ICD-10-CM

## 2024-01-04 DIAGNOSIS — G89.18 POST-OP PAIN: ICD-10-CM

## 2024-01-04 NOTE — TELEPHONE ENCOUNTER
Spoke to pt and relayed their 5am  arrival time for surgery. Also informed pt to not eat or drink after midnight including gum, hard candy or mints. Also that the pt must have a ride home from surgery, they will not be allowed to drive after anesthesia. Pt verbalized understanding and call ended.

## 2024-01-04 NOTE — PRE-PROCEDURE INSTRUCTIONS
The following was discussed with pt via phone and sent to pt portal. Pt verbalized understanding.    Dear Olinda ,    You are scheduled for a procedure with Dr. Queen on 1/5/2024. Your scheduled arrival time is 5:15am.  This arrival time is roughly 2 hours before your anticipated procedure time to allow sufficient time for pre-op.  Please wear comfortable clothes.  This procedure will take place at the [facility] at the corner of [facility].  [facility] to [facility].  The address is:    [address] [place] [address].  [place] [address]    After entering the building, you will proceed to the second floor where you can check in with registration. You should take any medications that you routinely take for blood pressure (other than those listed below), heart medications, thyroid, cholesterol, etc.     If you wear contact lenses, please wear glasses to your procedure.    Your fasting instructions are as follow:  Nothing to eat after midnight tonight. You may drink clear liquids up until 2 hours prior to your arrival time. You MUST have a responsible adult to bring you home.      This evening and tomorrow morning, please hold the following medications:  -Aspirin and Aspirin-containing products (Goody's powder, Excedrin)  -NSAIDs (Advil, Ibuprofen, Aleve, Diclofenac)  -Vitamins/Supplements  -Herbal remedies/Teas  -Stimulants (Adderall, Vyvanse, Adipex)  -Diabetic medication (Please bring with you day of procedure)  -VITAMINS/SUPPLEMENTS    -May take Tylenol      This evening and tomorrow morning, take a shower using antibacterial soap (ex: Hibiclens or Dial antibacterial soap). DO NOT apply deodorant, lotion, cologne, or anything else to the skin. Wear loose, comfortable fitting clothing. Do not wear jewelry or bring any valuables with you. If you wear dentures or contacts, please bring your case with you or leave them at home. Use and bring any inhalers  that you may have.    If you have any procedure-specific questions, please call your surgeon's office. Any other questions, don't hesitate to call at [phone].    Thanks,  SIERRA Das  Pre-Admit Dept [facility]

## 2024-01-05 ENCOUNTER — HOSPITAL ENCOUNTER (OUTPATIENT)
Facility: HOSPITAL | Age: 59
Discharge: HOME | End: 2024-01-05
Attending: PODIATRIST | Admitting: PODIATRIST
Payer: COMMERCIAL

## 2024-01-05 ENCOUNTER — PATIENT MESSAGE (OUTPATIENT)
Dept: SURGERY | Facility: HOSPITAL | Age: 59
End: 2024-01-05
Payer: COMMERCIAL

## 2024-01-05 ENCOUNTER — ANESTHESIA (OUTPATIENT)
Dept: SURGERY | Facility: HOSPITAL | Age: 59
End: 2024-01-05
Payer: COMMERCIAL

## 2024-01-05 VITALS
OXYGEN SATURATION: 97 % | RESPIRATION RATE: 19 BRPM | SYSTOLIC BLOOD PRESSURE: 131 MMHG | TEMPERATURE: 98 F | HEART RATE: 69 BPM | DIASTOLIC BLOOD PRESSURE: 74 MMHG

## 2024-01-05 DIAGNOSIS — G57.50 TARSAL TUNNEL SYNDROME: ICD-10-CM

## 2024-01-05 DIAGNOSIS — G57.81 NERVE ENTRAPMENT OF LOWER LIMB, RIGHT: ICD-10-CM

## 2024-01-05 DIAGNOSIS — G57.51 TARSAL TUNNEL SYNDROME OF RIGHT SIDE: Primary | ICD-10-CM

## 2024-01-05 PROCEDURE — D9220A PRA ANESTHESIA: Mod: ,,, | Performed by: NURSE ANESTHETIST, CERTIFIED REGISTERED

## 2024-01-05 PROCEDURE — 27892 DECOMPRESSION OF LEG: CPT | Mod: RT,,, | Performed by: PODIATRIST

## 2024-01-05 PROCEDURE — 94761 N-INVAS EAR/PLS OXIMETRY MLT: CPT

## 2024-01-05 PROCEDURE — 25000003 PHARM REV CODE 250: Performed by: ANESTHESIOLOGY

## 2024-01-05 PROCEDURE — 71000033 HC RECOVERY, INTIAL HOUR: Performed by: PODIATRIST

## 2024-01-05 PROCEDURE — 36000707: Performed by: PODIATRIST

## 2024-01-05 PROCEDURE — 36000706: Performed by: PODIATRIST

## 2024-01-05 PROCEDURE — 63600175 PHARM REV CODE 636 W HCPCS: Mod: JZ,JG | Performed by: PODIATRIST

## 2024-01-05 PROCEDURE — 99900035 HC TECH TIME PER 15 MIN (STAT)

## 2024-01-05 PROCEDURE — 71000015 HC POSTOP RECOV 1ST HR: Performed by: PODIATRIST

## 2024-01-05 PROCEDURE — 28035 DECOMPRESSION OF TIBIA NERVE: CPT | Mod: 51,RT,, | Performed by: PODIATRIST

## 2024-01-05 PROCEDURE — 71000016 HC POSTOP RECOV ADDL HR: Performed by: PODIATRIST

## 2024-01-05 PROCEDURE — 37000009 HC ANESTHESIA EA ADD 15 MINS: Performed by: PODIATRIST

## 2024-01-05 PROCEDURE — 25000003 PHARM REV CODE 250: Performed by: PODIATRIST

## 2024-01-05 PROCEDURE — 99499 UNLISTED E&M SERVICE: CPT | Mod: ,,, | Performed by: PODIATRIST

## 2024-01-05 PROCEDURE — 37000008 HC ANESTHESIA 1ST 15 MINUTES: Performed by: PODIATRIST

## 2024-01-05 PROCEDURE — 25000003 PHARM REV CODE 250: Performed by: NURSE ANESTHETIST, CERTIFIED REGISTERED

## 2024-01-05 PROCEDURE — 63600175 PHARM REV CODE 636 W HCPCS: Mod: JZ,JG | Performed by: ANESTHESIOLOGY

## 2024-01-05 PROCEDURE — 63600175 PHARM REV CODE 636 W HCPCS

## 2024-01-05 PROCEDURE — 64708 REVISE ARM/LEG NERVE: CPT | Mod: 51,RT,, | Performed by: PODIATRIST

## 2024-01-05 PROCEDURE — 63600175 PHARM REV CODE 636 W HCPCS: Performed by: NURSE ANESTHETIST, CERTIFIED REGISTERED

## 2024-01-05 RX ORDER — ONDANSETRON 2 MG/ML
4 INJECTION INTRAMUSCULAR; INTRAVENOUS DAILY PRN
Status: DISCONTINUED | OUTPATIENT
Start: 2024-01-05 | End: 2024-01-05 | Stop reason: HOSPADM

## 2024-01-05 RX ORDER — DEXAMETHASONE SODIUM PHOSPHATE 4 MG/ML
INJECTION, SOLUTION INTRA-ARTICULAR; INTRALESIONAL; INTRAMUSCULAR; INTRAVENOUS; SOFT TISSUE
Status: DISCONTINUED | OUTPATIENT
Start: 2024-01-05 | End: 2024-01-05

## 2024-01-05 RX ORDER — ONDANSETRON 2 MG/ML
INJECTION INTRAMUSCULAR; INTRAVENOUS
Status: DISCONTINUED | OUTPATIENT
Start: 2024-01-05 | End: 2024-01-05

## 2024-01-05 RX ORDER — SODIUM CHLORIDE 0.9 % (FLUSH) 0.9 %
10 SYRINGE (ML) INJECTION
Status: DISCONTINUED | OUTPATIENT
Start: 2024-01-05 | End: 2024-01-05 | Stop reason: HOSPADM

## 2024-01-05 RX ORDER — MIDAZOLAM HYDROCHLORIDE 1 MG/ML
INJECTION, SOLUTION INTRAMUSCULAR; INTRAVENOUS
Status: DISCONTINUED | OUTPATIENT
Start: 2024-01-05 | End: 2024-01-05

## 2024-01-05 RX ORDER — TIZANIDINE HYDROCHLORIDE 2 MG/1
CAPSULE, GELATIN COATED ORAL
COMMUNITY

## 2024-01-05 RX ORDER — OXYCODONE AND ACETAMINOPHEN 7.5; 325 MG/1; MG/1
1 TABLET ORAL EVERY 8 HOURS PRN
Qty: 30 TABLET | Refills: 0 | Status: SHIPPED | OUTPATIENT
Start: 2024-01-05 | End: 2024-01-10 | Stop reason: SDUPTHER

## 2024-01-05 RX ORDER — FENTANYL CITRATE 50 UG/ML
25 INJECTION, SOLUTION INTRAMUSCULAR; INTRAVENOUS EVERY 5 MIN PRN
Status: DISCONTINUED | OUTPATIENT
Start: 2024-01-05 | End: 2024-01-05 | Stop reason: HOSPADM

## 2024-01-05 RX ORDER — LIDOCAINE HYDROCHLORIDE 10 MG/ML
INJECTION INFILTRATION; PERINEURAL
Status: DISCONTINUED | OUTPATIENT
Start: 2024-01-05 | End: 2024-01-05 | Stop reason: HOSPADM

## 2024-01-05 RX ORDER — TRAMADOL HYDROCHLORIDE 50 MG/1
50 TABLET ORAL EVERY 12 HOURS PRN
Qty: 14 TABLET | Refills: 0 | Status: SHIPPED | OUTPATIENT
Start: 2024-01-05 | End: 2024-01-05

## 2024-01-05 RX ORDER — PROCHLORPERAZINE EDISYLATE 5 MG/ML
5 INJECTION INTRAMUSCULAR; INTRAVENOUS EVERY 30 MIN PRN
Status: DISCONTINUED | OUTPATIENT
Start: 2024-01-05 | End: 2024-01-05 | Stop reason: HOSPADM

## 2024-01-05 RX ORDER — LIDOCAINE HYDROCHLORIDE 20 MG/ML
INJECTION INTRAVENOUS
Status: DISCONTINUED | OUTPATIENT
Start: 2024-01-05 | End: 2024-01-05

## 2024-01-05 RX ORDER — OXYCODONE AND ACETAMINOPHEN 7.5; 325 MG/1; MG/1
1 TABLET ORAL EVERY 8 HOURS PRN
Qty: 30 TABLET | Refills: 0 | Status: SHIPPED | OUTPATIENT
Start: 2024-01-05 | End: 2024-01-05 | Stop reason: SDUPTHER

## 2024-01-05 RX ORDER — HYDROMORPHONE HYDROCHLORIDE 1 MG/ML
0.2 INJECTION, SOLUTION INTRAMUSCULAR; INTRAVENOUS; SUBCUTANEOUS EVERY 5 MIN PRN
Status: DISCONTINUED | OUTPATIENT
Start: 2024-01-05 | End: 2024-01-05 | Stop reason: HOSPADM

## 2024-01-05 RX ORDER — ROCURONIUM BROMIDE 10 MG/ML
INJECTION, SOLUTION INTRAVENOUS
Status: DISCONTINUED | OUTPATIENT
Start: 2024-01-05 | End: 2024-01-05

## 2024-01-05 RX ORDER — KETOROLAC TROMETHAMINE 30 MG/ML
INJECTION, SOLUTION INTRAMUSCULAR; INTRAVENOUS
Status: COMPLETED
Start: 2024-01-05 | End: 2024-01-05

## 2024-01-05 RX ORDER — FENTANYL CITRATE 50 UG/ML
INJECTION, SOLUTION INTRAMUSCULAR; INTRAVENOUS
Status: DISCONTINUED | OUTPATIENT
Start: 2024-01-05 | End: 2024-01-05

## 2024-01-05 RX ORDER — BUPIVACAINE HYDROCHLORIDE 2.5 MG/ML
INJECTION, SOLUTION EPIDURAL; INFILTRATION; INTRACAUDAL
Status: DISCONTINUED | OUTPATIENT
Start: 2024-01-05 | End: 2024-01-05 | Stop reason: HOSPADM

## 2024-01-05 RX ORDER — PROPOFOL 10 MG/ML
VIAL (ML) INTRAVENOUS
Status: DISCONTINUED | OUTPATIENT
Start: 2024-01-05 | End: 2024-01-05

## 2024-01-05 RX ORDER — ACETAMINOPHEN 10 MG/ML
1000 INJECTION, SOLUTION INTRAVENOUS ONCE
Status: COMPLETED | OUTPATIENT
Start: 2024-01-05 | End: 2024-01-05

## 2024-01-05 RX ORDER — OXYCODONE AND ACETAMINOPHEN 5; 325 MG/1; MG/1
1 TABLET ORAL
Status: DISCONTINUED | OUTPATIENT
Start: 2024-01-05 | End: 2024-01-05 | Stop reason: HOSPADM

## 2024-01-05 RX ORDER — KETOROLAC TROMETHAMINE 30 MG/ML
30 INJECTION, SOLUTION INTRAMUSCULAR; INTRAVENOUS ONCE
Status: COMPLETED | OUTPATIENT
Start: 2024-01-05 | End: 2024-01-05

## 2024-01-05 RX ADMIN — HYDROMORPHONE HYDROCHLORIDE 0.2 MG: 1 INJECTION, SOLUTION INTRAMUSCULAR; INTRAVENOUS; SUBCUTANEOUS at 08:01

## 2024-01-05 RX ADMIN — ROCURONIUM BROMIDE 30 MG: 10 INJECTION INTRAVENOUS at 07:01

## 2024-01-05 RX ADMIN — KETOROLAC TROMETHAMINE 30 MG: 30 INJECTION, SOLUTION INTRAMUSCULAR; INTRAVENOUS at 08:01

## 2024-01-05 RX ADMIN — LIDOCAINE HYDROCHLORIDE 100 MG: 20 INJECTION INTRAVENOUS at 07:01

## 2024-01-05 RX ADMIN — SODIUM CHLORIDE: 0.9 INJECTION, SOLUTION INTRAVENOUS at 07:01

## 2024-01-05 RX ADMIN — HYDROMORPHONE HYDROCHLORIDE 0.2 MG: 1 INJECTION, SOLUTION INTRAMUSCULAR; INTRAVENOUS; SUBCUTANEOUS at 09:01

## 2024-01-05 RX ADMIN — FENTANYL CITRATE 100 MCG: 50 INJECTION, SOLUTION INTRAMUSCULAR; INTRAVENOUS at 07:01

## 2024-01-05 RX ADMIN — ONDANSETRON 4 MG: 2 INJECTION INTRAMUSCULAR; INTRAVENOUS at 07:01

## 2024-01-05 RX ADMIN — FENTANYL CITRATE 25 MCG: 50 INJECTION INTRAMUSCULAR; INTRAVENOUS at 08:01

## 2024-01-05 RX ADMIN — SUGAMMADEX 200 MG: 100 INJECTION, SOLUTION INTRAVENOUS at 07:01

## 2024-01-05 RX ADMIN — ACETAMINOPHEN 1000 MG: 10 INJECTION INTRAVENOUS at 08:01

## 2024-01-05 RX ADMIN — DEXAMETHASONE SODIUM PHOSPHATE 4 MG: 4 INJECTION INTRA-ARTICULAR; INTRALESIONAL; INTRAMUSCULAR; INTRAVENOUS; SOFT TISSUE at 07:01

## 2024-01-05 RX ADMIN — OXYCODONE HYDROCHLORIDE AND ACETAMINOPHEN 1 TABLET: 5; 325 TABLET ORAL at 08:01

## 2024-01-05 RX ADMIN — MIDAZOLAM HYDROCHLORIDE 2 MG: 1 INJECTION, SOLUTION INTRAMUSCULAR; INTRAVENOUS at 07:01

## 2024-01-05 RX ADMIN — PROPOFOL 150 MG: 10 INJECTION, EMULSION INTRAVENOUS at 07:01

## 2024-01-05 NOTE — BRIEF OP NOTE
[facility]  Brief Operative Note    Surgery Date: 1/5/2024     Surgeon(s) and Role:     * Estevan Queen DPM - Primary    Assisting Surgeon: Shona Katz    Pre-op Diagnosis:  Tarsal tunnel syndrome of right side [G57.51]  Right foot pain [M79.671]    Post-op Diagnosis:  Post-Op Diagnosis Codes:     * Tarsal tunnel syndrome of right side [G57.51]     * Right foot pain [M79.671]    Procedure(s) (LRB):  RELEASE, TARSAL TUNNEL (Right)    Anesthesia: General    Operative Findings: Right tarsal tunnel release. Layered sutures with 3-0 monocryl and 3-0 nylon. No complications.     Estimated Blood Loss: * No values recorded between 1/5/2024  7:26 AM and 1/5/2024  7:59 AM *         Specimens:   Specimen (24h ago, onward)      None          Discharge Note    OUTCOME: Patient tolerated treatment/procedure well without complication and is now ready for discharge.    DISPOSITION: Home or Self Care    FINAL DIAGNOSIS:  <principal problem not specified>    FOLLOWUP: In clinic    DISCHARGE INSTRUCTIONS:  No discharge procedures on file.

## 2024-01-05 NOTE — DISCHARGE INSTRUCTIONS
POST-OPERATIVE INSTRUCTIONS FOR PODIATRY PATIENTS      Keep your dressing dry. Do not remove or change the bandage.      Keep your foot elevated to the level of your heart. You should recline as far as possible (When your toes are at eye level you're in the right position).      You may apply an ice pack to the top of your foot or back of your knee. Replace as needed. Make sure this does not get the dressing wet.      Take your pain medication as instructed for the first 24 hours after surgery, then progress to using them only when you need it.      Stay off your foot as much as possible. You may get up to eat, use the bathroom, etc.    No long standing or walking on your  foot. Do not sit with your feet dangling.      When you do walk make sure you wear your surgical shoe/boot as provided     Eat your regular diet and drink plenty of fluids.      If any of the following conditions are present, call the Podiatry Clinic immediately,, report to ED, or call 911 if emergent     a. Fever 101° or higher      b. Pain that is not controlled by pain medication      c. Red, warm, swollen feet with red streaks going up your legs      d. Cold, blue, numb toes, especially if you are wearing a cast

## 2024-01-05 NOTE — DISCHARGE SUMMARY
[facility]  Discharge Note  Short Stay    Procedure(s) (LRB):  RELEASE, TARSAL TUNNEL (Right)      OUTCOME: Patient tolerated treatment/procedure well without complication and is now ready for discharge.    DISPOSITION: Home or Self Care    FINAL DIAGNOSIS:  <principal problem not specified>    FOLLOWUP: In clinic    DISCHARGE INSTRUCTIONS:  No discharge procedures on file.     Shona Katz DPM   Podiatric Medicine & Surgery  [facility]  Secure Chat Preferred  Mobile: [phone]  Pager: [phone]

## 2024-01-05 NOTE — H&P
The patient has been examined and the H&P has been reviewed.     I concur with the findings and no changes have occurred since H&P was written.     Anesthesia/Surgery risks, benefits and alternative options discussed and understood by patient/family.    Shona Katz DPM   Podiatric Medicine & Surgery  [facility]  Secure Chat Preferred  Mobile: [phone]  Pager: [phone]        Subjective:      Patient ID: Twila Roman is a 58 y.o. female.    Chief Complaint:   Foot Pain (Right foot tarsal tunnel syndrome )    Twila is a 58 y.o. female who presents to the podiatry clinic  with complaint of  right foot pain. Onset of the symptoms was several months ago. Precipitating event: increased activity. Current symptoms include:  Tingling, burning, and numbness . Aggravating factors: any weight bearing. Symptoms have gradually worsened. Patient has had no prior foot problems. Evaluation to date: Ortho consult:  Sports Medicine therapy/treatment and EMG is currently ordered . Treatment to date: avoidance of offending activity. Patients rates pain 10/10 on pain scale.    Review of Systems   Constitutional: Negative for chills, decreased appetite, fever and malaise/fatigue.   HENT:  Negative for congestion, hearing loss, nosebleeds and tinnitus.    Eyes:  Negative for double vision, pain, photophobia and visual disturbance.   Cardiovascular:  Negative for chest pain, claudication, cyanosis and leg swelling.   Respiratory:  Negative for cough, hemoptysis, shortness of breath and wheezing.    Endocrine: Negative for cold intolerance and heat intolerance.   Hematologic/Lymphatic: Negative for adenopathy and bleeding problem.   Skin:  Negative for color change, dry skin, itching, nail changes and suspicious lesions.   Musculoskeletal:  Negative for arthritis, joint pain, myalgias and stiffness.   Gastrointestinal:  Negative for abdominal pain, jaundice, nausea and vomiting.   Genitourinary:  Negative for  dysuria, frequency and hematuria.   Neurological:  Positive for numbness, paresthesias and sensory change. Negative for difficulty with concentration and loss of balance.   Psychiatric/Behavioral:  Negative for altered mental status, hallucinations and suicidal ideas. The patient is not nervous/anxious.    Allergic/Immunologic: Negative for environmental allergies and persistent infections.           Objective:      Physical Exam          Assessment:       Encounter Diagnoses   Name Primary?    Tarsal tunnel syndrome of right side Yes    Right foot pain     Nerve entrapment of lower limb, right      Independent visualization of imaging was performed.  Results were reviewed in detail with patient.       Plan:       Twila was seen today for foot pain.    Diagnoses and all orders for this visit:    Tarsal tunnel syndrome of right side  -     MRI Ankle Without Contrast Right; Future    Right foot pain  -     MRI Ankle Without Contrast Right; Future    Nerve entrapment of lower limb, right  -     MRI Ankle Without Contrast Right; Future      I counseled the patient on her conditions, their implications and medical management.    The nature of the condition, options for management, as well as potential risks and complications were discussed in detail with patient. Patient was amenable to my recommendations and left my office fully informed and will follow up as instructed or sooner if necessary.      Nerve injection:    Performed by:  Estevan Queen DPM    Preop diagnosis:  Neuropathy symptoms/paresthesias/pain    The area overlying the right posterior tibial nerve(s) was sterilely prepped, verbal consent was obtained, 2 cc's of 0.5% Marcaine plain was infiltrated around the affected nerve(s) for a diagnostic nerve block.  This was well tolerated with no complications.        Discussed options for peripheral neuropathy/nerve entrapment syndrome including nerve block therapy, surgical nerve entrapment decompression  procedures, and various vitamins and supplementation available shown to improve nerve function.    Follow-up to discuss affected this nerve block/EMG results/discuss possible tarsal tunnel release right ankle.

## 2024-01-05 NOTE — ANESTHESIA PREPROCEDURE EVALUATION
01/05/2024  Twila Lynn is a 58 y.o., female.      Pre-op Assessment    I have reviewed the Patient Summary Reports.     I have reviewed the Nursing Notes. I have reviewed the NPO Status.   I have reviewed the Medications.     Review of Systems  Anesthesia Hx:             Denies Family Hx of Anesthesia complications.    Denies Personal Hx of Anesthesia complications.                    Cardiovascular:     Hypertension                                            Physical Exam    Airway:  Mallampati: II   Mouth Opening: Normal  Neck ROM: Normal ROM        Anesthesia Plan  Type of Anesthesia, risks & benefits discussed:    Anesthesia Type: Gen Natural Airway  Intra-op Monitoring Plan: Standard ASA Monitors  Post Op Pain Control Plan: multimodal analgesia  Induction:  IV  Informed Consent: Informed consent signed with the Patient and all parties understand the risks and agree with anesthesia plan.  All questions answered.   ASA Score: 2  Day of Surgery Review of History & Physical: H&P Update referred to the surgeon/provider.    Ready For Surgery From Anesthesia Perspective.     .

## 2024-01-05 NOTE — TRANSFER OF CARE
Anesthesia Transfer of Care Note    Patient: Twila Case    Procedure(s) Performed: Procedure(s) (LRB):  RELEASE, TARSAL TUNNEL (Right)    Patient location: PACU    Anesthesia Type: general    Transport from OR: Transported from OR on 6-10 L/min O2 by face mask with adequate spontaneous ventilation    Post pain: adequate analgesia    Post assessment: no apparent anesthetic complications    Post vital signs: stable    Level of consciousness: awake    Nausea/Vomiting: no nausea/vomiting    Complications: none    Transfer of care protocol was followed    Last vitals: Visit Vitals  /76 (BP Location: Right arm, Patient Position: Lying)   Pulse 71   Temp 36.6 °C (97.9 °F) (Temporal)   Resp 16   LMP 01/01/2015 (Approximate)   SpO2 100%   Breastfeeding No

## 2024-01-05 NOTE — PLAN OF CARE
Pt to bay 20 at 0810. VS stable while in recovery. Pain difficult to control. IV tylenol, dilaudid, fentanyl, and toradol given. PO norco administered also. Pt voiced concerns of tramdol order for home. Concerns expressed to resident. Otherwise patient's pain controlled while in recovery. Discharge instructions reviewed with patient and spouse. Verbalized understanding.

## 2024-01-05 NOTE — ANESTHESIA POSTPROCEDURE EVALUATION
Anesthesia Post Evaluation    Patient: Twila Case    Procedure(s) Performed: Procedure(s) (LRB):  RELEASE, TARSAL TUNNEL (Right)    Final Anesthesia Type: general      Patient location during evaluation: PACU  Patient participation: Yes- Able to Participate  Level of consciousness: awake and alert  Post-procedure vital signs: reviewed and stable  Pain management: adequate  Airway patency: patent    PONV status at discharge: No PONV  Anesthetic complications: no      Cardiovascular status: stable  Respiratory status: spontaneous ventilation  Hydration status: euvolemic  Follow-up not needed.              Vitals Value Taken Time   /74 01/05/24 0946   Temp 36.6 °C (97.9 °F) 01/05/24 0810   Pulse 77 01/05/24 0954   Resp 42 01/05/24 0954   SpO2 98 % 01/05/24 0954   Vitals shown include unvalidated device data.      Event Time   Out of Recovery 08:25:00         Pain/Micah Score: Pain Rating Prior to Med Admin: 5 (1/5/2024  9:05 AM)  Micah Score: 10 (1/5/2024  8:25 AM)

## 2024-01-05 NOTE — ANESTHESIA PROCEDURE NOTES
Intubation    Date/Time: 1/5/2024 7:09 AM    Performed by: Sharlene Pollard CRNA  Authorized by: Faustino Lackey MD    Intubation:     Induction:  Intravenous    Intubated:  Postinduction    Mask Ventilation:  Easy mask    Attempts:  1    Attempted By:  CRNA    Method of Intubation:  Video laryngoscopy    Blade:  Mao 3    Laryngeal View Grade: Grade IIA - cords partially seen      Difficult Airway Encountered?: No      Complications:  None    Airway Device:  Oral endotracheal tube    Airway Device Size:  7.0    Style/Cuff Inflation:  Cuffed (inflated to minimal occlusive pressure)    Tube secured:  21    Secured at:  The teeth    Placement Verified By:  Capnometry    Complicating Factors:  None    Findings Post-Intubation:  BS equal bilateral

## 2024-01-06 PROBLEM — G57.81 NERVE ENTRAPMENT OF LOWER LIMB, RIGHT: Status: ACTIVE | Noted: 2024-01-06

## 2024-01-06 NOTE — OP NOTE
[facility]  Operative Note      Date of Procedure: 1/5/2024     Procedure: Procedure(s) (LRB):  RELEASE, TARSAL TUNNEL (Right)     Surgeon(s) and Role:     * Estevan Queen DPM - Primary    Assisting Surgeon: Shona Katz DPM    Pre-Operative Diagnosis: Tarsal tunnel syndrome of right side [G57.51]  Right foot pain [M79.671]    Post-Operative Diagnosis: Post-Op Diagnosis Codes:     * Tarsal tunnel syndrome of right side [G57.51]     * Right foot pain [M79.671]    Anesthesia: General    Operative Findings (including complications, if any):   Right tarsal tunnel release. Layered sutures with 3-0 monocryl and 3-0 nylon. No complications.      Description of Technical Procedures:   Patient was brought to the operating room on a stretcher and transferred to the OR table in supine position. Anesthesia was induced. Tourniquet was applied to the R ankle. The R foot was draped and prepped in a sterile manner. Tourniquet was inflated to 250 mmHg.     Attention was directed to the medial aspect of the R ankle where a curvilinear incision was performed over the tarsal tunnel extending proximally to the level of the ankle joint and distally to the abductor hallucis muscle. Meticulous sharp and blunt dissection was utilized through the incision down through subcutaneous tissues to the level of the laciniate ligament. Bipolar cautery was used judiciously were necessary. Laciniate ligament was identified and transected and a tarsal tunnel release was achieved. It was noted that there was adhesions between the venous structures and the posterior tibial nerve. Using meticulous blunt dissection the fibrinous adhesions between the nerve and veins were removed.  There was a significant amount of fibro-edematous changes to the posterior tibial nerve. Using meticulous blunt dissection the subcutaneous tissues over the posterior tibial nerve were gently tented and fibrous adhesions and  constrictions were removed from the posterior tibial nerve proximally above the level of the ankle joint. Vaso-nervosa and was noted to begin to resanguinate. Specks of the nerve complex further distally revealed that the medial calcaneal nerve at the same fibro-edematous changes and had multiple constrictions of fibrous adhesions compressing it. These were transected with sharp dissection. Where the medial calcaneal nerve enters the panniculus of the heel fibrous ring was identified and that was transected open up the tunnel for the medial calcaneal nerve. Vaso-nervosa was noted. Further distally, the bifurcation of the medial and lateral plantar nerves was identified. Again fibrous adhesions and constrictions were noted over the nerves that also showed fibro-edematous changes. Using meticulous sharp and blunt dissection the fibrous adhesions and constrictions were removed from the 2 nerve branches. Where the 2 nerve branches enter the danyel pedis, fasciotomy on the superior and anterior most portions of the abductor hallucis muscle was performed to further aid in decompression of these 2 nerves. Vaso nervosa was noted to resanguinate good decompression was achieved. The surgical site was rinsed with normal saline via bulb syringe x2. Layered closure with 3-0 Monocryl and 3-0 nylon superficially.  25 cc of one-to-one mixture of 0.5% bupivacaine plain and 1% lidocaine plain was locally infiltrated around the suture site. R foot dressed with Xeroform, 4 x 4 gauze, cast padding, and ACE wrap.    The patient tolerated the procedure and anesthesia well. Patient was transferred to the recovery room with vital signs stable, vascular status intact, and capillary refill time < 3 seconds to the distal R foot. Following a period of post-op monitoring, the patient will be discharged home on the following written and oral post op instructions:      1. Keep dressing dry and intact until clinic visit.  2. Avoid excessive  ambulation, ambulate with surgical shoe on at all times with partial weightbearing as tolerated  3. Ice and elevate R foot when at rest.  4. All prescriptions were given to patient pre and post-op.   5. Contact Dr. Queen for all post op follow up care and if any problems arise.    Estimated Blood Loss (EBL): * No values recorded between 1/5/2024  7:26 AM and 1/5/2024  8:04 AM *           Implants: * No implants in log *    Specimens:   Specimen (24h ago, onward)      None             Condition: Good    Disposition: PACU - hemodynamically stable.    Attestation: I was present and scrubbed for the entire procedure.    Discharge Note    OUTCOME: Patient tolerated treatment/procedure well without complication and is now ready for discharge.    DISPOSITION: Home or Self Care    FINAL DIAGNOSIS:  <principal problem not specified>    FOLLOWUP: In clinic    DISCHARGE INSTRUCTIONS:  No discharge procedures on file.

## 2024-01-10 ENCOUNTER — PATIENT MESSAGE (OUTPATIENT)
Dept: PODIATRY | Facility: CLINIC | Age: 59
End: 2024-01-10

## 2024-01-10 ENCOUNTER — OFFICE VISIT (OUTPATIENT)
Dept: PODIATRY | Facility: CLINIC | Age: 59
End: 2024-01-10
Payer: COMMERCIAL

## 2024-01-10 VITALS
DIASTOLIC BLOOD PRESSURE: 100 MMHG | WEIGHT: 125 LBS | HEIGHT: 65 IN | SYSTOLIC BLOOD PRESSURE: 145 MMHG | HEART RATE: 79 BPM | BODY MASS INDEX: 20.83 KG/M2

## 2024-01-10 DIAGNOSIS — G89.18 POST-OP PAIN: ICD-10-CM

## 2024-01-10 DIAGNOSIS — M79.671 RIGHT FOOT PAIN: ICD-10-CM

## 2024-01-10 DIAGNOSIS — G57.51 TARSAL TUNNEL SYNDROME OF RIGHT SIDE: Primary | ICD-10-CM

## 2024-01-10 PROCEDURE — 99999 PR PBB SHADOW E&M-EST. PATIENT-LVL III: CPT | Mod: PBBFAC,,, | Performed by: PODIATRIST

## 2024-01-10 PROCEDURE — 99024 POSTOP FOLLOW-UP VISIT: CPT | Mod: S$GLB,,, | Performed by: PODIATRIST

## 2024-01-10 PROCEDURE — 3080F DIAST BP >= 90 MM HG: CPT | Mod: CPTII,S$GLB,, | Performed by: PODIATRIST

## 2024-01-10 PROCEDURE — 1160F RVW MEDS BY RX/DR IN RCRD: CPT | Mod: CPTII,S$GLB,, | Performed by: PODIATRIST

## 2024-01-10 PROCEDURE — 1159F MED LIST DOCD IN RCRD: CPT | Mod: CPTII,S$GLB,, | Performed by: PODIATRIST

## 2024-01-10 PROCEDURE — 3077F SYST BP >= 140 MM HG: CPT | Mod: CPTII,S$GLB,, | Performed by: PODIATRIST

## 2024-01-10 RX ORDER — IBUPROFEN 600 MG/1
600 TABLET ORAL EVERY 8 HOURS PRN
Qty: 30 TABLET | Refills: 1 | Status: SHIPPED | OUTPATIENT
Start: 2024-01-10 | End: 2024-01-10

## 2024-01-10 RX ORDER — IBUPROFEN 600 MG/1
600 TABLET ORAL EVERY 8 HOURS PRN
Qty: 30 TABLET | Refills: 1 | Status: SHIPPED | OUTPATIENT
Start: 2024-01-10 | End: 2024-06-04 | Stop reason: SDUPTHER

## 2024-01-10 RX ORDER — OXYCODONE AND ACETAMINOPHEN 7.5; 325 MG/1; MG/1
1 TABLET ORAL EVERY 8 HOURS PRN
Qty: 30 TABLET | Refills: 0 | Status: SHIPPED | OUTPATIENT
Start: 2024-01-10 | End: 2024-01-10

## 2024-01-10 RX ORDER — OXYCODONE AND ACETAMINOPHEN 7.5; 325 MG/1; MG/1
1 TABLET ORAL EVERY 8 HOURS PRN
Qty: 30 TABLET | Refills: 0 | Status: SHIPPED | OUTPATIENT
Start: 2024-01-10 | End: 2024-01-19 | Stop reason: SDUPTHER

## 2024-01-12 ENCOUNTER — TELEPHONE (OUTPATIENT)
Dept: PODIATRY | Facility: CLINIC | Age: 59
End: 2024-01-12
Payer: COMMERCIAL

## 2024-01-12 NOTE — TELEPHONE ENCOUNTER
Left vm for pt with callback number of [phone] and left message stating that due to the potential freeze clinic may possibly be cancelled. Also stated that pt will be contacted in regards to this matter with an update.

## 2024-01-16 NOTE — PROGRESS NOTES
Patient presents approximately 1 week status post tarsal tunnel release right foot.  She is doing very well.  Mild discomfort.  Incision healing well with no signs of infection or dehiscence.  Neurovascular status intact.  Continue postoperative instructions and follow-up in 1 week.

## 2024-01-17 ENCOUNTER — OFFICE VISIT (OUTPATIENT)
Dept: PODIATRY | Facility: CLINIC | Age: 59
End: 2024-01-17
Payer: COMMERCIAL

## 2024-01-17 VITALS
SYSTOLIC BLOOD PRESSURE: 127 MMHG | DIASTOLIC BLOOD PRESSURE: 84 MMHG | HEART RATE: 68 BPM | BODY MASS INDEX: 20.8 KG/M2 | HEIGHT: 65 IN

## 2024-01-17 DIAGNOSIS — G57.51 TARSAL TUNNEL SYNDROME OF RIGHT SIDE: Primary | ICD-10-CM

## 2024-01-17 PROCEDURE — 99024 POSTOP FOLLOW-UP VISIT: CPT | Mod: S$GLB,,, | Performed by: PODIATRIST

## 2024-01-17 PROCEDURE — 1159F MED LIST DOCD IN RCRD: CPT | Mod: CPTII,S$GLB,, | Performed by: PODIATRIST

## 2024-01-17 PROCEDURE — 99999 PR PBB SHADOW E&M-EST. PATIENT-LVL III: CPT | Mod: PBBFAC,,, | Performed by: PODIATRIST

## 2024-01-17 PROCEDURE — 3074F SYST BP LT 130 MM HG: CPT | Mod: CPTII,S$GLB,, | Performed by: PODIATRIST

## 2024-01-17 PROCEDURE — 3079F DIAST BP 80-89 MM HG: CPT | Mod: CPTII,S$GLB,, | Performed by: PODIATRIST

## 2024-01-17 RX ORDER — PREGABALIN 50 MG/1
50 CAPSULE ORAL 3 TIMES DAILY
Qty: 90 CAPSULE | Refills: 6 | Status: SHIPPED | OUTPATIENT
Start: 2024-01-17 | End: 2024-04-30

## 2024-01-19 RX ORDER — OXYCODONE AND ACETAMINOPHEN 7.5; 325 MG/1; MG/1
1 TABLET ORAL EVERY 8 HOURS PRN
Qty: 30 TABLET | Refills: 0 | Status: SHIPPED | OUTPATIENT
Start: 2024-01-19 | End: 2024-02-19 | Stop reason: SDUPTHER

## 2024-01-24 ENCOUNTER — TELEPHONE (OUTPATIENT)
Dept: PODIATRY | Facility: CLINIC | Age: 59
End: 2024-01-24
Payer: COMMERCIAL

## 2024-01-24 NOTE — TELEPHONE ENCOUNTER
Called patient to get a sooner appointment for post op. She gave verbal understanding of her scheduled office visit 2/8 at 10 am with Dr Queen that she agreed to keep that scheduled appointment but be placed on the wait list in case she can get another sooner office visit. Olinda gave verbal understanding of Dr Queen out for the week being sick.

## 2024-02-02 ENCOUNTER — TELEPHONE (OUTPATIENT)
Dept: PODIATRY | Facility: CLINIC | Age: 59
End: 2024-02-02
Payer: COMMERCIAL

## 2024-02-02 NOTE — PROGRESS NOTES
I called and informed the patient that I am faxing the orders now. Faxing to [phone].   Patient in clinic to sign form(s) and  test kit for genetic testing.

## 2024-02-06 ENCOUNTER — TELEPHONE (OUTPATIENT)
Dept: PODIATRY | Facility: CLINIC | Age: 59
End: 2024-02-06
Payer: COMMERCIAL

## 2024-02-06 ENCOUNTER — PATIENT MESSAGE (OUTPATIENT)
Dept: PODIATRY | Facility: CLINIC | Age: 59
End: 2024-02-06
Payer: COMMERCIAL

## 2024-02-06 NOTE — TELEPHONE ENCOUNTER
----- Message from Olga Murrell sent at 2/6/2024  7:58 AM CST -----  Twila Lynn calling regarding Patient Advice (message) for running late for the appt today, stating she is about 5 mins away, please call back if needed [phone]

## 2024-02-06 NOTE — TELEPHONE ENCOUNTER
Patient wanted to know if she will still be seen after running late. I called Olinda to inform her that I will let Dr Queen know she is running late and she will get worked in since she running late, but to be seen.

## 2024-02-08 ENCOUNTER — OFFICE VISIT (OUTPATIENT)
Dept: PODIATRY | Facility: CLINIC | Age: 59
End: 2024-02-08
Payer: COMMERCIAL

## 2024-02-08 VITALS
HEART RATE: 68 BPM | HEIGHT: 65 IN | BODY MASS INDEX: 20.8 KG/M2 | DIASTOLIC BLOOD PRESSURE: 84 MMHG | SYSTOLIC BLOOD PRESSURE: 127 MMHG

## 2024-02-08 DIAGNOSIS — G57.51 TARSAL TUNNEL SYNDROME OF RIGHT SIDE: Primary | ICD-10-CM

## 2024-02-08 PROCEDURE — 3074F SYST BP LT 130 MM HG: CPT | Mod: CPTII,S$GLB,, | Performed by: PODIATRIST

## 2024-02-08 PROCEDURE — 99999 PR PBB SHADOW E&M-EST. PATIENT-LVL III: CPT | Mod: PBBFAC,,, | Performed by: PODIATRIST

## 2024-02-08 PROCEDURE — 1159F MED LIST DOCD IN RCRD: CPT | Mod: CPTII,S$GLB,, | Performed by: PODIATRIST

## 2024-02-08 PROCEDURE — 99024 POSTOP FOLLOW-UP VISIT: CPT | Mod: S$GLB,,, | Performed by: PODIATRIST

## 2024-02-08 PROCEDURE — 3079F DIAST BP 80-89 MM HG: CPT | Mod: CPTII,S$GLB,, | Performed by: PODIATRIST

## 2024-02-09 ENCOUNTER — PATIENT MESSAGE (OUTPATIENT)
Dept: PODIATRY | Facility: CLINIC | Age: 59
End: 2024-02-09
Payer: COMMERCIAL

## 2024-02-12 ENCOUNTER — TELEPHONE (OUTPATIENT)
Dept: PODIATRY | Facility: CLINIC | Age: 59
End: 2024-02-12
Payer: COMMERCIAL

## 2024-02-12 NOTE — TELEPHONE ENCOUNTER
Hold off on compression and use topical neosporin, you can send a pic of the wound as well     Message from Dr. Queen was given to patient.

## 2024-02-21 RX ORDER — OXYCODONE AND ACETAMINOPHEN 7.5; 325 MG/1; MG/1
1 TABLET ORAL EVERY 8 HOURS PRN
Qty: 30 TABLET | Refills: 0 | Status: SHIPPED | OUTPATIENT
Start: 2024-02-21 | End: 2024-03-11 | Stop reason: SDUPTHER

## 2024-02-23 NOTE — PROGRESS NOTES
Patient presents approximately 4 weeks status post tarsal tunnel release right foot.  She is doing very well.  Mild discomfort.  Incision healing well with no signs of infection or dehiscence.  Neurovascular status intact.  Sutures removed under asceptic conditions, dry sterile bandage applied. Patient is cleared to begin light bathing of operative site.  No oils, lotions, or ointments to incision for two weeks.  Follow-up in 4 weeks.

## 2024-03-08 ENCOUNTER — TELEPHONE (OUTPATIENT)
Dept: PODIATRY | Facility: CLINIC | Age: 59
End: 2024-03-08
Payer: COMMERCIAL

## 2024-03-11 ENCOUNTER — PATIENT MESSAGE (OUTPATIENT)
Dept: INTERNAL MEDICINE | Facility: CLINIC | Age: 59
End: 2024-03-11
Payer: COMMERCIAL

## 2024-03-11 ENCOUNTER — TELEPHONE (OUTPATIENT)
Dept: PODIATRY | Facility: CLINIC | Age: 59
End: 2024-03-11
Payer: COMMERCIAL

## 2024-03-11 RX ORDER — OXYCODONE AND ACETAMINOPHEN 7.5; 325 MG/1; MG/1
1 TABLET ORAL EVERY 8 HOURS PRN
Qty: 30 TABLET | Refills: 0 | Status: SHIPPED | OUTPATIENT
Start: 2024-03-11 | End: 2024-04-08 | Stop reason: SDUPTHER

## 2024-03-11 RX ORDER — LORAZEPAM 1 MG/1
TABLET ORAL
Qty: 60 TABLET | Refills: 0 | Status: SHIPPED | OUTPATIENT
Start: 2024-03-11 | End: 2024-04-30 | Stop reason: SDUPTHER

## 2024-03-11 NOTE — TELEPHONE ENCOUNTER
Spoke with pt in regards to r/s appt due to provider not being in clinic. Pt r/s to 3/21. Pt verbalized understanding.

## 2024-03-11 NOTE — TELEPHONE ENCOUNTER
No care due was identified.  [facility] Embedded Care Due Messages. Reference number: [id number].   3/11/2024 10:15:25 AM CDT

## 2024-03-21 ENCOUNTER — OFFICE VISIT (OUTPATIENT)
Dept: PODIATRY | Facility: CLINIC | Age: 59
End: 2024-03-21
Payer: COMMERCIAL

## 2024-03-21 VITALS
WEIGHT: 121.25 LBS | HEART RATE: 68 BPM | BODY MASS INDEX: 20.2 KG/M2 | DIASTOLIC BLOOD PRESSURE: 84 MMHG | HEIGHT: 65 IN | SYSTOLIC BLOOD PRESSURE: 127 MMHG

## 2024-03-21 DIAGNOSIS — G57.51 TARSAL TUNNEL SYNDROME OF RIGHT SIDE: Primary | ICD-10-CM

## 2024-03-21 PROCEDURE — 99999 PR PBB SHADOW E&M-EST. PATIENT-LVL III: CPT | Mod: PBBFAC,,, | Performed by: PODIATRIST

## 2024-03-21 PROCEDURE — 99024 POSTOP FOLLOW-UP VISIT: CPT | Mod: S$GLB,,, | Performed by: PODIATRIST

## 2024-03-21 PROCEDURE — 3074F SYST BP LT 130 MM HG: CPT | Mod: CPTII,S$GLB,, | Performed by: PODIATRIST

## 2024-03-21 PROCEDURE — 3079F DIAST BP 80-89 MM HG: CPT | Mod: CPTII,S$GLB,, | Performed by: PODIATRIST

## 2024-03-25 ENCOUNTER — PATIENT MESSAGE (OUTPATIENT)
Dept: PODIATRY | Facility: CLINIC | Age: 59
End: 2024-03-25
Payer: COMMERCIAL

## 2024-04-08 ENCOUNTER — PATIENT MESSAGE (OUTPATIENT)
Dept: PODIATRY | Facility: CLINIC | Age: 59
End: 2024-04-08
Payer: COMMERCIAL

## 2024-04-08 RX ORDER — OXYCODONE AND ACETAMINOPHEN 7.5; 325 MG/1; MG/1
1 TABLET ORAL EVERY 8 HOURS PRN
Qty: 30 TABLET | Refills: 0 | Status: SHIPPED | OUTPATIENT
Start: 2024-04-08 | End: 2024-05-15 | Stop reason: SDUPTHER

## 2024-04-09 NOTE — PROGRESS NOTES
Patient presents approximately status post tarsal tunnel release right foot.  She is doing very well.  Mild discomfort.  Incision healing well with no signs of infection or dehiscence.  Neurovascular status intact.  Overall much improved.  Still having some discomfort and swelling as well as Achilles tendon pain.  Recommend icing/topical anti-inflammatory/night splint/possible physical therapy.  Follow-up in 2 months.

## 2024-04-10 ENCOUNTER — PATIENT MESSAGE (OUTPATIENT)
Dept: PODIATRY | Facility: CLINIC | Age: 59
End: 2024-04-10
Payer: COMMERCIAL

## 2024-04-12 NOTE — TELEPHONE ENCOUNTER
Request from [facility].  Last filled 90 x3  7/10/23 [facility].    Lov 12/13/23 charles rodriguez.  Lov pcp dr lees 10/2/23

## 2024-04-13 RX ORDER — METOPROLOL SUCCINATE 25 MG/1
25 TABLET, EXTENDED RELEASE ORAL
Qty: 90 TABLET | Refills: 1 | Status: SHIPPED | OUTPATIENT
Start: 2024-04-13

## 2024-04-17 NOTE — PROGRESS NOTES
Subjective:     Twila Case     Chief Complaint   Patient presents with    Right Foot - Pain    Right Ankle - Pain     HPI    Olinda is a 58 y.o. female coming in today for right achilles pain. Since last visit the pain has Improved in the achilles with the tenjet, mid substance. She had tarsal tunnel surgery with Dr. Queen 1/5/24 and notes posterior heel pain since elevating her foot after the surgery. Dr. Queen diagnosed a bone spur. Pt is using inserts, heel cups, and night splint without relief. The pain is better with rest, ice and worse with direct pressure, lot of walking. Pt reports pain with wearing tennis shoes, better with sandals/flip flops. Pt. describes the pain as a 4/10 achy pain that does not radiate. There has not been any new a fall/injury/ or traumas since last visit. Pt. Noted persistent, chronic left achilles pain as well, but not as painful as the new right sided pain.  Pt. denies any new musculoskeletal complaints at this time.       Joint instability? no  Mechanical locking/clicking? no  Affecting ADL's? yes  Affecting sleep? yes    Occupation: owns [employer]    Procedures reviewed:   8/25/23- R Achilles Tenjet - ongoing relief  10/6/23- R tibial nerve hydrodissection- one day of pain relief    Office note from 10/6/23 reviewed     PAST MEDICAL HISTORY:   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      PAST SURGICAL HISTORY:   Past Surgical History:   Procedure Laterality Date    BACK SURGERY      spinal fusion    COLONOSCOPY N/A 12/11/2019    Procedure: COLONOSCOPY;  Surgeon: Caroline Swartz MD;  Location: [facility] ([address] [place] [address]);  Service: Endoscopy;  Laterality: N/A;    FUSION OF SACROILIAC JOINT Left 10/3/2022    Procedure: FUSION, SACROILIAC JOINT;  Surgeon: Henry Felix MD;  Location: [facility];  Service: Pain Management;  Laterality: Left;  LEFT  PAINTEQ REP     INJECTION, SACROILIAC JOINT Left 7/14/2022    Procedure: INJECTION,SACROILIAC JOINT, LEFT;  Surgeon: Henry Felix MD;  Location: [facility];  Service: Pain Management;  Laterality: Left;    INJECTION, SACROILIAC JOINT Left 8/16/2022    Procedure: INJECTION,SACROILIAC JOINT, LEFT DIAGNOSTIC SI WITHOUT STEROID;  Surgeon: Henry Felix MD;  Location: [facility];  Service: Pain Management;  Laterality: Left;    INJECTION, SACROILIAC JOINT Left 9/1/2022    Procedure: INJECTION,SACROILIAC JOINT LEFT DIAGNOSTIC WITH NO STEROIDS;  Surgeon: Henry Felix MD;  Location: [facility];  Service: Pain Management;  Laterality: Left;    KNEE ARTHROSCOPY W/ MENISCECTOMY Left 8/15/2018    Procedure: ARTHROSCOPY, KNEE, WITH MENISCECTOMY;  Surgeon: Handy Elkins MD;  Location: [facility] OR;  Service: Orthopedics;  Laterality: Left;  Video    ROBOT-ASSISTED LAPAROSCOPIC ABDOMINAL HYSTERECTOMY USING DA MARIA ESTHER XI N/A 10/20/2020    Procedure: XI ROBOTIC HYSTERECTOMY;  Surgeon: Danie Burns MD;  Location: [facility] OR;  Service: OB/GYN;  Laterality: N/A;    ROBOT-ASSISTED LAPAROSCOPIC SALPINGO-OOPHORECTOMY USING DA MARIA ESTHER XI Bilateral 10/20/2020    Procedure: XI ROBOTIC SALPINGO-OOPHORECTOMY;  Surgeon: Danie Burns MD;  Location: [facility] OR;  Service: OB/GYN;  Laterality: Bilateral;    TARSAL TUNNEL RELEASE Right 1/5/2024    Procedure: RELEASE, TARSAL TUNNEL;  Surgeon: Estevan Queen DPM;  Location: [facility] OR;  Service: Podiatry;  Laterality: Right;    TRANSFORAMINAL EPIDURAL INJECTION OF STEROID Right 7/18/2019    Procedure: INJECTION, STEROID, EPIDURAL, TRANSFORAMINAL APPROACH, L4 AND L5;  Surgeon: Daniel Seo MD;  Location: [facility];  Service: Pain Management;  Laterality: Right;    TRANSFORAMINAL EPIDURAL INJECTION OF STEROID Right 3/16/2021    Procedure: INJECTION, STEROID, EPIDURAL, TRANSFORAMINAL APPROACH L4/5 AND L5/S1;  Surgeon: Daniel Seo MD;  Location: [facility];  Service: Pain Management;   Laterality: Right;    TUMOR REMOVAL      right hand     FAMILY HISTORY:   Family History   Problem Relation Name Age of Onset    Ovarian cancer Mother  66         at 72    Heart disease Father          double bypass    Macular degeneration Father      Heart failure Father      No Known Problems Brother      No Known Problems Brother      Cancer Paternal Grandfather          lung (pt thinks), possible smoker    Breast cancer Paternal Cousin  20        father's sister's daughter, pt thinks bilateral, cousin is     Other Maternal Aunt          mat aunt had part of colon removed, pt does not think cancer    Breast cancer Maternal Aunt       SOCIAL HISTORY:   Social History     Socioeconomic History    Marital status:    Occupational History    Occupation:      Employer: [employer]    Tobacco Use    Smoking status: Former    Smokeless tobacco: Never   Substance and Sexual Activity    Alcohol use: Yes     Alcohol/week: 5.0 standard drinks of alcohol     Types: 5 Glasses of wine per week     Comment: 3 drinks a week    Drug use: No    Sexual activity: Yes     Partners: Male     Comment:      Social Determinants of Health     Financial Resource Strain: Low Risk  (2023)    Overall Financial Resource Strain (CARDIA)     Difficulty of Paying Living Expenses: Not very hard   Food Insecurity: No Food Insecurity (2023)    Hunger Vital Sign     Worried About Running Out of Food in the Last Year: Never true     Ran Out of Food in the Last Year: Never true   Transportation Needs: No Transportation Needs (2023)    PRAPARE - Transportation     Lack of Transportation (Medical): No     Lack of Transportation (Non-Medical): No   Physical Activity: Insufficiently Active (2023)    Exercise Vital Sign     Days of Exercise per Week: 5 days     Minutes of Exercise per Session: 20 min   Stress: Stress Concern Present (2023)    [employer]  - Occupational Stress Questionnaire     Feeling of Stress : To some extent   Social Connections: Unknown (11/13/2023)    Social Connection and Isolation Panel [NHANES]     Frequency of Communication with Friends and Family: Three times a week     Frequency of Social Gatherings with Friends and Family: Twice a week     Active Member of Clubs or Organizations: No     Attends Club or Organization Meetings: Never     Marital Status:    Housing Stability: Low Risk  (11/13/2023)    Housing Stability Vital Sign     Unable to Pay for Housing in the Last Year: No     Number of Places Lived in the Last Year: 1     Unstable Housing in the Last Year: No       MEDICATIONS:   Current Outpatient Medications:     biotin 5,000 mcg TbDL, Take 1 capsule by mouth once daily., Disp: , Rfl:     buPROPion (WELLBUTRIN XL) 150 MG TB24 tablet, Take 1 tablet (150 mg total) by mouth once daily., Disp: 90 tablet, Rfl: 3    buPROPion (WELLBUTRIN XL) 300 MG 24 hr tablet, Take 1 tablet (300 mg total) by mouth once daily., Disp: 90 tablet, Rfl: 3    cholecalciferol, vitamin D3, (VITAMIN D3) 50 mcg (2,000 unit) Tab, Take 1 tablet by mouth once daily., Disp: , Rfl:     FLAXSEED OIL MISC, by Misc.(Non-Drug; Combo Route) route., Disp: , Rfl:     ibuprofen (ADVIL,MOTRIN) 600 MG tablet, Take 1 tablet (600 mg total) by mouth every 8 (eight) hours as needed for Pain., Disp: 30 tablet, Rfl: 1    LORazepam (ATIVAN) 1 MG tablet, TAKE 1 TABLET(1 MG) BY MOUTH EVERY 12 HOURS AS NEEDED, Disp: 60 tablet, Rfl: 0    metoprolol succinate (TOPROL-XL) 25 MG 24 hr tablet, TAKE 1 TABLET(25 MG) BY MOUTH EVERY DAY, Disp: 90 tablet, Rfl: 1    multivitamin (THERAGRAN) per tablet, Take 1 tablet by mouth once daily., Disp: , Rfl:     oxyCODONE-acetaminophen (PERCOCET) 7.5-325 mg per tablet, Take 1 tablet by mouth every 8 (eight) hours as needed for Pain., Disp: 30 tablet, Rfl: 0    tiZANidine 2 mg Cap, Take by mouth., Disp: , Rfl:     pregabalin (LYRICA) 50 MG capsule,  "Take 1 capsule (50 mg total) by mouth 3 (three) times daily. (Patient not taking: Reported on 4/18/2024), Disp: 90 capsule, Rfl: 6  No current facility-administered medications for this visit.    Facility-Administered Medications Ordered in Other Visits:     0.9%  NaCl infusion, 500 mL, Intravenous, Continuous, Inez Joiner MD  ALLERGIES:   Review of patient's allergies indicates:  No Known Allergies    Objective:     VITAL SIGNS: /73   Ht 5' 5" (1.651 m)   Wt 55 kg (121 lb 4.1 oz)   LMP 01/01/2015 (Approximate)   BMI 20.18 kg/m²    General    Vitals reviewed.  Constitutional: She is oriented to person, place, and time. She appears well-developed and well-nourished.   Neurological: She is alert and oriented to person, place, and time.   Psychiatric: She has a normal mood and affect. Her behavior is normal.           MUSCULOSKELETAL EXAM  ANKLE: right ANKLE  The affected ankle is compared to the contralateral ankle.     Observation:    + LEFT mid substance achilles edema  RIGHT medial ankle surgical scar, well healed  There is no errythema or ecchymosis.   Shoes reveal a normal wear pattern.  No structural deformities including pes planus/cavus, hallux valgus, or toe deformities.  Negative too-many toes sign.    Normal callus pattern on the feet bilaterally.    No leg or intrinsic foot muscle atrophy.  Non-antalgic pain     ROM (* = with pain):  Active dorsiflexion to 20° on left and 20° on right  Active plantarflexion to 50° on left and 50° on right  Active ankle inversion to 35° on left and 35° on right  Active ankle eversion to 15° on left and 15° on right  Full active flexion of the toes bilaterally.  Slight decreased extension right toes with the exception of the great toe.     Tenderness To Palpation:  No tenderness at the ATFL, CFL, PTFL, or deltoid ligaments  No tenderness over the distal anterior syndesmosis, distal tibia/fibula, fibular head/shaft  No tenderness at medial or lateral malleoli   No " tenderness at navicular, cuboid, cuneiforms, or talus  No achilles insertional pain at calcaneus  No tenderness along the metatarsals or phalanges  + tenderness at the Achilles tendon calcaneal insertion on RIGHT with associated lateral prominence  + mild LEFT mid substance achilles tendon tenderness  No tenderness at the posterior tibial or peroneal tendons  No tenderness at the tarsal tunnel in the region of the tibial nerve     Strength Testing (* = with pain):  Dorsiflexion - 5/5 on left and 5-/5 on right  Platarflexion - 5/5 on left and 5/5 on right  Resisted Inversion - 5/5 on left and 5/5 on right  Resisted Eversion - 5/5 on left and 5/5 on right*  Great Toe Extension - 5/5 on left and 5/5 on right  Great Toe Flexion - 5/5 on left and 5/5 on right     Special Tests:  Anterior talar drawer - negative and without dimpling  Talar tilt - negative  Reverse Talar tilt - negative     Heel tap test - negative  Distal tib/fib squeeze test - negative  External rotation stress (Kleiger) test - negative  Luis squeeze test - negative     Metatarsal squeeze test - negative  Midfoot stress test - negative  Calcaneal squeeze test - negative     No subluxation of the peroneal tendons with resisted eversion.     Vascular/Sensory Exam:  DP and PT pulses intact bilaterally  No skin changes, no abnormal hair distribution  Negative supine straight leg raise bilaterally  Improved sensation to light touch in the tibial nerve distribution on right. Decreased dorsal foot sensation compared to left, but subjective improvement in this area noted by patient as well.   Negative Tinel's test over tarsal tunnel on right  Capillary refill intact <2 seconds in all toes bilaterally.    Assessment:      Encounter Diagnoses   Name Primary?    Calcific Achilles tendonitis, right Yes    Achilles tendinosis of left lower extremity         Plan:   1. Acute onset of right insertional Achilles tendinitis with associated small insertional  calcification noted on previous right ankle MRI from 10/23/2023.  Previous right midsubstance Achilles tendinosis improved s/p [place] on 8/25/23 and previous right tarsal tunnel syndrome resolved status post tarsal tunnel release with Dr. Queen on 01/05/2024.  Residual left midsubstance Achilles tendinosis symptoms still present, however mild in nature on exam today  - recommend restarting formal PT for acute onset of insertional calcific Achilles tendinitis, with discussion of further evaluation with repeat diagnostic musculoskeletal ultrasound of the right Achilles tendon, if symptoms persist  - referral placed to formal physical therapy ([facility]) for therapeutic modalities and progressive eccentric exercise program  -  X-ray images of bilateral calcanei taken 5/4/23 (AP and lateral views) showed Mild pes cavus configuration bilaterally. Post-operative posterior calcaneal cortical changes noted bilaterally from prior amador deformity removal. Images were personally reviewed with patient.  - Diagnostic ultrasound of bilateral achilles taken 6/1/23 showed sonographic evidence of bilateral midsubstance and insertional Achilles tendinosis with no clear tearing, left worse than right.  Images personally reviewed.  - right ankle MRI from 10/23/2023 noted Muscular atrophy at the level of the extensor digitorum longus, flexor hallucis longus and tibialis posterior proximal to the tarsal tunnel. No evidence for tarsal tunnel mass.  Normal appearance of Achilles tendon, with small insertional calcification vs. Heal spur noted.  Images personally reviewed with patient.    2. Follow-up upon completion of PT if pain persist or deteriorates    3. Patient agreeable to today's plan and all questions were answered    This note is dictated using the M*Modal Fluency Direct word recognition program. There are word recognition mistakes that are occasionally missed on review.

## 2024-04-18 ENCOUNTER — OFFICE VISIT (OUTPATIENT)
Dept: SPORTS MEDICINE | Facility: CLINIC | Age: 59
End: 2024-04-18
Payer: COMMERCIAL

## 2024-04-18 VITALS
WEIGHT: 121.25 LBS | BODY MASS INDEX: 20.2 KG/M2 | SYSTOLIC BLOOD PRESSURE: 114 MMHG | DIASTOLIC BLOOD PRESSURE: 73 MMHG | HEIGHT: 65 IN

## 2024-04-18 DIAGNOSIS — M65.28 CALCIFIC ACHILLES TENDONITIS: Primary | ICD-10-CM

## 2024-04-18 DIAGNOSIS — M67.88 ACHILLES TENDINOSIS OF LEFT LOWER EXTREMITY: ICD-10-CM

## 2024-04-18 PROCEDURE — 1159F MED LIST DOCD IN RCRD: CPT | Mod: CPTII,S$GLB,, | Performed by: NEUROMUSCULOSKELETAL MEDICINE & OMM

## 2024-04-18 PROCEDURE — 3074F SYST BP LT 130 MM HG: CPT | Mod: CPTII,S$GLB,, | Performed by: NEUROMUSCULOSKELETAL MEDICINE & OMM

## 2024-04-18 PROCEDURE — 3008F BODY MASS INDEX DOCD: CPT | Mod: CPTII,S$GLB,, | Performed by: NEUROMUSCULOSKELETAL MEDICINE & OMM

## 2024-04-18 PROCEDURE — 1160F RVW MEDS BY RX/DR IN RCRD: CPT | Mod: CPTII,S$GLB,, | Performed by: NEUROMUSCULOSKELETAL MEDICINE & OMM

## 2024-04-18 PROCEDURE — 99999 PR PBB SHADOW E&M-EST. PATIENT-LVL III: CPT | Mod: PBBFAC,,, | Performed by: NEUROMUSCULOSKELETAL MEDICINE & OMM

## 2024-04-18 PROCEDURE — 3078F DIAST BP <80 MM HG: CPT | Mod: CPTII,S$GLB,, | Performed by: NEUROMUSCULOSKELETAL MEDICINE & OMM

## 2024-04-18 PROCEDURE — 99214 OFFICE O/P EST MOD 30 MIN: CPT | Mod: S$GLB,,, | Performed by: NEUROMUSCULOSKELETAL MEDICINE & OMM

## 2024-04-24 ENCOUNTER — PATIENT MESSAGE (OUTPATIENT)
Dept: PODIATRY | Facility: CLINIC | Age: 59
End: 2024-04-24
Payer: COMMERCIAL

## 2024-04-30 ENCOUNTER — OFFICE VISIT (OUTPATIENT)
Dept: INTERNAL MEDICINE | Facility: CLINIC | Age: 59
End: 2024-04-30
Payer: COMMERCIAL

## 2024-04-30 VITALS
BODY MASS INDEX: 22.27 KG/M2 | WEIGHT: 133.81 LBS | SYSTOLIC BLOOD PRESSURE: 120 MMHG | DIASTOLIC BLOOD PRESSURE: 84 MMHG | OXYGEN SATURATION: 99 % | HEART RATE: 72 BPM | TEMPERATURE: 99 F | RESPIRATION RATE: 16 BRPM

## 2024-04-30 DIAGNOSIS — F41.9 ANXIETY: Chronic | ICD-10-CM

## 2024-04-30 DIAGNOSIS — I15.9 SECONDARY HYPERTENSION: Primary | Chronic | ICD-10-CM

## 2024-04-30 PROCEDURE — 3079F DIAST BP 80-89 MM HG: CPT | Mod: CPTII,S$GLB,, | Performed by: INTERNAL MEDICINE

## 2024-04-30 PROCEDURE — 1159F MED LIST DOCD IN RCRD: CPT | Mod: CPTII,S$GLB,, | Performed by: INTERNAL MEDICINE

## 2024-04-30 PROCEDURE — 99999 PR PBB SHADOW E&M-EST. PATIENT-LVL IV: CPT | Mod: PBBFAC,,, | Performed by: INTERNAL MEDICINE

## 2024-04-30 PROCEDURE — 99214 OFFICE O/P EST MOD 30 MIN: CPT | Mod: S$GLB,,, | Performed by: INTERNAL MEDICINE

## 2024-04-30 PROCEDURE — 3074F SYST BP LT 130 MM HG: CPT | Mod: CPTII,S$GLB,, | Performed by: INTERNAL MEDICINE

## 2024-04-30 PROCEDURE — 3008F BODY MASS INDEX DOCD: CPT | Mod: CPTII,S$GLB,, | Performed by: INTERNAL MEDICINE

## 2024-04-30 PROCEDURE — 1160F RVW MEDS BY RX/DR IN RCRD: CPT | Mod: CPTII,S$GLB,, | Performed by: INTERNAL MEDICINE

## 2024-04-30 RX ORDER — LORAZEPAM 1 MG/1
TABLET ORAL
Qty: 60 TABLET | Refills: 2 | Status: SHIPPED | OUTPATIENT
Start: 2024-04-30

## 2024-04-30 RX ORDER — HYDROXYZINE HYDROCHLORIDE 25 MG/1
25 TABLET, FILM COATED ORAL NIGHTLY PRN
Qty: 30 TABLET | Refills: 0 | Status: SHIPPED | OUTPATIENT
Start: 2024-04-30 | End: 2024-05-30

## 2024-04-30 NOTE — PROGRESS NOTES
Subjective:       Patient ID: Twila Case is a 58 y.o. female.    Chief Complaint: No chief complaint on file.    HPI    58-year-old female here for follow-up.    HTN -  Patient is currently on Toprol-XL 25 mg. She does check her BP at home, and it runs 120s/70s-80s. Side effects of medications note: none. Denies headaches, blurred vision, chest pain, shortness of breath, nausea.    Patient has anxiety and is on Wellbutrin 450 mg daily.  She uses Ativan 1 mg twice daily as needed.  She reports that the wellbutrin is working well for her.  She takes the ativan nightly.  She has managed to cut back to 1.5 mg of ativan, which she has been on for the last 6 months.  She has racing thoughts before she goes to sleep. She has tried benadyl in the past.    In the past, she has had burning of her achilles.  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  She wore improper footwear for 5 months.  She saw Dr. Fernandez and had relief.  She got tarsal tunnel after this.  She saw Dr. Fernandez recently and is going back to PT.    Review of Systems      Objective:      Physical Exam  Vitals reviewed.   Constitutional:       Appearance: She is well-developed.   HENT:      Head: Normocephalic and atraumatic.      Mouth/Throat:      Pharynx: No oropharyngeal exudate.   Eyes:      General: No scleral icterus.        Right eye: No discharge.         Left eye: No discharge.      Pupils: Pupils are equal, round, and reactive to light.   Neck:      Thyroid: No thyromegaly.      Trachea: No tracheal deviation.   Cardiovascular:      Rate and Rhythm: Normal rate and regular rhythm.      Heart sounds: Normal heart sounds. No murmur heard.     No friction rub. No gallop.   Pulmonary:      Effort: Pulmonary effort is normal. No respiratory distress.      Breath sounds: Normal breath sounds. No wheezing or rales.   Chest:      Chest wall: No tenderness.   Abdominal:      General: Bowel sounds are normal. There is no distension.      Palpations:  Abdomen is soft. There is no mass.      Tenderness: There is no abdominal tenderness. There is no guarding or rebound.   Musculoskeletal:         General: No tenderness. Normal range of motion.      Cervical back: Normal range of motion and neck supple.   Skin:     General: Skin is warm and dry.      Coloration: Skin is not pale.      Findings: No erythema or rash.   Neurological:      Mental Status: She is alert and oriented to person, place, and time.   Psychiatric:         Behavior: Behavior normal.         Assessment:       1. Secondary hypertension    2. Anxiety      Plan:       1. Continue Toprol-XL 25 mg p.o.  2.  Continue Wellbutrin 450 mg daily, Ativan 1-2 mg nightly as needed.  Try to cut back on Ativan from 1.5 mg to 1 mg.  Atarax prescribed to help with sleep as well.

## 2024-05-01 ENCOUNTER — CLINICAL SUPPORT (OUTPATIENT)
Dept: REHABILITATION | Facility: HOSPITAL | Age: 59
End: 2024-05-01
Payer: COMMERCIAL

## 2024-05-01 DIAGNOSIS — M67.88 ACHILLES TENDINOSIS OF LEFT LOWER EXTREMITY: ICD-10-CM

## 2024-05-01 DIAGNOSIS — M65.28 CALCIFIC ACHILLES TENDONITIS: ICD-10-CM

## 2024-05-01 PROCEDURE — 97140 MANUAL THERAPY 1/> REGIONS: CPT | Performed by: PHYSICAL THERAPIST

## 2024-05-01 PROCEDURE — 97110 THERAPEUTIC EXERCISES: CPT | Performed by: PHYSICAL THERAPIST

## 2024-05-01 PROCEDURE — 97161 PT EVAL LOW COMPLEX 20 MIN: CPT | Performed by: PHYSICAL THERAPIST

## 2024-05-01 NOTE — PATIENT INSTRUCTIONS
Ankle Pump    With affected leg elevated, gently flex and extend ankle. Move through full range of motion. Avoid pain. Perform more frequently if having increased swelling.  Repeat 25 times. Do 1 set per session. Do 2 sessions per day.      Ankle Alphabet    Sit with leg straight out in front of you and heel off edge of bed or propped up on towel roll. Using ankle and foot only, trace the letters of the alphabet. Perform A to Z. Do not let the knee move too much side to side.  Repeat 1 times on affected side Do 2 sessions per day.      Towel Scrunches    Place affected foot flat on towel, knee pointed forward. Use forefoot and toes to pull towel backward.  Do not allow heel or knee to move. Repetitions should be slow and controlled.  Repeat 25 times Do 1 set per session. Do 2 sessions per day.      Gastroc, Sitting (Passive)     Sit with leg straight out in front of you and a towel under your heel and around ball of foot. Gently pull toward body. Hold 30 seconds.   Repeat 5 times. Do 1 sets per session. Do 2 sessions per day.      Resistance Band Ankle Movements, 4 ways    1. Wrap band around foot and attach below the foot. Pull foot up against resistance band. Slowly release for 3-5 seconds.  2. Wrap band around foot and hold band in your hand. Push foot down against resistance band. Slowly release for 3-5 seconds.  3. Wrap band around foot and loop around other foot and hold the end of the band. Pull foot out to side against resistance band. Slowly release for 3-5 seconds.   4. Cross one leg over the other, wrap band around bottom foot, step top of foot on band and hold the end of the band. Pull foot to the inside against resistance band. Slowly release for 3-5 seconds.     Use  resistance band.  Repeat 25 times Do 1 set per session. Do 2 sessions per day.        Straight Leg Raise     With R/L or BL leg straight, other leg bent, raise straight leg until knees are even. Slowly lower. Roll on your side and repeat  lifting top leg up, on other side, lifting bottom leg up, and on stomach kicking back (squeezing your rear end before you kick back).  Repeat 25 times. Do 1 sets per session. Do 2 sessions per day.       Glute Squeeze, supine    Lie face up and squeeze your rear end. Do not tighten your abdominals or hold your breath. Squeeze our glutes and hold 5 seconds. Relax.  Repeat 25 times per set. Do 1 sets per session. Do 2 sessions per day.

## 2024-05-01 NOTE — PLAN OF CARE
[facility]   Physical Therapy Initial Evaluation      Name: Twila Case  Clinic Number: [id number]    Therapy Diagnosis:   Encounter Diagnoses   Name Primary?    Calcific Achilles tendonitis, right     Achilles tendinosis of left lower extremity         Physician: Sona Fernandez DO    Physician Orders: PT Eval and Treat   Medical Diagnosis from Referral: M65.28 (ICD-10-CM) - Calcific Achilles tendonitis M67.88 (ICD-10-CM) - Achilles tendinosis of left lower extremity  Evaluation Date: 5/1/2024  Authorization Period Expiration: 12-31-24  Plan of Care Expiration: 7-31-24  Visit # / Visits authorized: 1/ 1   FOTO: 1/5    Precautions: Standard     Time In: 11:45 am  Time Out: 12:35 pm  Total Appointment Time (timed & untimed codes):  50 minutes    Subjective     Date of onset: >1 yr  History of current condition - Olinda reports: having pain R posterior calcaneus at achilles insertion site.  States much pain with increased walking/standing that she describes as achy.  States having had a tenjet procedure in August 2023 and a tarsal tunnel release on 1-05-24 with some pain relief.  Pt states dorsum of her foot is numb but improving.  States having PT elsewhere without relief.  Pt states pain limits her ADL       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      Twila Case  has a past surgical history that includes Back surgery; Tumor removal; Knee arthroscopy w/ meniscectomy (Left, 8/15/2018); Transforaminal epidural injection of steroid (Right, 7/18/2019); Colonoscopy (N/A, 12/11/2019); Robot-assisted laparoscopic abdominal hysterectomy using da Angel Xi (N/A, 10/20/2020); Robot-assisted laparoscopic salpingo-oophorectomy using da Angel Xi (Bilateral, 10/20/2020); Transforaminal epidural injection of steroid (Right, 3/16/2021); injection, sacroiliac joint (Left, 7/14/2022); injection,  sacroiliac joint (Left, 8/16/2022); injection, sacroiliac joint (Left, 9/1/2022); Fusion of sacroiliac joint (Left, 10/3/2022); and Tarsal tunnel release (Right, 1/5/2024).    Twila has a current medication list which includes the following prescription(s): biotin, bupropion, bupropion, cholecalciferol (vitamin d3), flaxseed oil, hydroxyzine hcl, ibuprofen, lorazepam, metoprolol succinate, multivitamin, oxycodone-acetaminophen, and tizanidine, and the following Facility-Administered Medications: sodium chloride 0.9%.    Review of patient's allergies indicates:  No Known Allergies     Imaging: none    Prior Therapy: PT for B achilles  Social History:  lives with their spouse  Occupation: owns a restaurant  Prior Level of Function: walking  Current Level of Function: ADL limited    Pain:  Current 3/10, worst 8/10, best 0/10   Location: right posterior heel  Description: Aching, Dull, and Sharp  Aggravating Factors: Standing and Walking  Easing Factors: rest    Pts goals: decrease pain with ADL    Objective     Postural examination:  good arch height     Functional assessment:   - walking:   independent             AROM:  0 deg DF and others WNL on R     MMT:   hip abductors 4/5, gross ankle 4+/5 to 5/5    Tone:  decreased gastroc    Flexibility testing:   tight heelcords    Special tests:   neg ankle stress tests    Palpation:   TTP posterior calcaneus at achilles insertion and also lower achilles tendon    Joint mobility: good ankle/forefoot    Swelling:  none    Other:  decreased sensation to light touch dorsum of foot    CMS Impairment/Limitation/Restriction for FOTO foot Survey    Therapist reviewed FOTO scores for Twila Case on 5/1/2024.   FOTO documents entered into EPIC - see Media section.           TREATMENT     Treatment Time In: 11:45 am  Treatment Time Out: 12:35 pm  Total Treatment time separate from Evaluation time:  35 min    Treatment:  HP x 10 min  HEP  Dry needling posterior calcaneus/achilles  insertion with 4 15 mm needles after written consent given    Home Exercises and Patient Education Provided    Education provided:   - ice for pain    Written Home Exercises Provided: yes.  Exercises were reviewed and Olinda was able to demonstrate them prior to the end of the session.  Olinda demonstrated good  understanding of the education provided.     See EMR under Media for exercises provided 5/1/2024.      Assessment     Twila is a 58 y.o. female referred to outpatient Physical Therapy with a medical diagnosis of M65.28 (ICD-10-CM) - Calcific Achilles tendonitis M67.88 (ICD-10-CM) - Achilles tendinosis of left lower extremity.  Pt presents with posterior heel pain, weakness and decreased flexibility that limit ADL.    Pt prognosis is Good.   Pt will benefit from skilled outpatient Physical Therapy to address the deficits stated above and in the chart below, provide pt/family education, and to maximize pt's level of independence.     Plan of care discussed with patient: Yes  Pt's spiritual, cultural and educational needs considered and patient is agreeable to the plan of care and goals as stated below:     Anticipated Barriers for therapy: length of time in pain    Medical Necessity is demonstrated by the following  History  Co-morbidities and personal factors that may impact the plan of care [x] LOW: no personal factors / co-morbidities  [] MODERATE: 1-2 personal factors / co-morbidities  [] HIGH: 3+ personal factors / co-morbidities    Moderate / High Support Documentation:   Co-morbidities affecting plan of care: na    Personal Factors:   no deficits     Examination  Body Structures and Functions, activity limitations and participation restrictions that may impact the plan of care [x] LOW: addressing 1-2 elements  [] MODERATE: 3+ elements  [] HIGH: 4+ elements (please support below)    Moderate / High Support Documentation: na     Clinical Presentation [x] LOW: stable  [] MODERATE: Evolving  [] HIGH:  Unstable     Decision Making/ Complexity Score: low       Goals:  Short Term Goals: 2 weeks         1.   Independent with HEP        2.  Pt will report decreased pain level of < 50% from above measure with ADL    Long Term Goals:   GOALS:    6_   weeks. Pt agrees with goals set.  Independent with HEP.  Report decreased    R heel    pain  <   / =  3/10 with ADL such as prolonged standing/walking  Increased MMT  for  R LE to 5/5 with ADL such as stair climbing  Increased arom  for  DF to 5 deg with functional activities such walking or self-care      Plan     Certification Period/Plan of care expiration: 5/1/2024 to 7-31-24.    Outpatient Physical Therapy 1 times weekly for 6 weeks to include the following interventions: Manual Therapy, Moist Heat/ Ice, Patient Education, Therapeutic Activities, and Therapeutic Exercise and dry needling.     Korey Castrejon, PT

## 2024-05-08 ENCOUNTER — CLINICAL SUPPORT (OUTPATIENT)
Dept: REHABILITATION | Facility: HOSPITAL | Age: 59
End: 2024-05-08
Payer: COMMERCIAL

## 2024-05-08 DIAGNOSIS — R52 PAIN: Primary | ICD-10-CM

## 2024-05-08 PROCEDURE — 97110 THERAPEUTIC EXERCISES: CPT | Performed by: PHYSICAL THERAPIST

## 2024-05-08 PROCEDURE — 97140 MANUAL THERAPY 1/> REGIONS: CPT | Performed by: PHYSICAL THERAPIST

## 2024-05-08 NOTE — PROGRESS NOTES
Physical Therapy Daily Treatment Note     Name: Twila Case  Clinic Number: [id number]    Therapy Diagnosis:   Encounter Diagnosis   Name Primary?    Pain Yes     Physician: Sona Fernandez DO    Visit Date: 5/8/2024  Physician Orders: PT Eval and Treat   Medical Diagnosis from Referral: M65.28 (ICD-10-CM) - Calcific Achilles tendonitis M67.88 (ICD-10-CM) - Achilles tendinosis of left lower extremity  Evaluation Date: 5/1/2024  Authorization Period Expiration: 12-31-24  Plan of Care Expiration: 7-31-24  Visit # / Visits authorized: 1/ 20   FOTO: 1/5     Time In: 3:20 pm  Time Out: 4:00 pm  Total Billable Time: 40 minutes    Precautions: Standard    Subjective     Pt reports: PAULY heel hurts a lot from being on her feet all day at work.  She was compliant with home exercise program.  Response to previous treatment: less pain  Functional change: na    Pain: 8/10  Location: right heel     Objective     TTP achilles insertion.    Olinda received therapeutic exercises to develop strength, flexibility, and core stabilization for 25 minutes including:  Heelcord stretch 3 x 1 min  Standing GTB hip abd and ext x 25 ea  GTB hip abd in supine x 25  GTB clamshells x 25  GTB ankle x 4 ways 25x ea  HEP review  KT tape achilles tendon with i strip and 75% tension    Olinda received the following manual therapy techniques: Soft tissue Mobilization were applied to the: R achilles/heel for 20 minutes, including:  IASTM x 10 min  DN achilles insertion and medial/lateral calcaneus with 4 15 mm needles    Olinda participated in neuromuscular re-education activities to improve: Balance and Proprioception for 0 minutes. The following activities were included:          Home Exercises Provided and Patient Education Provided     Education provided:   - ice for pain    Written Home Exercises Provided: Patient instructed to cont prior HEP.  Exercises were reviewed and Olinda was able to demonstrate them prior to the end of the session.  Olinda  demonstrated good  understanding of the education provided.     See EMR under Media for exercises provided prior visit.    Assessment     Tolerated treatment well.  Olinda Is progressing well towards her goals.   Pt prognosis is Good.     Pt will continue to benefit from skilled outpatient physical therapy to address the deficits listed in the problem list box on initial evaluation, provide pt/family education and to maximize pt's level of independence in the home and community environment.     Pt's spiritual, cultural and educational needs considered and pt agreeable to plan of care and goals.    Anticipated barriers to physical therapy: none    Goals: Short Term Goals: 2 weeks         1.   Independent with HEP        2.  Pt will report decreased pain level of < 50% from above measure with ADL     Long Term Goals:   GOALS:    6_   weeks. Pt agrees with goals set.  Independent with HEP.  Report decreased    R heel    pain  <   / =  3/10 with ADL such as prolonged standing/walking  Increased MMT  for  R LE to 5/5 with ADL such as stair climbing  Increased arom  for  DF to 5 deg with functional activities such walking or self-care    Plan     Continue PT per POC.    Korey Castrejon, PT

## 2024-05-13 ENCOUNTER — OFFICE VISIT (OUTPATIENT)
Dept: PODIATRY | Facility: CLINIC | Age: 59
End: 2024-05-13
Payer: COMMERCIAL

## 2024-05-13 VITALS
BODY MASS INDEX: 22.3 KG/M2 | DIASTOLIC BLOOD PRESSURE: 84 MMHG | HEIGHT: 65 IN | HEART RATE: 72 BPM | WEIGHT: 133.81 LBS | SYSTOLIC BLOOD PRESSURE: 120 MMHG

## 2024-05-13 DIAGNOSIS — G57.51 TARSAL TUNNEL SYNDROME OF RIGHT SIDE: Primary | ICD-10-CM

## 2024-05-13 DIAGNOSIS — M79.671 PAIN IN BOTH FEET: ICD-10-CM

## 2024-05-13 DIAGNOSIS — M76.62 ACHILLES TENDINITIS OF BOTH LOWER EXTREMITIES: ICD-10-CM

## 2024-05-13 DIAGNOSIS — M76.61 ACHILLES TENDINITIS OF BOTH LOWER EXTREMITIES: ICD-10-CM

## 2024-05-13 DIAGNOSIS — M79.672 PAIN IN BOTH FEET: ICD-10-CM

## 2024-05-13 PROCEDURE — 3079F DIAST BP 80-89 MM HG: CPT | Mod: CPTII,S$GLB,, | Performed by: PODIATRIST

## 2024-05-13 PROCEDURE — 3074F SYST BP LT 130 MM HG: CPT | Mod: CPTII,S$GLB,, | Performed by: PODIATRIST

## 2024-05-13 PROCEDURE — 99024 POSTOP FOLLOW-UP VISIT: CPT | Mod: S$GLB,,, | Performed by: PODIATRIST

## 2024-05-13 PROCEDURE — 99999 PR PBB SHADOW E&M-EST. PATIENT-LVL III: CPT | Mod: PBBFAC,,, | Performed by: PODIATRIST

## 2024-05-13 PROCEDURE — 1159F MED LIST DOCD IN RCRD: CPT | Mod: CPTII,S$GLB,, | Performed by: PODIATRIST

## 2024-05-15 RX ORDER — OXYCODONE AND ACETAMINOPHEN 7.5; 325 MG/1; MG/1
1 TABLET ORAL EVERY 8 HOURS PRN
Qty: 30 TABLET | Refills: 0 | Status: SHIPPED | OUTPATIENT
Start: 2024-05-15 | End: 2024-06-04 | Stop reason: SDUPTHER

## 2024-05-17 ENCOUNTER — CLINICAL SUPPORT (OUTPATIENT)
Dept: REHABILITATION | Facility: HOSPITAL | Age: 59
End: 2024-05-17
Payer: COMMERCIAL

## 2024-05-17 DIAGNOSIS — R52 PAIN: Primary | ICD-10-CM

## 2024-05-17 PROCEDURE — 97140 MANUAL THERAPY 1/> REGIONS: CPT | Performed by: PHYSICAL THERAPIST

## 2024-05-17 PROCEDURE — 97110 THERAPEUTIC EXERCISES: CPT | Performed by: PHYSICAL THERAPIST

## 2024-05-17 NOTE — PROGRESS NOTES
Physical Therapy Daily Treatment Note     Name: Twila Case  Clinic Number: [id number]    Therapy Diagnosis:   Encounter Diagnosis   Name Primary?    Pain Yes     Physician: No ref. provider found    Visit Date: 5/17/2024  Physician Orders: PT Eval and Treat   Medical Diagnosis from Referral: M65.28 (ICD-10-CM) - Calcific Achilles tendonitis M67.88 (ICD-10-CM) - Achilles tendinosis of left lower extremity  Evaluation Date: 5/1/2024  Authorization Period Expiration: 12-31-24  Plan of Care Expiration: 7-31-24  Visit # / Visits authorized: 2/ 20   FOTO: 2/5     Time In: 9:40 am  Time Out: 10:00 am  Total Billable Time: 40 minutes    Precautions: Standard    Subjective     Pt reports: R heel feels OK this morning but c/o much pain from working long hrs.  She was compliant with home exercise program.  Response to previous treatment: less pain  Functional change: na    Pain: 3/10  Location: right heel     Objective     TTP achilles insertion.    Olinda received therapeutic exercises to develop strength, flexibility, and core stabilization for 25 minutes including:  Heelcord stretch 3 x 1 min  Standing GTB hip abd and ext x 25 ea  GTB hip abd in supine x 25  GTB clamshells x 25  GTB ankle x 4 ways 25x ea  HEP review  KT tape achilles tendon with i strip and 75% tension    Olinda received the following manual therapy techniques: Soft tissue Mobilization were applied to the: R achilles/heel for 20 minutes, including:  IASTM x 10 min  DN achilles insertion and medial/lateral calcaneus with 4 15 mm needles    Olinda participated in neuromuscular re-education activities to improve: Balance and Proprioception for 0 minutes. The following activities were included:          Home Exercises Provided and Patient Education Provided     Education provided:   - ice for pain    Written Home Exercises Provided: Patient instructed to cont prior HEP.  Exercises were reviewed and Olinda was able to demonstrate them prior to the end of the  session.  Olinda demonstrated good  understanding of the education provided.     See EMR under Media for exercises provided prior visit.    Assessment     Tolerated treatment well.  Sx dependent on amount of time standing/walking.  Olinda Is progressing well towards her goals.   Pt prognosis is Good.     Pt will continue to benefit from skilled outpatient physical therapy to address the deficits listed in the problem list box on initial evaluation, provide pt/family education and to maximize pt's level of independence in the home and community environment.     Pt's spiritual, cultural and educational needs considered and pt agreeable to plan of care and goals.    Anticipated barriers to physical therapy: none    Goals: Short Term Goals: 2 weeks         1.   Independent with HEP        2.  Pt will report decreased pain level of < 50% from above measure with ADL     Long Term Goals:   GOALS:    6_   weeks. Pt agrees with goals set.  Independent with HEP.  Report decreased    R heel    pain  <   / =  3/10 with ADL such as prolonged standing/walking  Increased MMT  for  R LE to 5/5 with ADL such as stair climbing  Increased arom  for  DF to 5 deg with functional activities such walking or self-care    Plan     Continue PT per POC.    Korey Castrejon, PT

## 2024-05-19 NOTE — PROGRESS NOTES
Patient presents approximately status post tarsal tunnel release right foot.  She is doing very well.  No tarsal tunnel pain at this point.  She does however have bilateral Achilles tendon issues right greater than left.  She has had previous surgery hip.  Currently undergoing treatment with sports medicine for this.  Follow-up in 3 months.

## 2024-05-22 ENCOUNTER — CLINICAL SUPPORT (OUTPATIENT)
Dept: REHABILITATION | Facility: HOSPITAL | Age: 59
End: 2024-05-22
Payer: COMMERCIAL

## 2024-05-22 DIAGNOSIS — R52 PAIN: Primary | ICD-10-CM

## 2024-05-22 PROCEDURE — 97140 MANUAL THERAPY 1/> REGIONS: CPT | Performed by: PHYSICAL THERAPIST

## 2024-05-22 PROCEDURE — 97110 THERAPEUTIC EXERCISES: CPT | Performed by: PHYSICAL THERAPIST

## 2024-05-22 NOTE — PROGRESS NOTES
Physical Therapy Daily Treatment Note     Name: Twila Case  Clinic Number: [id number]    Therapy Diagnosis:   Encounter Diagnosis   Name Primary?    Pain Yes     Physician: Sona Fernandez DO    Visit Date: 5/22/2024  Physician Orders: PT Eval and Treat   Medical Diagnosis from Referral: M65.28 (ICD-10-CM) - Calcific Achilles tendonitis M67.88 (ICD-10-CM) - Achilles tendinosis of left lower extremity  Evaluation Date: 5/1/2024  Authorization Period Expiration: 12-31-24  Plan of Care Expiration: 7-31-24  Visit # / Visits authorized: 3/ 20   FOTO: 3/5     Time In: 3:15 pm  Time Out: 4:00 pm  Total Billable Time: 40 minutes    Precautions: Standard    Subjective     Pt reports: R heel hurts today from walking a lot at work.  She was compliant with home exercise program.  Response to previous treatment: less pain  Functional change: na    Pain: 4/10  Location: right heel     Objective     TTP achilles insertion.    Olinda received therapeutic exercises to develop strength, flexibility, and core stabilization for 25 minutes including:  Heelcord stretch 3 x 1 min  Standing GTB hip abd and ext x 25 ea  GTB hip abd in supine x 25  GTB clamshells x 25  GTB ankle x 4 ways 25x ea  HEP review  KT tape achilles tendon with i strip and 75% tension    Olinda received the following manual therapy techniques: Soft tissue Mobilization were applied to the: R achilles/heel for 20 minutes, including:  IASTM x 10 min  DN achilles insertion and medial/lateral calcaneus with 4 15 mm needles    Olinda participated in neuromuscular re-education activities to improve: Balance and Proprioception for 0 minutes. The following activities were included:          Home Exercises Provided and Patient Education Provided     Education provided:   - ice for pain    Written Home Exercises Provided: Patient instructed to cont prior HEP.  Exercises were reviewed and Olinda was able to demonstrate them prior to the end of the session.  Olinda demonstrated  good  understanding of the education provided.     See EMR under Media for exercises provided prior visit.    Assessment     Tolerated treatment well.  Sx dependent on amount of time standing/walking.  Olinda Is progressing well towards her goals.   Pt prognosis is Good.     Pt will continue to benefit from skilled outpatient physical therapy to address the deficits listed in the problem list box on initial evaluation, provide pt/family education and to maximize pt's level of independence in the home and community environment.     Pt's spiritual, cultural and educational needs considered and pt agreeable to plan of care and goals.    Anticipated barriers to physical therapy: none    Goals: Short Term Goals: 2 weeks         1.   Independent with HEP        2.  Pt will report decreased pain level of < 50% from above measure with ADL     Long Term Goals:   GOALS:    6_   weeks. Pt agrees with goals set.  Independent with HEP.  Report decreased    R heel    pain  <   / =  3/10 with ADL such as prolonged standing/walking  Increased MMT  for  R LE to 5/5 with ADL such as stair climbing  Increased arom  for  DF to 5 deg with functional activities such walking or self-care    Plan     Continue PT per POC.    Korey Castrejon, PT

## 2024-05-29 ENCOUNTER — CLINICAL SUPPORT (OUTPATIENT)
Dept: REHABILITATION | Facility: HOSPITAL | Age: 59
End: 2024-05-29
Payer: COMMERCIAL

## 2024-05-29 DIAGNOSIS — R52 PAIN: Primary | ICD-10-CM

## 2024-05-29 PROCEDURE — 97140 MANUAL THERAPY 1/> REGIONS: CPT | Performed by: PHYSICAL THERAPIST

## 2024-05-29 PROCEDURE — 97110 THERAPEUTIC EXERCISES: CPT | Performed by: PHYSICAL THERAPIST

## 2024-05-29 NOTE — PROGRESS NOTES
Physical Therapy Daily Treatment Note     Name: Twila Case  Clinic Number: [id number]    Therapy Diagnosis:   Encounter Diagnosis   Name Primary?    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  She was compliant with home exercise program.  Response to previous treatment: less pain  Functional change: na    Pain: 1/10  Location: right heel     Objective     TTP achilles insertion.    Olinda received therapeutic exercises to develop strength, flexibility, and core stabilization for 25 minutes including:  Heelcord stretch 3 x 1 min  Standing GTB hip abd and ext x 25 ea  GTB hip abd in supine x 25  GTB clamshells x 25  GTB ankle x 4 ways 25x ea  HEP review  KT tape achilles tendon with i strip and 75% tension    Olinda received the following manual therapy techniques: Soft tissue Mobilization were applied to the: R achilles/heel for 20 minutes, including:  IASTM x 10 min  DN achilles insertion and medial/lateral calcaneus with 4 15 mm needles    Olinda participated in neuromuscular re-education activities to improve: Balance and Proprioception for 0 minutes. The following activities were included:          Home Exercises Provided and Patient Education Provided     Education provided:   - ice for pain    Written Home Exercises Provided: Patient instructed to cont prior HEP.  Exercises were reviewed and Olinda was able to demonstrate them prior to the end of the session.  Olinda demonstrated  good  understanding of the education provided.     See EMR under Media for exercises provided prior visit.    Assessment     Tolerated treatment well.  Sx dependent on amount of time standing/walking.  Olinda Is progressing well towards her goals.   Pt prognosis is Good.     Pt will continue to benefit from skilled outpatient physical therapy to address the deficits listed in the problem list box on initial evaluation, provide pt/family education and to maximize pt's level of independence in the home and community environment.     Pt's spiritual, cultural and educational needs considered and pt agreeable to plan of care and goals.    Anticipated barriers to physical therapy: none    Goals: Short Term Goals: 2 weeks         1.   Independent with HEP        2.  Pt will report decreased pain level of < 50% from above measure with ADL     Long Term Goals:   GOALS:    6_   weeks. Pt agrees with goals set.  Independent with HEP.  Report decreased    R heel    pain  <   / =  3/10 with ADL such as prolonged standing/walking  Increased MMT  for  R LE to 5/5 with ADL such as stair climbing  Increased arom  for  DF to 5 deg with functional activities such walking or self-care    Plan     Continue PT per POC.    Korey Castrejon, PT

## 2024-06-05 ENCOUNTER — CLINICAL SUPPORT (OUTPATIENT)
Dept: REHABILITATION | Facility: HOSPITAL | Age: 59
End: 2024-06-05
Payer: COMMERCIAL

## 2024-06-05 DIAGNOSIS — R52 PAIN: Primary | ICD-10-CM

## 2024-06-05 PROCEDURE — 97140 MANUAL THERAPY 1/> REGIONS: CPT | Performed by: PHYSICAL THERAPIST

## 2024-06-05 PROCEDURE — 97110 THERAPEUTIC EXERCISES: CPT | Performed by: PHYSICAL THERAPIST

## 2024-06-05 RX ORDER — IBUPROFEN 600 MG/1
600 TABLET ORAL EVERY 8 HOURS PRN
Qty: 30 TABLET | Refills: 1 | Status: SHIPPED | OUTPATIENT
Start: 2024-06-05

## 2024-06-05 RX ORDER — OXYCODONE AND ACETAMINOPHEN 7.5; 325 MG/1; MG/1
1 TABLET ORAL EVERY 8 HOURS PRN
Qty: 30 TABLET | Refills: 0 | Status: SHIPPED | OUTPATIENT
Start: 2024-06-05

## 2024-06-05 NOTE — PROGRESS NOTES
Physical Therapy Daily Treatment Note     Name: Twila Case  Clinic Number: [id number]    Therapy Diagnosis:   Encounter Diagnosis   Name Primary?    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  She was compliant with home exercise program.  Response to previous treatment: less pain  Functional change: na    Pain: 3/10  Location: right heel     Objective     TTP achilles insertion.  Heelcord flexibility increased.    Olinda received therapeutic exercises to develop strength, flexibility, and core stabilization for 20 minutes including:  Heelcord stretch 3 x 1 min  Standing GTB hip abd and ext x 25 ea  GTB hip abd in supine x 25  GTB clamshells x 25  GTB ankle x 4 ways 25x ea  HEP review  KT tape achilles tendon with i strip and 75% tension    Olinda received the following manual therapy techniques: Soft tissue Mobilization were applied to the: R achilles/heel for 20 minutes, including:  IASTM x 10 min  DN achilles insertion and medial/lateral calcaneus with 4 15 mm needles    Olinda participated in neuromuscular re-education activities to improve: Balance and Proprioception for 0 minutes. The following activities were included:          Home Exercises Provided and Patient Education Provided     Education provided:   - ice for pain    Written Home Exercises Provided: Patient instructed to cont prior HEP.  Exercises were reviewed and Olinda was able to demonstrate them prior to the end of the session.  Olinda demonstrated  good  understanding of the education provided.     See EMR under Media for exercises provided prior visit.    Assessment     Tolerated treatment well.  Sx increased today.  Olinda Is progressing well towards her goals.   Pt prognosis is FAIR.     Pt will continue to benefit from skilled outpatient physical therapy to address the deficits listed in the problem list box on initial evaluation, provide pt/family education and to maximize pt's level of independence in the home and community environment.     Pt's spiritual, cultural and educational needs considered and pt agreeable to plan of care and goals.    Anticipated barriers to physical therapy: none    Goals: Short Term Goals: 2 weeks         1.   Independent with HEP        2.  Pt will report decreased pain level of < 50% from above measure with ADL     Long Term Goals:   GOALS:    6_   weeks. Pt agrees with goals set.  Independent with HEP.  Report decreased    R heel    pain  <   / =  3/10 with ADL such as prolonged standing/walking  Increased MMT  for  R LE to 5/5 with ADL such as stair climbing  Increased arom  for  DF to 5 deg with functional activities such walking or self-care    Plan     Continue PT per POC.    Korey Castrejon, PT

## 2024-06-11 ENCOUNTER — OFFICE VISIT (OUTPATIENT)
Dept: URGENT CARE | Facility: CLINIC | Age: 59
End: 2024-06-11
Payer: COMMERCIAL

## 2024-06-11 VITALS
HEART RATE: 73 BPM | DIASTOLIC BLOOD PRESSURE: 85 MMHG | SYSTOLIC BLOOD PRESSURE: 123 MMHG | OXYGEN SATURATION: 98 % | WEIGHT: 133 LBS | RESPIRATION RATE: 18 BRPM | HEIGHT: 65 IN | TEMPERATURE: 99 F | BODY MASS INDEX: 22.16 KG/M2

## 2024-06-11 DIAGNOSIS — H01.004 BLEPHARITIS OF LEFT UPPER EYELID, UNSPECIFIED TYPE: Primary | ICD-10-CM

## 2024-06-11 DIAGNOSIS — J01.90 ACUTE BACTERIAL RHINOSINUSITIS: ICD-10-CM

## 2024-06-11 DIAGNOSIS — B96.89 ACUTE BACTERIAL RHINOSINUSITIS: ICD-10-CM

## 2024-06-11 PROCEDURE — 99213 OFFICE O/P EST LOW 20 MIN: CPT | Mod: S$GLB,,,

## 2024-06-11 RX ORDER — AMOXICILLIN AND CLAVULANATE POTASSIUM 875; 125 MG/1; MG/1
1 TABLET, FILM COATED ORAL EVERY 12 HOURS
Qty: 14 TABLET | Refills: 0 | Status: SHIPPED | OUTPATIENT
Start: 2024-06-11 | End: 2024-06-18

## 2024-06-11 RX ORDER — ERYTHROMYCIN 5 MG/G
OINTMENT OPHTHALMIC 4 TIMES DAILY
Qty: 3.5 G | Refills: 0 | Status: SHIPPED | OUTPATIENT
Start: 2024-06-11 | End: 2024-06-18

## 2024-06-11 NOTE — PATIENT INSTRUCTIONS
Erythromycin 4 times daily for 7 days.  Augmentin twice daily for 7 days.  Warm compress daily.    Is there anything I can do on my own to feel better? -- Yes. You can:  Put warm, wet pressure on your eyes - Wet a clean wash cloth with warm (not scalding hot) water and put it over your eyes. When the wash cloth cools, reheat it with warm water and put it back over your eyes. Repeat these steps for 5 minutes, 2 to 4 times a day.  Gently rub your eyelids - Do this right after putting warm, wet pressure on your eyes. Use the washcloth or a clean fingertip to gently rub your eyelid in small circles.  Wash your eyelids - Use plain warm water or warm water with a drop of baby shampoo on a clean washcloth, gauze pad, or cotton swab. Gently clean any crusty material off the eyelashes and eyelids. Do not rub hard or you can cause more irritation. You can also use over-the-counter eyelid scrubs and pads.  - Rest.    - Drink plenty of fluids.    - Acetaminophen (tylenol) or Ibuprofen (advil,motrin) as directed as needed for fever/pain. Avoid tylenol if you have a history of liver disease. Do not take ibuprofen if you have a history of GI bleeding, kidney disease, or if you take blood thinners.     - Follow up with your PCP or specialty clinic as directed in the next 1-2 weeks if not improved or as needed.  You can call [phone] to schedule an appointment with the appropriate provider.    - Go to the ER or seek medical attention immediately if you develop new or worsening symptoms.     - You must understand that you have received an Urgent Care treatment only and that you may be released before all of your medical problems are known or treated.   - You, the patient, will arrange for follow up care as instructed.   - If your condition worsens or fails to improve we recommend that you receive another evaluation at the ER immediately or contact your PCP to discuss your concerns or return here.

## 2024-06-11 NOTE — PROGRESS NOTES
"Subjective:      Patient ID: Twila Case is a 58 y.o. female.    Vitals:  height is 5' 5" (1.651 m) and weight is 60.3 kg (133 lb). Her oral temperature is 98.5 °F (36.9 °C). Her blood pressure is 123/85 and her pulse is 73. Her respiration is 18 and oxygen saturation is 98%.     Chief Complaint: Eye Problem    58-year-old female patient reports of swelling of the left upper eyelid with discharge and crustiness noticed this morning.  Patient reports having watery discharge.  Patient also complain of left facial sinus pain with congestion for over a week.  Patient denies any trauma or injury.  Patient denies any blurry or loss or change in vision. PERRLA.           Eye Problem   The left eye is affected. This is a new problem. The current episode started yesterday. The problem occurs constantly. The problem has been gradually worsening. The injury mechanism is unknown. The pain is at a severity of 6/10. The pain is moderate. There is No known exposure to pink eye. She Does not wear contacts. Associated symptoms include an eye discharge. Pertinent negatives include no blurred vision, double vision, eye redness, itching or photophobia. She has tried eye drops for the symptoms.     Constitution: Negative for activity change, appetite change and chills.   HENT:  Positive for congestion, sinus pain and sinus pressure. Negative for ear pain, ear discharge, foreign body in ear, facial trauma, nosebleeds, foreign body in nose, postnasal drip, sore throat, trouble swallowing and voice change.    Neck: Negative for neck pain, neck stiffness and painful lymph nodes.   Cardiovascular:  Negative for chest trauma, chest pain and leg swelling.   Eyes:  Positive for eye discharge and eyelid swelling. Negative for eye trauma, foreign body in eye, eye itching, eye pain, eye redness, photophobia, vision loss, double vision and blurred vision.   Respiratory:  Negative for sleep apnea, chest tightness, cough and asthma.  "   Gastrointestinal:  Negative for abdominal trauma, abdominal pain and abdominal bloating.   Endocrine: hair loss, cold intolerance and heat intolerance.   Genitourinary:  Negative for dysuria, frequency, urgency and painful intercourse.   Musculoskeletal:  Negative for pain, trauma and joint pain.   Skin:  Negative for color change, pale, rash and wound.   Allergic/Immunologic: Positive for recurrent sinus infections. Negative for environmental allergies, seasonal allergies, food allergies, eczema, asthma, immunocompromised state and chronic cough.   Neurological:  Negative for dizziness, history of vertigo, light-headedness, disorientation and altered mental status.   Hematologic/Lymphatic: Negative for swollen lymph nodes, easy bruising/bleeding and trouble clotting. Does not bruise/bleed easily.   Psychiatric/Behavioral:  Negative for altered mental status, disorientation, confusion, agitation and nervous/anxious. The patient is not nervous/anxious.       Objective:     Physical Exam   Constitutional: She is oriented to person, place, and time. She appears well-developed. She is cooperative. No distress.   HENT:   Head: Normocephalic and atraumatic.   Nose: Congestion present. Left sinus exhibits maxillary sinus tenderness.   Mouth/Throat: Oropharynx is clear and moist and mucous membranes are normal.   Eyes: Conjunctivae and lids are normal.   Neck: Trachea normal and phonation normal. Neck supple.   Cardiovascular: Normal rate, regular rhythm, normal heart sounds and normal pulses.   Pulmonary/Chest: Effort normal and breath sounds normal.   Abdominal: Normal appearance and bowel sounds are normal. She exhibits no mass. Soft.   Musculoskeletal:         General: No deformity.   Neurological: She is alert and oriented to person, place, and time. She has normal strength and normal reflexes. No sensory deficit.   Skin: Skin is warm, dry, intact and not diaphoretic.   Psychiatric: Her speech is normal and behavior  is normal. Judgment and thought content normal.   Nursing note and vitals reviewed.      Assessment:     1. Blepharitis of left upper eyelid, unspecified type    2. Acute bacterial rhinosinusitis        Plan:       Blepharitis of left upper eyelid, unspecified type  -     erythromycin (ROMYCIN) ophthalmic ointment; Place into the left eye 4 (four) times daily. for 7 days  Dispense: 3.5 g; Refill: 0    Acute bacterial rhinosinusitis  -     amoxicillin-clavulanate 875-125mg (AUGMENTIN) 875-125 mg per tablet; Take 1 tablet by mouth every 12 (twelve) hours. for 7 days  Dispense: 14 tablet; Refill: 0

## 2024-06-19 ENCOUNTER — OFFICE VISIT (OUTPATIENT)
Dept: SPORTS MEDICINE | Facility: CLINIC | Age: 59
End: 2024-06-19
Payer: COMMERCIAL

## 2024-06-19 ENCOUNTER — PATIENT MESSAGE (OUTPATIENT)
Dept: PODIATRY | Facility: CLINIC | Age: 59
End: 2024-06-19
Payer: COMMERCIAL

## 2024-06-19 VITALS
DIASTOLIC BLOOD PRESSURE: 83 MMHG | SYSTOLIC BLOOD PRESSURE: 111 MMHG | BODY MASS INDEX: 22.15 KG/M2 | HEIGHT: 65 IN | WEIGHT: 132.94 LBS

## 2024-06-19 DIAGNOSIS — M77.31 HEEL SPUR, RIGHT: Primary | ICD-10-CM

## 2024-06-19 DIAGNOSIS — M67.873 ACHILLES TENDINOSIS OF RIGHT ANKLE: ICD-10-CM

## 2024-06-19 DIAGNOSIS — M79.671 INTRACTABLE RIGHT HEEL PAIN: ICD-10-CM

## 2024-06-19 PROCEDURE — 99999 PR PBB SHADOW E&M-EST. PATIENT-LVL III: CPT | Mod: PBBFAC,,, | Performed by: NEUROMUSCULOSKELETAL MEDICINE & OMM

## 2024-06-19 NOTE — PROGRESS NOTES
ULTRASOUND EXAM REPORT    Patient: Twila Lynn, 58 y.o. female     FOCUSED::   1. Diagnostic Extremity - [facility]-Sports Ultrasound was recommended due to Right heel pain.  2. Diagnostic Extremity - [facility]-Sports Ultrasound Performed: Sona Fernandez Extremity Study: right posterior calcaneous and achilles tendon.    TECHNIQUE:  Real time ultrasound examination of the right achilles tendon and posterior calcaneous was performed with SonoSite Edge 2, 9-L MHz linear probe(s).     FINDINGS:  The images are of adequate diagnostic quality with identification of all echogenic structures made except for the vascular structures unless otherwise noted.  The findings are consistent with the diagnosi(e)s below.  There is no sonographic evidence of periosteal abnormalities, soft tissue edema, musculotendinous or nerve irregularities.  The right Achilles tendon distal insertion to the calcaneus is intact upon static and dynamic testing.  Minimal hypoechoic change noted just proximal to the distal Achilles tendon attachment however no associated hyperemia or Achilles tendon thickening appreciated.  Patient most tender upon sonopalpation at the lateral calcaneal spurring.  The rest of the sonographic examination was unremarkable.    IMPRESSION:  Mild insertional Achilles tendinosis with most significant pain upon sonopalpation of the lateral calcaneal heel spur.

## 2024-06-19 NOTE — PROGRESS NOTES
"Subjective:     Twila Case     No chief complaint on file.    HPI    Olinda is a 58 y.o. female coming in today for right>left achilles pain. Since last visit the pain has Improved with PT and dry needling, but it's a "temporary fix". She is still having a lot of pain at work. Pt is using inserts, heel cups, and night splint without relief. The pain is better with rest, ice and worse with direct pressure, lot of walking. Pt reports pain with wearing tennis shoes, better with sandals/flip flops. Pt. describes the pain as a 3/10 achy pain that does not radiate. Pt has discussed surgery for the bone spur with Dr. Queen but would like to try Tenjet first. There has not been any new a fall/injury/ or traumas since last visit. Pt. denies any new musculoskeletal complaints at this time.       Joint instability? no  Mechanical locking/clicking? no  Affecting ADL's? yes  Affecting sleep? yes    Occupation: owns [employer]    Procedures reviewed:   8/25/23- R Achilles Tenjet - ongoing relief on the midsubstance tendon  10/6/23- R tibial nerve hydrodissection- one day of pain relief    Office note from 10/6/23 reviewed     PAST MEDICAL HISTORY:   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      PAST SURGICAL HISTORY:   Past Surgical History:   Procedure Laterality Date    BACK SURGERY      spinal fusion    COLONOSCOPY N/A 12/11/2019    Procedure: COLONOSCOPY;  Surgeon: Caroline Swartz MD;  Location: [address]);  Service: Endoscopy;  Laterality: N/A;    FUSION OF SACROILIAC JOINT Left 10/3/2022    Procedure: FUSION, SACROILIAC JOINT;  Surgeon: Henry Felix MD;  Location: [facility] OR;  Service: Pain Management;  Laterality: Left;  LEFT  PAINTEQ REP    INJECTION, SACROILIAC JOINT Left 7/14/2022    Procedure: INJECTION,SACROILIAC JOINT, LEFT;  Surgeon: Henry Felix MD;  Location: [facility] PAIN MGT;  " Service: Pain Management;  Laterality: Left;    INJECTION, SACROILIAC JOINT Left 8/16/2022    Procedure: INJECTION,SACROILIAC JOINT, LEFT DIAGNOSTIC SI WITHOUT STEROID;  Surgeon: Henry Felix MD;  Location: [facility] [place] PAIN MGT;  Service: Pain Management;  Laterality: Left;    INJECTION, SACROILIAC JOINT Left 9/1/2022    Procedure: INJECTION,SACROILIAC JOINT LEFT DIAGNOSTIC WITH NO STEROIDS;  Surgeon: Henry Felix MD;  Location: [facility] [place] PAIN MGT;  Service: Pain Management;  Laterality: Left;    KNEE ARTHROSCOPY W/ MENISCECTOMY Left 8/15/2018    Procedure: ARTHROSCOPY, KNEE, WITH MENISCECTOMY;  Surgeon: Handy Elkins MD;  Location: [facility] OR;  Service: Orthopedics;  Laterality: Left;  Video    ROBOT-ASSISTED LAPAROSCOPIC ABDOMINAL HYSTERECTOMY USING DA MARIA ESTHER XI N/A 10/20/2020    Procedure: XI ROBOTIC HYSTERECTOMY;  Surgeon: Danie Burns MD;  Location: [facility] [place] OR;  Service: OB/GYN;  Laterality: N/A;    ROBOT-ASSISTED LAPAROSCOPIC SALPINGO-OOPHORECTOMY USING DA MARIA ESTHER XI Bilateral 10/20/2020    Procedure: XI ROBOTIC SALPINGO-OOPHORECTOMY;  Surgeon: Danie Burns MD;  Location: [facility] [place] OR;  Service: OB/GYN;  Laterality: Bilateral;    TARSAL TUNNEL RELEASE Right 1/5/2024    Procedure: RELEASE, TARSAL TUNNEL;  Surgeon: Estevan Queen DPM;  Location: [facility] OR;  Service: Podiatry;  Laterality: Right;    TRANSFORAMINAL EPIDURAL INJECTION OF STEROID Right 7/18/2019    Procedure: INJECTION, STEROID, EPIDURAL, TRANSFORAMINAL APPROACH, L4 AND L5;  Surgeon: Daniel Seo MD;  Location: [facility] [place] PAIN MGT;  Service: Pain Management;  Laterality: Right;    TRANSFORAMINAL EPIDURAL INJECTION OF STEROID Right 3/16/2021    Procedure: INJECTION, STEROID, EPIDURAL, TRANSFORAMINAL APPROACH L4/5 AND L5/S1;  Surgeon: Daniel Seo MD;  Location: [facility] [place] PAIN MGT;  Service: Pain Management;  Laterality: Right;    TUMOR REMOVAL      right hand     FAMILY HISTORY:   Family History   Problem Relation Name Age of Onset    Ovarian cancer Mother   66         at 72    Heart disease Father          double bypass    Macular degeneration Father      Heart failure Father      No Known Problems Brother      No Known Problems Brother      Cancer Paternal Grandfather          lung (pt thinks), possible smoker    Breast cancer Paternal Cousin  20        father's sister's daughter, pt thinks bilateral, cousin is     Other Maternal Aunt          mat aunt had part of colon removed, pt does not think cancer    Breast cancer Maternal Aunt       SOCIAL HISTORY:   Social History     Socioeconomic History    Marital status:    Occupational History    Occupation:      Employer: [employer]    Tobacco Use    Smoking status: Former    Smokeless tobacco: Never   Substance and Sexual Activity    Alcohol use: Yes     Alcohol/week: 5.0 standard drinks of alcohol     Types: 5 Glasses of wine per week     Comment: 3 drinks a week    Drug use: No    Sexual activity: Yes     Partners: Male     Comment:      Social Determinants of Health     Financial Resource Strain: Low Risk  (2023)    Overall Financial Resource Strain (CARDIA)     Difficulty of Paying Living Expenses: Not very hard   Food Insecurity: No Food Insecurity (2023)    Hunger Vital Sign     Worried About Running Out of Food in the Last Year: Never true     Ran Out of Food in the Last Year: Never true   Transportation Needs: No Transportation Needs (2023)    PRAPARE - Transportation     Lack of Transportation (Medical): No     Lack of Transportation (Non-Medical): No   Physical Activity: Insufficiently Active (2023)    Exercise Vital Sign     Days of Exercise per Week: 5 days     Minutes of Exercise per Session: 20 min   Stress: Stress Concern Present (2023)    [employer] [place] [employer] - Occupational Stress Questionnaire     Feeling of Stress : To some extent   Housing Stability: Low Risk  (2023)    Housing Stability Vital Sign  "    Unable to Pay for Housing in the Last Year: No     Number of Places Lived in the Last Year: 1     Unstable Housing in the Last Year: No     MEDICATIONS:   Current Outpatient Medications:     biotin 5,000 mcg TbDL, Take 1 capsule by mouth once daily., Disp: , Rfl:     buPROPion (WELLBUTRIN XL) 150 MG TB24 tablet, Take 1 tablet (150 mg total) by mouth once daily., Disp: 90 tablet, Rfl: 3    buPROPion (WELLBUTRIN XL) 300 MG 24 hr tablet, Take 1 tablet (300 mg total) by mouth once daily., Disp: 90 tablet, Rfl: 3    cholecalciferol, vitamin D3, (VITAMIN D3) 50 mcg (2,000 unit) Tab, Take 1 tablet by mouth once daily., Disp: , Rfl:     FLAXSEED OIL MISC, by Misc.(Non-Drug; Combo Route) route., Disp: , Rfl:     ibuprofen (ADVIL,MOTRIN) 600 MG tablet, Take 1 tablet (600 mg total) by mouth every 8 (eight) hours as needed for Pain., Disp: 30 tablet, Rfl: 1    LORazepam (ATIVAN) 1 MG tablet, TAKE 1 TABLET(1 MG) BY MOUTH EVERY 12 HOURS AS NEEDED. Do not take within 6 hours of oxycodone., Disp: 60 tablet, Rfl: 2    metoprolol succinate (TOPROL-XL) 25 MG 24 hr tablet, TAKE 1 TABLET(25 MG) BY MOUTH EVERY DAY, Disp: 90 tablet, Rfl: 1    multivitamin (THERAGRAN) per tablet, Take 1 tablet by mouth once daily., Disp: , Rfl:     oxyCODONE-acetaminophen (PERCOCET) 7.5-325 mg per tablet, Take 1 tablet by mouth every 8 (eight) hours as needed for Pain., Disp: 30 tablet, Rfl: 0    tiZANidine 2 mg Cap, Take by mouth., Disp: , Rfl:   No current facility-administered medications for this visit.    Facility-Administered Medications Ordered in Other Visits:     0.9%  NaCl infusion, 500 mL, Intravenous, Continuous, Inez Joiner MD    ALLERGIES:   Review of patient's allergies indicates:  No Known Allergies    Objective:     VITAL SIGNS: /83   Ht 5' 5" (1.651 m)   Wt 60.3 kg (132 lb 15 oz)   LMP 01/01/2015 (Approximate)   BMI 22.12 kg/m²    General    Vitals reviewed.  Constitutional: She is oriented to person, place, and time. She " appears well-developed and well-nourished.   Neurological: She is alert and oriented to person, place, and time.   Psychiatric: She has a normal mood and affect. Her behavior is normal.           MUSCULOSKELETAL EXAM  ANKLE: right ANKLE  The affected ankle is compared to the contralateral ankle.     Observation:    No LEFT mid substance achilles edema  RIGHT medial ankle surgical scar, well healed  There is no errythema or ecchymosis.   Shoes reveal a normal wear pattern.  No structural deformities including pes planus/cavus, hallux valgus, or toe deformities.  Negative too-many toes sign.    Normal callus pattern on the feet bilaterally.    No leg or intrinsic foot muscle atrophy.  Non-antalgic pain     ROM (* = with pain):  Active dorsiflexion to 20° on left and 20° on right  Active plantarflexion to 50° on left and 50° on right  Active ankle inversion to 35° on left and 35° on right  Active ankle eversion to 15° on left and 15° on right  Full active flexion of the toes bilaterally.  Slight decreased extension right toes with the exception of the great toe.     Tenderness To Palpation:  No tenderness at the ATFL, CFL, PTFL, or deltoid ligaments  No tenderness over the distal anterior syndesmosis, distal tibia/fibula, fibular head/shaft  No tenderness at medial or lateral malleoli   No tenderness at navicular, cuboid, cuneiforms, or talus  No achilles insertional pain at calcaneus  No tenderness along the metatarsals or phalanges  + mild tenderness at the Achilles tendon calcaneal insertion on RIGHT but with associated lateral calcaneal prominence and significanct bony tenderness here  + mild LEFT mid substance achilles tendon tenderness  No tenderness at the posterior tibial or peroneal tendons  No tenderness at the tarsal tunnel in the region of the tibial nerve     Strength Testing (* = with pain):  Dorsiflexion - 5/5 on left and 5-/5 on right  Platarflexion - 5/5 on left and 5/5 on right  Resisted Inversion -  5/5 on left and 5/5 on right  Resisted Eversion - 5/5 on left and 5/5 on right*  Great Toe Extension - 5/5 on left and 5/5 on right  Great Toe Flexion - 5/5 on left and 5/5 on right     Special Tests:  Anterior talar drawer - negative and without dimpling  Talar tilt - negative  Reverse Talar tilt - negative     Heel tap test - negative  Distal tib/fib squeeze test - negative  External rotation stress (Kleiger) test - negative  Luis squeeze test - negative     Metatarsal squeeze test - negative  Midfoot stress test - negative  Calcaneal squeeze test - negative     No subluxation of the peroneal tendons with resisted eversion.     Vascular/Sensory Exam:  DP and PT pulses intact bilaterally  No skin changes, no abnormal hair distribution  Negative supine straight leg raise bilaterally  Negative Tinel's test over tarsal tunnel on right  Capillary refill intact <2 seconds in all toes bilaterally.    DIAGNOSTIC ULTRASOUND FOCUSED::   CLINICAL INDICATION:  right achilles pain    TECHNIQUE:  Real time ultrasound examination of the right posterior calcaneous and achilles tendon. was performed with SonoSite Edge 2, 9-L MHz linear probe(s).     FINDINGS:  The images are of adequate diagnostic quality with identification of all echogenic structures made except for the vascular structures unless otherwise noted.  The findings are consistent with the diagnosi(e)s below.  There is no sonographic evidence of periosteal abnormalities, soft tissue edema, musculotendinous or nerve irregularities.  The right Achilles tendon distal insertion to the calcaneus is intact upon static and dynamic testing.  Minimal hypoechoic change noted just proximal to the distal Achilles tendon attachment however no associated hyperemia or Achilles tendon thickening appreciated.  Patient most tender upon sonopalpation at the lateral calcaneal spurring.  The rest of the sonographic examination was unremarkable.    IMPRESSION:  Mild insertional  Achilles tendinosis with most significant pain upon sonopalpation of the lateral calcaneal heel spur.     Ultrasound images were directly reviewed with the patient and then a treatment plan was discussed. Images were saved and stored for documentation.      Assessment:      Encounter Diagnoses   Name Primary?    Heel spur, right Yes    Intractable right heel pain     Achilles tendinosis of right ankle         Plan:   1. Persistent right heel pain with correlating tenderness most significant over the right lateral calcaneal heel spur and only mild insertional Achilles tendinosis noted on today's limited diagnostic musculoskeletal ultrasound.  Given that primary pain source appears to be the calcaneal heel with heel spur, recommend follow-up with Dr. Queen for surgical consultation.  I am not confident that a minimally invasive tenotomy of the insertional Achilles tendon is going to resolve patient has pain. Residual left midsubstance Achilles tendinosis symptoms still present, however mild in nature on exam today  - continue home exercise program from formal physical therapy  -  X-ray images of bilateral calcanei taken 5/4/23 (AP and lateral views) showed Mild pes cavus configuration bilaterally. Post-operative posterior calcaneal cortical changes noted bilaterally from prior amador deformity removal. Images were personally reviewed with patient.  - Diagnostic ultrasound of bilateral achilles taken 6/1/23 showed sonographic evidence of bilateral midsubstance and insertional Achilles tendinosis with no clear tearing, left worse than right.  Images personally reviewed.  - Right ankle MRI from 10/23/2023 noted Muscular atrophy at the level of the extensor digitorum longus, flexor hallucis longus and tibialis posterior proximal to the tarsal tunnel. No evidence for tarsal tunnel mass.  Normal appearance of Achilles tendon, with small insertional calcification vs. Heal spur noted.  Images personally reviewed with  patient.  - Today's limited diagnostic musculoskeletal ultrasound of the right Achilles tendon and posterior calcaneus noted sonographic evidence of mild insertional Achilles tendinosis with most significant pain upon sonopalpation of the lateral calcaneal heel spur.     2. Follow-up as needed if pain deteriorates or new issue arises    3. Patient agreeable to today's plan and all questions were answered    This note is dictated using the M*Modal [employer] Direct word recognition program. There are word recognition mistakes that are occasionally missed on review.

## 2024-06-25 ENCOUNTER — TELEPHONE (OUTPATIENT)
Dept: PODIATRY | Facility: CLINIC | Age: 59
End: 2024-06-25
Payer: COMMERCIAL

## 2024-06-25 NOTE — TELEPHONE ENCOUNTER
Patient gave verbal understanding of being rescheduled with provider being out of office on today.

## 2024-06-27 ENCOUNTER — OFFICE VISIT (OUTPATIENT)
Dept: INTERNAL MEDICINE | Facility: CLINIC | Age: 59
End: 2024-06-27
Payer: COMMERCIAL

## 2024-06-27 VITALS
SYSTOLIC BLOOD PRESSURE: 102 MMHG | DIASTOLIC BLOOD PRESSURE: 76 MMHG | BODY MASS INDEX: 22.03 KG/M2 | HEART RATE: 80 BPM | WEIGHT: 132.25 LBS | OXYGEN SATURATION: 98 % | HEIGHT: 65 IN | RESPIRATION RATE: 18 BRPM | TEMPERATURE: 98 F

## 2024-06-27 DIAGNOSIS — N30.01 ACUTE CYSTITIS WITH HEMATURIA: ICD-10-CM

## 2024-06-27 DIAGNOSIS — R30.0 DYSURIA: Primary | ICD-10-CM

## 2024-06-27 LAB
BILIRUB SERPL-MCNC: NEGATIVE MG/DL
BLOOD, POC UA: POSITIVE
GLUCOSE UR QL STRIP: NEGATIVE
KETONES UR QL STRIP: NEGATIVE
LEUKOCYTE ESTERASE URINE, POC: POSITIVE
NITRITE, POC UA: NEGATIVE
PH, POC UA: 6
PROTEIN, POC: NEGATIVE
SPECIFIC GRAVITY, POC UA: 1.02
UROBILINOGEN, POC UA: NEGATIVE

## 2024-06-27 PROCEDURE — 1159F MED LIST DOCD IN RCRD: CPT | Mod: CPTII,S$GLB,, | Performed by: NURSE PRACTITIONER

## 2024-06-27 PROCEDURE — 3078F DIAST BP <80 MM HG: CPT | Mod: CPTII,S$GLB,, | Performed by: NURSE PRACTITIONER

## 2024-06-27 PROCEDURE — 87086 URINE CULTURE/COLONY COUNT: CPT | Performed by: NURSE PRACTITIONER

## 2024-06-27 PROCEDURE — 3008F BODY MASS INDEX DOCD: CPT | Mod: CPTII,S$GLB,, | Performed by: NURSE PRACTITIONER

## 2024-06-27 PROCEDURE — 3074F SYST BP LT 130 MM HG: CPT | Mod: CPTII,S$GLB,, | Performed by: NURSE PRACTITIONER

## 2024-06-27 PROCEDURE — 1160F RVW MEDS BY RX/DR IN RCRD: CPT | Mod: CPTII,S$GLB,, | Performed by: NURSE PRACTITIONER

## 2024-06-27 PROCEDURE — 81003 URINALYSIS AUTO W/O SCOPE: CPT | Mod: QW,S$GLB,, | Performed by: NURSE PRACTITIONER

## 2024-06-27 PROCEDURE — 99213 OFFICE O/P EST LOW 20 MIN: CPT | Mod: S$GLB,,, | Performed by: NURSE PRACTITIONER

## 2024-06-27 PROCEDURE — 99999 PR PBB SHADOW E&M-EST. PATIENT-LVL IV: CPT | Mod: PBBFAC,,, | Performed by: NURSE PRACTITIONER

## 2024-06-27 RX ORDER — NITROFURANTOIN 25; 75 MG/1; MG/1
100 CAPSULE ORAL 2 TIMES DAILY
Qty: 10 CAPSULE | Refills: 0 | Status: SHIPPED | OUTPATIENT
Start: 2024-06-27 | End: 2024-07-02

## 2024-06-27 NOTE — PROGRESS NOTES
Subjective:       Patient ID: Twila Case is a 58 y.o. female.    Chief Complaint: Urinary Tract Infection (Burning w/urination ), Urinary Frequency (Urgency ), and Back Pain (Lower back )      Patient is a 58 y.o. female who traditionally follows with Ranjan Jaramillo MD presenting today for new complaints of dysuria.      Dysuria  Pt. states she woke up this morning with new c/o dysuria and frequency.  She has also noted mid back pain, mild nausea, urgency and 99 degrees temperature.  She denies hematuria and vomiting.      Review of patient's allergies indicates:  No Known Allergies    Medication List with Changes/Refills   New Medications    NITROFURANTOIN, MACROCRYSTAL-MONOHYDRATE, (MACROBID) 100 MG CAPSULE    Take 1 capsule (100 mg total) by mouth 2 (two) times daily. for 5 days   Current Medications    BIOTIN 5,000 MCG TBDL    Take 1 capsule by mouth once daily.    BUPROPION (WELLBUTRIN XL) 150 MG TB24 TABLET    Take 1 tablet (150 mg total) by mouth once daily.    BUPROPION (WELLBUTRIN XL) 300 MG 24 HR TABLET    Take 1 tablet (300 mg total) by mouth once daily.    CHOLECALCIFEROL, VITAMIN D3, (VITAMIN D3) 50 MCG (2,000 UNIT) TAB    Take 1 tablet by mouth once daily.    FLAXSEED OIL MISC    by Misc.(Non-Drug; Combo Route) route.    IBUPROFEN (ADVIL,MOTRIN) 600 MG TABLET    Take 1 tablet (600 mg total) by mouth every 8 (eight) hours as needed for Pain.    LORAZEPAM (ATIVAN) 1 MG TABLET    TAKE 1 TABLET(1 MG) BY MOUTH EVERY 12 HOURS AS NEEDED. Do not take within 6 hours of oxycodone.    METOPROLOL SUCCINATE (TOPROL-XL) 25 MG 24 HR TABLET    TAKE 1 TABLET(25 MG) BY MOUTH EVERY DAY    MULTIVITAMIN (THERAGRAN) PER TABLET    Take 1 tablet by mouth once daily.    OXYCODONE-ACETAMINOPHEN (PERCOCET) 7.5-325 MG PER TABLET    Take 1 tablet by mouth every 8 (eight) hours as needed for Pain.    TIZANIDINE 2 MG CAP    Take by mouth.     Review of Systems   Constitutional:  Positive for chills and fever.   Respiratory:  Negative  "for shortness of breath.    Cardiovascular:  Negative for palpitations.   Genitourinary:  Positive for dysuria, flank pain, frequency and urgency. Negative for hematuria.       Objective:   /76 (BP Location: Right arm, Patient Position: Sitting, BP Method: Medium (Manual))   Pulse 80   Temp 97.8 °F (36.6 °C) (Temporal)   Resp 18   Ht 5' 5" (1.651 m)   Wt 60 kg (132 lb 4.4 oz)   LMP 01/01/2015 (Approximate)   SpO2 98%   BMI 22.01 kg/m²       Physical Exam  Constitutional:       Appearance: Normal appearance.   Cardiovascular:      Rate and Rhythm: Normal rate and regular rhythm.      Pulses: Normal pulses.   Pulmonary:      Effort: Pulmonary effort is normal.      Breath sounds: Normal breath sounds.   Abdominal:      Palpations: Abdomen is soft.      Tenderness: There is abdominal tenderness. There is right CVA tenderness.   Musculoskeletal:         General: Normal range of motion.      Cervical back: Normal range of motion and neck supple.   Neurological:      Mental Status: She is alert. Mental status is at baseline.   Psychiatric:         Mood and Affect: Mood normal.         Behavior: Behavior normal.       Assessment and Plan:     1. Dysuria  2. Acute cystitis with hematuria  - POCT URINALYSIS  - CULTURE, URINE  - nitrofurantoin, macrocrystal-monohydrate, (MACROBID) 100 MG capsule; Take 1 capsule (100 mg total) by mouth 2 (two) times daily. for 5 days  Dispense: 10 capsule; Refill: 0  -Advised to increase water intake  -Advised if symptoms worsen (increased pain, N/V, high fevers) immediately go to ED     Abbygail Jeansonne, BSN, RN, BMTCN  Family Nurse Practitioner Student     As the co-signing provider I have reviewed the students documentation and am responsible for the treatment, assessment, and plan.     "

## 2024-06-28 LAB — BACTERIA UR CULT: NORMAL

## 2024-07-02 ENCOUNTER — OFFICE VISIT (OUTPATIENT)
Dept: PODIATRY | Facility: CLINIC | Age: 59
End: 2024-07-02
Payer: COMMERCIAL

## 2024-07-02 VITALS
HEART RATE: 71 BPM | HEIGHT: 65 IN | WEIGHT: 133.94 LBS | DIASTOLIC BLOOD PRESSURE: 76 MMHG | BODY MASS INDEX: 22.31 KG/M2 | SYSTOLIC BLOOD PRESSURE: 114 MMHG

## 2024-07-02 DIAGNOSIS — M76.62 ACHILLES TENDINITIS OF BOTH LOWER EXTREMITIES: ICD-10-CM

## 2024-07-02 DIAGNOSIS — M79.672 PAIN IN BOTH FEET: ICD-10-CM

## 2024-07-02 DIAGNOSIS — M79.671 PAIN IN BOTH FEET: ICD-10-CM

## 2024-07-02 DIAGNOSIS — M76.61 ACHILLES TENDINITIS OF BOTH LOWER EXTREMITIES: ICD-10-CM

## 2024-07-02 DIAGNOSIS — G57.51 TARSAL TUNNEL SYNDROME OF RIGHT SIDE: Primary | ICD-10-CM

## 2024-07-02 PROCEDURE — 3078F DIAST BP <80 MM HG: CPT | Mod: CPTII,S$GLB,, | Performed by: PODIATRIST

## 2024-07-02 PROCEDURE — 1159F MED LIST DOCD IN RCRD: CPT | Mod: CPTII,S$GLB,, | Performed by: PODIATRIST

## 2024-07-02 PROCEDURE — 3008F BODY MASS INDEX DOCD: CPT | Mod: CPTII,S$GLB,, | Performed by: PODIATRIST

## 2024-07-02 PROCEDURE — 99999 PR PBB SHADOW E&M-EST. PATIENT-LVL IV: CPT | Mod: PBBFAC,,, | Performed by: PODIATRIST

## 2024-07-02 PROCEDURE — 99214 OFFICE O/P EST MOD 30 MIN: CPT | Mod: S$GLB,,, | Performed by: PODIATRIST

## 2024-07-02 PROCEDURE — 3074F SYST BP LT 130 MM HG: CPT | Mod: CPTII,S$GLB,, | Performed by: PODIATRIST

## 2024-07-07 RX ORDER — HYDROXYZINE HYDROCHLORIDE 25 MG/1
TABLET, FILM COATED ORAL
Qty: 90 TABLET | Refills: 1 | Status: SHIPPED | OUTPATIENT
Start: 2024-07-07

## 2024-07-07 NOTE — TELEPHONE ENCOUNTER
Refill Routing Note   Medication(s) are not appropriate for processing by [facility] for the following reason(s):        Outside of protocol    [facility] action(s):  Route               Appointments  past 12m or future 3m with PCP    Date Provider   Last Visit   4/30/2024 Ranjan Jaramillo MD   Next Visit   10/30/2024 Ranjan Jaramillo MD   ED visits in past 90 days: 0        Note composed:12:14 AM 07/07/2024

## 2024-07-08 ENCOUNTER — PATIENT MESSAGE (OUTPATIENT)
Dept: PODIATRY | Facility: CLINIC | Age: 59
End: 2024-07-08
Payer: COMMERCIAL

## 2024-07-08 ENCOUNTER — PATIENT MESSAGE (OUTPATIENT)
Dept: INTERNAL MEDICINE | Facility: CLINIC | Age: 59
End: 2024-07-08
Payer: COMMERCIAL

## 2024-07-10 ENCOUNTER — TELEPHONE (OUTPATIENT)
Dept: PODIATRY | Facility: CLINIC | Age: 59
End: 2024-07-10
Payer: COMMERCIAL

## 2024-07-10 DIAGNOSIS — G57.81 NERVE ENTRAPMENT OF LOWER LIMB, RIGHT: Primary | ICD-10-CM

## 2024-07-10 NOTE — TELEPHONE ENCOUNTER
Spoke to pt and discussed 8/9 surgery date. Also advised pt to contact their PCP to schedule an appointment to be cleared for surgery. Then advised pt that someone will call them the day before surgery between 3p-5p with the surgery arrival time and instructions. Pt verbalized understanding and call ended.

## 2024-07-11 NOTE — PROGRESS NOTES
Subjective:      Patient ID: Twila Case is a 58 y.o. female.    Chief Complaint:   Foot Pain (Right foot)    Twila is a 58 y.o. female who presents to the podiatry clinic  with complaint of  right foot pain. Onset of the symptoms was several months ago. Precipitating event: increased activity. Current symptoms include:  Tingling, burning, and numbness . Aggravating factors: any weight bearing. Symptoms have gradually worsened. Patient has had no prior foot problems. Evaluation to date: Ortho consult:  Sports Medicine therapy/treatment and EMG is currently ordered .  In addition she is doing very well after tarsal tunnel surgery, right.  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  She would like to discuss intervention at this time surgically as she has exhausted all conservative measures including icing, NSAIDs, orthotics, shoe gear changes, over-the-counter padding, therapy.  Review of Systems   Constitutional: Negative for chills, decreased appetite, fever and malaise/fatigue.   HENT:  Negative for congestion, hearing loss, nosebleeds and tinnitus.    Eyes:  Negative for double vision, pain, photophobia and visual disturbance.   Cardiovascular:  Negative for chest pain, claudication, cyanosis and leg swelling.   Respiratory:  Negative for cough, hemoptysis, shortness of breath and wheezing.    Endocrine: Negative for cold intolerance and heat intolerance.   Hematologic/Lymphatic: Negative for adenopathy and bleeding problem.   Skin:  Negative for color change, dry skin, itching, nail changes and suspicious lesions.   Musculoskeletal:  Negative for arthritis, joint pain, myalgias and stiffness.   Gastrointestinal:  Negative for abdominal pain, jaundice, nausea and vomiting.   Genitourinary:  Negative for dysuria, frequency and hematuria.   Neurological:  Positive for numbness, paresthesias and sensory change. Negative for difficulty with concentration and loss of balance.  "  Psychiatric/Behavioral:  Negative for altered mental status, hallucinations and suicidal ideas. The patient is not nervous/anxious.    Allergic/Immunologic: Negative for environmental allergies and persistent infections.           Objective:      Physical Exam  Musculoskeletal:      Comments: Tenderness to the right posterior calcaneus with palpable exostoses noted.               Assessment:       Encounter Diagnoses   Name Primary?    Tarsal tunnel syndrome of right side Yes    Achilles tendinitis of both lower extremities     Pain in both feet      Independent visualization of imaging was performed.  Results were reviewed in detail with patient.       Plan:       Twila Martinez" was seen today for foot pain.    Diagnoses and all orders for this visit:    Tarsal tunnel syndrome of right side  -     Case Request Operating Room: DEBRIDEMENT, TENDON, ACHILLES    Achilles tendinitis of both lower extremities  -     Case Request Operating Room: DEBRIDEMENT, TENDON, ACHILLES  -     HME - OTHER    Pain in both feet  -     Case Request Operating Room: DEBRIDEMENT, TENDON, ACHILLES      I counseled the patient on her conditions, their implications and medical management.    The nature of the condition, options for management, as well as potential risks and complications were discussed in detail with patient. Patient was amenable to my recommendations and left my office fully informed and will follow up as instructed or sooner if necessary.          Discussed options for peripheral neuropathy/nerve entrapment syndrome including nerve block therapy, surgical nerve entrapment decompression procedures, and various vitamins and supplementation available shown to improve nerve function.    Independent visualization of imaging was performed.  Results were reviewed in detail with patient.    The patient has decided to forego any further conservative treatment and opted for surgical intervention.  Alternative treatments, and " benefits of surgery were discussed with the patient.  Risks and complications, including but not limited to: pain, swelling, numbness, infection, failure to achieve the stated goals, recurrence of problem, nerve and blood vessel damage, scar tissue formation, further need of surgery, loss of limb or life, was discussed in detail with the patient.  All questions asked were answered, and written informed consent was sign and received. The patient will undergo Achilles tendon debridement/calcaneal exostosis, right.

## 2024-07-15 ENCOUNTER — OFFICE VISIT (OUTPATIENT)
Dept: INTERNAL MEDICINE | Facility: CLINIC | Age: 59
End: 2024-07-15
Payer: COMMERCIAL

## 2024-07-15 ENCOUNTER — LAB VISIT (OUTPATIENT)
Dept: LAB | Facility: HOSPITAL | Age: 59
End: 2024-07-15
Attending: NURSE PRACTITIONER
Payer: COMMERCIAL

## 2024-07-15 VITALS
SYSTOLIC BLOOD PRESSURE: 116 MMHG | TEMPERATURE: 98 F | DIASTOLIC BLOOD PRESSURE: 84 MMHG | BODY MASS INDEX: 22.19 KG/M2 | HEART RATE: 78 BPM | OXYGEN SATURATION: 98 % | WEIGHT: 133.19 LBS | HEIGHT: 65 IN | RESPIRATION RATE: 18 BRPM

## 2024-07-15 DIAGNOSIS — Z01.818 ENCOUNTER FOR PRE-OPERATIVE EXAMINATION: Primary | ICD-10-CM

## 2024-07-15 DIAGNOSIS — Z01.818 ENCOUNTER FOR PRE-OPERATIVE EXAMINATION: ICD-10-CM

## 2024-07-15 LAB
ALBUMIN SERPL BCP-MCNC: 4.4 G/DL (ref 3.5–5.2)
ALP SERPL-CCNC: 70 U/L (ref 55–135)
ALT SERPL W/O P-5'-P-CCNC: 31 U/L (ref 10–44)
ANION GAP SERPL CALC-SCNC: 9 MMOL/L (ref 8–16)
AST SERPL-CCNC: 32 U/L (ref 10–40)
BASOPHILS # BLD AUTO: 0.06 K/UL (ref 0–0.2)
BASOPHILS NFR BLD: 1.1 % (ref 0–1.9)
BILIRUB SERPL-MCNC: 0.9 MG/DL (ref 0.1–1)
BUN SERPL-MCNC: 14 MG/DL (ref 6–20)
CALCIUM SERPL-MCNC: 9.7 MG/DL (ref 8.7–10.5)
CHLORIDE SERPL-SCNC: 105 MMOL/L (ref 95–110)
CO2 SERPL-SCNC: 25 MMOL/L (ref 23–29)
CREAT SERPL-MCNC: 0.8 MG/DL (ref 0.5–1.4)
DIFFERENTIAL METHOD BLD: ABNORMAL
EOSINOPHIL # BLD AUTO: 0.1 K/UL (ref 0–0.5)
EOSINOPHIL NFR BLD: 1.4 % (ref 0–8)
ERYTHROCYTE [DISTWIDTH] IN BLOOD BY AUTOMATED COUNT: 12.6 % (ref 11.5–14.5)
EST. GFR  (NO RACE VARIABLE): >60 ML/MIN/1.73 M^2
GLUCOSE SERPL-MCNC: 101 MG/DL (ref 70–110)
HCT VFR BLD AUTO: 44.1 % (ref 37–48.5)
HGB BLD-MCNC: 14.4 G/DL (ref 12–16)
IMM GRANULOCYTES # BLD AUTO: 0.02 K/UL (ref 0–0.04)
IMM GRANULOCYTES NFR BLD AUTO: 0.4 % (ref 0–0.5)
LYMPHOCYTES # BLD AUTO: 1.6 K/UL (ref 1–4.8)
LYMPHOCYTES NFR BLD: 28.6 % (ref 18–48)
MCH RBC QN AUTO: 31.4 PG (ref 27–31)
MCHC RBC AUTO-ENTMCNC: 32.7 G/DL (ref 32–36)
MCV RBC AUTO: 96 FL (ref 82–98)
MONOCYTES # BLD AUTO: 0.7 K/UL (ref 0.3–1)
MONOCYTES NFR BLD: 12 % (ref 4–15)
NEUTROPHILS # BLD AUTO: 3.2 K/UL (ref 1.8–7.7)
NEUTROPHILS NFR BLD: 56.5 % (ref 38–73)
NRBC BLD-RTO: 0 /100 WBC
PLATELET # BLD AUTO: 223 K/UL (ref 150–450)
PMV BLD AUTO: 10.2 FL (ref 9.2–12.9)
POTASSIUM SERPL-SCNC: 4 MMOL/L (ref 3.5–5.1)
PROT SERPL-MCNC: 7.2 G/DL (ref 6–8.4)
RBC # BLD AUTO: 4.58 M/UL (ref 4–5.4)
SODIUM SERPL-SCNC: 139 MMOL/L (ref 136–145)
WBC # BLD AUTO: 5.6 K/UL (ref 3.9–12.7)

## 2024-07-15 PROCEDURE — 1159F MED LIST DOCD IN RCRD: CPT | Mod: CPTII,S$GLB,, | Performed by: NURSE PRACTITIONER

## 2024-07-15 PROCEDURE — 3074F SYST BP LT 130 MM HG: CPT | Mod: CPTII,S$GLB,, | Performed by: NURSE PRACTITIONER

## 2024-07-15 PROCEDURE — 99214 OFFICE O/P EST MOD 30 MIN: CPT | Mod: S$GLB,,, | Performed by: NURSE PRACTITIONER

## 2024-07-15 PROCEDURE — 80053 COMPREHEN METABOLIC PANEL: CPT | Performed by: NURSE PRACTITIONER

## 2024-07-15 PROCEDURE — 3008F BODY MASS INDEX DOCD: CPT | Mod: CPTII,S$GLB,, | Performed by: NURSE PRACTITIONER

## 2024-07-15 PROCEDURE — 36415 COLL VENOUS BLD VENIPUNCTURE: CPT | Mod: PO | Performed by: NURSE PRACTITIONER

## 2024-07-15 PROCEDURE — 93010 ELECTROCARDIOGRAM REPORT: CPT | Mod: S$GLB,,, | Performed by: INTERNAL MEDICINE

## 2024-07-15 PROCEDURE — 99999 PR PBB SHADOW E&M-EST. PATIENT-LVL IV: CPT | Mod: PBBFAC,,, | Performed by: NURSE PRACTITIONER

## 2024-07-15 PROCEDURE — 85025 COMPLETE CBC W/AUTO DIFF WBC: CPT | Performed by: NURSE PRACTITIONER

## 2024-07-15 PROCEDURE — 93005 ELECTROCARDIOGRAM TRACING: CPT | Mod: S$GLB,,, | Performed by: NURSE PRACTITIONER

## 2024-07-15 PROCEDURE — 3079F DIAST BP 80-89 MM HG: CPT | Mod: CPTII,S$GLB,, | Performed by: NURSE PRACTITIONER
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
## 2024-07-16 LAB
OHS QRS DURATION: 82 MS
OHS QTC CALCULATION: 444 MS

## 2024-07-26 RX ORDER — IBUPROFEN 600 MG/1
600 TABLET ORAL EVERY 8 HOURS PRN
Qty: 30 TABLET | Refills: 1 | Status: SHIPPED | OUTPATIENT
Start: 2024-07-26

## 2024-07-29 ENCOUNTER — PATIENT MESSAGE (OUTPATIENT)
Dept: PODIATRY | Facility: CLINIC | Age: 59
End: 2024-07-29
Payer: COMMERCIAL

## 2024-08-07 ENCOUNTER — ANESTHESIA EVENT (OUTPATIENT)
Dept: SURGERY | Facility: HOSPITAL | Age: 59
End: 2024-08-07
Payer: COMMERCIAL

## 2024-08-08 ENCOUNTER — TELEPHONE (OUTPATIENT)
Dept: PODIATRY | Facility: CLINIC | Age: 59
End: 2024-08-08
Payer: COMMERCIAL

## 2024-08-08 NOTE — PRE-PROCEDURE INSTRUCTIONS
Patient was informed of pre-procedure instructions and arrival time. Pt verbalized understanding and is to be accompanied by .    Patient denies taking any over-the-counter vitamins, supplements, nsaids or aspirin products for 7 days.    Dear Olinda ,     You are scheduled for a procedure with Dr. Queen on 8/9/2024. Your scheduled arrival time is 5:15 am.  This arrival time is roughly 2 hours before your anticipated procedure time to allow sufficient time for pre-op.  Please wear comfortable clothing  This procedure will take place at the [facility] at the corner of [facility].  It is in the [place] next to [facility]. The address is:     [address] [place] [address].  [place] [address]     After entering the building, proceed to the second floor and check in with registration.      Your fasting instructions are as follows:     Nothing to eat after 11:00 pm the evening before your surgery.   You may drink clear liquids up until 2 hours prior to your arrival time.      You MUST have a responsible adult to bring you home.     The evening before and morning of your procedure please hold (do not take) the following medications:  -Aspirin and Aspirin-containing products (Goody's powder, Excedrin)  -NSAIDs (Advil, Ibuprofen, Aleve, Diclofenac)  -Vitamins/Supplements   -Herbal remedies/Teas  -Stimulants (Adderall, Vyvanse, Adipex)  -Diabetic medication (Please bring with you day of procedure)  -Prescription creams/gels/lotions        *May take Tylenol if needed         The evening before and morning of your procedure, take a shower using antibacterial soap (ex: Hibiclens or Dial antibacterial soap). DO NOT apply deodorant, lotion, cologne, or anything else to the skin. Do not wear jewelry or bring any valuables with you.  .     Please do not wear contact lenses the day of your procedure.   Bring any inhalers that you may need.     If you have any  procedure-specific questions, please call your surgeon's office. Any other questions, don't hesitate to call at [phone]     Thanks,  Pre-Admit Testing  Anesthesia Dept OC

## 2024-08-09 ENCOUNTER — ANESTHESIA (OUTPATIENT)
Dept: SURGERY | Facility: HOSPITAL | Age: 59
End: 2024-08-09
Payer: COMMERCIAL

## 2024-08-09 ENCOUNTER — HOSPITAL ENCOUNTER (OUTPATIENT)
Facility: HOSPITAL | Age: 59
Discharge: HOME | End: 2024-08-09
Attending: PODIATRIST | Admitting: PODIATRIST
Payer: COMMERCIAL

## 2024-08-09 ENCOUNTER — HOSPITAL ENCOUNTER (OUTPATIENT)
Dept: RADIOLOGY | Facility: HOSPITAL | Age: 59
Discharge: HOME | End: 2024-08-09
Attending: PODIATRIST | Admitting: PODIATRIST
Payer: COMMERCIAL

## 2024-08-09 ENCOUNTER — PATIENT MESSAGE (OUTPATIENT)
Dept: PODIATRY | Facility: CLINIC | Age: 59
End: 2024-08-09

## 2024-08-09 VITALS
WEIGHT: 130 LBS | SYSTOLIC BLOOD PRESSURE: 114 MMHG | DIASTOLIC BLOOD PRESSURE: 82 MMHG | OXYGEN SATURATION: 97 % | HEIGHT: 65 IN | TEMPERATURE: 99 F | RESPIRATION RATE: 15 BRPM | HEART RATE: 72 BPM | BODY MASS INDEX: 21.66 KG/M2

## 2024-08-09 DIAGNOSIS — M76.60 ACHILLES TENDINITIS: ICD-10-CM

## 2024-08-09 DIAGNOSIS — M76.60 ACHILLES TENDINITIS, UNSPECIFIED LATERALITY: ICD-10-CM

## 2024-08-09 DIAGNOSIS — M76.60 ACHILLES TENDINITIS, UNSPECIFIED LATERALITY: Primary | ICD-10-CM

## 2024-08-09 PROCEDURE — 25000003 PHARM REV CODE 250: Performed by: NURSE ANESTHETIST, CERTIFIED REGISTERED

## 2024-08-09 PROCEDURE — 71000015 HC POSTOP RECOV 1ST HR: Performed by: PODIATRIST

## 2024-08-09 PROCEDURE — 71000033 HC RECOVERY, INTIAL HOUR: Performed by: PODIATRIST

## 2024-08-09 PROCEDURE — 63600175 PHARM REV CODE 636 W HCPCS: Performed by: NURSE ANESTHETIST, CERTIFIED REGISTERED

## 2024-08-09 PROCEDURE — 99900035 HC TECH TIME PER 15 MIN (STAT)

## 2024-08-09 PROCEDURE — 36000707: Performed by: PODIATRIST

## 2024-08-09 PROCEDURE — 63600175 PHARM REV CODE 636 W HCPCS: Performed by: ANESTHESIOLOGY

## 2024-08-09 PROCEDURE — 27201423 OPTIME MED/SURG SUP & DEVICES STERILE SUPPLY: Performed by: PODIATRIST

## 2024-08-09 PROCEDURE — 94761 N-INVAS EAR/PLS OXIMETRY MLT: CPT

## 2024-08-09 PROCEDURE — 37000009 HC ANESTHESIA EA ADD 15 MINS: Performed by: PODIATRIST

## 2024-08-09 PROCEDURE — 36000706: Performed by: PODIATRIST

## 2024-08-09 PROCEDURE — 63600175 PHARM REV CODE 636 W HCPCS: Mod: JZ,JG | Performed by: PODIATRIST

## 2024-08-09 PROCEDURE — 25000003 PHARM REV CODE 250: Performed by: ANESTHESIOLOGY

## 2024-08-09 PROCEDURE — 37000008 HC ANESTHESIA 1ST 15 MINUTES: Performed by: PODIATRIST

## 2024-08-09 PROCEDURE — 25000003 PHARM REV CODE 250: Performed by: PODIATRIST

## 2024-08-09 DEVICE — KIT SONICANCHOR F/F 2-0 C-2: Type: IMPLANTABLE DEVICE | Site: FOOT | Status: FUNCTIONAL

## 2024-08-09 DEVICE — TISSUE ALLOWRAP DS WET 4X4CM: Type: IMPLANTABLE DEVICE | Site: FOOT | Status: FUNCTIONAL

## 2024-08-09 RX ORDER — ONDANSETRON HYDROCHLORIDE 2 MG/ML
INJECTION, SOLUTION INTRAVENOUS
Status: DISCONTINUED | OUTPATIENT
Start: 2024-08-09 | End: 2024-08-09

## 2024-08-09 RX ORDER — SODIUM CHLORIDE 9 MG/ML
INJECTION, SOLUTION INTRAVENOUS CONTINUOUS
Status: DISCONTINUED | OUTPATIENT
Start: 2024-08-09 | End: 2024-08-09 | Stop reason: HOSPADM

## 2024-08-09 RX ORDER — PROCHLORPERAZINE EDISYLATE 5 MG/ML
5 INJECTION INTRAMUSCULAR; INTRAVENOUS EVERY 30 MIN PRN
Status: DISCONTINUED | OUTPATIENT
Start: 2024-08-09 | End: 2024-08-09 | Stop reason: HOSPADM

## 2024-08-09 RX ORDER — FENTANYL CITRATE 50 UG/ML
25 INJECTION, SOLUTION INTRAMUSCULAR; INTRAVENOUS EVERY 5 MIN PRN
Status: COMPLETED | OUTPATIENT
Start: 2024-08-09 | End: 2024-08-09

## 2024-08-09 RX ORDER — FENTANYL CITRATE 50 UG/ML
INJECTION, SOLUTION INTRAMUSCULAR; INTRAVENOUS
Status: DISCONTINUED | OUTPATIENT
Start: 2024-08-09 | End: 2024-08-09

## 2024-08-09 RX ORDER — BUPIVACAINE HYDROCHLORIDE 5 MG/ML
INJECTION, SOLUTION EPIDURAL; INTRACAUDAL
Status: DISCONTINUED | OUTPATIENT
Start: 2024-08-09 | End: 2024-08-09 | Stop reason: HOSPADM

## 2024-08-09 RX ORDER — PHENYLEPHRINE HYDROCHLORIDE 10 MG/ML
INJECTION INTRAVENOUS
Status: DISCONTINUED | OUTPATIENT
Start: 2024-08-09 | End: 2024-08-09

## 2024-08-09 RX ORDER — HYDROMORPHONE HYDROCHLORIDE 1 MG/ML
0.2 INJECTION, SOLUTION INTRAMUSCULAR; INTRAVENOUS; SUBCUTANEOUS EVERY 5 MIN PRN
Status: DISCONTINUED | OUTPATIENT
Start: 2024-08-09 | End: 2024-08-09 | Stop reason: HOSPADM

## 2024-08-09 RX ORDER — LIDOCAINE HYDROCHLORIDE 20 MG/ML
INJECTION INTRAVENOUS
Status: DISCONTINUED | OUTPATIENT
Start: 2024-08-09 | End: 2024-08-09

## 2024-08-09 RX ORDER — PROPOFOL 10 MG/ML
INJECTION, EMULSION INTRAVENOUS
Status: DISCONTINUED | OUTPATIENT
Start: 2024-08-09 | End: 2024-08-09

## 2024-08-09 RX ORDER — ONDANSETRON HYDROCHLORIDE 2 MG/ML
4 INJECTION, SOLUTION INTRAVENOUS DAILY PRN
Status: DISCONTINUED | OUTPATIENT
Start: 2024-08-09 | End: 2024-08-09 | Stop reason: HOSPADM

## 2024-08-09 RX ORDER — DEXAMETHASONE SODIUM PHOSPHATE 4 MG/ML
INJECTION, SOLUTION INTRA-ARTICULAR; INTRALESIONAL; INTRAMUSCULAR; INTRAVENOUS; SOFT TISSUE
Status: DISCONTINUED | OUTPATIENT
Start: 2024-08-09 | End: 2024-08-09

## 2024-08-09 RX ORDER — OXYCODONE AND ACETAMINOPHEN 7.5; 325 MG/1; MG/1
1 TABLET ORAL EVERY 6 HOURS PRN
Qty: 28 TABLET | Refills: 0 | Status: SHIPPED | OUTPATIENT
Start: 2024-08-09 | End: 2024-08-13 | Stop reason: SDUPTHER

## 2024-08-09 RX ORDER — SODIUM CHLORIDE 0.9 % (FLUSH) 0.9 %
10 SYRINGE (ML) INJECTION
Status: DISCONTINUED | OUTPATIENT
Start: 2024-08-09 | End: 2024-08-09 | Stop reason: HOSPADM

## 2024-08-09 RX ORDER — OXYCODONE AND ACETAMINOPHEN 5; 325 MG/1; MG/1
1 TABLET ORAL
Status: DISCONTINUED | OUTPATIENT
Start: 2024-08-09 | End: 2024-08-09 | Stop reason: HOSPADM

## 2024-08-09 RX ORDER — MIDAZOLAM HYDROCHLORIDE 1 MG/ML
INJECTION INTRAMUSCULAR; INTRAVENOUS
Status: DISCONTINUED | OUTPATIENT
Start: 2024-08-09 | End: 2024-08-09

## 2024-08-09 RX ORDER — GLUCAGON 1 MG
1 KIT INJECTION
Status: DISCONTINUED | OUTPATIENT
Start: 2024-08-09 | End: 2024-08-09 | Stop reason: HOSPADM

## 2024-08-09 RX ADMIN — MIDAZOLAM HYDROCHLORIDE 2 MG: 2 INJECTION, SOLUTION INTRAMUSCULAR; INTRAVENOUS at 07:08

## 2024-08-09 RX ADMIN — PROPOFOL 170 MG: 10 INJECTION, EMULSION INTRAVENOUS at 07:08

## 2024-08-09 RX ADMIN — PHENYLEPHRINE HYDROCHLORIDE 100 MCG: 10 INJECTION INTRAVENOUS at 08:08

## 2024-08-09 RX ADMIN — HYDROMORPHONE HYDROCHLORIDE 0.2 MG: 1 INJECTION, SOLUTION INTRAMUSCULAR; INTRAVENOUS; SUBCUTANEOUS at 08:08

## 2024-08-09 RX ADMIN — LIDOCAINE HYDROCHLORIDE 100 MG: 20 INJECTION INTRAVENOUS at 07:08

## 2024-08-09 RX ADMIN — OXYCODONE HYDROCHLORIDE AND ACETAMINOPHEN 1 TABLET: 5; 325 TABLET ORAL at 09:08

## 2024-08-09 RX ADMIN — HYDROMORPHONE HYDROCHLORIDE 0.2 MG: 1 INJECTION, SOLUTION INTRAMUSCULAR; INTRAVENOUS; SUBCUTANEOUS at 09:08

## 2024-08-09 RX ADMIN — SODIUM CHLORIDE: 9 INJECTION, SOLUTION INTRAVENOUS at 06:08

## 2024-08-09 RX ADMIN — FENTANYL CITRATE 25 MCG: 50 INJECTION INTRAMUSCULAR; INTRAVENOUS at 08:08

## 2024-08-09 RX ADMIN — PHENYLEPHRINE HYDROCHLORIDE 100 MCG: 10 INJECTION INTRAVENOUS at 07:08

## 2024-08-09 RX ADMIN — FENTANYL CITRATE 25 MCG: 0.05 INJECTION, SOLUTION INTRAMUSCULAR; INTRAVENOUS at 08:08

## 2024-08-09 RX ADMIN — SODIUM CHLORIDE, SODIUM GLUCONATE, SODIUM ACETATE, POTASSIUM CHLORIDE, MAGNESIUM CHLORIDE, SODIUM PHOSPHATE, DIBASIC, AND POTASSIUM PHOSPHATE: .53; .5; .37; .037; .03; .012; .00082 INJECTION, SOLUTION INTRAVENOUS at 08:08

## 2024-08-09 RX ADMIN — DEXAMETHASONE SODIUM PHOSPHATE 8 MG: 4 INJECTION INTRA-ARTICULAR; INTRALESIONAL; INTRAMUSCULAR; INTRAVENOUS; SOFT TISSUE at 07:08

## 2024-08-09 RX ADMIN — ONDANSETRON 4 MG: 2 INJECTION INTRAMUSCULAR; INTRAVENOUS at 07:08

## 2024-08-09 RX ADMIN — FENTANYL CITRATE 50 MCG: 0.05 INJECTION, SOLUTION INTRAMUSCULAR; INTRAVENOUS at 07:08

## 2024-08-09 NOTE — BRIEF OP NOTE
[facility]  Brief Operative Note    Surgery Date: 8/9/2024     Surgeons and Role:     * Estevan Queen DPM - Primary    Assisting Surgeon:   Shona Katz DPM, Kasei DPM    Pre-op Diagnosis:  Tarsal tunnel syndrome of right side [G57.51]  Achilles tendinitis of both lower extremities [M76.61, M76.62]  Pain in both feet [M79.671, M79.672]    Post-op Diagnosis:  Post-Op Diagnosis Codes:     * Tarsal tunnel syndrome of right side [G57.51]     * Achilles tendinitis of both lower extremities [M76.61, M76.62]     * Pain in both feet [M79.671, M79.672]    Procedure(s) (LRB):  DEBRIDEMENT, TENDON, ACHILLES (Right)    Anesthesia: General    Operative Findings: R foot achilles reattachment and calc exostectomy. Deep peroneal nerve decompressions with 2nd met/medial cuneiform exostectomy. Carlos Allowrap graft placement. Closed with 3-0 monocryl and 3-0 nylon.     Estimated Blood Loss: * No values recorded between 8/9/2024  7:34 AM and 8/9/2024  8:21 AM *         Specimens:   Specimen (24h ago, onward)      None          Discharge Note    OUTCOME: Patient tolerated treatment/procedure well without complication and is now ready for discharge.    DISPOSITION: Home or Self Care    FINAL DIAGNOSIS:  <principal problem not specified>    FOLLOWUP: In clinic    DISCHARGE INSTRUCTIONS:  No discharge procedures on file.

## 2024-08-09 NOTE — PLAN OF CARE
Chart reviewed. Preop nursing care completed per orders. Safe surgery checklist complete. Pt denies any open wounds cuts or sores. Pt denies any metal in body. Family at bedside and belongings in PACU locker 4. Waiting for H&P; anesthesia consent; and site marking prior to surgery. Pt AAOX4, VSS on room air. Pt toileted, Bed locked in lowest position, Call light within reach. Pt denies any needs at this time.

## 2024-08-09 NOTE — DISCHARGE INSTRUCTIONS
POST-OPERATIVE INSTRUCTIONS FOR PODIATRY PATIENTS      Keep your dressing dry. Do not remove or change the bandage.      Keep your foot elevated to the level of your heart. You should recline as far as possible (When your toes are at eye level you're in the right position).      You may apply an ice pack to the top of your foot or back of your knee. Replace as needed. Make sure this does not get the dressing wet.      Take your pain medication as instructed for the first 24 hours after surgery, then progress to using them only when you need it.      Try to stay off your foot as much as possible. You may get up to eat, use the bathroom, etc.    Limit standing or walking on your  foot. Do not sit with your feet dangling.      When you do walk make sure you wear your surgical shoe/boot as provided. Use knee scooter as much as possible    Eat your regular diet and drink plenty of fluids.      If any of the following conditions are present, call the Podiatry Clinic immediately, report to ED, or call 911 if emergent  - fever 101° or higher   - pain that is not controlled by pain medication   - red, warm, swollen feet with red streaks going up your legs   - cold, blue, numb toes, especially if you are wearing a cast

## 2024-08-09 NOTE — DISCHARGE SUMMARY
[facility]  Discharge Note  Short Stay    Procedure(s) (LRB):  DEBRIDEMENT, TENDON, ACHILLES (Right)      OUTCOME: Patient tolerated treatment/procedure well without complication and is now ready for discharge.    DISPOSITION: Home or Self Care    FINAL DIAGNOSIS:  <principal problem not specified>    FOLLOWUP: In clinic    DISCHARGE INSTRUCTIONS:  No discharge procedures on file.     TIME SPENT ON DISCHARGE: 60 minutes    POST-OPERATIVE INSTRUCTIONS FOR PODIATRY PATIENTS      Keep your dressing dry. Do not remove or change the bandage.      Keep your foot elevated to the level of your heart. You should recline as far as possible (When your toes are at eye level you're in the right position).      You may apply an ice pack to the top of your foot or back of your knee. Replace as needed. Make sure this does not get the dressing wet.      Take your pain medication as instructed for the first 24 hours after surgery, then progress to using them only when you need it.      Try to stay off your foot as much as possible. You may get up to eat, use the bathroom, etc.    Limit standing or walking on your  foot. Do not sit with your feet dangling.      When you do walk make sure you wear your surgical shoe/boot as provided     Eat your regular diet and drink plenty of fluids.      If any of the following conditions are present, call the Podiatry Clinic immediately, report to ED, or call 911 if emergent  - fever 101° or higher   - pain that is not controlled by pain medication   - red, warm, swollen feet with red streaks going up your legs   - cold, blue, numb toes, especially if you are wearing a cast

## 2024-08-10 NOTE — OP NOTE
[facility]  Operative Note      Date of Procedure: 8/9/2024     Procedure: Procedure(s) (LRB):  DEBRIDEMENT, TENDON, ACHILLES (Right)   Deep peroneal nerve decompression with extensor hallucis brevis tenotomy, right  Surgeons and Role:     * Estevan Queen DPM - Primary    Assisting Surgeon:    Shona Katz DPM, Kasei DPM    Pre-Operative Diagnosis: Tarsal tunnel syndrome of right side [G57.51]  Achilles tendinitis of both lower extremities [M76.61, M76.62]  Pain in both feet [M79.671, M79.672]    Post-Operative Diagnosis: Post-Op Diagnosis Codes:     * Tarsal tunnel syndrome of right side [G57.51]     * Achilles tendinitis of both lower extremities [M76.61, M76.62]     * Pain in both feet [M79.671, M79.672]     * Lavelle's deformity correction     * Nerve decompression    Anesthesia: General    Operative Findings (including complications, if any):   R foot achilles reattachment and calc exostectomy. Deep peroneal nerve decompressions with 2nd met/medial cuneiform exostectomy. Carlos Allowrap graft placement. Closed with 3-0 monocryl and 3-0 nylon.     Description of Technical Procedures:   Patient was brought to the operating room on a stretcher and transferred to the OR table in supine position. Anesthesia was induced. Tourniquet was applied to the R calf. The R foot was draped and prepped in a sterile manner. Tourniquet was inflated to 250 mmHg.     Attention was first directed the posterior lateral aspect of the R calcaneus.  A linear 3 cm incision was made using a #15 scalpel down to subcutaneous tissue.  Further blunt dissection with Metzenbaum scissors down to reveal the lateral portion of the Achilles insertion site.  Using a #15 scalpel the Achilles tendon was disarticulated from its insertion and reflected away to reveal the bony prominence on the calcaneus.  A sagittal saw was used to resect the bony prominence.  A reciprocating rasp was used to further  smooth the edge of the resected bone.  The reflected Achilles was reattached to the calcaneus using Baldwin's sonic suture anchor.  Surgical site was rinsed with normal saline via bulb syringe and a Carlos Allowrap graft was placed into the surgical site.  Layered closure with 3-0 monocryl and 3-0 nylon.    Attention was then directed to the dorsum of the L foot, using a 15 blade an incision was performed over the deep peroneal nerve. Meticulous sharp and blunt dissection utilized down through subcutaneous tissues to the level of deep fascia and the deep fascia was sharply released. Using tenotomy scissors the overlying extensor hallucis brevis tendon was released. Bipolar cautery used to control bleeding. The deep peroneal nerve was identified. Impinging fibrous tissues were identified and transected, verified with manual palpation. The nerve was noted to have pathological fibrous and fatty edematous changes. The operative site was lavaged with copious amounts of saline. Carlos Allowrap graft was placed into the surgical site.  Layered closure with 3-0 monocryl and 3-0 nylon.    Tourniquet was deflated.    The patient tolerated the procedure and anesthesia well. Patient was transferred to the recovery room with vital signs stable, vascular status intact, and capillary refill time <3 seconds to the distal R foot. Following a period of post-op monitoring, the patient will be discharged home on the following written and oral post-op instructions:      - Keep dressing dry and intact until clinic visit.  - Avoid excessive ambulation, ambulate with surgical shoe on at all times with partial weightbearing as tolerated  - Ice and elevate R foot when at rest.  - All prescriptions were given to patient pre and post-op.   - Contact Dr. Queen for all post op follow up care and if any problems arise.     Estimated Blood Loss (EBL): 5 ml           Implants:   Implant Name Type Inv. Item Serial No.  Lot No. LRB No.  Used Action   KIT SONICANCHOR F/F 2-0 C-2 - [id number]  KIT SONICANCHOR F/F 2-0 C-2  [employer]. [id number] Right 1 Implanted   [id number]  TISSUE ALLOWRAP DS WET [id number] [id number] [employer].   1 Implanted       Specimens:   Specimen (24h ago, onward)      None                    Condition: Good    Disposition: PACU - hemodynamically stable.    Attestation: I was present and scrubbed for the entire procedure.    Discharge Note    OUTCOME: Patient tolerated treatment/procedure well without complication and is now ready for discharge.    DISPOSITION: Home or Self Care    FINAL DIAGNOSIS:  <principal problem not specified>    FOLLOWUP: In clinic    DISCHARGE INSTRUCTIONS:  No discharge procedures on file.

## 2024-08-13 ENCOUNTER — TELEPHONE (OUTPATIENT)
Dept: PODIATRY | Facility: CLINIC | Age: 59
End: 2024-08-13
Payer: COMMERCIAL

## 2024-08-13 ENCOUNTER — PATIENT MESSAGE (OUTPATIENT)
Dept: PODIATRY | Facility: CLINIC | Age: 59
End: 2024-08-13
Payer: COMMERCIAL

## 2024-08-13 DIAGNOSIS — G89.18 POST-OP PAIN: Primary | ICD-10-CM

## 2024-08-13 RX ORDER — OXYCODONE AND ACETAMINOPHEN 7.5; 325 MG/1; MG/1
1 TABLET ORAL EVERY 6 HOURS PRN
Qty: 28 TABLET | Refills: 0 | Status: SHIPPED | OUTPATIENT
Start: 2024-08-13

## 2024-08-13 RX ORDER — SULFAMETHOXAZOLE AND TRIMETHOPRIM 400; 80 MG/1; MG/1
1 TABLET ORAL 2 TIMES DAILY
Qty: 20 TABLET | Refills: 0 | Status: SHIPPED | OUTPATIENT
Start: 2024-08-13

## 2024-08-13 NOTE — TELEPHONE ENCOUNTER
Twila Lynn would like to cancel the following appointments:      Estevan Queen in [facility] ([phone]), 8/13/2024  2:30 PM      Comments:

## 2024-08-13 NOTE — TELEPHONE ENCOUNTER
Patient gave verbal understanding of being scheduled for cancelled post op appointment with Dr Queen on 8/15 at 11:30 am.

## 2024-08-15 ENCOUNTER — OFFICE VISIT (OUTPATIENT)
Dept: PODIATRY | Facility: CLINIC | Age: 59
End: 2024-08-15
Payer: COMMERCIAL

## 2024-08-15 VITALS
HEART RATE: 93 BPM | WEIGHT: 130.06 LBS | SYSTOLIC BLOOD PRESSURE: 106 MMHG | HEIGHT: 65 IN | DIASTOLIC BLOOD PRESSURE: 69 MMHG | BODY MASS INDEX: 21.67 KG/M2

## 2024-08-15 DIAGNOSIS — G89.18 POST-OP PAIN: Primary | ICD-10-CM

## 2024-08-15 PROCEDURE — 99999 PR PBB SHADOW E&M-EST. PATIENT-LVL IV: CPT | Mod: PBBFAC,,, | Performed by: PODIATRIST

## 2024-08-15 PROCEDURE — 3078F DIAST BP <80 MM HG: CPT | Mod: CPTII,S$GLB,, | Performed by: PODIATRIST

## 2024-08-15 PROCEDURE — 3074F SYST BP LT 130 MM HG: CPT | Mod: CPTII,S$GLB,, | Performed by: PODIATRIST

## 2024-08-15 PROCEDURE — 99024 POSTOP FOLLOW-UP VISIT: CPT | Mod: S$GLB,,, | Performed by: PODIATRIST

## 2024-08-16 ENCOUNTER — PATIENT MESSAGE (OUTPATIENT)
Dept: PODIATRY | Facility: CLINIC | Age: 59
End: 2024-08-16
Payer: COMMERCIAL

## 2024-08-19 ENCOUNTER — PATIENT MESSAGE (OUTPATIENT)
Dept: PODIATRY | Facility: CLINIC | Age: 59
End: 2024-08-19
Payer: COMMERCIAL

## 2024-08-19 ENCOUNTER — TELEPHONE (OUTPATIENT)
Dept: PODIATRY | Facility: CLINIC | Age: 59
End: 2024-08-19
Payer: COMMERCIAL

## 2024-08-19 DIAGNOSIS — G89.18 POST-OP PAIN: ICD-10-CM

## 2024-08-19 RX ORDER — OXYCODONE AND ACETAMINOPHEN 7.5; 325 MG/1; MG/1
1 TABLET ORAL EVERY 6 HOURS PRN
Qty: 28 TABLET | Refills: 0 | Status: SHIPPED | OUTPATIENT
Start: 2024-08-19 | End: 2024-08-20

## 2024-08-19 NOTE — TELEPHONE ENCOUNTER
Spoke with patient states she figured out why her script was sent the pharmacy on [address]. By provider it is a 24 hour [facility]. Patient states she will have spouse  medication.

## 2024-08-20 ENCOUNTER — OFFICE VISIT (OUTPATIENT)
Dept: PODIATRY | Facility: CLINIC | Age: 59
End: 2024-08-20
Payer: COMMERCIAL

## 2024-08-20 VITALS
HEART RATE: 79 BPM | BODY MASS INDEX: 21.67 KG/M2 | HEIGHT: 65 IN | DIASTOLIC BLOOD PRESSURE: 82 MMHG | SYSTOLIC BLOOD PRESSURE: 129 MMHG | WEIGHT: 130.06 LBS

## 2024-08-20 DIAGNOSIS — G57.81 NERVE ENTRAPMENT OF LOWER LIMB, RIGHT: Primary | ICD-10-CM

## 2024-08-20 DIAGNOSIS — G89.18 POST-OP PAIN: ICD-10-CM

## 2024-08-20 PROCEDURE — 99024 POSTOP FOLLOW-UP VISIT: CPT | Mod: S$GLB,,, | Performed by: PODIATRIST

## 2024-08-20 PROCEDURE — 3074F SYST BP LT 130 MM HG: CPT | Mod: CPTII,S$GLB,, | Performed by: PODIATRIST

## 2024-08-20 PROCEDURE — 3079F DIAST BP 80-89 MM HG: CPT | Mod: CPTII,S$GLB,, | Performed by: PODIATRIST

## 2024-08-20 PROCEDURE — 99999 PR PBB SHADOW E&M-EST. PATIENT-LVL III: CPT | Mod: PBBFAC,,, | Performed by: PODIATRIST

## 2024-08-20 RX ORDER — OXYCODONE AND ACETAMINOPHEN 7.5; 325 MG/1; MG/1
1 TABLET ORAL EVERY 6 HOURS PRN
Qty: 28 TABLET | Refills: 0 | OUTPATIENT
Start: 2024-08-20

## 2024-08-20 RX ORDER — OXYCODONE AND ACETAMINOPHEN 10; 325 MG/1; MG/1
1 TABLET ORAL EVERY 8 HOURS PRN
Qty: 21 TABLET | Refills: 0 | Status: SHIPPED | OUTPATIENT
Start: 2024-08-20 | End: 2024-08-27

## 2024-08-21 ENCOUNTER — PATIENT MESSAGE (OUTPATIENT)
Dept: PODIATRY | Facility: CLINIC | Age: 59
End: 2024-08-21
Payer: COMMERCIAL

## 2024-08-21 NOTE — PROGRESS NOTES
Patient presents 1 week status post posterior calcaneal exostectomy with tendon debridement as well as deep peroneal nerve decompression.  She is having moderate discomfort.  She has been taking pain medication.  Neurovascular status intact.  Incisions healing well with no signs of infection or dehiscence.  Continue postoperative instructions and follow in 1 week.

## 2024-08-21 NOTE — TELEPHONE ENCOUNTER
Refused Prescriptions     oxyCODONE-acetaminophen (PERCOCET) 7.5-325 mg per tablet         Sig: Take 1 tablet by mouth every 6 (six) hours as needed for Pain.    Disp: 28 tablet    Refills: 0    Start: 8/20/2024    Earliest Fill Date: 8/20/2024    Class: Normal    Refused by: Estevan Queen DPM    Refusal reason: Duplicate        Fill requested from: None [Patient requested: Please call into [facility] at [facility], [phone].  [facility] at [address] [facility] will not fill the prescription for some reason]

## 2024-08-23 ENCOUNTER — PATIENT MESSAGE (OUTPATIENT)
Dept: PODIATRY | Facility: CLINIC | Age: 59
End: 2024-08-23
Payer: COMMERCIAL

## 2024-08-24 ENCOUNTER — PATIENT MESSAGE (OUTPATIENT)
Dept: PODIATRY | Facility: CLINIC | Age: 59
End: 2024-08-24
Payer: COMMERCIAL

## 2024-08-27 ENCOUNTER — OFFICE VISIT (OUTPATIENT)
Dept: PODIATRY | Facility: CLINIC | Age: 59
End: 2024-08-27
Payer: COMMERCIAL

## 2024-08-27 VITALS
HEART RATE: 89 BPM | SYSTOLIC BLOOD PRESSURE: 118 MMHG | BODY MASS INDEX: 21.64 KG/M2 | DIASTOLIC BLOOD PRESSURE: 87 MMHG | HEIGHT: 65 IN

## 2024-08-27 DIAGNOSIS — G57.81 NERVE ENTRAPMENT OF LOWER LIMB, RIGHT: ICD-10-CM

## 2024-08-27 PROCEDURE — 99024 POSTOP FOLLOW-UP VISIT: CPT | Mod: S$GLB,,, | Performed by: PODIATRIST

## 2024-08-27 PROCEDURE — 99999 PR PBB SHADOW E&M-EST. PATIENT-LVL III: CPT | Mod: PBBFAC,,, | Performed by: PODIATRIST

## 2024-08-27 PROCEDURE — 3074F SYST BP LT 130 MM HG: CPT | Mod: CPTII,S$GLB,, | Performed by: PODIATRIST

## 2024-08-27 PROCEDURE — 3079F DIAST BP 80-89 MM HG: CPT | Mod: CPTII,S$GLB,, | Performed by: PODIATRIST

## 2024-08-27 PROCEDURE — 1160F RVW MEDS BY RX/DR IN RCRD: CPT | Mod: CPTII,S$GLB,, | Performed by: PODIATRIST

## 2024-08-27 PROCEDURE — 1159F MED LIST DOCD IN RCRD: CPT | Mod: CPTII,S$GLB,, | Performed by: PODIATRIST

## 2024-08-27 RX ORDER — OXYCODONE AND ACETAMINOPHEN 7.5; 325 MG/1; MG/1
1 TABLET ORAL EVERY 6 HOURS PRN
COMMUNITY
Start: 2024-08-21 | End: 2024-08-27

## 2024-08-27 RX ORDER — GABAPENTIN 300 MG/1
300 CAPSULE ORAL 3 TIMES DAILY
Qty: 90 CAPSULE | Refills: 11 | Status: SHIPPED | OUTPATIENT
Start: 2024-08-27 | End: 2025-08-27

## 2024-08-27 RX ORDER — OXYCODONE AND ACETAMINOPHEN 10; 325 MG/1; MG/1
1 TABLET ORAL EVERY 8 HOURS PRN
Qty: 21 TABLET | Refills: 0 | Status: SHIPPED | OUTPATIENT
Start: 2024-08-27 | End: 2024-09-03 | Stop reason: SDUPTHER

## 2024-08-27 NOTE — PROGRESS NOTES
Patient presents 2 weeks status post posterior calcaneal exostectomy with tendon debridement as well as deep peroneal nerve decompression.  She is having moderate discomfort.  She has been taking pain medication.  Neurovascular status intact.  Incisions healing well with no signs of infection or dehiscence.  Continue postoperative instructions and follow in 1 week.

## 2024-08-28 ENCOUNTER — PATIENT MESSAGE (OUTPATIENT)
Dept: PODIATRY | Facility: CLINIC | Age: 59
End: 2024-08-28
Payer: COMMERCIAL

## 2024-08-30 ENCOUNTER — PATIENT MESSAGE (OUTPATIENT)
Dept: PODIATRY | Facility: CLINIC | Age: 59
End: 2024-08-30
Payer: COMMERCIAL

## 2024-08-30 RX ORDER — LORAZEPAM 1 MG/1
TABLET ORAL
Qty: 60 TABLET | Refills: 0 | Status: SHIPPED | OUTPATIENT
Start: 2024-08-30

## 2024-08-30 RX ORDER — IBUPROFEN 600 MG/1
600 TABLET ORAL EVERY 8 HOURS PRN
Qty: 30 TABLET | Refills: 1 | Status: SHIPPED | OUTPATIENT
Start: 2024-08-30

## 2024-08-30 RX ORDER — IBUPROFEN 600 MG/1
600 TABLET ORAL EVERY 8 HOURS PRN
Qty: 30 TABLET | Refills: 1 | OUTPATIENT
Start: 2024-08-30

## 2024-08-30 NOTE — TELEPHONE ENCOUNTER
Refused Prescriptions     Name from pharmacy: IBUPROFEN 600MG TABLETS         Will file in chart as: ibuprofen (ADVIL,MOTRIN) 600 MG tablet    Sig: TAKE 1 TABLET BY MOUTH EVERY 8 HOURS AS NEEDED FOR PAIN    Disp: 30 tablet    Refills: 1 (Pharmacy requested: Not specified)    Start: 8/30/2024    Class: Normal    Refused by: Estevan Queen DPM    Refusal reason: Duplicate

## 2024-08-30 NOTE — TELEPHONE ENCOUNTER
No care due was identified.  [facility] Embedded Care Due Messages. Reference number: [id number].   8/30/2024 8:14:05 AM CDT

## 2024-08-31 NOTE — PROGRESS NOTES
Subjective:      Patient ID: Twila Case is a 59 y.o. female.    Chief Complaint:   Post-op Evaluation (# 3 right foot )    Twila is a 59 y.o. female who presents to the podiatry clinic  with complaint of  right foot pain. Onset of the symptoms was several months ago. Precipitating event: increased activity. Current symptoms include:  Tingling, burning, and numbness . Aggravating factors: any weight bearing. Symptoms have gradually worsened. Patient has had no prior foot problems. Evaluation to date: Ortho consult:  Sports Medicine therapy/treatment and EMG is currently ordered .  In addition she is doing very well after tarsal tunnel surgery, right.  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  She would like to discuss intervention at this time surgically as she has exhausted all conservative measures including icing, NSAIDs, orthotics, shoe gear changes, over-the-counter padding, therapy.    8/27:  Patient presents to post-op visit number 3 for R calcaneal exostectomy.  Patient is healing well reports improvement to her R foot pain.  Has been compliant with wearing post-op shoe at all times.  Denies fevers, chills, nausea, or vomiting.  Denies any new pedal complaints.    Review of Systems   Constitutional: Negative for chills, decreased appetite, fever and malaise/fatigue.   HENT:  Negative for congestion, hearing loss, nosebleeds and tinnitus.    Eyes:  Negative for double vision, pain, photophobia and visual disturbance.   Cardiovascular:  Negative for chest pain, claudication, cyanosis and leg swelling.   Respiratory:  Negative for cough, hemoptysis, shortness of breath and wheezing.    Endocrine: Negative for cold intolerance and heat intolerance.   Hematologic/Lymphatic: Negative for adenopathy and bleeding problem.   Skin:  Negative for color change, dry skin, itching, nail changes and suspicious lesions.   Musculoskeletal:  Negative for arthritis, joint pain, myalgias  "and stiffness.   Gastrointestinal:  Negative for abdominal pain, jaundice, nausea and vomiting.   Genitourinary:  Negative for dysuria, frequency and hematuria.   Neurological:  Positive for numbness, paresthesias and sensory change. Negative for difficulty with concentration and loss of balance.   Psychiatric/Behavioral:  Negative for altered mental status, hallucinations and suicidal ideas. The patient is not nervous/anxious.    Allergic/Immunologic: Negative for environmental allergies and persistent infections.           Objective:      Physical Exam  Constitutional:       General: She is not in acute distress.     Appearance: Normal appearance.   Cardiovascular:      Rate and Rhythm: Normal rate and regular rhythm.   Pulmonary:      Effort: Pulmonary effort is normal.      Breath sounds: Normal breath sounds.   Abdominal:      Palpations: There is no mass.   Musculoskeletal:         General: No swelling, tenderness or deformity.      Comments: S/p R calcaneal exostectomy and Achilles reattachment.  Sutures intact without dehiscence.  No erythema, edema, or drainage noted.  No tenderness to palpation.   Skin:     General: Skin is warm.      Capillary Refill: Capillary refill takes less than 2 seconds.      Findings: No bruising or erythema.   Neurological:      Mental Status: She is alert.   Psychiatric:         Mood and Affect: Mood normal.         Assessment:       Encounter Diagnosis   Name Primary?    Nerve entrapment of lower limb, right      Independent visualization of imaging was performed.  Results were reviewed in detail with patient.       Plan:       Twila Martinez" was seen today for post-op evaluation.    Diagnoses and all orders for this visit:    Nerve entrapment of lower limb, right  -     oxyCODONE-acetaminophen (PERCOCET)  mg per tablet; Take 1 tablet by mouth every 8 (eight) hours as needed for Pain.    Other orders  -     gabapentin (NEURONTIN) 300 MG capsule; Take 1 capsule (300 mg " total) by mouth 3 (three) times daily.      I counseled the patient on her conditions, their implications and medical management.    The nature of the condition, options for management, as well as potential risks and complications were discussed in detail with patient. Patient was amenable to my recommendations and left my office fully informed and will follow up as instructed or sooner if necessary.      Sutures removed today in clinic.    Advised patient to continue to ambulate in Darco shoe for 1 week and okay to transition into tennis shoe    Rx Percocet and and gabapentin p.r.n.    RTC 4 weeks        Shona Katz DPM   Podiatric Medicine & Surgery  [facility]

## 2024-09-03 ENCOUNTER — TELEPHONE (OUTPATIENT)
Dept: PODIATRY | Facility: CLINIC | Age: 59
End: 2024-09-03
Payer: COMMERCIAL

## 2024-09-10 ENCOUNTER — TELEPHONE (OUTPATIENT)
Dept: PODIATRY | Facility: CLINIC | Age: 59
End: 2024-09-10
Payer: COMMERCIAL

## 2024-09-10 ENCOUNTER — PATIENT MESSAGE (OUTPATIENT)
Dept: PODIATRY | Facility: CLINIC | Age: 59
End: 2024-09-10
Payer: COMMERCIAL

## 2024-09-11 ENCOUNTER — PATIENT MESSAGE (OUTPATIENT)
Dept: PODIATRY | Facility: CLINIC | Age: 59
End: 2024-09-11
Payer: COMMERCIAL

## 2024-09-12 ENCOUNTER — OFFICE VISIT (OUTPATIENT)
Dept: PODIATRY | Facility: CLINIC | Age: 59
End: 2024-09-12
Payer: COMMERCIAL

## 2024-09-12 VITALS
BODY MASS INDEX: 21.64 KG/M2 | SYSTOLIC BLOOD PRESSURE: 102 MMHG | HEIGHT: 65 IN | HEART RATE: 90 BPM | DIASTOLIC BLOOD PRESSURE: 74 MMHG

## 2024-09-12 DIAGNOSIS — G57.81 NERVE ENTRAPMENT OF LOWER LIMB, RIGHT: ICD-10-CM

## 2024-09-12 PROCEDURE — 3078F DIAST BP <80 MM HG: CPT | Mod: CPTII,S$GLB,, | Performed by: PODIATRIST

## 2024-09-12 PROCEDURE — 3074F SYST BP LT 130 MM HG: CPT | Mod: CPTII,S$GLB,, | Performed by: PODIATRIST

## 2024-09-12 PROCEDURE — 99024 POSTOP FOLLOW-UP VISIT: CPT | Mod: S$GLB,,, | Performed by: PODIATRIST

## 2024-09-12 PROCEDURE — 1160F RVW MEDS BY RX/DR IN RCRD: CPT | Mod: CPTII,S$GLB,, | Performed by: PODIATRIST

## 2024-09-12 PROCEDURE — 1159F MED LIST DOCD IN RCRD: CPT | Mod: CPTII,S$GLB,, | Performed by: PODIATRIST

## 2024-09-12 PROCEDURE — 99999 PR PBB SHADOW E&M-EST. PATIENT-LVL III: CPT | Mod: PBBFAC,,, | Performed by: PODIATRIST

## 2024-09-12 RX ORDER — OXYCODONE AND ACETAMINOPHEN 10; 325 MG/1; MG/1
1 TABLET ORAL EVERY 8 HOURS PRN
Qty: 30 TABLET | Refills: 0 | Status: SHIPPED | OUTPATIENT
Start: 2024-09-12 | End: 2024-09-19 | Stop reason: SDUPTHER

## 2024-09-19 DIAGNOSIS — G57.81 NERVE ENTRAPMENT OF LOWER LIMB, RIGHT: ICD-10-CM

## 2024-09-19 RX ORDER — OXYCODONE AND ACETAMINOPHEN 10; 325 MG/1; MG/1
1 TABLET ORAL EVERY 8 HOURS PRN
Qty: 30 TABLET | Refills: 0 | Status: SHIPPED | OUTPATIENT
Start: 2024-09-19

## 2024-09-21 NOTE — PROGRESS NOTES
Subjective:      Patient ID: Twila Case is a 59 y.o. female.    Chief Complaint:   Post-op Evaluation (Post-op #4 right foot)    Twila is a 59 y.o. female who presents to the podiatry clinic  with complaint of  right foot pain. Onset of the symptoms was several months ago. Precipitating event: increased activity. Current symptoms include:  Tingling, burning, and numbness . Aggravating factors: any weight bearing. Symptoms have gradually worsened. Patient has had no prior foot problems. Evaluation to date: Ortho consult:  Sports Medicine therapy/treatment and EMG is currently ordered .  In addition she is doing very well after tarsal tunnel surgery, right.  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  She would like to discuss intervention at this time surgically as she has exhausted all conservative measures including icing, NSAIDs, orthotics, shoe gear changes, over-the-counter padding, therapy.    8/27:  Patient presents to post-op visit number 3 for R calcaneal exostectomy.  Patient is healing well reports improvement to her R foot pain.  Has been compliant with wearing post-op shoe at all times.  Denies fevers, chills, nausea, or vomiting.  Denies any new pedal complaints.    Review of Systems   Constitutional: Negative for chills, decreased appetite, fever and malaise/fatigue.   HENT:  Negative for congestion, hearing loss, nosebleeds and tinnitus.    Eyes:  Negative for double vision, pain, photophobia and visual disturbance.   Cardiovascular:  Negative for chest pain, claudication, cyanosis and leg swelling.   Respiratory:  Negative for cough, hemoptysis, shortness of breath and wheezing.    Endocrine: Negative for cold intolerance and heat intolerance.   Hematologic/Lymphatic: Negative for adenopathy and bleeding problem.   Skin:  Negative for color change, dry skin, itching, nail changes and suspicious lesions.   Musculoskeletal:  Negative for arthritis, joint pain,  "myalgias and stiffness.   Gastrointestinal:  Negative for abdominal pain, jaundice, nausea and vomiting.   Genitourinary:  Negative for dysuria, frequency and hematuria.   Neurological:  Positive for numbness, paresthesias and sensory change. Negative for difficulty with concentration and loss of balance.   Psychiatric/Behavioral:  Negative for altered mental status, hallucinations and suicidal ideas. The patient is not nervous/anxious.    Allergic/Immunologic: Negative for environmental allergies and persistent infections.           Objective:      Physical Exam  Constitutional:       General: She is not in acute distress.     Appearance: Normal appearance.   Cardiovascular:      Rate and Rhythm: Normal rate and regular rhythm.   Pulmonary:      Effort: Pulmonary effort is normal.      Breath sounds: Normal breath sounds.   Abdominal:      Palpations: There is no mass.   Musculoskeletal:         General: No swelling, tenderness or deformity.      Comments: S/p R calcaneal exostectomy and Achilles reattachment.  Sutures intact without dehiscence.  No erythema, edema, or drainage noted.  No tenderness to palpation.   Skin:     General: Skin is warm.      Capillary Refill: Capillary refill takes less than 2 seconds.      Findings: No bruising or erythema.   Neurological:      Mental Status: She is alert.   Psychiatric:         Mood and Affect: Mood normal.           Assessment:       Encounter Diagnosis   Name Primary?    Nerve entrapment of lower limb, right      Independent visualization of imaging was performed.  Results were reviewed in detail with patient.       Plan:       Twila Martinez" was seen today for post-op evaluation.    Diagnoses and all orders for this visit:    Nerve entrapment of lower limb, right  -     Discontinue: oxyCODONE-acetaminophen (PERCOCET)  mg per tablet; Take 1 tablet by mouth every 8 (eight) hours as needed for Pain.      I counseled the patient on her conditions, their " implications and medical management.    The nature of the condition, options for management, as well as potential risks and complications were discussed in detail with patient. Patient was amenable to my recommendations and left my office fully informed and will follow up as instructed or sooner if necessary.      Sutures removed today in clinic.    Advised patient to continue to ambulate in Darco shoe for 1 week and okay to transition into tennis shoe    Rx Percocet and and gabapentin p.r.n.    Sutures removed under asceptic conditions, dry sterile bandage applied. Patient is cleared to begin light bathing of operative site.  No oils, lotions, or ointments to incision for two weeks    RTC 4 weeks        Shona Katz DPM   Podiatric Medicine & Surgery  [facility]

## 2024-10-02 DIAGNOSIS — G57.81 NERVE ENTRAPMENT OF LOWER LIMB, RIGHT: ICD-10-CM

## 2024-10-02 RX ORDER — OXYCODONE AND ACETAMINOPHEN 10; 325 MG/1; MG/1
1 TABLET ORAL EVERY 8 HOURS PRN
Qty: 30 TABLET | Refills: 0 | Status: SHIPPED | OUTPATIENT
Start: 2024-10-02

## 2024-10-03 RX ORDER — LORAZEPAM 1 MG/1
TABLET ORAL
Qty: 60 TABLET | Refills: 0 | Status: SHIPPED | OUTPATIENT
Start: 2024-10-03

## 2024-10-03 NOTE — TELEPHONE ENCOUNTER
No care due was identified.  [facility] Embedded Care Due Messages. Reference number: [id number].   10/03/2024 10:22:59 AM CDT

## 2024-10-04 ENCOUNTER — PATIENT MESSAGE (OUTPATIENT)
Dept: PODIATRY | Facility: CLINIC | Age: 59
End: 2024-10-04
Payer: COMMERCIAL

## 2024-10-04 RX ORDER — METOPROLOL SUCCINATE 25 MG/1
25 TABLET, EXTENDED RELEASE ORAL
Qty: 90 TABLET | Refills: 1 | Status: SHIPPED | OUTPATIENT
Start: 2024-10-04

## 2024-10-04 NOTE — TELEPHONE ENCOUNTER
Refill Decision Note   Twila Case  is requesting a refill authorization.  Brief Assessment and Rationale for Refill:  Approve     Medication Therapy Plan:         Comments:     Note composed:12:51 PM 10/04/2024

## 2024-10-04 NOTE — TELEPHONE ENCOUNTER
No care due was identified.  [facility] Embedded Care Due Messages. Reference number: [id number].   10/04/2024 5:22:53 AM CDT

## 2024-10-05 RX ORDER — IBUPROFEN 600 MG/1
600 TABLET ORAL EVERY 8 HOURS PRN
Qty: 30 TABLET | Refills: 1 | Status: SHIPPED | OUTPATIENT
Start: 2024-10-05

## 2024-10-07 NOTE — TELEPHONE ENCOUNTER
No care due was identified.  [facility] Embedded Care Due Messages. Reference number: [id number].   10/07/2024 12:51:53 PM CDT

## 2024-10-07 NOTE — TELEPHONE ENCOUNTER
No care due was identified.  [facility] Embedded Care Due Messages. Reference number: [id number].   10/07/2024 1:00:29 PM CDT

## 2024-10-08 ENCOUNTER — OFFICE VISIT (OUTPATIENT)
Dept: PODIATRY | Facility: CLINIC | Age: 59
End: 2024-10-08
Payer: COMMERCIAL

## 2024-10-08 VITALS
SYSTOLIC BLOOD PRESSURE: 123 MMHG | WEIGHT: 130.06 LBS | RESPIRATION RATE: 18 BRPM | HEIGHT: 65 IN | DIASTOLIC BLOOD PRESSURE: 82 MMHG | BODY MASS INDEX: 21.67 KG/M2 | HEART RATE: 73 BPM

## 2024-10-08 DIAGNOSIS — M76.61 ACHILLES TENDINITIS OF BOTH LOWER EXTREMITIES: ICD-10-CM

## 2024-10-08 DIAGNOSIS — M76.62 ACHILLES TENDINITIS OF BOTH LOWER EXTREMITIES: ICD-10-CM

## 2024-10-08 DIAGNOSIS — G57.81 NERVE ENTRAPMENT OF LOWER LIMB, RIGHT: Primary | ICD-10-CM

## 2024-10-08 PROCEDURE — 99999 PR PBB SHADOW E&M-EST. PATIENT-LVL IV: CPT | Mod: PBBFAC,,, | Performed by: PODIATRIST

## 2024-10-08 PROCEDURE — 99024 POSTOP FOLLOW-UP VISIT: CPT | Mod: S$GLB,,, | Performed by: PODIATRIST

## 2024-10-08 PROCEDURE — 3079F DIAST BP 80-89 MM HG: CPT | Mod: CPTII,S$GLB,, | Performed by: PODIATRIST

## 2024-10-08 PROCEDURE — 3074F SYST BP LT 130 MM HG: CPT | Mod: CPTII,S$GLB,, | Performed by: PODIATRIST

## 2024-10-08 PROCEDURE — 1159F MED LIST DOCD IN RCRD: CPT | Mod: CPTII,S$GLB,, | Performed by: PODIATRIST

## 2024-10-08 RX ORDER — BUPROPION HYDROCHLORIDE 150 MG/1
150 TABLET ORAL
Qty: 90 TABLET | Refills: 3 | OUTPATIENT
Start: 2024-10-08

## 2024-10-08 RX ORDER — BUPROPION HYDROCHLORIDE 150 MG/1
150 TABLET ORAL DAILY
Qty: 90 TABLET | Refills: 1 | OUTPATIENT
Start: 2024-10-08

## 2024-10-08 RX ORDER — BUPROPION HYDROCHLORIDE 150 MG/1
150 TABLET ORAL DAILY
Qty: 90 TABLET | Refills: 1 | Status: SHIPPED | OUTPATIENT
Start: 2024-10-08

## 2024-10-08 NOTE — TELEPHONE ENCOUNTER
Refill Decision Note   Twila Case  is requesting a refill authorization.  Brief Assessment and Rationale for Refill:  Quick Discontinue     Medication Therapy Plan:         Comments:     Note composed:6:51 PM 10/08/2024

## 2024-10-08 NOTE — TELEPHONE ENCOUNTER
Refill Decision Note   Twila Case  is requesting a refill authorization.  Brief Assessment and Rationale for Refill:  Quick Discontinue     Medication Therapy Plan:  Duplicate      Comments:     Note composed:2:39 AM 10/08/2024

## 2024-10-08 NOTE — TELEPHONE ENCOUNTER
No care due was identified.  [facility] Embedded Care Due Messages. Reference number: [id number].   10/08/2024 11:34:12 AM CDT

## 2024-10-08 NOTE — TELEPHONE ENCOUNTER
Refill Decision Note   Twila Case  is requesting a refill authorization.  Brief Assessment and Rationale for Refill:  Approve     Medication Therapy Plan:         Comments:     Note composed:2:45 AM 10/08/2024

## 2024-10-11 DIAGNOSIS — G57.81 NERVE ENTRAPMENT OF LOWER LIMB, RIGHT: ICD-10-CM

## 2024-10-11 RX ORDER — OXYCODONE AND ACETAMINOPHEN 10; 325 MG/1; MG/1
1 TABLET ORAL EVERY 8 HOURS PRN
Qty: 30 TABLET | Refills: 0 | Status: SHIPPED | OUTPATIENT
Start: 2024-10-11

## 2024-10-12 ENCOUNTER — PATIENT MESSAGE (OUTPATIENT)
Dept: PODIATRY | Facility: CLINIC | Age: 59
End: 2024-10-12
Payer: COMMERCIAL

## 2024-10-12 NOTE — PROGRESS NOTES
Subjective:      Patient ID: Twila Case is a 59 y.o. female.    Chief Complaint:   Post-op Evaluation (Rt foot top and back heel ), Dressing Change, and Foot Pain (Rt foot 6 out 10 )    Twila is a 59 y.o. female who presents to the podiatry clinic  with complaint of  right foot pain. Onset of the symptoms was several months ago. Precipitating event: increased activity. Current symptoms include:  Tingling, burning, and numbness . Aggravating factors: any weight bearing. Symptoms have gradually worsened. Patient has had no prior foot problems. Evaluation to date: Ortho consult:  Sports Medicine therapy/treatment and EMG is currently ordered .  In addition she is doing very well after tarsal tunnel surgery, right.  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  She would like to discuss intervention at this time surgically as she has exhausted all conservative measures including icing, NSAIDs, orthotics, shoe gear changes, over-the-counter padding, therapy.    8/27:  Patient presents to post-op visit number 3 for R calcaneal exostectomy.  Patient is healing well reports improvement to her R foot pain.  Has been compliant with wearing post-op shoe at all times.  Denies fevers, chills, nausea, or vomiting.  Denies any new pedal complaints.    Review of Systems   Constitutional: Negative for chills, decreased appetite, fever and malaise/fatigue.   HENT:  Negative for congestion, hearing loss, nosebleeds and tinnitus.    Eyes:  Negative for double vision, pain, photophobia and visual disturbance.   Cardiovascular:  Negative for chest pain, claudication, cyanosis and leg swelling.   Respiratory:  Negative for cough, hemoptysis, shortness of breath and wheezing.    Endocrine: Negative for cold intolerance and heat intolerance.   Hematologic/Lymphatic: Negative for adenopathy and bleeding problem.   Skin:  Negative for color change, dry skin, itching, nail changes and suspicious lesions.  "  Musculoskeletal:  Negative for arthritis, joint pain, myalgias and stiffness.   Gastrointestinal:  Negative for abdominal pain, jaundice, nausea and vomiting.   Genitourinary:  Negative for dysuria, frequency and hematuria.   Neurological:  Positive for numbness, paresthesias and sensory change. Negative for difficulty with concentration and loss of balance.   Psychiatric/Behavioral:  Negative for altered mental status, hallucinations and suicidal ideas. The patient is not nervous/anxious.    Allergic/Immunologic: Negative for environmental allergies and persistent infections.           Objective:      Physical Exam  Constitutional:       General: She is not in acute distress.     Appearance: Normal appearance.   Cardiovascular:      Rate and Rhythm: Normal rate and regular rhythm.   Pulmonary:      Effort: Pulmonary effort is normal.      Breath sounds: Normal breath sounds.   Abdominal:      Palpations: There is no mass.   Musculoskeletal:         General: No swelling, tenderness or deformity.      Comments: S/p R calcaneal exostectomy and Achilles reattachment.  Sutures intact without dehiscence.  No erythema, edema, or drainage noted.  No tenderness to palpation.   Skin:     General: Skin is warm.      Capillary Refill: Capillary refill takes less than 2 seconds.      Findings: No bruising or erythema.   Neurological:      Mental Status: She is alert.   Psychiatric:         Mood and Affect: Mood normal.           Assessment:       Encounter Diagnoses   Name Primary?    Nerve entrapment of lower limb, right Yes    Achilles tendinitis of both lower extremities      Independent visualization of imaging was performed.  Results were reviewed in detail with patient.       Plan:       Twila Martinez" was seen today for post-op evaluation, dressing change and foot pain.    Diagnoses and all orders for this visit:    Nerve entrapment of lower limb, right    Achilles tendinitis of both lower extremities      I counseled " the patient on her conditions, their implications and medical management.    The nature of the condition, options for management, as well as potential risks and complications were discussed in detail with patient. Patient was amenable to my recommendations and left my office fully informed and will follow up as instructed or sooner if necessary.      Sutures removed today in clinic.    Advised patient to continue to ambulate in Darco shoe for 1 week and okay to transition into tennis shoe    Rx Percocet and and gabapentin p.r.n.    Sutures removed under asceptic conditions, dry sterile bandage applied. Patient is cleared to begin light bathing of operative site.  No oils, lotions, or ointments to incision for two weeks    RTC 4 weeks

## 2024-10-14 ENCOUNTER — TELEPHONE (OUTPATIENT)
Dept: PODIATRY | Facility: CLINIC | Age: 59
End: 2024-10-14
Payer: COMMERCIAL

## 2024-10-14 NOTE — TELEPHONE ENCOUNTER
Left vm message for patient in regards to her prior message sent concerning PT, and per: Dr. Queen he stated below:    It is okay to wait a few weeks, I just wanted to do the referral so it was in the system and sometimes takes a while to set up.     And to contact office at [phone] if she has any additional questions          ----- Message from Estevan Queen DPM sent at 10/14/2024  8:59 AM CDT -----  Regarding: RE: PT question  It is okay to wait a few weeks, I just wanted to do the referral so it was in the system and sometimes takes a while to set up.  ----- Message -----  From: Wendy Bond MA  Sent: 10/14/2024   8:33 AM CDT  To: Estevan Queen DPM  Subject: PT question                                      Good morning Dr. Queen, please read the message below from the patient and advise. Thank you.        PT  (Newest Message First)October 12, 2024  Olinda Case to GEETA Barreto Staff (supporting Estevan Queen DPM)         10/12/24  3:53 PM  Hi Dr Queen,  When I was in for my  appointment Tuesday we talked about PT, and you suggested that I hold off until my pain subsided a bit... someone from PT called me today to schedule me. Since I'm still hurting should I hold off on PT or should I schedule? Just didn't want to further hurt myself, please let me know if I should go ahead and start or wait a few weeks...  Thanks for your help!  Olinda

## 2024-10-25 DIAGNOSIS — G57.81 NERVE ENTRAPMENT OF LOWER LIMB, RIGHT: ICD-10-CM

## 2024-10-25 RX ORDER — OXYCODONE AND ACETAMINOPHEN 10; 325 MG/1; MG/1
1 TABLET ORAL EVERY 8 HOURS PRN
Qty: 30 TABLET | Refills: 0 | Status: SHIPPED | OUTPATIENT
Start: 2024-10-25

## 2024-10-29 ENCOUNTER — CLINICAL SUPPORT (OUTPATIENT)
Dept: REHABILITATION | Facility: HOSPITAL | Age: 59
End: 2024-10-29
Attending: PODIATRIST
Payer: COMMERCIAL

## 2024-10-29 DIAGNOSIS — M76.62 ACHILLES TENDINITIS OF BOTH LOWER EXTREMITIES: ICD-10-CM

## 2024-10-29 DIAGNOSIS — M76.61 ACHILLES TENDINITIS OF BOTH LOWER EXTREMITIES: ICD-10-CM

## 2024-10-29 DIAGNOSIS — M25.671 DECREASED RANGE OF MOTION OF RIGHT ANKLE: Primary | Chronic | ICD-10-CM

## 2024-10-29 PROBLEM — M25.673 DECREASED ROM OF ANKLE: Status: ACTIVE | Noted: 2023-09-10

## 2024-10-29 PROCEDURE — 97110 THERAPEUTIC EXERCISES: CPT | Performed by: PHYSICAL THERAPIST

## 2024-10-29 PROCEDURE — 97161 PT EVAL LOW COMPLEX 20 MIN: CPT | Performed by: PHYSICAL THERAPIST

## 2024-10-30 ENCOUNTER — TELEPHONE (OUTPATIENT)
Dept: INTERNAL MEDICINE | Facility: CLINIC | Age: 59
End: 2024-10-30
Payer: COMMERCIAL

## 2024-11-05 ENCOUNTER — CLINICAL SUPPORT (OUTPATIENT)
Dept: REHABILITATION | Facility: HOSPITAL | Age: 59
End: 2024-11-05
Payer: COMMERCIAL

## 2024-11-05 ENCOUNTER — OFFICE VISIT (OUTPATIENT)
Dept: PODIATRY | Facility: CLINIC | Age: 59
End: 2024-11-05
Payer: COMMERCIAL

## 2024-11-05 VITALS
HEIGHT: 65 IN | BODY MASS INDEX: 21.67 KG/M2 | HEART RATE: 73 BPM | SYSTOLIC BLOOD PRESSURE: 123 MMHG | WEIGHT: 130.06 LBS | DIASTOLIC BLOOD PRESSURE: 82 MMHG

## 2024-11-05 DIAGNOSIS — M76.62 ACHILLES TENDINITIS OF BOTH LOWER EXTREMITIES: ICD-10-CM

## 2024-11-05 DIAGNOSIS — M76.61 ACHILLES TENDINITIS OF BOTH LOWER EXTREMITIES: ICD-10-CM

## 2024-11-05 DIAGNOSIS — G57.81 NERVE ENTRAPMENT OF LOWER LIMB, RIGHT: Primary | ICD-10-CM

## 2024-11-05 DIAGNOSIS — M25.671 DECREASED RANGE OF MOTION OF RIGHT ANKLE: Primary | Chronic | ICD-10-CM

## 2024-11-05 PROCEDURE — 97140 MANUAL THERAPY 1/> REGIONS: CPT | Performed by: PHYSICAL THERAPIST

## 2024-11-05 PROCEDURE — 3079F DIAST BP 80-89 MM HG: CPT | Mod: CPTII,S$GLB,, | Performed by: PODIATRIST

## 2024-11-05 PROCEDURE — 99999 PR PBB SHADOW E&M-EST. PATIENT-LVL IV: CPT | Mod: PBBFAC,,, | Performed by: PODIATRIST

## 2024-11-05 PROCEDURE — 97110 THERAPEUTIC EXERCISES: CPT | Performed by: PHYSICAL THERAPIST

## 2024-11-05 PROCEDURE — 1160F RVW MEDS BY RX/DR IN RCRD: CPT | Mod: CPTII,S$GLB,, | Performed by: PODIATRIST

## 2024-11-05 PROCEDURE — 99024 POSTOP FOLLOW-UP VISIT: CPT | Mod: S$GLB,,, | Performed by: PODIATRIST

## 2024-11-05 PROCEDURE — 1159F MED LIST DOCD IN RCRD: CPT | Mod: CPTII,S$GLB,, | Performed by: PODIATRIST

## 2024-11-05 PROCEDURE — 3074F SYST BP LT 130 MM HG: CPT | Mod: CPTII,S$GLB,, | Performed by: PODIATRIST

## 2024-11-05 NOTE — PROGRESS NOTES
Physical Therapy Daily Treatment Note     Name: Twila Case  Clinic Number: [id number]    Therapy Diagnosis:   Encounter Diagnosis   Name Primary?    Decreased range of motion of right ankle Yes     Physician: Estevan Queen DPM    Visit Date: 11/5/2024  Physician Orders: PT Eval and Treat   Medical Diagnosis from Referral: M76.61,M76.62 (ICD-10-CM) - Achilles tendinitis of both lower extremities  Evaluation Date: 10/29/2024  Authorization Period Expiration: 10-12-25  Plan of Care Expiration: 12-31-24  Visit # / Visits authorized: 1/ 1   FOTO: 1/5     Time In: 10:30 am  Time Out: 11:20 am  Total Billable Time: 35 minutes    Precautions: Standard    Subjective     Pt reports: R ankle feels better.  She was compliant with home exercise program.  Response to previous treatment: less pain  Functional change: na    Pain: 1/10  Location: right ankle/foot     Objective     To PT with walking boot.  Ankle AROM is WFL-WNL.  No swelling.    Olinda received therapeutic exercises to develop strength, ROM, flexibility, and core stabilization for 25 minutes including:  HEP review  Heelcord stretch on angle board 3 x 1 min  GTB ankle x 4 ways  Step ups with R foot on foam x 25 (all exercises)  GTB hip abduction in supine  GTB B clamshells  GTB bridges  Standing GTB hip abd and ext B    Olinda received the following manual therapy techniques: Joint mobilizations were applied to the: R ankle/foot for 10 minutes, including:  MOBS/PROM    Olinda received cold pack for 10 minutes to R ankle.      Home Exercises Provided and Patient Education Provided     Education provided:   - ice for pain    Written Home Exercises Provided: Patient instructed to cont prior HEP.  Exercises were reviewed and Oilnda was able to demonstrate them prior to the end of the session.  Olinda demonstrated good  understanding of the education provided.     See EMR under Media for exercises provided prior visit.    Assessment     Tolerated exercises well.  Improved  wt-bearing tolerance.  Olinda Is progressing well towards her goals.   Pt prognosis is Good.     Pt will continue to benefit from skilled outpatient physical therapy to address the deficits listed in the problem list box on initial evaluation, provide pt/family education and to maximize pt's level of independence in the home and community environment.     Pt's spiritual, cultural and educational needs considered and pt agreeable to plan of care and goals.    Anticipated barriers to physical therapy: none    Goals:    Short Term Goals: 2 weeks         1.   Independent with HEP        2.  Pt will report decreased pain level of < 50% from above measure with ADL     Long Term Goals:   GOALS:    8_   weeks. Pt agrees with goals set.  Independent with HEP.  Report decreased    R ankle/foot    pain  <   / =  3/10 with ADL such as walking unassisted  Increased MMT  for  R LE to 5/5  with ADL such as stair climbing  Increased arom  for  R ankle to WNL with functional activities such walking or self-care       Plan     Continue PT per POC.    Korey Castrejon, PT

## 2024-11-07 ENCOUNTER — DOCUMENTATION ONLY (OUTPATIENT)
Dept: REHABILITATION | Facility: HOSPITAL | Age: 59
End: 2024-11-07
Payer: COMMERCIAL

## 2024-11-07 ENCOUNTER — CLINICAL SUPPORT (OUTPATIENT)
Dept: REHABILITATION | Facility: HOSPITAL | Age: 59
End: 2024-11-07
Payer: COMMERCIAL

## 2024-11-07 DIAGNOSIS — M25.671 DECREASED RANGE OF MOTION OF RIGHT ANKLE: Primary | Chronic | ICD-10-CM

## 2024-11-07 PROCEDURE — 97110 THERAPEUTIC EXERCISES: CPT | Performed by: PHYSICAL THERAPIST

## 2024-11-07 PROCEDURE — 97140 MANUAL THERAPY 1/> REGIONS: CPT | Performed by: PHYSICAL THERAPIST

## 2024-11-07 NOTE — PROGRESS NOTES
Physical Therapy Daily Treatment Note     Name: Twila Case  Clinic Number: [id number]    Therapy Diagnosis:   Encounter Diagnosis   Name Primary?    Decreased range of motion of right ankle Yes     Physician: Estevan Queen DPM    Visit Date: 11/7/2024  Physician Orders: PT Eval and Treat   Medical Diagnosis from Referral: M76.61,M76.62 (ICD-10-CM) - Achilles tendinitis of both lower extremities  Evaluation Date: 10/29/2024  Authorization Period Expiration: 10-12-25  Plan of Care Expiration: 12-31-24  Visit # / Visits authorized: 2/ 20   FOTO: 2/5     Time In: 10:00 am  Time Out: 10:45 am  Total Billable Time: 35 minutes    Precautions: Standard    Subjective     Pt reports: R ankle feels sore with walking.  She was compliant with home exercise program.  Response to previous treatment: less pain  Functional change: na    Pain: 1/10  Location: right ankle/foot     Objective     To PT with walking boot.  Ankle AROM is WFL-WNL.  Unable to wear shoe.  No swelling.    Olinda received therapeutic exercises to develop strength, ROM, flexibility, and core stabilization for 25 minutes including:  HEP review  Heelcord stretch on angle board 3 x 1 min  GTB ankle x 4 ways  Step ups with R foot on foam x 25 (all exercises)  Foam marching  Mini squats and wt shifts  GTB hip abduction in supine  GTB B clamshells  GTB bridges  Standing GTB hip abd and ext B    Olinda received the following manual therapy techniques: Joint mobilizations were applied to the: R ankle/foot for 10 minutes, including:  MOBS/PROM    Olinda received cold pack for 10 minutes to R ankle.      Home Exercises Provided and Patient Education Provided     Education provided:   - ice for pain    Written Home Exercises Provided: Patient instructed to cont prior HEP.  Exercises were reviewed and Olinda was able to demonstrate them prior to the end of the session.  Olinda demonstrated good  understanding of the education provided.     See EMR under Media for exercises  provided prior visit.    Assessment     Tolerated exercises well.  Improved wt-bearing tolerance.  Olinda Is progressing well towards her goals.   Pt prognosis is Good.     Pt will continue to benefit from skilled outpatient physical therapy to address the deficits listed in the problem list box on initial evaluation, provide pt/family education and to maximize pt's level of independence in the home and community environment.     Pt's spiritual, cultural and educational needs considered and pt agreeable to plan of care and goals.    Anticipated barriers to physical therapy: none    Goals:    Short Term Goals: 2 weeks         1.   Independent with HEP        2.  Pt will report decreased pain level of < 50% from above measure with ADL     Long Term Goals:   GOALS:    8_   weeks. Pt agrees with goals set.  Independent with HEP.  Report decreased    R ankle/foot    pain  <   / =  3/10 with ADL such as walking unassisted  Increased MMT  for  R LE to 5/5  with ADL such as stair climbing  Increased arom  for  R ankle to WNL with functional activities such walking or self-care       Plan     Continue PT per POC.    Korey Castrejon, PT

## 2024-11-07 NOTE — PROGRESS NOTES
Pt was d/c from prior PT POC as of 6-05-24 due to her wanting to have surgery for R posterior heel pain.  At time of d/c she was limited in function due to continued pain with wt-bearing activities.      Korey Castrejon, PT

## 2024-11-11 DIAGNOSIS — G57.81 NERVE ENTRAPMENT OF LOWER LIMB, RIGHT: ICD-10-CM

## 2024-11-11 RX ORDER — IBUPROFEN 600 MG/1
600 TABLET ORAL EVERY 8 HOURS PRN
Qty: 30 TABLET | Refills: 1 | Status: SHIPPED | OUTPATIENT
Start: 2024-11-11

## 2024-11-11 RX ORDER — OXYCODONE AND ACETAMINOPHEN 10; 325 MG/1; MG/1
1 TABLET ORAL EVERY 8 HOURS PRN
Qty: 30 TABLET | Refills: 0 | Status: SHIPPED | OUTPATIENT
Start: 2024-11-11

## 2024-11-12 ENCOUNTER — CLINICAL SUPPORT (OUTPATIENT)
Dept: REHABILITATION | Facility: HOSPITAL | Age: 59
End: 2024-11-12
Payer: COMMERCIAL

## 2024-11-12 DIAGNOSIS — M25.671 DECREASED RANGE OF MOTION OF RIGHT ANKLE: Primary | Chronic | ICD-10-CM

## 2024-11-12 PROCEDURE — 97140 MANUAL THERAPY 1/> REGIONS: CPT | Performed by: PHYSICAL THERAPIST

## 2024-11-12 PROCEDURE — 97110 THERAPEUTIC EXERCISES: CPT | Performed by: PHYSICAL THERAPIST

## 2024-11-12 NOTE — PROGRESS NOTES
Physical Therapy Daily Treatment Note     Name: Twila Case  Clinic Number: [id number]    Therapy Diagnosis:   Encounter Diagnosis   Name Primary?    Decreased range of motion of right ankle Yes     Physician: Estevan Queen DPM    Visit Date: 11/12/2024  Physician Orders: PT Eval and Treat   Medical Diagnosis from Referral: M76.61,M76.62 (ICD-10-CM) - Achilles tendinitis of both lower extremities  Evaluation Date: 10/29/2024  Authorization Period Expiration: 10-12-25  Plan of Care Expiration: 12-31-24  Visit # / Visits authorized: 3/ 20   FOTO: 3/5     Time In: 1:45 am  Time Out: 2:35 am  Total Billable Time: 35 minutes    Precautions: Standard    Subjective     Pt reports: R ankle better today.  She was compliant with home exercise program.  Response to previous treatment: less pain  Functional change: na    Pain: 1/10  Location: right ankle/foot     Objective     To PT with walking boot.  Ankle AROM is WFL-WNL; 0 deg DF, 5 passive.  Unable to wear shoe.  No swelling.    Olinda received therapeutic exercises to develop strength, ROM, flexibility, and core stabilization for 25 minutes including:  HEP review  Heelcord stretch on angle board 3 x 1 min  GTB ankle x 4 ways  Step ups with R foot on foam x 25 (all exercises)  Foam marching  Mini squats and wt shifts  GTB hip abduction in supine  GTB B clamshells  GTB bridges  Standing GTB hip abd and ext B  Bike x 5 min    Olinda received the following manual therapy techniques: Joint mobilizations were applied to the: R ankle/foot for 10 minutes, including:  MOBS/PROM    Olinda received cold pack for 10 minutes to R ankle.      Home Exercises Provided and Patient Education Provided     Education provided:   - ice for pain    Written Home Exercises Provided: Patient instructed to cont prior HEP.  Exercises were reviewed and Olinda was able to demonstrate them prior to the end of the session.  Olinda demonstrated good  understanding of the education provided.     See EMR  under Media for exercises provided prior visit.    Assessment     Tolerated exercises well.  MIN pain with treatment.  Olinda Is progressing well towards her goals.   Pt prognosis is Good.     Pt will continue to benefit from skilled outpatient physical therapy to address the deficits listed in the problem list box on initial evaluation, provide pt/family education and to maximize pt's level of independence in the home and community environment.     Pt's spiritual, cultural and educational needs considered and pt agreeable to plan of care and goals.    Anticipated barriers to physical therapy: none    Goals:    Short Term Goals: 2 weeks         1.   Independent with HEP        2.  Pt will report decreased pain level of < 50% from above measure with ADL     Long Term Goals:   GOALS:    8_   weeks. Pt agrees with goals set.  Independent with HEP.  Report decreased    R ankle/foot    pain  <   / =  3/10 with ADL such as walking unassisted  Increased MMT  for  R LE to 5/5  with ADL such as stair climbing  Increased arom  for  R ankle to WNL with functional activities such walking or self-care       Plan     Continue PT per POC.    Korey Castrejon, PT

## 2024-11-13 ENCOUNTER — CLINICAL SUPPORT (OUTPATIENT)
Dept: REHABILITATION | Facility: HOSPITAL | Age: 59
End: 2024-11-13
Payer: COMMERCIAL

## 2024-11-13 DIAGNOSIS — M25.671 DECREASED RANGE OF MOTION OF RIGHT ANKLE: Primary | Chronic | ICD-10-CM

## 2024-11-13 PROCEDURE — 97110 THERAPEUTIC EXERCISES: CPT | Performed by: PHYSICAL THERAPIST

## 2024-11-13 PROCEDURE — 97140 MANUAL THERAPY 1/> REGIONS: CPT | Performed by: PHYSICAL THERAPIST

## 2024-11-13 NOTE — PROGRESS NOTES
Physical Therapy Daily Treatment Note     Name: Twila Case  Clinic Number: [id number]    Therapy Diagnosis:   Encounter Diagnosis   Name Primary?    Decreased range of motion of right ankle Yes     Physician: Estevan Queen DPM    Visit Date: 11/13/2024  Physician Orders: PT Eval and Treat   Medical Diagnosis from Referral: M76.61,M76.62 (ICD-10-CM) - Achilles tendinitis of both lower extremities  Evaluation Date: 10/29/2024  Authorization Period Expiration: 10-12-25  Plan of Care Expiration: 12-31-24  Visit # / Visits authorized: 4/ 20   FOTO: 4/5     Time In: 11:35 am  Time Out: 12:10 am  Total Billable Time: 25 minutes    Precautions: Standard    Subjective     Pt reports: R ankle is sore.  She was compliant with home exercise program.  Response to previous treatment: less pain  Functional change: na    Pain: 2/10  Location: right ankle/foot     Objective     To PT with walking boot.  Ankle AROM is WFL-WNL; 0 deg DF, 5 passive.  No swelling.  Mild TTP dorsal scar.    Olinda received therapeutic exercises to develop strength, ROM, flexibility, and core stabilization for 15 minutes including:  HEP review  Heelcord stretch on angle board 3 x 1 min  GTB ankle x 4 ways  Step ups with R foot on foam x 25 (all exercises)  NP  Foam marching  NP  Mini squats and wt shifts   NP  GTB hip abduction in supine  GTB B clamshells  GTB bridges  Standing GTB hip abd and ext B  Bike x 5 min  NP    Olinda received the following manual therapy techniques: Joint mobilizations were applied to the: R ankle/foot for 10 minutes, including:  MOBS/PROM    Olinda received cold pack for 10 minutes to R ankle.      Home Exercises Provided and Patient Education Provided     Education provided:   - ice for pain    Written Home Exercises Provided: Patient instructed to cont prior HEP.  Exercises were reviewed and Olinda was able to demonstrate them prior to the end of the session.  Olinda demonstrated good  understanding of the education provided.      See EMR under Media for exercises provided prior visit.    Assessment     Tolerated exercises well.  Sore with ROM.  Olinda Is progressing well towards her goals.   Pt prognosis is Good.     Pt will continue to benefit from skilled outpatient physical therapy to address the deficits listed in the problem list box on initial evaluation, provide pt/family education and to maximize pt's level of independence in the home and community environment.     Pt's spiritual, cultural and educational needs considered and pt agreeable to plan of care and goals.    Anticipated barriers to physical therapy: none    Goals:    Short Term Goals: 2 weeks         1.   Independent with HEP        2.  Pt will report decreased pain level of < 50% from above measure with ADL     Long Term Goals:   GOALS:    8_   weeks. Pt agrees with goals set.  Independent with HEP.  Report decreased    R ankle/foot    pain  <   / =  3/10 with ADL such as walking unassisted  Increased MMT  for  R LE to 5/5  with ADL such as stair climbing  Increased arom  for  R ankle to WNL with functional activities such walking or self-care       Plan     Continue PT per POC.    Korey Castrejon, PT

## 2024-11-14 ENCOUNTER — LAB VISIT (OUTPATIENT)
Dept: LAB | Facility: HOSPITAL | Age: 59
End: 2024-11-14
Payer: COMMERCIAL

## 2024-11-14 ENCOUNTER — OFFICE VISIT (OUTPATIENT)
Dept: INTERNAL MEDICINE | Facility: CLINIC | Age: 59
End: 2024-11-14
Payer: COMMERCIAL

## 2024-11-14 VITALS
HEIGHT: 65 IN | WEIGHT: 136.25 LBS | TEMPERATURE: 98 F | OXYGEN SATURATION: 98 % | SYSTOLIC BLOOD PRESSURE: 124 MMHG | BODY MASS INDEX: 22.7 KG/M2 | RESPIRATION RATE: 18 BRPM | HEART RATE: 78 BPM | DIASTOLIC BLOOD PRESSURE: 84 MMHG

## 2024-11-14 DIAGNOSIS — Z00.00 ENCOUNTER FOR ANNUAL HEALTH EXAMINATION: ICD-10-CM

## 2024-11-14 DIAGNOSIS — Z00.00 ENCOUNTER FOR ANNUAL HEALTH EXAMINATION: Primary | ICD-10-CM

## 2024-11-14 DIAGNOSIS — I10 HYPERTENSION, UNSPECIFIED TYPE: ICD-10-CM

## 2024-11-14 DIAGNOSIS — F41.9 ANXIETY: Chronic | ICD-10-CM

## 2024-11-14 PROBLEM — M25.673 DECREASED ROM OF ANKLE: Status: RESOLVED | Noted: 2023-09-10 | Resolved: 2024-11-14

## 2024-11-14 PROBLEM — M46.1 SACROILIITIS: Status: RESOLVED | Noted: 2022-09-30 | Resolved: 2024-11-14

## 2024-11-14 PROCEDURE — 3079F DIAST BP 80-89 MM HG: CPT | Mod: CPTII,S$GLB,, | Performed by: NURSE PRACTITIONER

## 2024-11-14 PROCEDURE — 80053 COMPREHEN METABOLIC PANEL: CPT | Performed by: NURSE PRACTITIONER

## 2024-11-14 PROCEDURE — 80061 LIPID PANEL: CPT | Performed by: NURSE PRACTITIONER

## 2024-11-14 PROCEDURE — 99396 PREV VISIT EST AGE 40-64: CPT | Mod: S$GLB,,, | Performed by: NURSE PRACTITIONER

## 2024-11-14 PROCEDURE — 36415 COLL VENOUS BLD VENIPUNCTURE: CPT | Mod: PO | Performed by: NURSE PRACTITIONER

## 2024-11-14 PROCEDURE — 3074F SYST BP LT 130 MM HG: CPT | Mod: CPTII,S$GLB,, | Performed by: NURSE PRACTITIONER

## 2024-11-14 PROCEDURE — 1160F RVW MEDS BY RX/DR IN RCRD: CPT | Mod: CPTII,S$GLB,, | Performed by: NURSE PRACTITIONER

## 2024-11-14 PROCEDURE — 84443 ASSAY THYROID STIM HORMONE: CPT | Performed by: NURSE PRACTITIONER

## 2024-11-14 PROCEDURE — 3008F BODY MASS INDEX DOCD: CPT | Mod: CPTII,S$GLB,, | Performed by: NURSE PRACTITIONER

## 2024-11-14 PROCEDURE — 1159F MED LIST DOCD IN RCRD: CPT | Mod: CPTII,S$GLB,, | Performed by: NURSE PRACTITIONER

## 2024-11-14 PROCEDURE — 99999 PR PBB SHADOW E&M-EST. PATIENT-LVL IV: CPT | Mod: PBBFAC,,, | Performed by: NURSE PRACTITIONER

## 2024-11-14 RX ORDER — LORAZEPAM 1 MG/1
TABLET ORAL
Qty: 60 TABLET | Refills: 0 | Status: SHIPPED | OUTPATIENT
Start: 2024-11-14

## 2024-11-14 RX ORDER — HYDROXYZINE HYDROCHLORIDE 10 MG/1
10 TABLET, FILM COATED ORAL 3 TIMES DAILY PRN
Qty: 90 TABLET | Refills: 1 | Status: SHIPPED | OUTPATIENT
Start: 2024-11-14

## 2024-11-14 RX ORDER — LORAZEPAM 1 MG/1
TABLET ORAL
Qty: 60 TABLET | Refills: 0 | Status: CANCELLED | OUTPATIENT
Start: 2024-11-14

## 2024-11-14 NOTE — PROGRESS NOTES
Subjective:       Patient ID: Twila Lynn is a 59 y.o. female.    Chief Complaint: Annual Wellness Visit    Twila Lnyn is a 59 y.o. female who presents today for an annual wellness visit.     Patient reports some pain still in her right foot - still in boot - otherwise denies acute concerns.   Is down to taking Ativan 1 mg for anxiety and sleep. Has hydroxyzine which she takes but it makes her feel hungover into the next day - has tried cutting in half but due to size is not successful.     Review of patient's allergies indicates:  No Known Allergies   Medication List with Changes/Refills   New Medications    HYDROXYZINE HCL (ATARAX) 10 MG TAB    Take 1 tablet (10 mg total) by mouth 3 (three) times daily as needed (Anxiety).   Current Medications    BIOTIN 5,000 MCG TBDL    Take 1 capsule by mouth once daily.    BUPROPION (WELLBUTRIN XL) 150 MG TB24 TABLET    Take 1 tablet (150 mg total) by mouth once daily.    BUPROPION (WELLBUTRIN XL) 300 MG 24 HR TABLET    Take 1 tablet (300 mg total) by mouth once daily.    CHOLECALCIFEROL, VITAMIN D3, (VITAMIN D3) 50 MCG (2,000 UNIT) TAB    Take 1 tablet by mouth once daily.    FLAXSEED OIL MISC    by Misc.(Non-Drug; Combo Route) route.    IBUPROFEN (ADVIL,MOTRIN) 600 MG TABLET    Take 1 tablet (600 mg total) by mouth every 8 (eight) hours as needed for Pain.    METOPROLOL SUCCINATE (TOPROL-XL) 25 MG 24 HR TABLET    TAKE 1 TABLET(25 MG) BY MOUTH EVERY DAY    MULTIVITAMIN (THERAGRAN) PER TABLET    Take 1 tablet by mouth once daily.    OXYCODONE-ACETAMINOPHEN (PERCOCET)  MG PER TABLET    Take 1 tablet by mouth every 8 (eight) hours as needed for Pain.   Changed and/or Refilled Medications    Modified Medication Previous Medication    LORAZEPAM (ATIVAN) 1 MG TABLET LORazepam (ATIVAN) 1 MG tablet       TAKE 1 TABLET(1 MG) BY MOUTH EVERY 12 HOURS AS NEEDED. Do not take within 6 hours of oxycodone.    TAKE 1 TABLET(1 MG) BY MOUTH EVERY 12 HOURS AS NEEDED. Do not take within 6  "hours of oxycodone.   Discontinued Medications    GABAPENTIN (NEURONTIN) 300 MG CAPSULE    Take 1 capsule (300 mg total) by mouth 3 (three) times daily.    HYDROXYZINE HCL (ATARAX) 25 MG TABLET    TAKE 1 TABLET(25 MG) BY MOUTH EVERY NIGHT AS NEEDED FOR ANXIETY    SULFAMETHOXAZOLE-TRIMETHOPRIM 400-80MG (BACTRIM,SEPTRA) 400-80 MG PER TABLET    Take 1 tablet by mouth 2 (two) times daily.    TIZANIDINE 2 MG CAP    Take by mouth.         Health Maintenance         Date Due Completion Date    Hepatitis C Screening Never done ---    HIV Screening Never done ---    Shingles Vaccine (1 of 2) Never done ---    COVID-19 Vaccine (3 - 2024-25 season) 09/01/2024 5/21/2021    Mammogram 12/12/2024 12/12/2023    TETANUS VACCINE 12/07/2027 12/7/2017    Lipid Panel 09/29/2028 9/29/2023    Colorectal Cancer Screening 12/11/2029 12/11/2019    RSV Vaccine (Age 60+ and Pregnant patients) (1 - 1-dose 75+ series) 07/21/2041 ---            Patient ambulates on her own without an assistive device.     On average, how many days per week do you do moderate to strenuous exercise:  (like a brisk walk or jog; this does not include your job/work)  0   On average, how many minutes do you exercise at this level each day? 0  We encourage you to start exercising if you do not already, continue at your current level or increase your level of activity.     Do you eat fruits and vegetable every day?  Yes    Do you still have a menstrual cycle? No       If not,  Hysterectomy      Have you often been bothered by feeling down, depressed, or hopeless?  Not at all       Review of Systems   Constitutional:  Negative for chills and fever.   Respiratory:  Negative for cough and shortness of breath.    Cardiovascular:  Negative for chest pain.   Musculoskeletal:  Positive for joint pain.   Neurological:  Negative for dizziness and headaches.     Objective:     /84 (BP Location: Left arm)   Pulse 78   Temp 98 °F (36.7 °C) (Temporal)   Resp 18   Ht 5' 5" " (1.651 m)   Wt 61.8 kg (136 lb 3.9 oz)   LMP 01/01/2015 (Approximate)   SpO2 98%   BMI 22.67 kg/m²     Physical Exam  Vitals reviewed.   Constitutional:       Appearance: Normal appearance.   HENT:      Head: Normocephalic and atraumatic.   Cardiovascular:      Rate and Rhythm: Normal rate and regular rhythm.      Heart sounds: Normal heart sounds. No murmur heard.  Pulmonary:      Effort: Pulmonary effort is normal.      Breath sounds: Normal breath sounds. No wheezing.   Skin:     General: Skin is warm and dry.   Neurological:      Mental Status: She is alert and oriented to person, place, and time.       BP Readings from Last 3 Encounters:   11/14/24 124/84   11/05/24 123/82   10/08/24 123/82     Wt Readings from Last 3 Encounters:   11/14/24 61.8 kg (136 lb 3.9 oz)   11/05/24 59 kg (130 lb 1.1 oz)   10/08/24 59 kg (130 lb 1.1 oz)       I reviewed and independently interpreted the labs and imaging below:  Last Labs:  Glucose   Date Value Ref Range Status   07/15/2024 101 70 - 110 mg/dL Final   12/13/2023 89 70 - 110 mg/dL Final     BUN   Date Value Ref Range Status   07/15/2024 14 6 - 20 mg/dL Final   12/13/2023 12 6 - 20 mg/dL Final     Creatinine   Date Value Ref Range Status   07/15/2024 0.8 0.5 - 1.4 mg/dL Final   12/13/2023 0.8 0.5 - 1.4 mg/dL Final     Sodium   Date Value Ref Range Status   07/15/2024 139 136 - 145 mmol/L Final     Potassium   Date Value Ref Range Status   07/15/2024 4.0 3.5 - 5.1 mmol/L Final     AST   Date Value Ref Range Status   07/15/2024 32 10 - 40 U/L Final     ALT   Date Value Ref Range Status   07/15/2024 31 10 - 44 U/L Final     eGFR if    Date Value Ref Range Status   08/27/2021 >60.0 >60 mL/min/1.73 m^2 Final     eGFR if non    Date Value Ref Range Status   08/27/2021 >60.0 >60 mL/min/1.73 m^2 Final     Comment:     Calculation used to obtain the estimated glomerular filtration  rate (eGFR) is the CKD-EPI equation.        Cholesterol   Date  Value Ref Range Status   09/29/2023 219 (H) 120 - 199 mg/dL Final     Comment:     The National Cholesterol Education Program (NCEP) has set the  following guidelines (reference ranges) for Cholesterol:  Optimal.....................<200 mg/dL  Borderline High.............200-239 mg/dL  High........................> or = 240 mg/dL     09/22/2022 218 (H) 120 - 199 mg/dL Final     Comment:     The National Cholesterol Education Program (NCEP) has set the  following guidelines (reference ranges) for Cholesterol:  Optimal.....................<200 mg/dL  Borderline High.............200-239 mg/dL  High........................> or = 240 mg/dL       Hemoglobin A1C   Date Value Ref Range Status   09/29/2023 5.1 4.0 - 5.6 % Final     Comment:     ADA Screening Guidelines:  5.7-6.4%  Consistent with prediabetes  >or=6.5%  Consistent with diabetes    High levels of fetal hemoglobin interfere with the HbA1C  assay. Heterozygous hemoglobin variants (HbS, HgC, etc)do  not significantly interfere with this assay.   However, presence of multiple variants may affect accuracy.     09/22/2022 5.4 4.0 - 5.6 % Final     Comment:     ADA Screening Guidelines:  5.7-6.4%  Consistent with prediabetes  >or=6.5%  Consistent with diabetes    High levels of fetal hemoglobin interfere with the HbA1C  assay. Heterozygous hemoglobin variants (HbS, HgC, etc)do  not significantly interfere with this assay.   However, presence of multiple variants may affect accuracy.       Lab Results   Component Value Date    WBC 5.60 07/15/2024    HGB 14.4 07/15/2024    HCT 44.1 07/15/2024     07/15/2024     Lab Results   Component Value Date    TSH 2.437 09/29/2023       Assessment and Plan:     1. Encounter for annual health examination (Primary)    --Nutrition: Discussed the importance of moderate caffeine intake. Adhering to a low sodium, low saturated fat/low cholesterol diet.   --Exercise: Discussed the importance of regular exercise. At least 30  minutes 5 days per week.   --Immunizations reviewed.  --Discussed benefits of maintaining up to date health maintenance.    - Lipid Panel; Future  - TSH; Future  - COMPREHENSIVE METABOLIC PANEL; Future    2. Hypertension, unspecified type    Chronic, stable, continue Metoprolol     3. Anxiety    Chronic, stable, continue current medications    - hydrOXYzine HCL (ATARAX) 10 MG Tab; Take 1 tablet (10 mg total) by mouth 3 (three) times daily as needed (Anxiety).  Dispense: 90 tablet; Refill: 1  - LORazepam (ATIVAN) 1 MG tablet; TAKE 1 TABLET(1 MG) BY MOUTH EVERY 12 HOURS AS NEEDED. Do not take within 6 hours of oxycodone.  Dispense: 60 tablet; Refill: 0

## 2024-11-15 LAB
ALBUMIN SERPL BCP-MCNC: 4.3 G/DL (ref 3.5–5.2)
ALP SERPL-CCNC: 64 U/L (ref 40–150)
ALT SERPL W/O P-5'-P-CCNC: 46 U/L (ref 10–44)
ANION GAP SERPL CALC-SCNC: 9 MMOL/L (ref 8–16)
AST SERPL-CCNC: 41 U/L (ref 10–40)
BILIRUB SERPL-MCNC: 0.6 MG/DL (ref 0.1–1)
BUN SERPL-MCNC: 15 MG/DL (ref 6–20)
CALCIUM SERPL-MCNC: 9.6 MG/DL (ref 8.7–10.5)
CHLORIDE SERPL-SCNC: 107 MMOL/L (ref 95–110)
CHOLEST SERPL-MCNC: 252 MG/DL (ref 120–199)
CHOLEST/HDLC SERPL: 2.5 {RATIO} (ref 2–5)
CO2 SERPL-SCNC: 22 MMOL/L (ref 23–29)
CREAT SERPL-MCNC: 0.9 MG/DL (ref 0.5–1.4)
EST. GFR  (NO RACE VARIABLE): >60 ML/MIN/1.73 M^2
GLUCOSE SERPL-MCNC: 100 MG/DL (ref 70–110)
HDLC SERPL-MCNC: 102 MG/DL (ref 40–75)
HDLC SERPL: 40.5 % (ref 20–50)
LDLC SERPL CALC-MCNC: 129.8 MG/DL (ref 63–159)
NONHDLC SERPL-MCNC: 150 MG/DL
POTASSIUM SERPL-SCNC: 3.9 MMOL/L (ref 3.5–5.1)
PROT SERPL-MCNC: 6.9 G/DL (ref 6–8.4)
SODIUM SERPL-SCNC: 138 MMOL/L (ref 136–145)
TRIGL SERPL-MCNC: 101 MG/DL (ref 30–150)
TSH SERPL DL<=0.005 MIU/L-ACNC: 1.07 UIU/ML (ref 0.4–4)

## 2024-11-19 ENCOUNTER — CLINICAL SUPPORT (OUTPATIENT)
Dept: REHABILITATION | Facility: HOSPITAL | Age: 59
End: 2024-11-19
Payer: COMMERCIAL

## 2024-11-19 DIAGNOSIS — M25.671 DECREASED RANGE OF MOTION OF RIGHT ANKLE: Primary | Chronic | ICD-10-CM

## 2024-11-19 PROCEDURE — 97110 THERAPEUTIC EXERCISES: CPT | Performed by: PHYSICAL THERAPIST

## 2024-11-19 PROCEDURE — 97140 MANUAL THERAPY 1/> REGIONS: CPT | Performed by: PHYSICAL THERAPIST

## 2024-11-19 NOTE — PROGRESS NOTES
Physical Therapy Daily Treatment Note     Name: Twila Case  Clinic Number: [id number]    Therapy Diagnosis:   Encounter Diagnosis   Name Primary?    Decreased range of motion of right ankle Yes     Physician: Estevan Queen DPM    Visit Date: 11/19/2024  Physician Orders: PT Eval and Treat   Medical Diagnosis from Referral: M76.61,M76.62 (ICD-10-CM) - Achilles tendinitis of both lower extremities  Evaluation Date: 10/29/2024  Authorization Period Expiration: 10-12-25  Plan of Care Expiration: 12-31-24  Visit # / Visits authorized: 5/ 20   FOTO: 5/5     Time In: 1:15 pm  Time Out: 2:10 pm  Total Billable Time: 40 minutes    Precautions: Standard    Subjective     Pt reports: R ankle is sore.  Wearing shoe today for first time since surgery.  She was compliant with home exercise program.  Response to previous treatment: less pain  Functional change: na    Pain: 2/10  Location: right ankle/foot     Objective     To PT wearing tennis shoe, mild limp noted.  Ankle AROM is WFL-WNL; 0 deg DF, 5 passive.  No swelling.  Mild TTP dorsal scar.    Olinda received therapeutic exercises to develop strength, ROM, flexibility, and core stabilization for 30 minutes including:  HEP review  Heelcord stretch on angle board 3 x 1 min  GTB ankle x 4 ways  Step ups with R foot on foam x 25 (all exercises)    Foam marching    Foam balance in SLS  Mini squats and wt shifts     GTB hip abduction in supine  GTB B clamshells  GTB bridges  Standing GTB hip abd and ext B  Bike x 5 min  NP    Olinda received the following manual therapy techniques: Joint mobilizations were applied to the: R ankle/foot for 10 minutes, including:  MOBS/PROM    Olinda received cold pack for 10 minutes to R ankle.      Home Exercises Provided and Patient Education Provided     Education provided:   - ice for pain    Written Home Exercises Provided: Patient instructed to cont prior HEP.  Exercises were reviewed and Olinda was able to demonstrate them prior to the end  of the session.  Olinda demonstrated good  understanding of the education provided.     See EMR under Media for exercises provided prior visit.    Assessment     Tolerated exercises well.  Sore dorsal foot due to shoe rubbing on incision.  Olinda Is progressing well towards her goals.   Pt prognosis is Good.     Pt will continue to benefit from skilled outpatient physical therapy to address the deficits listed in the problem list box on initial evaluation, provide pt/family education and to maximize pt's level of independence in the home and community environment.     Pt's spiritual, cultural and educational needs considered and pt agreeable to plan of care and goals.    Anticipated barriers to physical therapy: none    Goals:    Short Term Goals: 2 weeks         1.   Independent with HEP        2.  Pt will report decreased pain level of < 50% from above measure with ADL     Long Term Goals:   GOALS:    8_   weeks. Pt agrees with goals set.  Independent with HEP.  Report decreased    R ankle/foot    pain  <   / =  3/10 with ADL such as walking unassisted  Increased MMT  for  R LE to 5/5  with ADL such as stair climbing  Increased arom  for  R ankle to WNL with functional activities such walking or self-care       Plan     Continue PT per POC.    Korey Castrejon, PT

## 2024-11-19 NOTE — PROGRESS NOTES
Subjective:      Patient ID: Twila Case is a 59 y.o. female.    Chief Complaint:   Post-op Evaluation (Right foot )    Patient presents to post-op visit  for R calcaneal exostectomy.  Patient is healing well reports improvement to her R foot pain.  Has been compliant with wearing post-op shoe at all times.  Denies fevers, chills, nausea, or vomiting.  Denies any new pedal complaints.    Review of Systems   Constitutional: Negative for chills, decreased appetite, fever and malaise/fatigue.   HENT:  Negative for congestion, hearing loss, nosebleeds and tinnitus.    Eyes:  Negative for double vision, pain, photophobia and visual disturbance.   Cardiovascular:  Negative for chest pain, claudication, cyanosis and leg swelling.   Respiratory:  Negative for cough, hemoptysis, shortness of breath and wheezing.    Endocrine: Negative for cold intolerance and heat intolerance.   Hematologic/Lymphatic: Negative for adenopathy and bleeding problem.   Skin:  Negative for color change, dry skin, itching, nail changes and suspicious lesions.   Musculoskeletal:  Negative for arthritis, joint pain, myalgias and stiffness.   Gastrointestinal:  Negative for abdominal pain, jaundice, nausea and vomiting.   Genitourinary:  Negative for dysuria, frequency and hematuria.   Neurological:  Positive for numbness, paresthesias and sensory change. Negative for difficulty with concentration and loss of balance.   Psychiatric/Behavioral:  Negative for altered mental status, hallucinations and suicidal ideas. The patient is not nervous/anxious.    Allergic/Immunologic: Negative for environmental allergies and persistent infections.           Objective:      Physical Exam  Constitutional:       General: She is not in acute distress.     Appearance: Normal appearance.   Cardiovascular:      Rate and Rhythm: Normal rate and regular rhythm.   Pulmonary:      Effort: Pulmonary effort is normal.      Breath sounds: Normal breath sounds.   Abdominal:  "     Palpations: There is no mass.   Musculoskeletal:         General: No swelling, tenderness or deformity.      Comments: S/p R calcaneal exostectomy and Achilles reattachment.  Sutures intact without dehiscence.  No erythema, edema, or drainage noted.  No tenderness to palpation.   Skin:     General: Skin is warm.      Capillary Refill: Capillary refill takes less than 2 seconds.      Findings: No bruising or erythema.   Neurological:      Mental Status: She is alert.   Psychiatric:         Mood and Affect: Mood normal.           Assessment:       Encounter Diagnoses   Name Primary?    Nerve entrapment of lower limb, right Yes    Achilles tendinitis of both lower extremities      Independent visualization of imaging was performed.  Results were reviewed in detail with patient.       Plan:       Twila Martinez" was seen today for post-op evaluation.    Diagnoses and all orders for this visit:    Nerve entrapment of lower limb, right    Achilles tendinitis of both lower extremities      I counseled the patient on her conditions, their implications and medical management.    The nature of the condition, options for management, as well as potential risks and complications were discussed in detail with patient. Patient was amenable to my recommendations and left my office fully informed and will follow up as instructed or sooner if necessary.          RTC 4 weeks                    "

## 2024-11-21 ENCOUNTER — CLINICAL SUPPORT (OUTPATIENT)
Dept: REHABILITATION | Facility: HOSPITAL | Age: 59
End: 2024-11-21
Payer: COMMERCIAL

## 2024-11-21 DIAGNOSIS — M25.671 DECREASED RANGE OF MOTION OF RIGHT ANKLE: Primary | Chronic | ICD-10-CM

## 2024-11-21 PROCEDURE — 97110 THERAPEUTIC EXERCISES: CPT | Performed by: PHYSICAL THERAPIST

## 2024-11-21 PROCEDURE — 97140 MANUAL THERAPY 1/> REGIONS: CPT | Performed by: PHYSICAL THERAPIST

## 2024-11-21 NOTE — PROGRESS NOTES
Physical Therapy Daily Treatment Note     Name: Twila Case  Clinic Number: [id number]    Therapy Diagnosis:   Encounter Diagnosis   Name Primary?    Decreased range of motion of right ankle Yes     Physician: Estevan Queen DPM    Visit Date: 11/21/2024  Physician Orders: PT Eval and Treat   Medical Diagnosis from Referral: M76.61,M76.62 (ICD-10-CM) - Achilles tendinitis of both lower extremities  Evaluation Date: 10/29/2024  Authorization Period Expiration: 10-12-25  Plan of Care Expiration: 12-31-24  Visit # / Visits authorized: 6/ 20   FOTO: 5/5     Time In: 1:25 pm  Time Out: 2:10 pm  Total Billable Time: 35 minutes    Precautions: Standard    Subjective     Pt reports: R ankle is sore.    She was compliant with home exercise program.  Response to previous treatment: less pain  Functional change: na    Pain: 2/10  Location: right ankle/foot     Objective     To PT wearing tennis shoe, MIN limp noted.  Ankle AROM is WFL-WNL; 0 deg DF, 5 passive.  No swelling.  Mild TTP dorsal scar.    Olinda received therapeutic exercises to develop strength, ROM, flexibility, and core stabilization for 25 minutes including:  HEP review  Heelcord stretch on angle board 3 x 1 min  GTB ankle x 4 ways  Step ups with R foot on foam x 25 (all exercises)    Foam marching    Foam balance in SLS  Mini squats and wt shifts     GTB hip abduction in supine  GTB B clamshells  GTB bridges  Standing GTB hip abd and ext B  Bike x 5 min  NP    Olinda received the following manual therapy techniques: Joint mobilizations were applied to the: R ankle/foot for 10 minutes, including:  MOBS/PROM    Olinda received cold pack for 10 minutes to R ankle.      Home Exercises Provided and Patient Education Provided     Education provided:   - ice for pain    Written Home Exercises Provided: Patient instructed to cont prior HEP.  Exercises were reviewed and Olinda was able to demonstrate them prior to the end of the session.  Olinda demonstrated good   understanding of the education provided.     See EMR under Media for exercises provided prior visit.    Assessment     Tolerated exercises well.    Olinda Is progressing well towards her goals.   Pt prognosis is Good.     Pt will continue to benefit from skilled outpatient physical therapy to address the deficits listed in the problem list box on initial evaluation, provide pt/family education and to maximize pt's level of independence in the home and community environment.     Pt's spiritual, cultural and educational needs considered and pt agreeable to plan of care and goals.    Anticipated barriers to physical therapy: none    Goals:    Short Term Goals: 2 weeks         1.   Independent with HEP        2.  Pt will report decreased pain level of < 50% from above measure with ADL     Long Term Goals:   GOALS:    8_   weeks. Pt agrees with goals set.  Independent with HEP.  Report decreased    R ankle/foot    pain  <   / =  3/10 with ADL such as walking unassisted  Increased MMT  for  R LE to 5/5  with ADL such as stair climbing  Increased arom  for  R ankle to WNL with functional activities such walking or self-care       Plan     Continue PT per POC.    Korey Castrejon, PT

## 2024-11-26 ENCOUNTER — PATIENT MESSAGE (OUTPATIENT)
Dept: INTERNAL MEDICINE | Facility: CLINIC | Age: 59
End: 2024-11-26
Payer: COMMERCIAL

## 2024-11-26 ENCOUNTER — PATIENT MESSAGE (OUTPATIENT)
Dept: PODIATRY | Facility: CLINIC | Age: 59
End: 2024-11-26
Payer: COMMERCIAL

## 2024-11-26 DIAGNOSIS — G57.81 NERVE ENTRAPMENT OF LOWER LIMB, RIGHT: ICD-10-CM

## 2024-11-26 DIAGNOSIS — R79.89 ELEVATED LFTS: Primary | ICD-10-CM

## 2024-11-26 RX ORDER — OXYCODONE AND ACETAMINOPHEN 10; 325 MG/1; MG/1
1 TABLET ORAL EVERY 8 HOURS PRN
Qty: 30 TABLET | Refills: 0 | Status: SHIPPED | OUTPATIENT
Start: 2024-11-26

## 2024-12-03 ENCOUNTER — CLINICAL SUPPORT (OUTPATIENT)
Dept: REHABILITATION | Facility: HOSPITAL | Age: 59
End: 2024-12-03
Payer: COMMERCIAL

## 2024-12-03 DIAGNOSIS — M25.671 DECREASED RANGE OF MOTION OF RIGHT ANKLE: Primary | Chronic | ICD-10-CM

## 2024-12-03 PROCEDURE — 97110 THERAPEUTIC EXERCISES: CPT | Performed by: PHYSICAL THERAPIST

## 2024-12-03 PROCEDURE — 97140 MANUAL THERAPY 1/> REGIONS: CPT | Performed by: PHYSICAL THERAPIST

## 2024-12-03 NOTE — PROGRESS NOTES
Physical Therapy Daily Treatment Note     Name: Twila Case  Clinic Number: [id number]    Therapy Diagnosis:   Encounter Diagnosis   Name Primary?    Decreased range of motion of right ankle Yes     Physician: No ref. provider found    Visit Date: 12/3/2024  Physician Orders: PT Eval and Treat   Medical Diagnosis from Referral: M76.61,M76.62 (ICD-10-CM) - Achilles tendinitis of both lower extremities  Evaluation Date: 10/29/2024  Authorization Period Expiration: 10-12-25  Plan of Care Expiration: 12-31-24  Visit # / Visits authorized: 7/ 20   FOTO: 5/5     Time In: 1:00 pm  Time Out: 1:50 pm  Total Billable Time: 40 minutes    Precautions: Standard    Subjective     Pt reports: R achilles insertion is sore at night.    She was compliant with home exercise program.  Response to previous treatment: less pain  Functional change: na    Pain: 2/10  Location: right ankle/foot     Objective     To PT wearing tennis shoe, MIN limp noted.  Ankle AROM is WFL-WNL; 0 deg DF, 5 passive.  No swelling.  Mild TTP dorsal scar.    Olinda received therapeutic exercises to develop strength, ROM, flexibility, and core stabilization for 25 minutes including:  HEP review  Heelcord stretch on angle board 3 x 1 min  GTB ankle x 4 ways  Step ups with R foot on foam x 25 (all exercises)    Foam marching    Foam balance in SLS  Mini squats and wt shifts     GTB hip abduction in supine  GTB B clamshells  GTB bridges  Standing GTB hip abd and ext B  Bike x 5 min  NP    Olinda received the following manual therapy techniques: Joint mobilizations were applied to the: R ankle/foot for 15 minutes, including:  MOBS/PROM  De-sensitization posterior scar    Olinda received cold pack for 10 minutes to R ankle.      Home Exercises Provided and Patient Education Provided     Education provided:   - ice for pain    Written Home Exercises Provided: Patient instructed to cont prior HEP.  Exercises were reviewed and Olinda was able to demonstrate them prior to the  end of the session.  Olinda demonstrated good  understanding of the education provided.     See EMR under Media for exercises provided prior visit.    Assessment     Tolerated exercises well.    Olinda Is progressing well towards her goals.   Pt prognosis is Good.     Pt will continue to benefit from skilled outpatient physical therapy to address the deficits listed in the problem list box on initial evaluation, provide pt/family education and to maximize pt's level of independence in the home and community environment.     Pt's spiritual, cultural and educational needs considered and pt agreeable to plan of care and goals.    Anticipated barriers to physical therapy: none    Goals:    Short Term Goals: 2 weeks         1.   Independent with HEP        2.  Pt will report decreased pain level of < 50% from above measure with ADL     Long Term Goals:   GOALS:    8_   weeks. Pt agrees with goals set.  Independent with HEP.  Report decreased    R ankle/foot    pain  <   / =  3/10 with ADL such as walking unassisted  Increased MMT  for  R LE to 5/5  with ADL such as stair climbing  Increased arom  for  R ankle to WNL with functional activities such walking or self-care       Plan     Continue PT per POC.    Korey Castrejon, PT

## 2024-12-04 NOTE — OPERATIVE NOTE ADDENDUM
Certification of Assistant at Surgery       Surgery Date: 10/20/2020     Participating Surgeons:  Surgeon(s) and Role:     * Danie Burns MD - Primary     * Jacques Patel MD - Resident - Assisting    Procedures:  Procedure(s) (LRB):  XI ROBOTIC HYSTERECTOMY (N/A)  XI ROBOTIC SALPINGO-OOPHORECTOMY (Bilateral)    Assistant Surgeon's Certification of Necessity:  I understand that section 1842 (b) (6) (d) of the Social Security Act generally prohibits Medicare Part B reasonable charge payment for the services of assistants at surgery in [facility] when qualified residents are available to furnish such services. I certify that the services for which payment is claimed were medically necessary, and that no qualified resident was available to perform the services. I further understand that these services are subject to post-payment review by the Medicare carrier.      Sommer Mcginnis NP    10/20/2020  8:51 AM

## 2024-12-05 ENCOUNTER — CLINICAL SUPPORT (OUTPATIENT)
Dept: REHABILITATION | Facility: HOSPITAL | Age: 59
End: 2024-12-05
Payer: COMMERCIAL

## 2024-12-05 DIAGNOSIS — M25.671 DECREASED RANGE OF MOTION OF RIGHT ANKLE: Primary | Chronic | ICD-10-CM

## 2024-12-05 PROCEDURE — 97110 THERAPEUTIC EXERCISES: CPT | Performed by: PHYSICAL THERAPIST

## 2024-12-05 PROCEDURE — 97140 MANUAL THERAPY 1/> REGIONS: CPT | Performed by: PHYSICAL THERAPIST

## 2024-12-05 NOTE — PROGRESS NOTES
Physical Therapy Daily Treatment Note     Name: Twila Case  Clinic Number: [id number]    Therapy Diagnosis:   Encounter Diagnosis   Name Primary?    Decreased range of motion of right ankle Yes     Physician: Estevan Queen DPM    Visit Date: 12/5/2024  Physician Orders: PT Eval and Treat   Medical Diagnosis from Referral: M76.61,M76.62 (ICD-10-CM) - Achilles tendinitis of both lower extremities  Evaluation Date: 10/29/2024  Authorization Period Expiration: 10-12-25  Plan of Care Expiration: 12-31-24  Visit # / Visits authorized: 8/ 20   FOTO: 5/5     Time In: 1:15 pm  Time Out: 1:55 pm  Total Billable Time: 40 minutes    Precautions: Standard    Subjective     Pt reports: R achilles insertion is still sore.    She was compliant with home exercise program.  Response to previous treatment: same  Functional change: na    Pain: 3/10  Location: right ankle/foot     Objective     To PT wearing tennis shoe, MIN limp noted.  Ankle AROM is WFL-WNL; 0 deg DF, 5 passive.  No swelling.  Mild TTP dorsal scar.    Olinda received therapeutic exercises to develop strength, ROM, flexibility, and core stabilization for 25 minutes including:  HEP review  Heelcord stretch on angle board 3 x 1 min  GTB ankle x 4 ways  Step ups with R foot on foam x 25 (all exercises)    Foam marching    Foam balance in SLS  Mini squats and wt shifts     GTB hip abduction in supine  GTB B clamshells  GTB bridges  Standing GTB hip abd and ext B  Bike x 5 min  NP    Olinda received the following manual therapy techniques: Joint mobilizations were applied to the: R ankle/foot for 15 minutes, including:  MOBS/PROM  De-sensitization posterior scar    Olinda received cold pack for 10 minutes to R ankle.      Home Exercises Provided and Patient Education Provided     Education provided:   - ice for pain    Written Home Exercises Provided: Patient instructed to cont prior HEP.  Exercises were reviewed and Olinda was able to demonstrate them prior to the end of  the session.  Olinda demonstrated good  understanding of the education provided.     See EMR under Media for exercises provided prior visit.    Assessment     Tolerated exercises well.  Less scar sensitivity.  Olinda Is progressing well towards her goals.   Pt prognosis is Good.     Pt will continue to benefit from skilled outpatient physical therapy to address the deficits listed in the problem list box on initial evaluation, provide pt/family education and to maximize pt's level of independence in the home and community environment.     Pt's spiritual, cultural and educational needs considered and pt agreeable to plan of care and goals.    Anticipated barriers to physical therapy: none    Goals:    Short Term Goals: 2 weeks         1.   Independent with HEP        2.  Pt will report decreased pain level of < 50% from above measure with ADL     Long Term Goals:   GOALS:    8_   weeks. Pt agrees with goals set.  Independent with HEP.  Report decreased    R ankle/foot    pain  <   / =  3/10 with ADL such as walking unassisted  Increased MMT  for  R LE to 5/5  with ADL such as stair climbing  Increased arom  for  R ankle to WNL with functional activities such walking or self-care       Plan     Continue PT per POC.    Korey Castrejon, PT

## 2024-12-10 ENCOUNTER — CLINICAL SUPPORT (OUTPATIENT)
Dept: REHABILITATION | Facility: HOSPITAL | Age: 59
End: 2024-12-10
Payer: COMMERCIAL

## 2024-12-10 DIAGNOSIS — G57.81 NERVE ENTRAPMENT OF LOWER LIMB, RIGHT: ICD-10-CM

## 2024-12-10 DIAGNOSIS — M25.671 DECREASED RANGE OF MOTION OF RIGHT ANKLE: Primary | Chronic | ICD-10-CM

## 2024-12-10 PROCEDURE — 97140 MANUAL THERAPY 1/> REGIONS: CPT | Performed by: PHYSICAL THERAPIST

## 2024-12-10 PROCEDURE — 97110 THERAPEUTIC EXERCISES: CPT | Performed by: PHYSICAL THERAPIST

## 2024-12-10 RX ORDER — OXYCODONE AND ACETAMINOPHEN 10; 325 MG/1; MG/1
1 TABLET ORAL EVERY 8 HOURS PRN
Qty: 30 TABLET | Refills: 0 | Status: SHIPPED | OUTPATIENT
Start: 2024-12-10

## 2024-12-10 RX ORDER — OXYCODONE AND ACETAMINOPHEN 10; 325 MG/1; MG/1
1 TABLET ORAL EVERY 8 HOURS PRN
Qty: 30 TABLET | Refills: 0 | Status: CANCELLED | OUTPATIENT
Start: 2024-12-10

## 2024-12-10 NOTE — PROGRESS NOTES
Physical Therapy Daily Treatment Note     Name: Twila Case  Clinic Number: [id number]    Therapy Diagnosis:   Encounter Diagnosis   Name Primary?    Decreased range of motion of right ankle Yes     Physician: Estevan Queen DPM    Visit Date: 12/10/2024  Physician Orders: PT Eval and Treat   Medical Diagnosis from Referral: M76.61,M76.62 (ICD-10-CM) - Achilles tendinitis of both lower extremities  Evaluation Date: 10/29/2024  Authorization Period Expiration: 10-12-25  Plan of Care Expiration: 12-31-24  Visit # / Visits authorized: 9/ 20   FOTO: 5/5     Time In: 3:10 pm  Time Out: 4:00 pm  Total Billable Time: 40 minutes    Precautions: Standard    Subjective     Pt reports: R achilles insertion is better today.    She was compliant with home exercise program.  Response to previous treatment: less pain  Functional change: na    Pain: 2/10  Location: right ankle/foot     Objective     To PT wearing tennis shoe, MIN limp noted.  Ankle AROM is WFL-WNL; 0 deg DF, 5 passive.  No swelling.  MIN TTP dorsal scar.    Olinda received therapeutic exercises to develop strength, ROM, flexibility, and core stabilization for 25 minutes including:  HEP review  Heelcord stretch on angle board 3 x 1 min  GTB ankle x 4 ways  Step ups with R foot on foam x 25 (all exercises)    Foam marching    Foam balance in SLS  Mini squats and wt shifts     GTB hip abduction in supine  GTB B clamshells  GTB bridges  Standing GTB hip abd and ext B  Bike x 5 min  NP  Shuttle toe raises with 2 bands    Olinda received the following manual therapy techniques: Joint mobilizations were applied to the: R ankle/foot for 15 minutes, including:  MOBS/PROM  De-sensitization posterior scar    Olinda received cold pack for 10 minutes to R ankle.      Home Exercises Provided and Patient Education Provided     Education provided:   - ice for pain    Written Home Exercises Provided: Patient instructed to cont prior HEP.  Exercises were reviewed and Olinda was able  to demonstrate them prior to the end of the session.  Olinda demonstrated good  understanding of the education provided.     See EMR under Media for exercises provided prior visit.    Assessment     Tolerated exercises well.  Less scar sensitivity.  Olinda Is progressing well towards her goals.   Pt prognosis is Good.     Pt will continue to benefit from skilled outpatient physical therapy to address the deficits listed in the problem list box on initial evaluation, provide pt/family education and to maximize pt's level of independence in the home and community environment.     Pt's spiritual, cultural and educational needs considered and pt agreeable to plan of care and goals.    Anticipated barriers to physical therapy: none    Goals:    Short Term Goals: 2 weeks         1.   Independent with HEP        2.  Pt will report decreased pain level of < 50% from above measure with ADL     Long Term Goals:   GOALS:    8_   weeks. Pt agrees with goals set.  Independent with HEP.  Report decreased    R ankle/foot    pain  <   / =  3/10 with ADL such as walking unassisted  Increased MMT  for  R LE to 5/5  with ADL such as stair climbing  Increased arom  for  R ankle to WNL with functional activities such walking or self-care       Plan     Continue PT per POC.    Korey Castrejon, PT

## 2024-12-12 ENCOUNTER — CLINICAL SUPPORT (OUTPATIENT)
Dept: REHABILITATION | Facility: HOSPITAL | Age: 59
End: 2024-12-12
Payer: COMMERCIAL

## 2024-12-12 DIAGNOSIS — M25.671 DECREASED RANGE OF MOTION OF RIGHT ANKLE: Primary | Chronic | ICD-10-CM

## 2024-12-12 PROCEDURE — 97110 THERAPEUTIC EXERCISES: CPT | Performed by: PHYSICAL THERAPIST

## 2024-12-12 PROCEDURE — 97140 MANUAL THERAPY 1/> REGIONS: CPT | Performed by: PHYSICAL THERAPIST

## 2024-12-12 NOTE — PROGRESS NOTES
Physical Therapy Daily Treatment Note     Name: Twila Case  Clinic Number: [id number]    Therapy Diagnosis:   Encounter Diagnosis   Name Primary?    Decreased range of motion of right ankle Yes     Physician: Estevan Queen DPM    Visit Date: 12/12/2024  Physician Orders: PT Eval and Treat   Medical Diagnosis from Referral: M76.61,M76.62 (ICD-10-CM) - Achilles tendinitis of both lower extremities  Evaluation Date: 10/29/2024  Authorization Period Expiration: 10-12-25  Plan of Care Expiration: 12-31-24  Visit # / Visits authorized: 10/ 20   FOTO: 5/5     Time In: 1:15 pm  Time Out: 2:10 pm  Total Billable Time: 45 minutes    Precautions: Standard    Subjective     Pt reports: R achilles insertion is sore after walking a lot yesterday.    She was compliant with home exercise program.  Response to previous treatment: less pain  Functional change: na    Pain: 4/10  Location: right ankle/foot     Objective     MIN limp noted with gait.  Ankle AROM is WFL-WNL; 0 deg DF, 5 passive.  No swelling.  Incisions well healed.    Olinda received therapeutic exercises to develop strength, ROM, flexibility, and core stabilization for 30 minutes including:  HEP review  Heelcord stretch on angle board 3 x 1 min  GTB ankle x 4 ways  Step ups with R foot on foam x 25 (all exercises)    Foam marching    Foam balance in SLS  Foam lateral step ups  Mini squats and wt shifts     GTB hip abduction in supine  GTB B clamshells  GTB bridges  Standing GTB hip abd and ext B  NuStep x 5 min    Shuttle toe raises with 2.5 bands    Olinda received the following manual therapy techniques: Joint mobilizations were applied to the: R ankle/foot for 15 minutes, including:  MOBS/PROM  De-sensitization posterior scar    Olinda received cold pack for 10 minutes to R ankle.      Home Exercises Provided and Patient Education Provided     Education provided:   - ice for pain    Written Home Exercises Provided: Patient instructed to cont prior HEP.  Exercises  were reviewed and Olinda was able to demonstrate them prior to the end of the session.  Olinda demonstrated good  understanding of the education provided.     See EMR under Media for exercises provided prior visit.    Assessment     Tolerated exercises well.  Mild pain noted with exericses.  Olinda Is progressing well towards her goals.   Pt prognosis is Good.     Pt will continue to benefit from skilled outpatient physical therapy to address the deficits listed in the problem list box on initial evaluation, provide pt/family education and to maximize pt's level of independence in the home and community environment.     Pt's spiritual, cultural and educational needs considered and pt agreeable to plan of care and goals.    Anticipated barriers to physical therapy: none    Goals:    Short Term Goals: 2 weeks         1.   Independent with HEP        2.  Pt will report decreased pain level of < 50% from above measure with ADL     Long Term Goals:   GOALS:    8_   weeks. Pt agrees with goals set.  Independent with HEP.  Report decreased    R ankle/foot    pain  <   / =  3/10 with ADL such as walking unassisted  Increased MMT  for  R LE to 5/5  with ADL such as stair climbing  Increased arom  for  R ankle to WNL with functional activities such walking or self-care       Plan     Continue PT per POC.    Korey Castrejon, PT

## 2024-12-13 ENCOUNTER — HOSPITAL ENCOUNTER (OUTPATIENT)
Dept: RADIOLOGY | Facility: HOSPITAL | Age: 59
Discharge: HOME | End: 2024-12-13
Attending: INTERNAL MEDICINE
Payer: COMMERCIAL

## 2024-12-13 DIAGNOSIS — Z12.31 ENCOUNTER FOR SCREENING MAMMOGRAM FOR BREAST CANCER: ICD-10-CM

## 2024-12-13 PROCEDURE — 77063 BREAST TOMOSYNTHESIS BI: CPT | Mod: TC,PO

## 2024-12-13 PROCEDURE — 77067 SCR MAMMO BI INCL CAD: CPT | Mod: 26,,, | Performed by: RADIOLOGY

## 2024-12-13 PROCEDURE — 77063 BREAST TOMOSYNTHESIS BI: CPT | Mod: 26,,, | Performed by: RADIOLOGY

## 2024-12-17 ENCOUNTER — CLINICAL SUPPORT (OUTPATIENT)
Dept: REHABILITATION | Facility: HOSPITAL | Age: 59
End: 2024-12-17
Payer: COMMERCIAL

## 2024-12-17 DIAGNOSIS — M25.671 DECREASED RANGE OF MOTION OF RIGHT ANKLE: Primary | Chronic | ICD-10-CM

## 2024-12-17 PROCEDURE — 97110 THERAPEUTIC EXERCISES: CPT | Performed by: PHYSICAL THERAPIST

## 2024-12-17 PROCEDURE — 97140 MANUAL THERAPY 1/> REGIONS: CPT | Performed by: PHYSICAL THERAPIST

## 2024-12-17 NOTE — PROGRESS NOTES
Physical Therapy Daily Treatment Note     Name: Twila Case  Clinic Number: [id number]    Therapy Diagnosis:   Encounter Diagnosis   Name Primary?    Decreased range of motion of right ankle Yes     Physician: Estevan Queen DPM    Visit Date: 12/17/2024  Physician Orders: PT Eval and Treat   Medical Diagnosis from Referral: M76.61,M76.62 (ICD-10-CM) - Achilles tendinitis of both lower extremities  Evaluation Date: 10/29/2024  Authorization Period Expiration: 10-12-25  Plan of Care Expiration: 12-31-24  Visit # / Visits authorized: 11/ 20   FOTO: 5/5     Time In: 1:10 pm  Time Out: 1:50 pm  Total Billable Time: 40 minutes    Precautions: Standard    Subjective     Pt reports: R achilles insertion feels better, most pain dorsal foot.    She was compliant with home exercise program.  Response to previous treatment: less pain  Functional change: improved ADL    Pain: 1/10  Location: right ankle/foot     Objective     No limp noted with gait.  Ankle AROM is 5 deg DF, 10 passive, others WNL.  Gross strength is 5/5.  No swelling.  Incisions well healed.    Olinda received therapeutic exercises to develop strength, ROM, flexibility, and core stabilization for 30 minutes including:  HEP review  Heelcord stretch on angle board 3 x 1 min  GTB ankle x 4 ways  Step ups with R foot on foam x 25 (all exercises)    Foam marching    Foam balance in SLS  Foam lateral step ups  Mini squats and wt shifts     GTB hip abduction in supine  GTB B clamshells  GTB bridges  Standing GTB hip abd and ext B  NuStep x 5 min    Shuttle toe raises with 2.5 bands    Olinda received the following manual therapy techniques: Joint mobilizations were applied to the: R ankle/foot for 15 minutes, including:  MOBS/PROM  De-sensitization posterior scar    Olinda received cold pack for 0 minutes to R ankle.      Home Exercises Provided and Patient Education Provided     Education provided:   - ice for pain    Written Home Exercises Provided: Patient  instructed to cont prior HEP.  Exercises were reviewed and Olinda was able to demonstrate them prior to the end of the session.  Olinda demonstrated good  understanding of the education provided.     See EMR under Media for exercises provided prior visit.    Assessment     Tolerated exercises well.  Functioning well.       Plan     D/C PT with a HEP secondary pt meeting all goals.    Korey Castrejon, PT

## 2024-12-23 DIAGNOSIS — G57.81 NERVE ENTRAPMENT OF LOWER LIMB, RIGHT: ICD-10-CM

## 2024-12-23 RX ORDER — OXYCODONE AND ACETAMINOPHEN 10; 325 MG/1; MG/1
1 TABLET ORAL EVERY 8 HOURS PRN
Qty: 30 TABLET | Refills: 0 | Status: SHIPPED | OUTPATIENT
Start: 2024-12-23

## 2024-12-23 RX ORDER — OXYCODONE AND ACETAMINOPHEN 10; 325 MG/1; MG/1
1 TABLET ORAL EVERY 8 HOURS PRN
Qty: 30 TABLET | Refills: 0 | Status: CANCELLED | OUTPATIENT
Start: 2024-12-23

## 2024-12-28 DIAGNOSIS — F41.9 ANXIETY: Chronic | ICD-10-CM

## 2024-12-30 RX ORDER — IBUPROFEN 600 MG/1
600 TABLET ORAL EVERY 8 HOURS PRN
Qty: 30 TABLET | Refills: 1 | Status: SHIPPED | OUTPATIENT
Start: 2024-12-30

## 2024-12-30 RX ORDER — LORAZEPAM 1 MG/1
TABLET ORAL
Qty: 60 TABLET | Refills: 0 | Status: SHIPPED | OUTPATIENT
Start: 2024-12-30

## 2024-12-31 DIAGNOSIS — G57.81 NERVE ENTRAPMENT OF LOWER LIMB, RIGHT: ICD-10-CM

## 2024-12-31 RX ORDER — HYDROXYZINE HYDROCHLORIDE 25 MG/1
TABLET, FILM COATED ORAL
Qty: 90 TABLET | Refills: 1 | OUTPATIENT
Start: 2024-12-31

## 2024-12-31 RX ORDER — OXYCODONE AND ACETAMINOPHEN 10; 325 MG/1; MG/1
1 TABLET ORAL EVERY 8 HOURS PRN
Qty: 30 TABLET | Refills: 0 | Status: CANCELLED | OUTPATIENT
Start: 2024-12-31

## 2024-12-31 NOTE — TELEPHONE ENCOUNTER
Refill Decision Note   Twila Lynn  is requesting a refill authorization.  Brief Assessment and Rationale for Refill:  Quick Discontinue     Medication Therapy Plan:  discontinued on 11/14/2024 by Pippa Knox NP      Comments:     Note composed:8:51 AM 12/31/2024             Appointments     Last Visit   4/30/2024 Ranjan Jaramillo MD   Next Visit   Visit date not found Ranjan Jaramillo MD

## 2025-01-01 DIAGNOSIS — G57.81 NERVE ENTRAPMENT OF LOWER LIMB, RIGHT: ICD-10-CM

## 2025-01-02 RX ORDER — OXYCODONE AND ACETAMINOPHEN 10; 325 MG/1; MG/1
1 TABLET ORAL EVERY 8 HOURS PRN
Qty: 30 TABLET | Refills: 0 | Status: SHIPPED | OUTPATIENT
Start: 2025-01-02

## 2025-01-15 DIAGNOSIS — G57.81 NERVE ENTRAPMENT OF LOWER LIMB, RIGHT: ICD-10-CM

## 2025-01-15 RX ORDER — OXYCODONE AND ACETAMINOPHEN 10; 325 MG/1; MG/1
1 TABLET ORAL EVERY 8 HOURS PRN
Qty: 30 TABLET | Refills: 0 | Status: CANCELLED | OUTPATIENT
Start: 2025-01-15

## 2025-01-15 RX ORDER — OXYCODONE AND ACETAMINOPHEN 10; 325 MG/1; MG/1
1 TABLET ORAL EVERY 8 HOURS PRN
Qty: 30 TABLET | Refills: 0 | Status: SHIPPED | OUTPATIENT
Start: 2025-01-15 | End: 2025-01-28 | Stop reason: SDUPTHER

## 2025-01-22 ENCOUNTER — TELEPHONE (OUTPATIENT)
Dept: OPHTHALMOLOGY | Facility: CLINIC | Age: 60
End: 2025-01-22
Payer: COMMERCIAL

## 2025-01-22 NOTE — TELEPHONE ENCOUNTER
Left message stating clinic closed 1/23 and to call back to reschedule or can reschedule via [employer].

## 2025-01-27 DIAGNOSIS — G57.81 NERVE ENTRAPMENT OF LOWER LIMB, RIGHT: ICD-10-CM

## 2025-01-27 RX ORDER — OXYCODONE AND ACETAMINOPHEN 10; 325 MG/1; MG/1
1 TABLET ORAL EVERY 8 HOURS PRN
Qty: 30 TABLET | Refills: 0 | OUTPATIENT
Start: 2025-01-27

## 2025-01-28 DIAGNOSIS — G57.81 NERVE ENTRAPMENT OF LOWER LIMB, RIGHT: ICD-10-CM

## 2025-01-28 RX ORDER — OXYCODONE AND ACETAMINOPHEN 10; 325 MG/1; MG/1
1 TABLET ORAL EVERY 8 HOURS PRN
Qty: 30 TABLET | Refills: 0 | OUTPATIENT
Start: 2025-01-28

## 2025-01-28 RX ORDER — OXYCODONE AND ACETAMINOPHEN 10; 325 MG/1; MG/1
1 TABLET ORAL EVERY 8 HOURS PRN
Qty: 30 TABLET | Refills: 0 | Status: SHIPPED | OUTPATIENT
Start: 2025-01-28

## 2025-01-29 ENCOUNTER — OFFICE VISIT (OUTPATIENT)
Dept: URGENT CARE | Facility: CLINIC | Age: 60
End: 2025-01-29
Payer: COMMERCIAL

## 2025-01-29 VITALS
DIASTOLIC BLOOD PRESSURE: 88 MMHG | SYSTOLIC BLOOD PRESSURE: 126 MMHG | TEMPERATURE: 99 F | RESPIRATION RATE: 16 BRPM | HEART RATE: 72 BPM | WEIGHT: 136.25 LBS | OXYGEN SATURATION: 98 % | HEIGHT: 65 IN | BODY MASS INDEX: 22.7 KG/M2

## 2025-01-29 DIAGNOSIS — R05.9 COUGH, UNSPECIFIED TYPE: Primary | ICD-10-CM

## 2025-01-29 DIAGNOSIS — J18.9 ATYPICAL PNEUMONIA: ICD-10-CM

## 2025-01-29 DIAGNOSIS — R07.81 PLEURITIC CHEST PAIN: ICD-10-CM

## 2025-01-29 DIAGNOSIS — R50.9 FEVER, UNSPECIFIED FEVER CAUSE: ICD-10-CM

## 2025-01-29 LAB
CTP QC/QA: YES
CTP QC/QA: YES
POC MOLECULAR INFLUENZA A AGN: NEGATIVE
POC MOLECULAR INFLUENZA B AGN: NEGATIVE
SARS-COV-2 AG RESP QL IA.RAPID: NEGATIVE

## 2025-01-29 PROCEDURE — 71046 X-RAY EXAM CHEST 2 VIEWS: CPT | Mod: FY,S$GLB,, | Performed by: RADIOLOGY

## 2025-01-29 PROCEDURE — 87811 SARS-COV-2 COVID19 W/OPTIC: CPT | Mod: QW,S$GLB,, | Performed by: FAMILY MEDICINE

## 2025-01-29 PROCEDURE — 99214 OFFICE O/P EST MOD 30 MIN: CPT | Mod: S$GLB,,, | Performed by: FAMILY MEDICINE

## 2025-01-29 PROCEDURE — 87502 INFLUENZA DNA AMP PROBE: CPT | Mod: QW,S$GLB,, | Performed by: FAMILY MEDICINE

## 2025-01-29 RX ORDER — ALBUTEROL SULFATE 90 UG/1
2 INHALANT RESPIRATORY (INHALATION) EVERY 6 HOURS PRN
Qty: 18 G | Refills: 0 | Status: SHIPPED | OUTPATIENT
Start: 2025-01-29 | End: 2026-01-29

## 2025-01-29 RX ORDER — DOXYCYCLINE HYCLATE 100 MG
100 TABLET ORAL 2 TIMES DAILY
Qty: 20 TABLET | Refills: 0 | Status: SHIPPED | OUTPATIENT
Start: 2025-01-29

## 2025-01-29 RX ORDER — METHYLPREDNISOLONE 4 MG/1
TABLET ORAL
Qty: 21 EACH | Refills: 0 | Status: SHIPPED | OUTPATIENT
Start: 2025-01-29 | End: 2025-02-19

## 2025-01-29 RX ORDER — BENZONATATE 200 MG/1
200 CAPSULE ORAL 3 TIMES DAILY PRN
Qty: 30 CAPSULE | Refills: 0 | Status: SHIPPED | OUTPATIENT
Start: 2025-01-29 | End: 2025-02-08

## 2025-01-29 NOTE — PROGRESS NOTES
"Subjective:      Patient ID: Twila Case is a 59 y.o. female.    Vitals:  vitals were not taken for this visit.     Chief Complaint: Fever (Wondering if i have the flu. Scheduled to fly out Friday for family event. - Entered by patient)    This pt complains of cough x 2 days. S/S: fever ("99.5 this morning"), body aches, chills, and congestion. No nausea, vomiting, or diarrhea.     Home tx: NyQuil and Advil     PMH: none     Cough  This is a new problem. The current episode started in the past 7 days. The problem has been unchanged. The problem occurs every few minutes. Associated symptoms include chills, headaches, nasal congestion and postnasal drip. Pertinent negatives include no sore throat.     Constitution: Positive for chills.   HENT:  Positive for postnasal drip. Negative for sore throat.    Respiratory:  Positive for cough.    Neurological:  Positive for headaches.    Objective:     Physical Exam   Constitutional: She is oriented to person, place, and time. She appears ill. She appears distressed. normal  HENT:   Head: Normocephalic and atraumatic.   Ears:   Right Ear: Tympanic membrane, external ear and ear canal normal.   Left Ear: Tympanic membrane, external ear and ear canal normal.   Nose: Rhinorrhea and congestion present.   Mouth/Throat: No oropharyngeal exudate.   Eyes: Conjunctivae are normal. Pupils are equal, round, and reactive to light. Extraocular movement intact   Neck: Neck supple.   Cardiovascular: Normal rate, regular rhythm and normal pulses.   No murmur heard.  Pulmonary/Chest: Effort normal. No stridor. No respiratory distress. She has rhonchi. She exhibits tenderness.   Abdominal: Normal appearance and bowel sounds are normal. Soft. flat abdomen   Neurological: no focal deficit. She is alert, oriented to person, place, and time and at baseline.   Skin: Skin is warm and dry. Capillary refill takes less than 2 seconds.   Psychiatric: Her behavior is normal. Mood, judgment and thought " content normal.   Nursing note and vitals reviewed.    Assessment:     Plan:   1. Cough, unspecified type  - POCT Influenza A/B MOLECULAR  - benzonatate (TESSALON) 200 MG capsule; Take 1 capsule (200 mg total) by mouth 3 (three) times daily as needed.  Dispense: 30 capsule; Refill: 0    2. Fever, unspecified fever cause  - SARS Coronavirus 2 Antigen, POCT Manual Read    3. Pleuritic chest pain  - XR CHEST PA AND LATERAL; Future    4. Atypical pneumonia  - doxycycline (VIBRA-TABS) 100 MG tablet; Take 1 tablet (100 mg total) by mouth 2 (two) times daily.  Dispense: 20 tablet; Refill: 0  - albuterol (VENTOLIN HFA) 90 mcg/actuation inhaler; Inhale 2 puffs into the lungs every 6 (six) hours as needed for Wheezing. Rescue  Dispense: 18 g; Refill: 0  - methylPREDNISolone (MEDROL DOSEPACK) 4 mg tablet; use as directed  Dispense: 21 each; Refill: 0   All results discussed with pt prior to discharge

## 2025-02-07 ENCOUNTER — OFFICE VISIT (OUTPATIENT)
Dept: OPTOMETRY | Facility: CLINIC | Age: 60
End: 2025-02-07
Payer: COMMERCIAL

## 2025-02-07 DIAGNOSIS — H11.433 CONJUNCTIVAL HYPEREMIA OF BOTH EYES: ICD-10-CM

## 2025-02-07 DIAGNOSIS — H04.123 DRY EYE SYNDROME, BILATERAL: Primary | ICD-10-CM

## 2025-02-07 DIAGNOSIS — H52.4 PRESBYOPIA: ICD-10-CM

## 2025-02-07 DIAGNOSIS — Z83.518 FAMILY HISTORY OF MACULAR DEGENERATION: ICD-10-CM

## 2025-02-07 PROCEDURE — 99999 PR PBB SHADOW E&M-EST. PATIENT-LVL III: CPT | Mod: PBBFAC,,, | Performed by: OPTOMETRIST

## 2025-02-07 PROCEDURE — 92015 DETERMINE REFRACTIVE STATE: CPT | Mod: S$GLB,,, | Performed by: OPTOMETRIST

## 2025-02-07 PROCEDURE — 1159F MED LIST DOCD IN RCRD: CPT | Mod: CPTII,S$GLB,, | Performed by: OPTOMETRIST

## 2025-02-07 PROCEDURE — 92004 COMPRE OPH EXAM NEW PT 1/>: CPT | Mod: S$GLB,,, | Performed by: OPTOMETRIST

## 2025-02-07 NOTE — PROGRESS NOTES
MK    ISELA: 2014 - Dr. Rivero     CC: Pt is here today for a routine eye exam. Pt states she has difficulty   focusing at near but readers help improve her vision. Pt states that she   uses +1.50 or +2.00 OTC readers.      (-)Dryness  (+)Burning  (-)Itchiness  (-)Tearing  (-)Flashes  (-)Floaters   (-)Photophobia  (-)Eye Pain      Diabetic: no    Contact Lens: no    Eye Meds:   Lumify PRN     PD: 61.5      Last edited by Ayana Joiner MA on 2/7/2025  2:51 PM.            Assessment /Plan     For exam results, see Encounter Report.    Dry eye syndrome, bilateral  Conjunctival hyperemia of both eyes   Start IVIZIA BID   Lumify PRN    Presbyopia   Rx specs, ok to use OTC readers   Consider MF contacts in the future    Family history of macular degeneration   Good overall ocular health, monitor    RTC 1 year

## 2025-02-11 DIAGNOSIS — G57.81 NERVE ENTRAPMENT OF LOWER LIMB, RIGHT: ICD-10-CM

## 2025-02-11 RX ORDER — OXYCODONE AND ACETAMINOPHEN 10; 325 MG/1; MG/1
1 TABLET ORAL EVERY 8 HOURS PRN
Qty: 30 TABLET | Refills: 0 | Status: CANCELLED | OUTPATIENT
Start: 2025-02-11

## 2025-02-12 DIAGNOSIS — G57.81 NERVE ENTRAPMENT OF LOWER LIMB, RIGHT: ICD-10-CM

## 2025-02-12 RX ORDER — OXYCODONE AND ACETAMINOPHEN 10; 325 MG/1; MG/1
1 TABLET ORAL EVERY 8 HOURS PRN
Qty: 30 TABLET | Refills: 0 | OUTPATIENT
Start: 2025-02-12

## 2025-02-12 RX ORDER — OXYCODONE AND ACETAMINOPHEN 5; 325 MG/1; MG/1
1 TABLET ORAL EVERY 12 HOURS PRN
Qty: 28 TABLET | Refills: 0 | Status: SHIPPED | OUTPATIENT
Start: 2025-02-12 | End: 2025-02-12

## 2025-02-12 RX ORDER — OXYCODONE AND ACETAMINOPHEN 5; 325 MG/1; MG/1
1 TABLET ORAL EVERY 12 HOURS PRN
Qty: 28 TABLET | Refills: 0 | Status: SHIPPED | OUTPATIENT
Start: 2025-02-12

## 2025-03-09 DIAGNOSIS — F41.9 ANXIETY: Chronic | ICD-10-CM

## 2025-03-09 RX ORDER — LORAZEPAM 1 MG/1
TABLET ORAL
Qty: 60 TABLET | Refills: 0 | Status: CANCELLED | OUTPATIENT
Start: 2025-03-09

## 2025-03-09 RX ORDER — HYDROXYZINE HYDROCHLORIDE 10 MG/1
10 TABLET, FILM COATED ORAL 3 TIMES DAILY PRN
Qty: 90 TABLET | Refills: 1 | Status: CANCELLED | OUTPATIENT
Start: 2025-03-09

## 2025-03-10 ENCOUNTER — TELEPHONE (OUTPATIENT)
Dept: INTERNAL MEDICINE | Facility: CLINIC | Age: 60
End: 2025-03-10
Payer: COMMERCIAL

## 2025-03-10 RX ORDER — HYDROXYZINE HYDROCHLORIDE 10 MG/1
TABLET, FILM COATED ORAL
Qty: 90 TABLET | Refills: 1 | Status: SHIPPED | OUTPATIENT
Start: 2025-03-10

## 2025-03-10 RX ORDER — LORAZEPAM 1 MG/1
TABLET ORAL
Qty: 60 TABLET | OUTPATIENT
Start: 2025-03-10

## 2025-03-10 NOTE — TELEPHONE ENCOUNTER
LOV: 11/14/24  LOV w/ pcp: 4/30/24  No Upcoming OV  LF lorazepam 1: 12/30/24  LF hydroxyzine 10: 2/7/25

## 2025-03-10 NOTE — TELEPHONE ENCOUNTER
Sent pt a message in [employer] that med was approved. - hydroxyzine  Sent pt a message in [employer] that med was denied. - lorazepam; pt needs appt

## 2025-03-12 ENCOUNTER — OFFICE VISIT (OUTPATIENT)
Dept: INTERNAL MEDICINE | Facility: CLINIC | Age: 60
End: 2025-03-12
Payer: COMMERCIAL

## 2025-03-12 VITALS
TEMPERATURE: 97 F | SYSTOLIC BLOOD PRESSURE: 116 MMHG | BODY MASS INDEX: 23.07 KG/M2 | OXYGEN SATURATION: 99 % | WEIGHT: 138.44 LBS | HEART RATE: 78 BPM | RESPIRATION RATE: 18 BRPM | DIASTOLIC BLOOD PRESSURE: 82 MMHG | HEIGHT: 65 IN

## 2025-03-12 DIAGNOSIS — F41.9 ANXIETY: Chronic | ICD-10-CM

## 2025-03-12 DIAGNOSIS — G47.9 SLEEP DISTURBANCES: Primary | ICD-10-CM

## 2025-03-12 PROCEDURE — 1160F RVW MEDS BY RX/DR IN RCRD: CPT | Mod: CPTII,S$GLB,, | Performed by: NURSE PRACTITIONER

## 2025-03-12 PROCEDURE — 3079F DIAST BP 80-89 MM HG: CPT | Mod: CPTII,S$GLB,, | Performed by: NURSE PRACTITIONER

## 2025-03-12 PROCEDURE — 3074F SYST BP LT 130 MM HG: CPT | Mod: CPTII,S$GLB,, | Performed by: NURSE PRACTITIONER

## 2025-03-12 PROCEDURE — 99999 PR PBB SHADOW E&M-EST. PATIENT-LVL IV: CPT | Mod: PBBFAC,,, | Performed by: NURSE PRACTITIONER

## 2025-03-12 PROCEDURE — 99213 OFFICE O/P EST LOW 20 MIN: CPT | Mod: S$GLB,,, | Performed by: NURSE PRACTITIONER

## 2025-03-12 PROCEDURE — 3008F BODY MASS INDEX DOCD: CPT | Mod: CPTII,S$GLB,, | Performed by: NURSE PRACTITIONER

## 2025-03-12 PROCEDURE — 1159F MED LIST DOCD IN RCRD: CPT | Mod: CPTII,S$GLB,, | Performed by: NURSE PRACTITIONER

## 2025-03-12 RX ORDER — TRAZODONE HYDROCHLORIDE 50 MG/1
50-100 TABLET ORAL NIGHTLY
Qty: 12 TABLET | Refills: 0 | Status: SHIPPED | OUTPATIENT
Start: 2025-03-12 | End: 2026-03-12

## 2025-03-12 RX ORDER — LORAZEPAM 1 MG/1
TABLET ORAL
Qty: 60 TABLET | Refills: 0 | Status: SHIPPED | OUTPATIENT
Start: 2025-03-12

## 2025-03-12 NOTE — PROGRESS NOTES
Subjective:       Patient ID: Twila Lynn is a 59 y.o. female.    Chief Complaint: Follow-up and Medication Refill (Ativan)      History of Present Illness    CHIEF COMPLAINT:  Ms. Lynn presents today for a refill on Xanax.    SLEEP AND ANXIETY:  She reports using lorazepam 1.5 mg nightly for sleep and expresses anxiety about medication dependence for sleep. She has not attempted sleeping without lorazepam even when feeling tired. Previous sleep interventions including hydroxyzine (25 mg causing grogginess, 10 mg being ineffective even when doubled) and melatonin were unsuccessful. Her sleep hygiene needs improvement, as she watches TV in bedroom before sleep and has difficulty quieting racing thoughts. She typically goes to bed around 10 PM but takes post-work naps due to fatigue.    ROS:  Constitutional: +fatigue, +excessive drowsiness  Neurological: +difficulty falling asleep, +sleep difficulty  Psychiatric: +anxiety         Review of patient's allergies indicates:  No Known Allergies    Medication List with Changes/Refills   New Medications    TRAZODONE (DESYREL) 50 MG TABLET    Take 1-2 tablets ( mg total) by mouth every evening.   Current Medications    BIOTIN 5,000 MCG TBDL    Take 1 capsule by mouth once daily.    BUPROPION (WELLBUTRIN XL) 150 MG TB24 TABLET    Take 1 tablet (150 mg total) by mouth once daily.    BUPROPION (WELLBUTRIN XL) 300 MG 24 HR TABLET    Take 1 tablet (300 mg total) by mouth once daily.    CHOLECALCIFEROL, VITAMIN D3, (VITAMIN D3) 50 MCG (2,000 UNIT) TAB    Take 1 tablet by mouth once daily.    FLAXSEED OIL MISC    by Misc.(Non-Drug; Combo Route) route.    HYDROXYZINE HCL (ATARAX) 10 MG TAB    TAKE 1 TABLET(10 MG) BY MOUTH THREE TIMES DAILY AS NEEDED FOR ANXIETY    IBUPROFEN (ADVIL,MOTRIN) 600 MG TABLET    Take 1 tablet (600 mg total) by mouth every 8 (eight) hours as needed for Pain.    METOPROLOL SUCCINATE (TOPROL-XL) 25 MG 24 HR TABLET    TAKE 1 TABLET(25 MG) BY MOUTH EVERY  "DAY    MULTIVITAMIN (THERAGRAN) PER TABLET    Take 1 tablet by mouth once daily.    OXYCODONE-ACETAMINOPHEN (PERCOCET) 5-325 MG PER TABLET    Take 1 tablet by mouth every 12 (twelve) hours as needed for Pain.   Changed and/or Refilled Medications    Modified Medication Previous Medication    LORAZEPAM (ATIVAN) 1 MG TABLET LORazepam (ATIVAN) 1 MG tablet       TAKE 1 TABLET(1 MG) BY MOUTH EVERY 12 HOURS AS NEEDED. Do not take within 6 hours of oxycodone.    TAKE 1 TABLET(1 MG) BY MOUTH EVERY 12 HOURS AS NEEDED. Do not take within 6 hours of oxycodone.   Discontinued Medications    ALBUTEROL (VENTOLIN HFA) 90 MCG/ACTUATION INHALER    Inhale 2 puffs into the lungs every 6 (six) hours as needed for Wheezing. Rescue    DOXYCYCLINE (VIBRA-TABS) 100 MG TABLET    Take 1 tablet (100 mg total) by mouth 2 (two) times daily.     Objective:   /82 (BP Location: Left arm, Patient Position: Sitting)   Pulse 78   Temp 97.3 °F (36.3 °C) (Temporal)   Resp 18   Ht 5' 5" (1.651 m)   Wt 62.8 kg (138 lb 7.2 oz)   LMP 01/01/2015 (Approximate)   SpO2 99%   BMI 23.04 kg/m²     Physical Exam  Vitals reviewed.   Constitutional:       Appearance: Normal appearance.   HENT:      Head: Normocephalic and atraumatic.   Pulmonary:      Effort: Pulmonary effort is normal.   Neurological:      Mental Status: She is alert and oriented to person, place, and time.       Assessment and Plan:     1. Sleep disturbances (Primary)    Refilled Ativan prescription while initiating alternative treatment. Decreased Ativan (lorazepam) to 0.5 mg (half of current dose) at bedtime, to be taken around 9:30 PM if needed after trazodone.  Prescribed trazodone: 1 tablet at bedtime, may increase to 2 tablets if needed. Instructed the patient to take trazodone around 8:30 PM.  Suggested alternative treatments such as aromatherapy and sound therapy.  Ms. Case to implement good sleep hygiene practices: turn off phone and TV before bedtime, set specific times for " going to bed and waking up, maintain sleep schedule even on weekends, avoid daytime naps.    - traZODone (DESYREL) 50 MG tablet; Take 1-2 tablets ( mg total) by mouth every evening.  Dispense: 12 tablet; Refill: 0    2. Anxiety    - LORazepam (ATIVAN) 1 MG tablet; TAKE 1 TABLET(1 MG) BY MOUTH EVERY 12 HOURS AS NEEDED. Do not take within 6 hours of oxycodone.  Dispense: 60 tablet; Refill: 0            FOLLOW-UP AND MONITORING:  - Requested the patient to send a portal message on Monday to report effectiveness and any side effects of trazodone.          This note was generated with the assistance of ambient listening technology. Verbal consent was obtained by the patient and accompanying visitor(s) for the recording of patient appointment to facilitate this note. I attest to having reviewed and edited the generated note for accuracy, though some syntax or spelling errors may persist. Please contact the author of this note for any clarification.

## 2025-03-17 ENCOUNTER — PATIENT MESSAGE (OUTPATIENT)
Dept: INTERNAL MEDICINE | Facility: CLINIC | Age: 60
End: 2025-03-17
Payer: COMMERCIAL

## 2025-03-17 NOTE — TELEPHONE ENCOUNTER
"LOV 03/12/25    Pt sends update on med management:     "Took 1 Traz and 1/2 Loraz. I didnt sleep. Got out of bed a few times and it didnt help.  Thursday night I was exhausted.Tried sleeping with nothing, that didnt work so I took 1 Traz and 1 Loraz. Worked but woke up several times. Was able to go back to sleep.  Friday l took 1 and 1 and it worked well. Fell asleep and stayed asleep.  Saturday/Sunday I took 1 and 1 and slept through the night both nights.     I know the plan is to reduce the Loraz so tonight Ill try 2 Traz and 1/2 Loraz. Im reluctant since the success with 1 and 1 but will try. Ill let you know how I did and well go from there. "     Pt will keep us updated on sleep regimen   "

## 2025-03-18 ENCOUNTER — PATIENT MESSAGE (OUTPATIENT)
Dept: INTERNAL MEDICINE | Facility: CLINIC | Age: 60
End: 2025-03-18
Payer: COMMERCIAL

## 2025-03-19 ENCOUNTER — PATIENT MESSAGE (OUTPATIENT)
Dept: INTERNAL MEDICINE | Facility: CLINIC | Age: 60
End: 2025-03-19
Payer: COMMERCIAL

## 2025-03-19 DIAGNOSIS — G47.9 SLEEP DISTURBANCES: ICD-10-CM

## 2025-03-19 RX ORDER — TRAZODONE HYDROCHLORIDE 50 MG/1
100 TABLET ORAL NIGHTLY
Qty: 30 TABLET | Refills: 0 | Status: SHIPPED | OUTPATIENT
Start: 2025-03-19 | End: 2025-03-19

## 2025-03-19 RX ORDER — TRAZODONE HYDROCHLORIDE 100 MG/1
100 TABLET ORAL NIGHTLY
Qty: 30 TABLET | Refills: 11 | Status: SHIPPED | OUTPATIENT
Start: 2025-03-19 | End: 2026-03-19

## 2025-03-19 NOTE — TELEPHONE ENCOUNTER
Please advise to the patient's MyChart message;    Per patient;a new prescription would be great! Will the 50mg be cancelled then? Im afraid my insurance company will not fill it with the other script out there if they cant cancel the 50s then Ill just wait unless you wrote it for once per day?

## 2025-03-19 NOTE — TELEPHONE ENCOUNTER
LOV 03/12/25     Pt was last seen about sleep disturbances. Pt has sent updates on medication regimen.       Took 1 Traz and 1/2 Loraz. I didnt sleep. Got out of bed a few times and it didnt help. I took 1 Traz and 1 Loraz. Worked but woke up several times.   Friday l took 1 and 1 and it worked well. Fell asleep and stayed asleep.  Saturday/Sunday I took 1 and 1 and slept through the night both nights.    Pt states trazodone gives her a slight headache.     Took 2 Traz 1/2 Loraz last night, an hour apart. It took 1 1/2 hours to fall asleep. I woke up 4 hours later, took 1 1/2 hours to fall asleep again and slept for 2 1/2 hours. My sleep is getting longer, hopefully it will take less time to get to sleep and stay asleep.     Pt is now asking for 100 mg of trazodone. States she needs two of the 50 mg because 1 isn't enough.     Pt is also asking for a decongestant for sinus headache and congestion.

## 2025-03-19 NOTE — TELEPHONE ENCOUNTER
Pt informed Trazodone has been increased.    Pt also c/o sinus congestion and request a decongestant.

## 2025-03-20 RX ORDER — IBUPROFEN 600 MG/1
600 TABLET ORAL EVERY 8 HOURS PRN
Qty: 30 TABLET | Refills: 1 | Status: SHIPPED | OUTPATIENT
Start: 2025-03-20

## 2025-03-24 ENCOUNTER — PATIENT MESSAGE (OUTPATIENT)
Dept: INTERNAL MEDICINE | Facility: CLINIC | Age: 60
End: 2025-03-24
Payer: COMMERCIAL

## 2025-03-26 RX ORDER — METOPROLOL SUCCINATE 25 MG/1
25 TABLET, EXTENDED RELEASE ORAL
Qty: 90 TABLET | Refills: 0 | Status: SHIPPED | OUTPATIENT
Start: 2025-03-26

## 2025-03-26 NOTE — TELEPHONE ENCOUNTER
No care due was identified.  [facility] Embedded Care Due Messages. Reference number: [id number].   3/26/2025 5:21:39 AM CDT

## 2025-03-26 NOTE — TELEPHONE ENCOUNTER
Refill Decision Note   Twila Case  is requesting a refill authorization.  Brief Assessment and Rationale for Refill:  Approve     Medication Therapy Plan:         Comments:     Note composed:12:23 PM 03/26/2025

## 2025-04-15 RX ORDER — BUPROPION HYDROCHLORIDE 150 MG/1
150 TABLET ORAL DAILY
Qty: 90 TABLET | Refills: 0 | Status: SHIPPED | OUTPATIENT
Start: 2025-04-15

## 2025-04-15 NOTE — TELEPHONE ENCOUNTER
No care due was identified.  [facility] Embedded Care Due Messages. Reference number: [id number].   4/15/2025 9:03:02 AM CDT

## 2025-04-15 NOTE — TELEPHONE ENCOUNTER
Refill Decision Note   Twila Case  is requesting a refill authorization.  Brief Assessment and Rationale for Refill:  Approve     Medication Therapy Plan:         Comments:     Note composed:9:41 AM 04/15/2025

## 2025-05-20 ENCOUNTER — OFFICE VISIT (OUTPATIENT)
Dept: OBSTETRICS AND GYNECOLOGY | Facility: CLINIC | Age: 60
End: 2025-05-20
Payer: COMMERCIAL

## 2025-05-20 VITALS
DIASTOLIC BLOOD PRESSURE: 72 MMHG | BODY MASS INDEX: 22.22 KG/M2 | WEIGHT: 133.38 LBS | SYSTOLIC BLOOD PRESSURE: 106 MMHG | HEIGHT: 65 IN

## 2025-05-20 DIAGNOSIS — N95.2 VAGINAL ATROPHY: ICD-10-CM

## 2025-05-20 DIAGNOSIS — N89.8 VAGINA ITCHING: ICD-10-CM

## 2025-05-20 DIAGNOSIS — Z12.31 ENCOUNTER FOR SCREENING MAMMOGRAM FOR BREAST CANCER: ICD-10-CM

## 2025-05-20 DIAGNOSIS — Z01.419 WOMEN'S ANNUAL ROUTINE GYNECOLOGICAL EXAMINATION: Primary | ICD-10-CM

## 2025-05-20 DIAGNOSIS — N94.10 DYSPAREUNIA IN FEMALE: ICD-10-CM

## 2025-05-20 PROCEDURE — 99999 PR PBB SHADOW E&M-EST. PATIENT-LVL III: CPT | Mod: PBBFAC,,, | Performed by: NURSE PRACTITIONER

## 2025-05-20 RX ORDER — CLOBETASOL PROPIONATE 0.5 MG/G
OINTMENT TOPICAL 2 TIMES DAILY
Qty: 30 G | Refills: 1 | Status: SHIPPED | OUTPATIENT
Start: 2025-05-20

## 2025-05-20 RX ORDER — PRASTERONE 6.5 MG/1
6.5 INSERT VAGINAL NIGHTLY
Qty: 30 EACH | Refills: 11 | Status: SHIPPED | OUTPATIENT
Start: 2025-05-20

## 2025-05-20 RX ORDER — PRASTERONE 6.5 MG/1
6.5 INSERT VAGINAL NIGHTLY
Qty: 30 EACH | Refills: 11 | Status: SHIPPED | OUTPATIENT
Start: 2025-05-20 | End: 2025-05-20 | Stop reason: SDUPTHER

## 2025-05-20 NOTE — PROGRESS NOTES
CC: Annual  HPI: Pt (Olinda) is a 59 y.o.  female who presents for routine annual exam.  She is s/p hysterectomy/ BSO - done preventatively.  She does not want STD screening.  She reports vaginal dryness, painful intercourse and bleeding after intercourse.   The patient participates in regular exercise: yes- dog walking.  The patient does not smoke.   Pt denies any domestic violence. MMG due 12/25. Colonoscopy due 2029. Mother with h/o ovarian cancer- passed from the cancer.         ROS:  GENERAL: Feeling well overall.   SKIN: Denies rash or lesions.   HEAD: Denies head injury or headache.   NODES: Denies enlarged lymph nodes.   CHEST: Denies chest pain or shortness of breath.   CARDIOVASCULAR: Denies palpitations or left sided chest pain.   ABDOMEN: No abdominal pain, nausea, vomiting or rectal bleeding.   URINARY: No dysuria or hematuria.  REPRODUCTIVE: See HPI.   BREASTS: Denies pain, lumps, or nipple discharge.   HEMATOLOGIC: No easy bruisability or excessive bleeding.   MUSCULOSKELETAL: Denies joint pain or swelling.   NEUROLOGIC: Denies syncope or weakness.   PSYCHIATRIC: Denies depression.    PE:    APPEARANCE: Well nourished, well developed, in no acute distress.  NODES: No inguinal lymph node enlargement.  ABDOMEN: Soft. No tenderness or masses. No hernias.  BREASTS: Symmetrical, no skin changes or visible lesions. No palpable masses, nipple discharge or adenopathy bilaterally.  PELVIC: Normal external female genitalia without lesions. Normal hair distribution. Adequate perineal body, normal urethral meatus. Thinning noted to perineum with white discoloration, fusion of prepuce and frenulum with minimal scarring of the clitoral figueredo  and  Vagina atrophic  without lesions or discharge. No significant cystocele or rectocele. Uterus and cervix surgically absent. Bimanual exam revealed no masses, tenderness or abnormality.  ANUS: Normal.          Diagnosis:  1. Women's annual routine gynecological examination     2. Encounter for screening mammogram for breast cancer    3. Vagina itching    4. Vaginal atrophy    5. Dyspareunia in female        Plan:   MMG in   Clobetasol ointment to external vulva twice daily for 1 week, then twice weekly at bedtime  Intrarosa- insert night at bedtime for painful intercourse/ dryness  Continue lubricant prior to intercourse         Orders Placed This Encounter    Mammo Digital Screening Bilat w/ Selvin (XPD)    clobetasol 0.05% (TEMOVATE) 0.05 % Oint    prasterone, DHEA, (INTRAROSA) 6.5 mg Inst             Patient was counseled today on postmenopausal issues.     Follow-up in 1 year.    LOVELY Couch  \  Answers submitted by the patient for this visit:  Vaginal Pain Questionnaire (Submitted on 2025)  Chief Complaint: Vaginal pain  Chronicity: chronic  Onset: more than 1 year ago  Frequency: intermittently  Progression since onset: gradually worsening  Pain severity: severe  Affected side: both  Pregnant now?: No  abdominal pain: No  anorexia: No  chills: No  discolored urine: No  dysuria: Yes  fever: No  frequency: Yes  nausea: No  painful intercourse: Yes  rash: No  urgency: Yes  vomiting: No  Aggravated by: intercourse  treatments tried: NSAIDs  Improvement on treatment: no relief  Sexual activity: sexually active  Partner with STD symptoms: no  Birth control: nothing  Menstrual history: postmenopausal  STD: No  abdominal surgery: No   section: No  Ectopic pregnancy: No  Endometriosis: No  herpes simplex: No  gynecological surgery: Yes  menorrhagia: No  metrorrhagia: No  miscarriage: No  ovarian cysts: No  perineal abscess: No  PID: No  terminated pregnancy: No  vaginosis: No

## 2025-05-26 ENCOUNTER — OFFICE VISIT (OUTPATIENT)
Dept: INTERNAL MEDICINE | Facility: CLINIC | Age: 60
End: 2025-05-26
Payer: COMMERCIAL

## 2025-05-26 ENCOUNTER — HOSPITAL ENCOUNTER (OUTPATIENT)
Dept: RADIOLOGY | Facility: OTHER | Age: 60
Discharge: HOME | End: 2025-05-26
Attending: NURSE PRACTITIONER
Payer: COMMERCIAL

## 2025-05-26 VITALS
DIASTOLIC BLOOD PRESSURE: 76 MMHG | RESPIRATION RATE: 18 BRPM | WEIGHT: 136.44 LBS | SYSTOLIC BLOOD PRESSURE: 112 MMHG | HEIGHT: 65 IN | HEART RATE: 68 BPM | BODY MASS INDEX: 22.73 KG/M2 | TEMPERATURE: 97 F | OXYGEN SATURATION: 99 %

## 2025-05-26 DIAGNOSIS — R10.31 RIGHT LOWER QUADRANT PAIN: Primary | ICD-10-CM

## 2025-05-26 DIAGNOSIS — R11.0 NAUSEA: ICD-10-CM

## 2025-05-26 DIAGNOSIS — R10.31 RIGHT LOWER QUADRANT PAIN: ICD-10-CM

## 2025-05-26 PROCEDURE — 99999 PR PBB SHADOW E&M-EST. PATIENT-LVL V: CPT | Mod: PBBFAC,,, | Performed by: NURSE PRACTITIONER

## 2025-05-26 PROCEDURE — 3078F DIAST BP <80 MM HG: CPT | Mod: CPTII,S$GLB,, | Performed by: NURSE PRACTITIONER

## 2025-05-26 PROCEDURE — 74176 CT ABD & PELVIS W/O CONTRAST: CPT | Mod: 26,,, | Performed by: RADIOLOGY

## 2025-05-26 PROCEDURE — 3074F SYST BP LT 130 MM HG: CPT | Mod: CPTII,S$GLB,, | Performed by: NURSE PRACTITIONER

## 2025-05-26 PROCEDURE — 1159F MED LIST DOCD IN RCRD: CPT | Mod: CPTII,S$GLB,, | Performed by: NURSE PRACTITIONER

## 2025-05-26 PROCEDURE — 99213 OFFICE O/P EST LOW 20 MIN: CPT | Mod: S$GLB,,, | Performed by: NURSE PRACTITIONER

## 2025-05-26 PROCEDURE — 74176 CT ABD & PELVIS W/O CONTRAST: CPT | Mod: TC

## 2025-05-26 PROCEDURE — 1160F RVW MEDS BY RX/DR IN RCRD: CPT | Mod: CPTII,S$GLB,, | Performed by: NURSE PRACTITIONER

## 2025-05-26 PROCEDURE — 3008F BODY MASS INDEX DOCD: CPT | Mod: CPTII,S$GLB,, | Performed by: NURSE PRACTITIONER

## 2025-05-26 RX ORDER — ONDANSETRON 4 MG/1
4 TABLET, ORALLY DISINTEGRATING ORAL EVERY 8 HOURS PRN
Qty: 10 TABLET | Refills: 0 | Status: SHIPPED | OUTPATIENT
Start: 2025-05-26

## 2025-05-26 NOTE — PROGRESS NOTES
Subjective:       Patient ID: Twila Lynn is a 59 y.o. female.    Chief Complaint: Abdominal Pain (Right side )      History of Present Illness    CHIEF COMPLAINT:  Ms. Lynn presents today for right lower abdominal pain and lumps    HISTORY OF PRESENT ILLNESS:  She reports right lower abdominal pain that began on Wednesday with subsequent discovery of multiple lumps over several days. She experiences burning sensation in the abdomen and pain with movement, particularly when bending over or attempting to stand straight, causing her to walk in a hunched position. She denies changes in bowel patterns and bloating. She denies urinary symptoms including frequency, urgency, and dysuria. She denies vaginal discharge, odor, or bleeding.    She reports severe fatigue. She experienced a possible low-grade fever yesterday which has resolved today. She endorses mild chills and body aches. She reports significant nausea, particularly severe in the mornings, with poor appetite and no oral intake today. She denies vomiting.          Review of patient's allergies indicates:  No Known Allergies    Medication List with Changes/Refills   New Medications    ONDANSETRON (ZOFRAN-ODT) 4 MG TBDL    Take 1 tablet (4 mg total) by mouth every 8 (eight) hours as needed (Nausea).   Current Medications    BIOTIN 5,000 MCG TBDL    Take 1 capsule by mouth once daily.    BUPROPION (WELLBUTRIN XL) 150 MG TB24 TABLET    Take 1 tablet (150 mg total) by mouth once daily.    BUPROPION (WELLBUTRIN XL) 300 MG 24 HR TABLET    Take 1 tablet (300 mg total) by mouth once daily.    CHOLECALCIFEROL, VITAMIN D3, (VITAMIN D3) 50 MCG (2,000 UNIT) TAB    Take 1 tablet by mouth once daily.    CLOBETASOL 0.05% (TEMOVATE) 0.05 % OINT    Apply topically 2 (two) times daily.    FLAXSEED OIL MISC    by Misc.(Non-Drug; Combo Route) route.    HYDROXYZINE HCL (ATARAX) 10 MG TAB    TAKE 1 TABLET(10 MG) BY MOUTH THREE TIMES DAILY AS NEEDED FOR ANXIETY    IBUPROFEN  "(ADVIL,MOTRIN) 600 MG TABLET    Take 1 tablet (600 mg total) by mouth every 8 (eight) hours as needed for Pain.    LORAZEPAM (ATIVAN) 1 MG TABLET    TAKE 1 TABLET(1 MG) BY MOUTH EVERY 12 HOURS AS NEEDED. Do not take within 6 hours of oxycodone.    METOPROLOL SUCCINATE (TOPROL-XL) 25 MG 24 HR TABLET    TAKE 1 TABLET(25 MG) BY MOUTH EVERY DAY    MULTIVITAMIN (THERAGRAN) PER TABLET    Take 1 tablet by mouth once daily.    OXYCODONE-ACETAMINOPHEN (PERCOCET) 5-325 MG PER TABLET    Take 1 tablet by mouth every 12 (twelve) hours as needed for Pain.    PRASTERONE, DHEA, (INTRAROSA) 6.5 MG INST    Place 6.5 mg vaginally nightly.    TRAZODONE (DESYREL) 100 MG TABLET    Take 1 tablet (100 mg total) by mouth every evening.       Review of Systems   Constitutional:  Positive for appetite change and fatigue. Negative for chills and fever.   Gastrointestinal:  Positive for abdominal pain and nausea. Negative for change in bowel habit and vomiting.     Objective:   /76 (BP Location: Right arm, Patient Position: Sitting)   Pulse 68   Temp 97.4 °F (36.3 °C) (Temporal)   Resp 18   Ht 5' 5" (1.651 m)   Wt 61.9 kg (136 lb 7.4 oz)   LMP 01/01/2015 (Approximate)   SpO2 99%   BMI 22.71 kg/m²     Physical Exam  Vitals reviewed.   Constitutional:       Appearance: Normal appearance.   HENT:      Head: Normocephalic and atraumatic.   Pulmonary:      Effort: Pulmonary effort is normal.   Abdominal:      General: Bowel sounds are normal.      Palpations: Abdomen is soft.      Tenderness: There is abdominal tenderness.      Hernia: No hernia is present.          Comments: Firm, mobile nodularity noted in right lower quadrant   Skin:     General: Skin is warm and dry.   Neurological:      Mental Status: She is alert and oriented to person, place, and time.       Assessment and Plan:     Assessment & Plan    Considered multiple potential causes for abdominal pain, fatigue, and palpable lumps including infection, lymphadenitis, and " hernia.  Consulted with Dr. Fuller regarding presentation and diagnostic approach.    1. Right lower quadrant pain (Primary)  2. Nausea    - CT Abdomen Pelvis  Without Contrast; Future  - ondansetron (ZOFRAN-ODT) 4 MG TbDL; Take 1 tablet (4 mg total) by mouth every 8 (eight) hours as needed (Nausea).  Dispense: 10 tablet; Refill: 0      FOLLOW-UP:  - Follow up in ER if needed  - Ms. Case advised to contact on-call nurse with any questions about CT results if reviewed by on-call provider.         This note was generated with the assistance of ambient listening technology. Verbal consent was obtained by the patient and accompanying visitor(s) for the recording of patient appointment to facilitate this note. I attest to having reviewed and edited the generated note for accuracy, though some syntax or spelling errors may persist. Please contact the author of this note for any clarification.

## 2025-05-27 ENCOUNTER — RESULTS FOLLOW-UP (OUTPATIENT)
Dept: INTERNAL MEDICINE | Facility: CLINIC | Age: 60
End: 2025-05-27

## 2025-05-27 ENCOUNTER — LAB VISIT (OUTPATIENT)
Dept: LAB | Facility: HOSPITAL | Age: 60
End: 2025-05-27
Payer: COMMERCIAL

## 2025-05-27 DIAGNOSIS — R10.31 RIGHT LOWER QUADRANT PAIN: Primary | ICD-10-CM

## 2025-05-27 DIAGNOSIS — R10.31 RIGHT LOWER QUADRANT PAIN: ICD-10-CM

## 2025-05-27 LAB
BACTERIA #/AREA URNS AUTO: ABNORMAL /HPF
BILIRUB UR QL STRIP.AUTO: NEGATIVE
CLARITY UR: CLEAR
COLOR UR AUTO: YELLOW
GLUCOSE UR QL STRIP: NEGATIVE
HGB UR QL STRIP: NEGATIVE
HOLD SPECIMEN: NORMAL
KETONES UR QL STRIP: NEGATIVE
LEUKOCYTE ESTERASE UR QL STRIP: ABNORMAL
MICROSCOPIC COMMENT: ABNORMAL
NITRITE UR QL STRIP: NEGATIVE
NON-SQ EPI CELLS #/AREA URNS AUTO: 1 /HPF
PH UR STRIP: 6 [PH]
PROT UR QL STRIP: NEGATIVE
RBC #/AREA URNS AUTO: 5 /HPF (ref 0–4)
SP GR UR STRIP: 1.02
SQUAMOUS #/AREA URNS AUTO: 4 /HPF
UROBILINOGEN UR STRIP-ACNC: NEGATIVE EU/DL
WBC #/AREA URNS AUTO: 18 /HPF (ref 0–5)

## 2025-05-27 PROCEDURE — 87086 URINE CULTURE/COLONY COUNT: CPT

## 2025-05-27 PROCEDURE — 81003 URINALYSIS AUTO W/O SCOPE: CPT

## 2025-05-27 RX ORDER — AMOXICILLIN AND CLAVULANATE POTASSIUM 875; 125 MG/1; MG/1
1 TABLET, FILM COATED ORAL EVERY 12 HOURS
Qty: 20 TABLET | Refills: 0 | Status: SHIPPED | OUTPATIENT
Start: 2025-05-27 | End: 2025-06-06

## 2025-05-27 NOTE — TELEPHONE ENCOUNTER
"Pt states feeling about the same, not great.   Pt states yesterday's report did not mention the lumps, or was it the "perivesicular infiltration"? Pt would like to know how to get rid of them.    Informed pt can schedule via [facility] or inform us when she would like to go in.  "

## 2025-05-28 ENCOUNTER — RESULTS FOLLOW-UP (OUTPATIENT)
Dept: INTERNAL MEDICINE | Facility: CLINIC | Age: 60
End: 2025-05-28

## 2025-05-28 ENCOUNTER — PATIENT MESSAGE (OUTPATIENT)
Dept: OBSTETRICS AND GYNECOLOGY | Facility: CLINIC | Age: 60
End: 2025-05-28
Payer: COMMERCIAL

## 2025-05-28 ENCOUNTER — OFFICE VISIT (OUTPATIENT)
Dept: URGENT CARE | Facility: CLINIC | Age: 60
End: 2025-05-28
Payer: COMMERCIAL

## 2025-05-28 VITALS
WEIGHT: 136 LBS | RESPIRATION RATE: 19 BRPM | OXYGEN SATURATION: 98 % | SYSTOLIC BLOOD PRESSURE: 115 MMHG | BODY MASS INDEX: 22.63 KG/M2 | HEART RATE: 97 BPM | DIASTOLIC BLOOD PRESSURE: 78 MMHG | TEMPERATURE: 98 F

## 2025-05-28 DIAGNOSIS — N94.10 DYSPAREUNIA IN FEMALE: ICD-10-CM

## 2025-05-28 DIAGNOSIS — N95.2 VAGINAL ATROPHY: Primary | ICD-10-CM

## 2025-05-28 DIAGNOSIS — S99.921A RIGHT FOOT INJURY, INITIAL ENCOUNTER: Primary | ICD-10-CM

## 2025-05-28 PROCEDURE — 73630 X-RAY EXAM OF FOOT: CPT | Mod: FY,RT,S$GLB, | Performed by: RADIOLOGY

## 2025-05-28 PROCEDURE — 99213 OFFICE O/P EST LOW 20 MIN: CPT | Mod: S$GLB,,,

## 2025-05-28 NOTE — PATIENT INSTRUCTIONS
Apply a compressive ACE bandage. Rest and elevate the affected painful area.  Apply cold compresses intermittently as needed.  As pain recedes, begin normal activities slowly as tolerated.  Call if symptoms persist.    Ibuprofen/ Aleve for pain.     X-ray read was negative for fracture.     Follow-up with you for podiatrist.     Return to clinic for no improvement in symptoms.  Report to the emergency room for worsening of symptoms.\

## 2025-05-28 NOTE — PROGRESS NOTES
Subjective:      Patient ID: Twila Case is a 59 y.o. female.    Vitals:  weight is 61.7 kg (136 lb). Her oral temperature is 98.2 °F (36.8 °C). Her blood pressure is 115/78 and her pulse is 97. Her respiration is 19 and oxygen saturation is 98%.     Chief Complaint: Injury (Dropped something I was trying to move on my foot yesterday. - Entered by patient)    This is a 59 y.o. female who presents today with a chief complaint of  injury to right foot after dropping furniture on it yesterday.  Reports numbness and tingling.  Denies weakness and taking medications.     Injury  This is a new problem. The current episode started yesterday. The problem occurs constantly. Associated symptoms include joint swelling. Pertinent negatives include no abdominal pain, anorexia, arthralgias, change in bowel habit, chest pain, chills, congestion, coughing, diaphoresis, fatigue, fever, headaches, myalgias, nausea, neck pain, numbness, rash, sore throat, swollen glands, urinary symptoms, vertigo, visual change, vomiting or weakness. The symptoms are aggravated by standing and walking. She has tried nothing for the symptoms. The treatment provided no relief.       Constitution: Negative for chills, sweating, fatigue and fever.   HENT:  Negative for congestion and sore throat.    Neck: Negative for neck pain.   Cardiovascular:  Negative for chest pain.   Respiratory:  Negative for cough.    Gastrointestinal:  Negative for abdominal pain, nausea and vomiting.   Musculoskeletal:  Positive for joint swelling. Negative for joint pain and muscle ache.   Skin:  Negative for rash.   Neurological:  Negative for history of vertigo, headaches and numbness.      Objective:     Physical Exam   Constitutional: She is oriented to person, place, and time.  Non-toxic appearance. She does not appear ill.   Cardiovascular: Normal rate, regular rhythm, normal heart sounds and normal pulses.   Pulmonary/Chest: Effort normal and breath sounds normal. No  respiratory distress.   Musculoskeletal:         General: Tenderness and signs of injury present.        Legs:       Right foot: Normal range of motion and normal capillary refill. Tenderness, bony tenderness (walks with limp) and swelling present.      Left foot: Normal.   Neurological: She is alert and oriented to person, place, and time. She displays no weakness.   Skin: Skin is warm, dry and not diaphoretic.   Nursing note and vitals reviewed.    X-Ray Foot Complete 3 view Right  Result Date: 5/28/2025  EXAMINATION: XR FOOT COMPLETE 3 VIEW RIGHT CLINICAL HISTORY: . Unspecified injury of right foot, initial encounter TECHNIQUE: AP, lateral, and oblique views of the right foot were performed. FINDINGS: There is no fracture, dislocation, or bony erosion.     As above. Electronically signed by: Art Kapoor MD Date:    05/28/2025 Time:    13:44      Assessment:     1. Right foot injury, initial encounter        Plan:     Patient Instructions   Apply a compressive ACE bandage. Rest and elevate the affected painful area.  Apply cold compresses intermittently as needed.  As pain recedes, begin normal activities slowly as tolerated.  Call if symptoms persist.    NSAIDs for pain.     X-ray read was negative for fracture.     Follow-up with you for podiatrist.     Return to clinic for no improvement in symptoms.  Report to the emergency room for worsening of symptoms.\        Right foot injury, initial encounter  -     X-Ray Foot Complete 3 view Right; Future; Expected date: 05/28/2025

## 2025-05-29 LAB — BACTERIA UR CULT: NORMAL

## 2025-05-29 RX ORDER — PRASTERONE 6.5 MG/1
6.5 INSERT VAGINAL NIGHTLY
Qty: 28 EACH | Refills: 11 | Status: SHIPPED | OUTPATIENT
Start: 2025-05-29

## 2025-06-02 DIAGNOSIS — N94.10 DYSPAREUNIA IN FEMALE: ICD-10-CM

## 2025-06-02 DIAGNOSIS — N95.2 VAGINAL ATROPHY: ICD-10-CM

## 2025-06-02 RX ORDER — PRASTERONE 6.5 MG/1
6.5 INSERT VAGINAL NIGHTLY
Qty: 28 EACH | Refills: 11 | Status: SHIPPED | OUTPATIENT
Start: 2025-06-02

## 2025-06-04 ENCOUNTER — PATIENT MESSAGE (OUTPATIENT)
Dept: INTERNAL MEDICINE | Facility: CLINIC | Age: 60
End: 2025-06-04
Payer: COMMERCIAL

## 2025-06-04 DIAGNOSIS — R10.31 RIGHT LOWER QUADRANT PAIN: Primary | ICD-10-CM

## 2025-06-05 ENCOUNTER — HOSPITAL ENCOUNTER (OUTPATIENT)
Dept: RADIOLOGY | Facility: HOSPITAL | Age: 60
Discharge: HOME | End: 2025-06-05
Attending: NURSE PRACTITIONER
Payer: COMMERCIAL

## 2025-06-05 DIAGNOSIS — R10.31 RIGHT LOWER QUADRANT PAIN: ICD-10-CM

## 2025-06-05 PROCEDURE — 76705 ECHO EXAM OF ABDOMEN: CPT | Mod: 26,,, | Performed by: RADIOLOGY

## 2025-06-05 PROCEDURE — 76705 ECHO EXAM OF ABDOMEN: CPT | Mod: TC

## 2025-06-06 ENCOUNTER — RESULTS FOLLOW-UP (OUTPATIENT)
Dept: INTERNAL MEDICINE | Facility: CLINIC | Age: 60
End: 2025-06-06
Payer: COMMERCIAL

## 2025-06-09 DIAGNOSIS — N94.10 DYSPAREUNIA IN FEMALE: ICD-10-CM

## 2025-06-09 DIAGNOSIS — N95.2 VAGINAL ATROPHY: ICD-10-CM

## 2025-06-09 RX ORDER — PRASTERONE 6.5 MG/1
6.5 INSERT VAGINAL NIGHTLY
Qty: 30 EACH | Refills: 11 | Status: SHIPPED | OUTPATIENT
Start: 2025-06-09

## 2025-06-18 ENCOUNTER — PATIENT MESSAGE (OUTPATIENT)
Dept: DERMATOLOGY | Facility: CLINIC | Age: 60
End: 2025-06-18
Payer: COMMERCIAL

## 2025-06-19 ENCOUNTER — TELEPHONE (OUTPATIENT)
Dept: DERMATOLOGY | Facility: CLINIC | Age: 60
End: 2025-06-19
Payer: COMMERCIAL

## 2025-06-20 RX ORDER — METOPROLOL SUCCINATE 25 MG/1
25 TABLET, EXTENDED RELEASE ORAL
Qty: 90 TABLET | Refills: 0 | Status: SHIPPED | OUTPATIENT
Start: 2025-06-20

## 2025-06-20 NOTE — TELEPHONE ENCOUNTER
Refill Decision Note   Twila Case  is requesting a refill authorization.  Brief Assessment and Rationale for Refill:  Approve     Medication Therapy Plan: FOVS in 5 days Pippa Knox NP      Comments:     Note composed:1:16 PM 06/20/2025

## 2025-06-20 NOTE — TELEPHONE ENCOUNTER
Care Due:                  Date            Visit Type   Department     Provider  --------------------------------------------------------------------------------                                MYCHART                              FOLLOWUP/OF  [facility] INTERNAL  Last Visit: 04-      FICE VISIT   MEDICINE       Ranjan Jaramillo  Next Visit: None Scheduled  None         None Found                                                            Last  Test          Frequency    Reason                     Performed    Due Date  --------------------------------------------------------------------------------    Office Visit  15 months..  buPROPion, metoprolol....  04- 07-    [facility] Embedded Care Due Messages. Reference number: [id number].   6/20/2025 5:22:25 AM CDT

## 2025-06-23 RX ORDER — IBUPROFEN 600 MG/1
600 TABLET, FILM COATED ORAL EVERY 8 HOURS PRN
Qty: 30 TABLET | Refills: 1 | Status: SHIPPED | OUTPATIENT
Start: 2025-06-23

## 2025-06-25 ENCOUNTER — OFFICE VISIT (OUTPATIENT)
Dept: INTERNAL MEDICINE | Facility: CLINIC | Age: 60
End: 2025-06-25
Payer: COMMERCIAL

## 2025-06-25 VITALS
BODY MASS INDEX: 22.15 KG/M2 | HEIGHT: 65 IN | HEART RATE: 73 BPM | OXYGEN SATURATION: 99 % | RESPIRATION RATE: 16 BRPM | WEIGHT: 132.94 LBS | DIASTOLIC BLOOD PRESSURE: 68 MMHG | TEMPERATURE: 97 F | SYSTOLIC BLOOD PRESSURE: 112 MMHG

## 2025-06-25 DIAGNOSIS — R05.1 ACUTE COUGH: Primary | ICD-10-CM

## 2025-06-25 DIAGNOSIS — L72.9 SUBCUTANEOUS CYST: ICD-10-CM

## 2025-06-25 PROCEDURE — 1160F RVW MEDS BY RX/DR IN RCRD: CPT | Mod: CPTII,S$GLB,, | Performed by: NURSE PRACTITIONER

## 2025-06-25 PROCEDURE — 99999 PR PBB SHADOW E&M-EST. PATIENT-LVL V: CPT | Mod: PBBFAC,,, | Performed by: NURSE PRACTITIONER

## 2025-06-25 PROCEDURE — 3008F BODY MASS INDEX DOCD: CPT | Mod: CPTII,S$GLB,, | Performed by: NURSE PRACTITIONER

## 2025-06-25 PROCEDURE — 1159F MED LIST DOCD IN RCRD: CPT | Mod: CPTII,S$GLB,, | Performed by: NURSE PRACTITIONER

## 2025-06-25 PROCEDURE — 99213 OFFICE O/P EST LOW 20 MIN: CPT | Mod: S$GLB,,, | Performed by: NURSE PRACTITIONER

## 2025-06-25 PROCEDURE — 3078F DIAST BP <80 MM HG: CPT | Mod: CPTII,S$GLB,, | Performed by: NURSE PRACTITIONER

## 2025-06-25 PROCEDURE — 3074F SYST BP LT 130 MM HG: CPT | Mod: CPTII,S$GLB,, | Performed by: NURSE PRACTITIONER

## 2025-06-25 RX ORDER — PREDNISONE 20 MG/1
40 TABLET ORAL DAILY
Qty: 10 TABLET | Refills: 0 | Status: SHIPPED | OUTPATIENT
Start: 2025-06-25 | End: 2025-06-30

## 2025-06-27 ENCOUNTER — PATIENT MESSAGE (OUTPATIENT)
Dept: INTERNAL MEDICINE | Facility: CLINIC | Age: 60
End: 2025-06-27
Payer: COMMERCIAL

## 2025-06-27 NOTE — PROGRESS NOTES
Subjective:       Patient ID: Twila Lynn is a 59 y.o. female.    Chief Complaint: Sore Throat    History of Present Illness    CHIEF COMPLAINT:  Ms. Lynn presents today for cough.    RESPIRATORY:  She reports a 2-week history of cough that began with a tickle causing coughing fits. The cough was initially non-productive but has evolved to become productive with chest involvement. The cough is severe enough to disrupt sleep. She experienced one episode of difficulty swallowing during a coughing fit. She denies ear pain, sore throat, and fevers. She has only attempted treatment with tea and water. She notes a family member is currently ill with congestion.    ABDOMINAL PAIN:  She reports an abdominal nodule with associated stitch-like pain. Pain is aggravated by clothing contact, counter level contact and worsened with lifting activities. The nodule feels enlarged and appears more swollen compared to the left side. Previous ultrasound showed a 3mm cyst. She denies associated diarrhea, nausea, vomiting, fever, or chills.    Previous robotic hysterectomy with small surgical scars.    Recently treated with Augmentin.      ROS:  Constitutional: -fevers, +fatigue  ENT: -ear pain, -sore throat, +difficulty swallowing  Respiratory: +cough, +chest congestion, +waking at night coughing, +productive cough  Gastrointestinal: +abdominal pain, -nausea, -vomiting, -diarrhea  Allergic: +frequent sneezing         Review of patient's allergies indicates:  No Known Allergies    Medication List with Changes/Refills   New Medications    PREDNISONE (DELTASONE) 20 MG TABLET    Take 2 tablets (40 mg total) by mouth once daily. for 5 days   Current Medications    BIOTIN 5,000 MCG TBDL    Take 1 capsule by mouth once daily.    BUPROPION (WELLBUTRIN XL) 150 MG TB24 TABLET    Take 1 tablet (150 mg total) by mouth once daily.    BUPROPION (WELLBUTRIN XL) 300 MG 24 HR TABLET    Take 1 tablet (300 mg total) by mouth once daily.     "CHOLECALCIFEROL, VITAMIN D3, (VITAMIN D3) 50 MCG (2,000 UNIT) TAB    Take 1 tablet by mouth once daily.    CLOBETASOL 0.05% (TEMOVATE) 0.05 % OINT    Apply topically 2 (two) times daily.    FLAXSEED OIL MISC    by Misc.(Non-Drug; Combo Route) route.    HYDROXYZINE HCL (ATARAX) 10 MG TAB    TAKE 1 TABLET(10 MG) BY MOUTH THREE TIMES DAILY AS NEEDED FOR ANXIETY    IBUPROFEN (ADVIL,MOTRIN) 600 MG TABLET    Take 1 tablet (600 mg total) by mouth every 8 (eight) hours as needed for Pain.    LORAZEPAM (ATIVAN) 1 MG TABLET    TAKE 1 TABLET(1 MG) BY MOUTH EVERY 12 HOURS AS NEEDED. Do not take within 6 hours of oxycodone.    METOPROLOL SUCCINATE (TOPROL-XL) 25 MG 24 HR TABLET    TAKE 1 TABLET(25 MG) BY MOUTH EVERY DAY    MULTIVITAMIN (THERAGRAN) PER TABLET    Take 1 tablet by mouth once daily.    ONDANSETRON (ZOFRAN-ODT) 4 MG TBDL    Take 1 tablet (4 mg total) by mouth every 8 (eight) hours as needed (Nausea).    OXYCODONE-ACETAMINOPHEN (PERCOCET) 5-325 MG PER TABLET    Take 1 tablet by mouth every 12 (twelve) hours as needed for Pain.    PRASTERONE, DHEA, (INTRAROSA) 6.5 MG INST    Place 1 insert (6.5 mg) vaginally every evening.    PRASTERONE, DHEA, (INTRAROSA) 6.5 MG INST    Place 6.5 mg vaginally nightly.    TRAZODONE (DESYREL) 100 MG TABLET    Take 1 tablet (100 mg total) by mouth every evening.     Objective:   /68 (BP Location: Left arm, Patient Position: Sitting)   Pulse 73   Temp 97.4 °F (36.3 °C) (Temporal)   Resp 16   Ht 5' 5" (1.651 m)   Wt 60.3 kg (132 lb 15 oz)   LMP 01/01/2015 (Approximate)   SpO2 99%   BMI 22.12 kg/m²     Physical Exam  Vitals reviewed.   Constitutional:       Appearance: Normal appearance.   HENT:      Head: Normocephalic and atraumatic.   Pulmonary:      Effort: Pulmonary effort is normal.   Abdominal:       Skin:     General: Skin is warm and dry.   Neurological:      Mental Status: She is alert and oriented to person, place, and time.       I reviewed and independently " interpreted the labs and imaging below:  6/6/2025  US Soft Tissue Abdomen    Nonspecific ultrasound abnormality, single superficial subcutaneous 3 mm cyst correlating with very subtle stranding in the subcutaneous soft tissues of the recent CT 05/26/2025.  This is favoring a benign inflammatory process, consider contact allergy, bug bite, post mild trauma, shingles, no worrisome findings seen on ultrasound.     5/26/2025  CT Abdomen Pelvis  Without Contrast    Thickening of the sigmoid colonic wall.  No pericolonic infiltration.  Findings may be related to underdistension, early or resolved colitis, neoplasia and or other etiology.     Mild perivesicular infiltration.  Recommend urinalysis to evaluate for the possibility of acute cystitis.     Trace pericardial effusion and/or mild pericardial wall thickening.    Assessment and Plan:     Assessment & Plan    Considered possible causes for persistent cough, including upper respiratory infection.  Evaluated abdominal nodule, ruling out hernia based on previous CT Abdomen results.  Assessed US findings of 3 mm cyst in abdomen, considering potential causes such as bug bite or shingles, but deemed unlikely due to lack of additional symptoms.  Considered possibility of cyst overlying a nerve, explaining sharp pain on palpation.  Ruled out lymphadenopathy based on previous CT Abdomen and antibiotic treatment.  Initiated short course of steroids to address both respiratory symptoms.    1. Acute cough (Primary)    Prescribed prednisone 40 mg daily for 5 days and recommended OTC Delsym cough medicine.    - predniSONE (DELTASONE) 20 MG tablet; Take 2 tablets (40 mg total) by mouth once daily. for 5 days  Dispense: 10 tablet; Refill: 0    2. Subcutaneous cyst    Ultrasound revealed a 3 mm mobile cyst that has possibly slightly enlarged over time.  The cyst is causing discomfort and is more palpable now, possibly due to its increased size.    - Ambulatory referral/consult to  General Surgery; Future          This note was generated with the assistance of ambient listening technology. Verbal consent was obtained by the patient and accompanying visitor(s) for the recording of patient appointment to facilitate this note. I attest to having reviewed and edited the generated note for accuracy, though some syntax or spelling errors may persist. Please contact the author of this note for any clarification.

## 2025-07-01 ENCOUNTER — OFFICE VISIT (OUTPATIENT)
Dept: SURGERY | Facility: CLINIC | Age: 60
End: 2025-07-01
Payer: COMMERCIAL

## 2025-07-01 VITALS
BODY MASS INDEX: 22 KG/M2 | HEART RATE: 78 BPM | WEIGHT: 132.06 LBS | TEMPERATURE: 99 F | DIASTOLIC BLOOD PRESSURE: 94 MMHG | HEIGHT: 65 IN | SYSTOLIC BLOOD PRESSURE: 145 MMHG

## 2025-07-01 DIAGNOSIS — L72.9 SUBCUTANEOUS CYST: ICD-10-CM

## 2025-07-01 PROCEDURE — 3008F BODY MASS INDEX DOCD: CPT | Mod: CPTII,S$GLB,, | Performed by: STUDENT IN AN ORGANIZED HEALTH CARE EDUCATION/TRAINING PROGRAM

## 2025-07-01 PROCEDURE — 99204 OFFICE O/P NEW MOD 45 MIN: CPT | Mod: S$GLB,,, | Performed by: STUDENT IN AN ORGANIZED HEALTH CARE EDUCATION/TRAINING PROGRAM

## 2025-07-01 PROCEDURE — 99999 PR PBB SHADOW E&M-EST. PATIENT-LVL IV: CPT | Mod: PBBFAC,,, | Performed by: STUDENT IN AN ORGANIZED HEALTH CARE EDUCATION/TRAINING PROGRAM

## 2025-07-01 PROCEDURE — 3077F SYST BP >= 140 MM HG: CPT | Mod: CPTII,S$GLB,, | Performed by: STUDENT IN AN ORGANIZED HEALTH CARE EDUCATION/TRAINING PROGRAM

## 2025-07-01 PROCEDURE — 3080F DIAST BP >= 90 MM HG: CPT | Mod: CPTII,S$GLB,, | Performed by: STUDENT IN AN ORGANIZED HEALTH CARE EDUCATION/TRAINING PROGRAM

## 2025-07-01 PROCEDURE — 1160F RVW MEDS BY RX/DR IN RCRD: CPT | Mod: CPTII,S$GLB,, | Performed by: STUDENT IN AN ORGANIZED HEALTH CARE EDUCATION/TRAINING PROGRAM

## 2025-07-01 PROCEDURE — 1159F MED LIST DOCD IN RCRD: CPT | Mod: CPTII,S$GLB,, | Performed by: STUDENT IN AN ORGANIZED HEALTH CARE EDUCATION/TRAINING PROGRAM

## 2025-07-02 ENCOUNTER — PATIENT MESSAGE (OUTPATIENT)
Dept: INTERNAL MEDICINE | Facility: CLINIC | Age: 60
End: 2025-07-02
Payer: COMMERCIAL

## 2025-07-03 ENCOUNTER — OFFICE VISIT (OUTPATIENT)
Dept: PAIN MEDICINE | Facility: CLINIC | Age: 60
End: 2025-07-03
Payer: COMMERCIAL

## 2025-07-03 VITALS
HEART RATE: 78 BPM | HEIGHT: 65 IN | RESPIRATION RATE: 12 BRPM | DIASTOLIC BLOOD PRESSURE: 82 MMHG | BODY MASS INDEX: 21.89 KG/M2 | WEIGHT: 131.38 LBS | OXYGEN SATURATION: 99 % | SYSTOLIC BLOOD PRESSURE: 119 MMHG

## 2025-07-03 DIAGNOSIS — M53.3 SACROILIAC JOINT PAIN: Primary | ICD-10-CM

## 2025-07-03 DIAGNOSIS — M54.16 LUMBAR RADICULOPATHY: ICD-10-CM

## 2025-07-03 DIAGNOSIS — M96.1 POSTLAMINECTOMY SYNDROME OF LUMBAR REGION: ICD-10-CM

## 2025-07-03 DIAGNOSIS — M47.816 LUMBAR SPONDYLOSIS: ICD-10-CM

## 2025-07-03 PROCEDURE — 3079F DIAST BP 80-89 MM HG: CPT | Mod: CPTII,S$GLB,, | Performed by: NURSE PRACTITIONER

## 2025-07-03 PROCEDURE — 99214 OFFICE O/P EST MOD 30 MIN: CPT | Mod: S$GLB,,, | Performed by: NURSE PRACTITIONER

## 2025-07-03 PROCEDURE — 99999 PR PBB SHADOW E&M-EST. PATIENT-LVL V: CPT | Mod: PBBFAC,,, | Performed by: NURSE PRACTITIONER

## 2025-07-03 PROCEDURE — 3074F SYST BP LT 130 MM HG: CPT | Mod: CPTII,S$GLB,, | Performed by: NURSE PRACTITIONER

## 2025-07-03 PROCEDURE — 3008F BODY MASS INDEX DOCD: CPT | Mod: CPTII,S$GLB,, | Performed by: NURSE PRACTITIONER

## 2025-07-03 PROCEDURE — 1159F MED LIST DOCD IN RCRD: CPT | Mod: CPTII,S$GLB,, | Performed by: NURSE PRACTITIONER

## 2025-07-03 PROCEDURE — 1160F RVW MEDS BY RX/DR IN RCRD: CPT | Mod: CPTII,S$GLB,, | Performed by: NURSE PRACTITIONER

## 2025-07-03 RX ORDER — IBUPROFEN 800 MG/1
800 TABLET, FILM COATED ORAL 3 TIMES DAILY PRN
Qty: 90 TABLET | Refills: 0 | Status: SHIPPED | OUTPATIENT
Start: 2025-07-03
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
## 2025-07-07 NOTE — PROGRESS NOTES
Patient ID: Twila Case is a 59 y.o. female.    Chief Complaint: No chief complaint on file.      HPI:  HPI  59F with one month right side pain, worse with pressing on it but overall constant. Finished course of abx for UTI but did not relieve. Notes two small subq nodules right groin.  Sometimes pain radiates to right upper thigh, described as burning.   Hx of back problems, lumbar spine surgery years ago. Has seen pain management in the past. Denies ted back pain now.   Cscope showed a few polyps  Hx of hysterectomy, lumbar fusion    Review of Systems   Constitutional:  Negative for fever.   HENT:  Negative for trouble swallowing.    Respiratory:  Negative for shortness of breath.    Cardiovascular:  Negative for chest pain.   Gastrointestinal:  Positive for abdominal pain. Negative for blood in stool, nausea and vomiting.   Genitourinary:  Negative for dysuria.   Musculoskeletal:  Negative for gait problem.   Skin:  Negative for rash and wound.   Allergic/Immunologic: Negative for immunocompromised state.   Neurological:  Negative for weakness.   Hematological:  Does not bruise/bleed easily.   Psychiatric/Behavioral:  Negative for agitation.        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        Past Surgical History:   Procedure Laterality Date    APPLICATION, GRAFT  8/9/2024    Procedure: APPLICATION, GRAFT;  Surgeon: Estevan Queen DPM;  Location: [facility] OR;  Service: Podiatry;;    BACK SURGERY      spinal fusion    COLONOSCOPY N/A 12/11/2019    Procedure: COLONOSCOPY;  Surgeon: Caroline Swartz MD;  Location: [address]);  Service: Endoscopy;  Laterality: N/A;    DEBRIDEMENT OF ACHILLES TENDON Right 8/9/2024    Procedure: DEBRIDEMENT, TENDON, ACHILLES;  Surgeon: Estevan Queen DPM;  Location: [facility]  OR;  Service: Podiatry;  Laterality: Right;    FUSION OF SACROILIAC JOINT Left 10/3/2022    Procedure: FUSION, SACROILIAC JOINT;  Surgeon: Henry Felix MD;  Location: [facility] OR;  Service: Pain Management;  Laterality: Left;  LEFT  PAINTEQ REP    INJECTION, SACROILIAC JOINT Left 7/14/2022    Procedure: INJECTION,SACROILIAC JOINT, LEFT;  Surgeon: Henry Felix MD;  Location: [facility] PAIN MGT;  Service: Pain Management;  Laterality: Left;    INJECTION, SACROILIAC JOINT Left 8/16/2022    Procedure: INJECTION,SACROILIAC JOINT, LEFT DIAGNOSTIC SI WITHOUT STEROID;  Surgeon: Henry Felix MD;  Location: [facility] PAIN MGT;  Service: Pain Management;  Laterality: Left;    INJECTION, SACROILIAC JOINT Left 9/1/2022    Procedure: INJECTION,SACROILIAC JOINT LEFT DIAGNOSTIC WITH NO STEROIDS;  Surgeon: Henry Felix MD;  Location: [facility] PAIN MGT;  Service: Pain Management;  Laterality: Left;    KNEE ARTHROSCOPY W/ MENISCECTOMY Left 8/15/2018    Procedure: ARTHROSCOPY, KNEE, WITH MENISCECTOMY;  Surgeon: Handy Elkins MD;  Location: [facility] OR;  Service: Orthopedics;  Laterality: Left;  Video    ROBOT-ASSISTED LAPAROSCOPIC ABDOMINAL HYSTERECTOMY USING DA MARIA ESTHER XI N/A 10/20/2020    Procedure: XI ROBOTIC HYSTERECTOMY;  Surgeon: Danie Burns MD;  Location: [facility] OR;  Service: OB/GYN;  Laterality: N/A;    ROBOT-ASSISTED LAPAROSCOPIC SALPINGO-OOPHORECTOMY USING DA MARIA ESTHER XI Bilateral 10/20/2020    Procedure: XI ROBOTIC SALPINGO-OOPHORECTOMY;  Surgeon: Danie Burns MD;  Location: [facility] OR;  Service: OB/GYN;  Laterality: Bilateral;    TARSAL TUNNEL RELEASE Right 1/5/2024    Procedure: RELEASE, TARSAL TUNNEL;  Surgeon: Estevan Queen DPM;  Location: [facility] OR;  Service: Podiatry;  Laterality: Right;    TRANSFORAMINAL EPIDURAL INJECTION OF STEROID Right 7/18/2019    Procedure: INJECTION, STEROID, EPIDURAL, TRANSFORAMINAL APPROACH, L4 AND L5;  Surgeon: Daniel Seo MD;  Location: [facility] PAIN MGT;  Service: Pain Management;   Laterality: Right;    TRANSFORAMINAL EPIDURAL INJECTION OF STEROID Right 3/16/2021    Procedure: INJECTION, STEROID, EPIDURAL, TRANSFORAMINAL APPROACH L4/5 AND L5/S1;  Surgeon: Daniel Seo MD;  Location: [facility];  Service: Pain Management;  Laterality: Right;    TUMOR REMOVAL      right hand       Social History[2]    Vitals:    07/01/25 1545   BP: (!) 145/94   Pulse: 78   Temp: 98.6 °F (37 °C)       Physical Exam  Constitutional:       General: She is not in acute distress.     Appearance: She is well-developed.   HENT:      Head: Normocephalic and atraumatic.   Eyes:      General: No scleral icterus.  Cardiovascular:      Rate and Rhythm: Normal rate.   Pulmonary:      Effort: Pulmonary effort is normal.      Breath sounds: No stridor.   Abdominal:      General: There is no distension.      Palpations: Abdomen is soft.      Tenderness: There is no abdominal tenderness.      Hernia: No hernia is present.       Lymphadenopathy:      Cervical: No cervical adenopathy.   Skin:     General: Skin is warm.      Findings: No erythema.   Neurological:      Mental Status: She is alert and oriented to person, place, and time.   Psychiatric:         Behavior: Behavior normal.     Body mass index is 21.98 kg/m².  CT no lumbar hernia    Assessment & Plan:  59F with right flank pain  Recommended following up with pain management again  No apparent general surgical issues             [1]   Current Outpatient Medications   Medication Sig Dispense Refill    biotin 5,000 mcg TbDL Take 1 capsule by mouth once daily.      buPROPion (WELLBUTRIN XL) 150 MG TB24 tablet Take 1 tablet (150 mg total) by mouth once daily. 90 tablet 0    buPROPion (WELLBUTRIN XL) 300 MG 24 hr tablet Take 1 tablet (300 mg total) by mouth once daily. 90 tablet 3    cholecalciferol, vitamin D3, (VITAMIN D3) 50 mcg (2,000 unit) Tab Take 1 tablet by mouth once daily.      clobetasol 0.05% (TEMOVATE) 0.05 % Oint Apply topically 2 (two) times daily. 30 g  1    FLAXSEED OIL MISC by Misc.(Non-Drug; Combo Route) route.      LORazepam (ATIVAN) 1 MG tablet TAKE 1 TABLET(1 MG) BY MOUTH EVERY 12 HOURS AS NEEDED. Do not take within 6 hours of oxycodone. 60 tablet 0    metoprolol succinate (TOPROL-XL) 25 MG 24 hr tablet TAKE 1 TABLET(25 MG) BY MOUTH EVERY DAY 90 tablet 0    multivitamin (THERAGRAN) per tablet Take 1 tablet by mouth once daily.      ondansetron (ZOFRAN-ODT) 4 MG TbDL Take 1 tablet (4 mg total) by mouth every 8 (eight) hours as needed (Nausea). 10 tablet 0    oxyCODONE-acetaminophen (PERCOCET) 5-325 mg per tablet Take 1 tablet by mouth every 12 (twelve) hours as needed for Pain. 28 tablet 0    prasterone, DHEA, (INTRAROSA) 6.5 mg Inst Place 1 insert (6.5 mg) vaginally every evening. 28 each 11    prasterone, DHEA, (INTRAROSA) 6.5 mg Inst Place 6.5 mg vaginally nightly. 30 each 11    traZODone (DESYREL) 100 MG tablet Take 1 tablet (100 mg total) by mouth every evening. 30 tablet 11    ibuprofen (ADVIL,MOTRIN) 800 MG tablet Take 1 tablet (800 mg total) by mouth 3 (three) times daily as needed for Pain. 90 tablet 0     No current facility-administered medications for this visit.     Facility-Administered Medications Ordered in Other Visits   Medication Dose Route Frequency Provider Last Rate Last Admin    0.9%  NaCl infusion  500 mL Intravenous Continuous Inez Joiner MD       [2]   Social History  Socioeconomic History    Marital status:    Occupational History    Occupation:      Employer: [employer]    Tobacco Use    Smoking status: Former    Smokeless tobacco: Never   Substance and Sexual Activity    Alcohol use: Yes     Alcohol/week: 5.0 standard drinks of alcohol     Types: 5 Glasses of wine per week     Comment: 3 drinks a week    Drug use: No    Sexual activity: Yes     Partners: Male     Comment:      Social Drivers of Health     Financial Resource Strain: Low Risk  (3/11/2025)    Overall Financial Resource Strain (CARDIA)      Difficulty of Paying Living Expenses: Not hard at all   Food Insecurity: No Food Insecurity (3/11/2025)    Hunger Vital Sign     Worried About Running Out of Food in the Last Year: Never true     Ran Out of Food in the Last Year: Never true   Transportation Needs: No Transportation Needs (3/11/2025)    PRAPARE - Transportation     Lack of Transportation (Medical): No     Lack of Transportation (Non-Medical): No   Physical Activity: Insufficiently Active (3/11/2025)    Exercise Vital Sign     Days of Exercise per Week: 3 days     Minutes of Exercise per Session: 30 min   Stress: Stress Concern Present (3/11/2025)    [employer] - Occupational Stress Questionnaire     Feeling of Stress : To some extent   Housing Stability: Low Risk  (3/11/2025)    Housing Stability Vital Sign     Unable to Pay for Housing in the Last Year: No     Number of Times Moved in the Last Year: 0     Homeless in the Last Year: No

## 2025-07-10 ENCOUNTER — CLINICAL SUPPORT (OUTPATIENT)
Dept: REHABILITATION | Facility: HOSPITAL | Age: 60
End: 2025-07-10
Payer: COMMERCIAL

## 2025-07-10 DIAGNOSIS — M53.3 SACROILIAC JOINT PAIN: Primary | ICD-10-CM

## 2025-07-10 DIAGNOSIS — M54.16 LUMBAR RADICULOPATHY: ICD-10-CM

## 2025-07-10 PROCEDURE — 97161 PT EVAL LOW COMPLEX 20 MIN: CPT | Mod: PO

## 2025-07-15 NOTE — PROGRESS NOTES
"  Outpatient Rehab    Physical Therapy Evaluation (only)    Patient Name: Olinda Lynn  MRN: [id number]  YOB: 1965  Encounter Date: 7/10/2025    Therapy Diagnosis:   Encounter Diagnoses   Name Primary?    Sacroiliac joint pain Yes    Lumbar radiculopathy      Physician: Kathy Rodriguez NP    Physician Orders: Eval and Treat  Medical Diagnosis: Sacroiliac joint pain  Lumbar radiculopathy  Surgical Diagnosis: Not applicable for this Episode  Surgical Date: Not applicable for this Episode  Days Since Last Surgery: Not applicable for this Episode    Visit # / Visits Authorized:  1 / 1  Insurance Authorization Period: 7/3/2025 to 7/3/2026  Date of Evaluation: 7/10/2025  Plan of Care Certification: 7/10/2025 to 9/10/2025     Time In: 1605   Time Out: 1700  Total Time (in minutes): 55   Total Billable Time (in minutes): 55    Intake Outcome Measure for FOTO Survey    Therapist reviewed FOTO scores for Olinda Case on 7/10/2025.   FOTO report - see Media section or FOTO account episode details.     Intake Score: 44%    Precautions:  Additional Precautions and Protocol Details: L4-S1 fusion 2009.  L SI "spacer" 2022.    Subjective   History of Present Illness  Olinda is a 59 y.o. female who reports to physical therapy with a chief concern of Pelvic girdle pian.     The patient reports a medical diagnosis of Lumbar radiculopathy.    Diagnostic tests related to this condition: MRI studies and X-ray.   MRI Studies Details: See test  X-Ray Details: See test    Dominant Hand: Right  History of Present Condition/Illness: Pt reports that she started having R side abdominal pain about 6 weeks ago that has progressed to a pelvic pain.  She has a Hx of L4-S1 fusion 2009 and a L SI spacer 2022.  She has recently had R foot surgery on her achilles and tarsal tunnel and she was in a boot on and off for several months.     Activities of Daily Living  Social history was obtained from Patient.               Previously independent " with activities of daily living? Yes     Currently independent with activities of daily living? Yes     She is able to perform all ADL's with pain.    Previously independent with instrumental activities of daily living? Yes     Currently independent with instrumental activities of daily living? Yes     She is able to perform all activities with increased pain        Pain     Patient reports a current pain level of 6/10. Pain at best is reported as 6/10. Pain at worst is reported as 8/10.   Location: R anterior hip/pelvis  Clinical Progression (since onset): Worsening  Pain Qualities: Other (Comment), Burning, Aching  Other Pain Qualities: Tingling down her R Leg  Pain-Relieving Factors: Lying down  Pain-Aggravating Factors: Other (Comment)  Other Pain-Aggravating Factors: sit to stand         Treatment History  Treatments  Previously Received Treatments: Yes  Previous Treatments: Medications - over-the-counter  Currently Receiving Treatments: Yes  Current Treatments: Medications - prescription    Living Arrangements  Living Situation  Living Arrangements: Spouse/significant other, Family members    Home Setup  Home Access: Stairs without rails  Entrance Stairs - Number of Steps: 1  Number of Levels in Home: One level        Employment  Employment Status: Employed full-time   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    Twila Case  has a past surgical history that includes Back surgery; Tumor removal; Knee arthroscopy w/ meniscectomy (Left, 8/15/2018); Transforaminal epidural injection of steroid (Right, 7/18/2019); Colonoscopy (N/A, 12/11/2019); Robot-assisted laparoscopic abdominal hysterectomy using da Angel Xi (N/A, 10/20/2020); Robot-assisted laparoscopic salpingo-oophorectomy using da Angel Xi (Bilateral, 10/20/2020);  Transforaminal epidural injection of steroid (Right, 3/16/2021); injection, sacroiliac joint (Left, 7/14/2022); injection, sacroiliac joint (Left, 8/16/2022); injection, sacroiliac joint (Left, 9/1/2022); Fusion of sacroiliac joint (Left, 10/3/2022); Tarsal tunnel release (Right, 1/5/2024); Debridement of Achilles tendon (Right, 8/9/2024); and application, graft (8/9/2024).    Twila has a current medication list which includes the following prescription(s): biotin, bupropion, bupropion, cholecalciferol (vitamin d3), clobetasol 0.05%, flaxseed oil, ibuprofen, lorazepam, metoprolol succinate, multivitamin, ondansetron, oxycodone-acetaminophen, intrarosa, intrarosa, and trazodone, and the following Facility-Administered Medications: 0.9% nacl.    Review of patient's allergies indicates:  No Known Allergies     Objective   Posture          Right pelvis characteristics: Anterior Superior Iliac Spine Higher, Posterior Superior Iliac Spine Higher, and Ilium Higher         Leg length: Umbilicus  to Medial Malleolus R 94 cm, L 94.5    Lower Extremity Sensation  General Lumbar/Lower Extremity Sensation  Impaired: Right  Right Lumbar/Lower Extremity Sensation  Diminished: Light Touch       Left Lumbar/Lower Extremity Sensation  Intact: Light Touch                 Hip Range of Motion   Right Hip   Active (deg) Passive (deg) Pain   Flexion   110     Extension         ABduction         ADduction         External Rotation 90/90         External Rotation Prone         Internal Rotation 90/90         Internal Rotation Prone             Left Hip   Active (deg) Passive (deg) Pain   Flexion   106     Extension         ABduction         ADduction         External Rotation 90/90         External Rotation Prone         Internal Rotation 90/90         Internal Rotation Prone                            Hip Strength - Planes of Motion   Right Strength Right Pain Left Strength Left  Pain   Flexion (L2) 4 Yes 4 Yes   Extension            ABduction           ADduction           Internal Rotation           External Rotation               Knee Strength   Right Strength Right Pain Left Strength Left  Pain   Flexion (S2) 3+   4+     Prone Flexion           Extension (L3) 3+   4+            Ankle/Foot Strength - Planes of Motion   Right Strength Right Pain Left Strength Left  Pain   Dorsiflexion (L4) 4+   5     Plantar Flexion (S1)           Inversion 4   4+     Eversion 4   4+     Great Toe Flexion           Great Toe Extension (L5)           Lesser Toes Flexion           Lesser Toes Extension                     Time Entry(in minutes):  PT Evaluation (Low) Time Entry: 50    Assessment & Plan   Assessment  Olinda presents with a condition of Low complexity.   Presentation of Symptoms: Stable       Functional Limitations: Activity tolerance, Completing work/school activities, Pain when reaching, Pain with ADLs/IADLs, Sitting tolerance, Disrupted sleep pattern, Range of motion, Performing household chores, Participating in leisure activities, Painful locomotion/ambulation  Impairments: Activity intolerance, Abnormal or restricted range of motion, Impaired physical strength, Lack of appropriate home exercise program, Pain with functional activity    Patient Goal for Therapy (PT): To find out what is causing her SX  Prognosis: Good  Assessment Details: Patient demonstrates deficits with range of motion, strength, and function that limit ability to participate in school, work, and recreational activities. They would benefit from skilled PT services to normalize kinetic chain mobility, strength, and function to safely return to their prior level of activity.    Plan  From a physical therapy perspective, the patient would benefit from: Skilled Rehab Services    Planned therapy interventions include: Therapeutic exercise, Therapeutic activities, Neuromuscular re-education, Manual therapy, ADLs/IADLs, Other (Comment), and Gait training. Dry Needling  (prn)  Planned modalities to include: Electrical stimulation - attended, Electrical stimulation - passive/unattended, Thermotherapy (hot pack), and Cryotherapy (cold pack).        Visit Frequency: 2 times Per Week for 8 Weeks.       This plan was discussed with Patient.   Discussion participants: Agreed Upon Plan of Care  Plan details: Frequency and duration of treatment to be adjusted as needed          The patient's spiritual, cultural, and educational needs were considered, and the patient is agreeable to the plan of care and goals.           Goals:   Active       Long term goals       Pt will be independent in a HEP to assist in managing their back and R LE Sx.         Start:  07/10/25    Expected End:  09/10/25            Pt will report decreased R side back and LE pain to be </= 2/10 at worst       Start:  07/10/25    Expected End:  09/10/25            Improve L hip flexion pain free ROM by 10 degrees       Start:  07/10/25    Expected End:  09/10/25            Improve R knee MMT by one muscle grade       Start:  07/10/25    Expected End:  09/10/25            Pt will be compliant with her Physical Therapy home exercise program       Start:  07/10/25    Expected End:  09/10/25            Pt will report improved function through an improved score on the FOTO Lumbar spine survey        Start:  07/10/25    Expected End:  09/10/25               Short term goals       Pt will be instructed in an exercise program to address functional deficits related to her back and LE Sx.       Start:  07/10/25    Expected End:  08/10/25            Pt will report decreased R side back and LE pain to be </= 5/10 at worst       Start:  07/10/25    Expected End:  08/10/25            Improve B hip flexion pain free ROM by 5 degrees       Start:  07/10/25    Expected End:  08/10/25            Improve R knee MMT by one half muscle grade       Start:  07/10/25    Expected End:  08/10/25            Pt will be compliant with her Physical  Therapy appointments       Start:  07/10/25    Expected End:  08/10/25                Farrukh Puentes, PT

## 2025-07-17 ENCOUNTER — OFFICE VISIT (OUTPATIENT)
Dept: PAIN MEDICINE | Facility: CLINIC | Age: 60
End: 2025-07-17
Payer: COMMERCIAL

## 2025-07-17 ENCOUNTER — PATIENT MESSAGE (OUTPATIENT)
Dept: PAIN MEDICINE | Facility: CLINIC | Age: 60
End: 2025-07-17
Payer: COMMERCIAL

## 2025-07-17 DIAGNOSIS — M47.816 LUMBAR SPONDYLOSIS: ICD-10-CM

## 2025-07-17 DIAGNOSIS — F41.9 ANXIETY: Chronic | ICD-10-CM

## 2025-07-17 DIAGNOSIS — M54.16 LUMBAR RADICULOPATHY: Primary | ICD-10-CM

## 2025-07-17 DIAGNOSIS — M96.1 POSTLAMINECTOMY SYNDROME OF LUMBAR REGION: ICD-10-CM

## 2025-07-17 DIAGNOSIS — M54.9 DORSALGIA, UNSPECIFIED: ICD-10-CM

## 2025-07-17 DIAGNOSIS — M53.3 SACROILIAC JOINT PAIN: ICD-10-CM

## 2025-07-17 PROCEDURE — 98005 SYNCH AUDIO-VIDEO EST LOW 20: CPT | Mod: 95,,, | Performed by: NURSE PRACTITIONER

## 2025-07-17 PROCEDURE — 1159F MED LIST DOCD IN RCRD: CPT | Mod: CPTII,95,, | Performed by: NURSE PRACTITIONER

## 2025-07-17 PROCEDURE — 1160F RVW MEDS BY RX/DR IN RCRD: CPT | Mod: CPTII,95,, | Performed by: NURSE PRACTITIONER

## 2025-07-17 RX ORDER — LORAZEPAM 1 MG/1
TABLET ORAL
Qty: 60 TABLET | Refills: 0 | Status: SHIPPED | OUTPATIENT
Start: 2025-07-17
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
## 2025-07-24 ENCOUNTER — HOSPITAL ENCOUNTER (OUTPATIENT)
Dept: RADIOLOGY | Facility: OTHER | Age: 60
Discharge: HOME | End: 2025-07-24
Attending: NURSE PRACTITIONER
Payer: COMMERCIAL

## 2025-07-24 DIAGNOSIS — M54.9 DORSALGIA, UNSPECIFIED: ICD-10-CM

## 2025-07-24 PROCEDURE — 72148 MRI LUMBAR SPINE W/O DYE: CPT | Mod: TC

## 2025-07-24 PROCEDURE — 72148 MRI LUMBAR SPINE W/O DYE: CPT | Mod: 26,,, | Performed by: RADIOLOGY

## 2025-07-26 ENCOUNTER — PATIENT MESSAGE (OUTPATIENT)
Dept: PAIN MEDICINE | Facility: OTHER | Age: 60
End: 2025-07-26
Payer: COMMERCIAL

## 2025-07-30 ENCOUNTER — CLINICAL SUPPORT (OUTPATIENT)
Dept: REHABILITATION | Facility: HOSPITAL | Age: 60
End: 2025-07-30
Payer: COMMERCIAL

## 2025-07-30 DIAGNOSIS — M54.16 LUMBAR RADICULOPATHY: Primary | ICD-10-CM

## 2025-07-30 PROCEDURE — 97140 MANUAL THERAPY 1/> REGIONS: CPT | Mod: PO

## 2025-07-30 NOTE — PROGRESS NOTES
"  Outpatient Rehab    Physical Therapy Visit    Patient Name: Olinda Lynn  MRN: [id number]  YOB: 1965  Encounter Date: 7/30/2025    Therapy Diagnosis:   Encounter Diagnosis   Name Primary?    Lumbar radiculopathy Yes     Physician: Kathy Rodriguez NP    Physician Orders: Eval and Treat  Medical Diagnosis: Sacroiliac joint pain  Lumbar radiculopathy  Surgical Diagnosis: Not applicable for this Episode   Surgical Date: Not applicable for this Episode  Days Since Last Surgery: Not applicable for this Episode    Visit # / Visits Authorized:  3 / 20  Insurance Authorization Period: 7/11/2025 to 12/31/2025  Date of Evaluation: 7/10/2025  Plan of Care Certification: 7/10/2025 to 9/10/2025      PT/PTA: PT   Number of PTA visits since last PT visit:0  Time In: 1633   Time Out: 1726  Total Time (in minutes): 53   Total Billable Time (in minutes): 53    FOTO:  Intake Score (%): 44  Survey Score 2 (%): Not applicable for this Episode  Survey Score 3 (%): Not applicable for this Episode    Precautions:  Additional Precautions and Protocol Details: L4-S1 fusion 2009.  L SI "spacer" 2022.      Subjective   Pt reports that her back pain and B leg Sx continue. 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  She also reported having an MRI last week, but has not discussed it with her Dr..  Pain reported as 7/10. Pain in her back, R > L, and tingling into B upper legs.    Objective            Treatment:  Manual Therapy  MT 1: hook lying B short axis dsitraction to B LE's  MT 2: Soft tissue mobilization to B lumbar paraspinals  MT 3: Dry needling to the B lumbar paraspinal muscles as tolerated with 5 - 60 mm needles and one 60 mm needle into the R quadratus lumborum.  Modalities  Dry Needling: Dry needling to the B lumbar paraspinal muscles as tolerated with 5 - 60 mm needles and one 60 mm needle into the R quadratus lumborum.    Time Entry(in minutes):  Manual Therapy Time Entry: 38  Number of Muscles " Needled: 3    Assessment & Plan   Assessment: Pt with continued back pain and tingling into B upper legs.  We discussed her MRI report and how the findings may relate to her Sx. She reported some decrease in her pain level with B hook lying short axis LE distraction.  She also received dry needling on the B lumbar paraspinals and R quadratus lumborum.  She reported increased discomfort with the setting of some needles while others were well tolerated. PT to have a R SI injection tomorrow afternoon.       The patient will continue to benefit from skilled outpatient physical therapy in order to address the deficits listed in the problem list on the initial evaluation, provide patient and family education, and maximize the patients level of independence in the home and community environments.     The patient's spiritual, cultural, and educational needs were considered, and the patient is agreeable to the plan of care and goals.           Plan: Progress Physical Therapy program to assist Pt with managing her back and B LE Sx.    Goals:   Active       Long term goals       Pt will be independent in a HEP to assist in managing their back and R LE Sx.         Start:  07/10/25    Expected End:  09/10/25            Pt will report decreased R side back and LE pain to be </= 2/10 at worst       Start:  07/10/25    Expected End:  09/10/25            Improve L hip flexion pain free ROM by 10 degrees       Start:  07/10/25    Expected End:  09/10/25            Improve R knee MMT by one muscle grade       Start:  07/10/25    Expected End:  09/10/25            Pt will be compliant with her Physical Therapy home exercise program       Start:  07/10/25    Expected End:  09/10/25            Pt will report improved function through an improved score on the FOTO Lumbar spine survey        Start:  07/10/25    Expected End:  09/10/25               Short term goals       Pt will be instructed in an exercise program to address functional  deficits related to her back and LE Sx.       Start:  07/10/25    Expected End:  08/10/25            Pt will report decreased R side back and LE pain to be </= 5/10 at worst       Start:  07/10/25    Expected End:  08/10/25            Improve B hip flexion pain free ROM by 5 degrees       Start:  07/10/25    Expected End:  08/10/25            Improve R knee MMT by one half muscle grade       Start:  07/10/25    Expected End:  08/10/25            Pt will be compliant with her Physical Therapy appointments       Start:  07/10/25    Expected End:  08/10/25                Farrukh Puentes, PT

## 2025-07-31 ENCOUNTER — HOSPITAL ENCOUNTER (OUTPATIENT)
Facility: OTHER | Age: 60
Discharge: HOME | End: 2025-07-31
Attending: ANESTHESIOLOGY | Admitting: ANESTHESIOLOGY
Payer: COMMERCIAL

## 2025-07-31 ENCOUNTER — PATIENT MESSAGE (OUTPATIENT)
Dept: PAIN MEDICINE | Facility: CLINIC | Age: 60
End: 2025-07-31

## 2025-07-31 VITALS
BODY MASS INDEX: 21.66 KG/M2 | SYSTOLIC BLOOD PRESSURE: 152 MMHG | HEART RATE: 67 BPM | WEIGHT: 130 LBS | TEMPERATURE: 99 F | HEIGHT: 65 IN | DIASTOLIC BLOOD PRESSURE: 81 MMHG | OXYGEN SATURATION: 100 % | RESPIRATION RATE: 18 BRPM

## 2025-07-31 DIAGNOSIS — M46.1 SACROILIITIS: Primary | ICD-10-CM

## 2025-07-31 DIAGNOSIS — G89.29 CHRONIC PAIN: ICD-10-CM

## 2025-07-31 PROCEDURE — 63600175 PHARM REV CODE 636 W HCPCS: Performed by: ANESTHESIOLOGY

## 2025-07-31 PROCEDURE — 27096 INJECT SACROILIAC JOINT: CPT | Mod: RT,,, | Performed by: ANESTHESIOLOGY

## 2025-07-31 PROCEDURE — 27096 INJECT SACROILIAC JOINT: CPT | Mod: RT | Performed by: ANESTHESIOLOGY

## 2025-07-31 PROCEDURE — 25500020 PHARM REV CODE 255: Performed by: ANESTHESIOLOGY

## 2025-07-31 RX ORDER — MIDAZOLAM HYDROCHLORIDE 1 MG/ML
INJECTION INTRAMUSCULAR; INTRAVENOUS
Status: DISCONTINUED | OUTPATIENT
Start: 2025-07-31 | End: 2025-07-31 | Stop reason: HOSPADM

## 2025-07-31 RX ORDER — SODIUM CHLORIDE 9 MG/ML
INJECTION, SOLUTION INTRAVENOUS CONTINUOUS
Status: DISCONTINUED | OUTPATIENT
Start: 2025-07-31 | End: 2025-07-31 | Stop reason: HOSPADM

## 2025-07-31 RX ORDER — LIDOCAINE HYDROCHLORIDE 20 MG/ML
INJECTION, SOLUTION INFILTRATION; PERINEURAL
Status: DISCONTINUED | OUTPATIENT
Start: 2025-07-31 | End: 2025-07-31 | Stop reason: HOSPADM

## 2025-07-31 RX ORDER — TRIAMCINOLONE ACETONIDE 40 MG/ML
INJECTION, SUSPENSION INTRA-ARTICULAR; INTRAMUSCULAR
Status: DISCONTINUED | OUTPATIENT
Start: 2025-07-31 | End: 2025-07-31 | Stop reason: HOSPADM

## 2025-07-31 RX ORDER — FENTANYL CITRATE 50 UG/ML
INJECTION, SOLUTION INTRAMUSCULAR; INTRAVENOUS
Status: DISCONTINUED | OUTPATIENT
Start: 2025-07-31 | End: 2025-07-31 | Stop reason: HOSPADM

## 2025-07-31 RX ORDER — BUPIVACAINE HYDROCHLORIDE 2.5 MG/ML
INJECTION, SOLUTION EPIDURAL; INFILTRATION; INTRACAUDAL; PERINEURAL
Status: DISCONTINUED | OUTPATIENT
Start: 2025-07-31 | End: 2025-07-31 | Stop reason: HOSPADM

## 2025-07-31 NOTE — DISCHARGE INSTRUCTIONS

## 2025-07-31 NOTE — DISCHARGE SUMMARY
Discharge Note  Short Stay      SUMMARY     Admit Date: 7/31/2025    Attending Physician: Henry Felix MD    Discharge Physician: Lorenzo Solomon MD      Discharge Date: 7/31/2025 2:24 PM    Procedure(s) (LRB):  INJECTION,SACROILIAC JOINT (Right)    Final Diagnosis: Sacroiliac joint pain [M53.3]    Disposition: Home or self care    Patient Instructions:   Current Discharge Medication List        CONTINUE these medications which have NOT CHANGED    Details   biotin 5,000 mcg TbDL Take 1 capsule by mouth once daily.      !! buPROPion (WELLBUTRIN XL) 150 MG TB24 tablet Take 1 tablet (150 mg total) by mouth once daily.  Qty: 90 tablet, Refills: 0      !! buPROPion (WELLBUTRIN XL) 300 MG 24 hr tablet Take 1 tablet (300 mg total) by mouth once daily.  Qty: 90 tablet, Refills: 3      cholecalciferol, vitamin D3, (VITAMIN D3) 50 mcg (2,000 unit) Tab Take 1 tablet by mouth once daily.      clobetasol 0.05% (TEMOVATE) 0.05 % Oint Apply topically 2 (two) times daily.  Qty: 30 g, Refills: 1    Associated Diagnoses: Vagina itching      FLAXSEED OIL MISC by Misc.(Non-Drug; Combo Route) route.      ibuprofen (ADVIL,MOTRIN) 800 MG tablet Take 1 tablet (800 mg total) by mouth 3 (three) times daily as needed for Pain.  Qty: 90 tablet, Refills: 0    Associated Diagnoses: Sacroiliac joint pain      LORazepam (ATIVAN) 1 MG tablet TAKE 1 TABLET(1 MG) BY MOUTH EVERY 12 HOURS AS NEEDED. Do not take within 6 hours of oxycodone.  Qty: 60 tablet, Refills: 0    Associated Diagnoses: Anxiety      metoprolol succinate (TOPROL-XL) 25 MG 24 hr tablet TAKE 1 TABLET(25 MG) BY MOUTH EVERY DAY  Qty: 90 tablet, Refills: 0    Comments: .      multivitamin (THERAGRAN) per tablet Take 1 tablet by mouth once daily.      ondansetron (ZOFRAN-ODT) 4 MG TbDL Take 1 tablet (4 mg total) by mouth every 8 (eight) hours as needed (Nausea).  Qty: 10 tablet, Refills: 0    Associated Diagnoses: Nausea      oxyCODONE-acetaminophen (PERCOCET) 5-325 mg per tablet Take  1 tablet by mouth every 12 (twelve) hours as needed for Pain.  Qty: 28 tablet, Refills: 0    Comments: Quantity prescribed more than 7 day supply? Yes, quantity medically necessary  Associated Diagnoses: Nerve entrapment of lower limb, right      !! prasterone, DHEA, (INTRAROSA) 6.5 mg Inst Place 1 insert (6.5 mg) vaginally every evening.  Qty: 28 each, Refills: 11    Associated Diagnoses: Vaginal atrophy; Dyspareunia in female      !! prasterone, DHEA, (INTRAROSA) 6.5 mg Inst Place 6.5 mg vaginally nightly.  Qty: 30 each, Refills: 11    Comments: Please help with PA  Pt contraindication to estrogen  Associated Diagnoses: Vaginal atrophy; Dyspareunia in female      traZODone (DESYREL) 100 MG tablet Take 1 tablet (100 mg total) by mouth every evening.  Qty: 30 tablet, Refills: 11    Associated Diagnoses: Sleep disturbances       !! - Potential duplicate medications found. Please discuss with provider.              Discharge Diagnosis: Sacroiliac joint pain [M53.3]  Condition on Discharge: Stable with no complications to procedure   Diet on Discharge: Same as before.  Activity: as per instruction sheet.  Discharge to: Home with a responsible adult.  Follow up: 2-4 weeks       Please call my office or pager at [phone] if experienced any weakness or loss of sensation, fever > 101.5, pain uncontrolled with oral medications, persistent nausea/vomiting/or diarrhea, redness or drainage from the incisions, or any other worrisome concerns. If physician on call was not reached or could not communicate with our office for any reason please go to the nearest emergency department      Lorenzo Solomon M.D.  PGY-5  Interventional Pain Management Fellow  [facility]  Pager: [phone]    07/31/2025

## 2025-07-31 NOTE — OP NOTE
Sacroiliac Joint Injection under Fluoroscopic Guidance    The procedure, risks, benefits, and options were discussed with the patient. There are no contraindications to the procedure. The patent expressed understanding and agreed to the procedure. Informed written consent was obtained prior to the start of the procedure and can be found in the patient's chart.    PATIENT NAME: Twila Lynn   MRN: [id number]     DATE OF PROCEDURE: 07/31/2025    PROCEDURE: Right Sacroiliac Joint Injection under Fluoroscopic Guidance    PRE-OP DIAGNOSIS: Sacroiliac joint pain [M53.3]    POST-OP DIAGNOSIS: Same    PHYSICIAN: Henry Felix MD    ASSISTANTS: DO Lorenzo Atkins MD Ethan Levitch MS4     MEDICATIONS INJECTED: Preservative-free Kenalog 40mg with 3cc of Bupivacine 0.25%     LOCAL ANESTHETIC INJECTED: Xylocaine 2%     SEDATION: Versed 1mg and Fentanyl 25mcg                                                                                                                                                                                     Conscious sedation ordered by M.D. Patient re-evaluation prior to administration of conscious sedation. No changes noted in patient's status from initial evaluation. The patient's vital signs were monitored by RN and patient remained hemodynamically stable throughout the procedure.    Event Time In   Sedation Start 1419   Sedation End 1425       ESTIMATED BLOOD LOSS: None    COMPLICATIONS: None    TECHNIQUE: Time-out was performed to identify the patient and procedure to be performed. With the patient laying in a prone position, the surgical area was prepped and draped in the usual sterile fashion using ChloraPrep and a fenestrated drape. The sacroiliac joint was determined under fluoroscopy guidance. Skin anesthesia was achieved by injecting Lidocaine 2% over the injection site. The sacroiliac joint was  then approached with a 25 gauge, 3.5 inch spinal quinke needle that was  introduced under fluoroscopic guidance in the AP and Lateral views. Once the needle tip was in the area of the joint, and there was no blood, contrast dye Omnipaque (300mg/mL) was injected to confirm placement and there was no vascular runoff. Fluoroscopic imaging in the AP and lateral views revealed a clear outline of the joint space. 4 mL of the medication mixture listed above was injected slowly intraarticular and mary-articular. Displacement of the radio opaque contrast after injection of the medication confirmed that the medication went into the area of the joint. The needles were removed and bleeding was nil. A sterile dressing was applied. No specimens collected. The patient tolerated the procedure well.       The patient was monitored after the procedure in the recovery area. They were given post-procedure and discharge instructions to follow at home. The patient was discharged in a stable condition.    Lorenzo Solomon MD     I reviewed and edited the fellow's note. I conducted my own interview and physical examination. I agree with the findings. I was present and supervising all critical portions of the procedure.    Henry Felix MD

## 2025-08-06 ENCOUNTER — CLINICAL SUPPORT (OUTPATIENT)
Dept: REHABILITATION | Facility: HOSPITAL | Age: 60
End: 2025-08-06
Payer: COMMERCIAL

## 2025-08-06 DIAGNOSIS — M54.16 LUMBAR RADICULOPATHY: Primary | ICD-10-CM

## 2025-08-06 PROCEDURE — 97140 MANUAL THERAPY 1/> REGIONS: CPT | Mod: PO

## 2025-08-06 PROCEDURE — 97110 THERAPEUTIC EXERCISES: CPT | Mod: PO

## 2025-08-06 NOTE — PROGRESS NOTES
"  Outpatient Rehab    Physical Therapy Visit    Patient Name: Olinda Lynn  MRN: [id number]  YOB: 1965  Encounter Date: 8/6/2025    Therapy Diagnosis:   Encounter Diagnosis   Name Primary?    Lumbar radiculopathy Yes     Physician: Kathy Rodriguez NP    Physician Orders: Eval and Treat  Medical Diagnosis: Sacroiliac joint pain  Lumbar radiculopathy  Surgical Diagnosis: Not applicable for this Episode   Surgical Date: Not applicable for this Episode  Days Since Last Surgery: Not applicable for this Episode    Visit # / Visits Authorized:  4 / 20  Insurance Authorization Period: 7/11/2025 to 12/31/2025  Date of Evaluation: 7/10/2025  Plan of Care Certification: 7/10/2025 to 9/10/2025      PT/PTA: PT   Number of PTA visits since last PT visit:0  Time In: 1630   Time Out: 1730  Total Time (in minutes): 60   Total Billable Time (in minutes): 60    FOTO:  Intake Score (%): 44  Survey Score 2 (%): Not applicable for this Episode  Survey Score 3 (%): Not applicable for this Episode    Precautions:  Additional Precautions and Protocol Details: L4-S1 fusion 2009.  L SI "spacer" 2022.      Subjective   Her pain is a little less in her her hip asdn buttocks afte the injection..  Pain reported as 5/10. lower back    Objective   Posture          Right pelvis characteristics: Anterior Superior Iliac Spine Higher, Posterior Superior Iliac Spine Higher, and Ilium Higher              Spinal Mobility  Lumbosacral Mobility Details: R SI joint appears Hypomobile as it moves before the L, but she has a Hx of a "Block" at the L SI joint.  Side lying mobility testing revealed no flexion at L5-S1 or L4-5 secondary to a previous fusion.  L side lying gapping at L3-4 gave her some sort term relief as did R U and B LE short axis distraction.           Treatment:  Therapeutic Exercise  TE 1: TA activation - pianful, but contracts with gluteal sets  TE 2: gluteal sets - painful, but less than TA activation alone  Manual Therapy  MT 1: " hook lying B short axis dsitraction to B LE's and U R LE  MT 4: soft tissue mobilization R Psoas  MT 5: L side lying gapping of L3-4  Therapeutic Activity  TA 1: Heel lift replaced on the R    Time Entry(in minutes):  Manual Therapy Time Entry: 45  Therapeutic Exercise Time Entry: 15    Assessment & Plan   Assessment: Pt reports some improvement in her pain level following R SI joint injection last week.  She reported short term relief from B and R U LE short axis distraction.  She also reported short term relief from L side lying L3-4 gapping.  She was given a heel lift to address a leg length difference. She uses heel lifts for B achilles Sx. Although this heel lift was intended for the L LE it replaced the one on the R which improved her pelvic alignment.        The patient will continue to benefit from skilled outpatient physical therapy in order to address the deficits listed in the problem list on the initial evaluation, provide patient and family education, and maximize the patients level of independence in the home and community environments.     The patient's spiritual, cultural, and educational needs were considered, and the patient is agreeable to the plan of care and goals.           Plan: Progress Physical Therapy program to assist Pt with managing her back and B LE Sx.    Goals:   Active       Long term goals       Pt will be independent in a HEP to assist in managing their back and R LE Sx.         Start:  07/10/25    Expected End:  09/10/25            Pt will report decreased R side back and LE pain to be </= 2/10 at worst       Start:  07/10/25    Expected End:  09/10/25            Improve L hip flexion pain free ROM by 10 degrees       Start:  07/10/25    Expected End:  09/10/25            Improve R knee MMT by one muscle grade       Start:  07/10/25    Expected End:  09/10/25            Pt will be compliant with her Physical Therapy home exercise program       Start:  07/10/25    Expected End:   09/10/25            Pt will report improved function through an improved score on the FOTO Lumbar spine survey        Start:  07/10/25    Expected End:  09/10/25               Short term goals       Pt will be instructed in an exercise program to address functional deficits related to her back and LE Sx.       Start:  07/10/25    Expected End:  08/10/25            Pt will report decreased R side back and LE pain to be </= 5/10 at worst       Start:  07/10/25    Expected End:  08/10/25            Improve B hip flexion pain free ROM by 5 degrees       Start:  07/10/25    Expected End:  08/10/25            Improve R knee MMT by one half muscle grade       Start:  07/10/25    Expected End:  08/10/25            Pt will be compliant with her Physical Therapy appointments       Start:  07/10/25    Expected End:  08/10/25                Farrukh Puentes, PT

## 2025-08-13 ENCOUNTER — OFFICE VISIT (OUTPATIENT)
Dept: PAIN MEDICINE | Facility: CLINIC | Age: 60
End: 2025-08-13
Payer: COMMERCIAL

## 2025-08-13 VITALS
DIASTOLIC BLOOD PRESSURE: 77 MMHG | HEIGHT: 65 IN | WEIGHT: 131.38 LBS | OXYGEN SATURATION: 98 % | HEART RATE: 78 BPM | TEMPERATURE: 99 F | BODY MASS INDEX: 21.89 KG/M2 | SYSTOLIC BLOOD PRESSURE: 128 MMHG

## 2025-08-13 DIAGNOSIS — M53.3 SACROILIAC JOINT PAIN: ICD-10-CM

## 2025-08-13 DIAGNOSIS — M47.816 LUMBAR SPONDYLOSIS: ICD-10-CM

## 2025-08-13 DIAGNOSIS — M54.16 LUMBAR RADICULOPATHY: ICD-10-CM

## 2025-08-13 DIAGNOSIS — M96.1 POSTLAMINECTOMY SYNDROME OF LUMBAR REGION: Primary | ICD-10-CM

## 2025-08-13 PROCEDURE — 99214 OFFICE O/P EST MOD 30 MIN: CPT | Mod: S$GLB,,, | Performed by: NURSE PRACTITIONER

## 2025-08-13 PROCEDURE — 3078F DIAST BP <80 MM HG: CPT | Mod: CPTII,S$GLB,, | Performed by: NURSE PRACTITIONER

## 2025-08-13 PROCEDURE — 3074F SYST BP LT 130 MM HG: CPT | Mod: CPTII,S$GLB,, | Performed by: NURSE PRACTITIONER

## 2025-08-13 PROCEDURE — 1159F MED LIST DOCD IN RCRD: CPT | Mod: CPTII,S$GLB,, | Performed by: NURSE PRACTITIONER

## 2025-08-13 PROCEDURE — 1160F RVW MEDS BY RX/DR IN RCRD: CPT | Mod: CPTII,S$GLB,, | Performed by: NURSE PRACTITIONER

## 2025-08-13 PROCEDURE — 3008F BODY MASS INDEX DOCD: CPT | Mod: CPTII,S$GLB,, | Performed by: NURSE PRACTITIONER

## 2025-08-13 PROCEDURE — 99999 PR PBB SHADOW E&M-EST. PATIENT-LVL IV: CPT | Mod: PBBFAC,,, | Performed by: NURSE PRACTITIONER

## 2025-08-14 ENCOUNTER — PATIENT MESSAGE (OUTPATIENT)
Dept: PAIN MEDICINE | Facility: CLINIC | Age: 60
End: 2025-08-14
Payer: COMMERCIAL

## 2025-08-14 ENCOUNTER — PATIENT MESSAGE (OUTPATIENT)
Dept: INTERNAL MEDICINE | Facility: CLINIC | Age: 60
End: 2025-08-14
Payer: COMMERCIAL

## 2025-08-14 DIAGNOSIS — M54.16 LUMBAR RADICULOPATHY: Primary | ICD-10-CM

## 2025-08-14 DIAGNOSIS — M53.3 SACROILIAC JOINT PAIN: ICD-10-CM

## 2025-08-14 RX ORDER — BUPROPION HYDROCHLORIDE 300 MG/1
TABLET ORAL
Qty: 90 TABLET | Refills: 0 | Status: SHIPPED | OUTPATIENT
Start: 2025-08-14

## 2025-08-14 RX ORDER — BUPROPION HYDROCHLORIDE 150 MG/1
TABLET ORAL
Qty: 90 TABLET | Refills: 0 | Status: SHIPPED | OUTPATIENT
Start: 2025-08-14

## 2025-08-14 RX ORDER — IBUPROFEN 800 MG/1
800 TABLET, FILM COATED ORAL
Qty: 90 TABLET | Refills: 0 | Status: SHIPPED | OUTPATIENT
Start: 2025-08-14

## 2025-08-18 ENCOUNTER — OFFICE VISIT (OUTPATIENT)
Dept: OTOLARYNGOLOGY | Facility: CLINIC | Age: 60
End: 2025-08-18
Payer: COMMERCIAL

## 2025-08-18 VITALS
BODY MASS INDEX: 21.68 KG/M2 | DIASTOLIC BLOOD PRESSURE: 87 MMHG | HEART RATE: 87 BPM | WEIGHT: 130.31 LBS | SYSTOLIC BLOOD PRESSURE: 128 MMHG

## 2025-08-18 DIAGNOSIS — K21.9 LARYNGOPHARYNGEAL REFLUX (LPR): ICD-10-CM

## 2025-08-18 DIAGNOSIS — J34.2 NASAL SEPTAL DEVIATION: Primary | ICD-10-CM

## 2025-08-18 PROCEDURE — 1159F MED LIST DOCD IN RCRD: CPT | Mod: CPTII,S$GLB,, | Performed by: STUDENT IN AN ORGANIZED HEALTH CARE EDUCATION/TRAINING PROGRAM

## 2025-08-18 PROCEDURE — 99203 OFFICE O/P NEW LOW 30 MIN: CPT | Mod: 25,S$GLB,, | Performed by: STUDENT IN AN ORGANIZED HEALTH CARE EDUCATION/TRAINING PROGRAM

## 2025-08-18 PROCEDURE — 99999 PR PBB SHADOW E&M-EST. PATIENT-LVL IV: CPT | Mod: PBBFAC,,, | Performed by: STUDENT IN AN ORGANIZED HEALTH CARE EDUCATION/TRAINING PROGRAM

## 2025-08-18 PROCEDURE — 3008F BODY MASS INDEX DOCD: CPT | Mod: CPTII,S$GLB,, | Performed by: STUDENT IN AN ORGANIZED HEALTH CARE EDUCATION/TRAINING PROGRAM

## 2025-08-18 PROCEDURE — 3079F DIAST BP 80-89 MM HG: CPT | Mod: CPTII,S$GLB,, | Performed by: STUDENT IN AN ORGANIZED HEALTH CARE EDUCATION/TRAINING PROGRAM

## 2025-08-18 PROCEDURE — 3074F SYST BP LT 130 MM HG: CPT | Mod: CPTII,S$GLB,, | Performed by: STUDENT IN AN ORGANIZED HEALTH CARE EDUCATION/TRAINING PROGRAM

## 2025-08-18 PROCEDURE — 31575 DIAGNOSTIC LARYNGOSCOPY: CPT | Mod: S$GLB,,, | Performed by: STUDENT IN AN ORGANIZED HEALTH CARE EDUCATION/TRAINING PROGRAM

## 2025-08-18 RX ORDER — PANTOPRAZOLE SODIUM 40 MG/1
40 TABLET, DELAYED RELEASE ORAL DAILY
Qty: 90 TABLET | Refills: 3 | Status: SHIPPED | OUTPATIENT
Start: 2025-08-18 | End: 2026-08-18

## 2025-08-29 ENCOUNTER — PATIENT MESSAGE (OUTPATIENT)
Dept: PAIN MEDICINE | Facility: OTHER | Age: 60
End: 2025-08-29
Payer: COMMERCIAL

## (undated) DEVICE — BANDAID STRIP PLASTIC 3/4X3

## (undated) DEVICE — SEE MEDLINE ITEM 152530

## (undated) DEVICE — BANDAGE MATRIX HK LOOP 4IN 5YD

## (undated) DEVICE — SEE MEDLINE ITEM 157117

## (undated) DEVICE — DRAPE STERI INSTRUMENT 1018

## (undated) DEVICE — DRESSING XEROFORM NONADH 1X8IN

## (undated) DEVICE — CUFF TOURNIQUET DL PRT

## (undated) DEVICE — SPONGE GAUZE 16PLY 4X4

## (undated) DEVICE — PORT ACCESS 8MM W/120MM LOW

## (undated) DEVICE — GOWN SURGICAL XX LARGE X LONG

## (undated) DEVICE — SEE MEDLINE ITEM 157116

## (undated) DEVICE — GOWN POLY REINF BRTH SLV XL

## (undated) DEVICE — KIT WING PAD POSITIONING

## (undated) DEVICE — PACK BASIC

## (undated) DEVICE — SUT ETHILON 3-0 PS2 18 BLK

## (undated) DEVICE — DRAPE THREE-QTR REINF 53X77IN

## (undated) DEVICE — SUT PDS II O CT-2 VIL MONO

## (undated) DEVICE — SUT VICRYL 2-0 8-18 CP-2

## (undated) DEVICE — CLOSURE SKIN 1/4INX1-1/2IN

## (undated) DEVICE — PAD CAST SPECIALIST STRL 4

## (undated) DEVICE — SUT MONOCRYL 3-0 PS-2 UND

## (undated) DEVICE — Device

## (undated) DEVICE — TRAY MINOR ORTHO OMC

## (undated) DEVICE — SEE MEDLINE ITEM 156955

## (undated) DEVICE — ELECTRODE REM PLYHSV RETURN 9

## (undated) DEVICE — GAUZE AVANT SPNG 4PLY STRL 4X4

## (undated) DEVICE — DRESSING LEUKOPLAST FLEX 1X3IN

## (undated) DEVICE — COVER SNAP 36IN X 30IN

## (undated) DEVICE — BANDAID GARFIELD

## (undated) DEVICE — COVER OVERHEAD SURG LT BLUE

## (undated) DEVICE — MANIPULATOR VCARE PLUS 32MM SM

## (undated) DEVICE — UNDERGLOVES BIOGEL PI SIZE 7.5

## (undated) DEVICE — ADHESIVE DERMABOND ADVANCED

## (undated) DEVICE — BANDAGE ADHESIVE

## (undated) DEVICE — SEE MEDLINE ITEM 157110

## (undated) DEVICE — GLOVE BIOGEL PI MICRO SZ 7.5

## (undated) DEVICE — BLADE SURG STAINLESS STEEL #10

## (undated) DEVICE — PADDING CAST SPECIALIST 6X4YD

## (undated) DEVICE — DRESSING AQUACEL FOAM 4X4IN

## (undated) DEVICE — SEE MEDLINE ITEM 157216

## (undated) DEVICE — SEAL UNIVERSAL 5MM-8MM XI

## (undated) DEVICE — NDL INSUF ULTRA VERESS 120MM

## (undated) DEVICE — UNDERGLOVES BIOGEL PI SZ 7 LF

## (undated) DEVICE — SPONGE COTTON TRAY 4X4IN

## (undated) DEVICE — BANDAGE ESMARK 4X9 55514

## (undated) DEVICE — NDL SPINAL 22GX5

## (undated) DEVICE — SUT MCRYL PLUS 4-0 PS2 27IN

## (undated) DEVICE — CLOSURE SKIN STERI STRIP 1/2X4

## (undated) DEVICE — BANDAGE ROLL COTTN 4.5INX4.1YD

## (undated) DEVICE — GLOVE PROTEXIS HYDROGEL SZ8.5

## (undated) DEVICE — SUT VICRYL 3-0 27 SH

## (undated) DEVICE — GLOVE PROTEXIS NEOPRENE 7.5

## (undated) DEVICE — STOCKINETTE TUBULAR 2PL 6 X 4

## (undated) DEVICE — SUT MONOCYRL 4-0 PS2 UND

## (undated) DEVICE — JELLY SURGILUBE 5GR

## (undated) DEVICE — GLOVE 6.5 PROTEXIS PI BLUE

## (undated) DEVICE — GLOVE BIOGEL SKINSENSE PI 7.0

## (undated) DEVICE — TOURNIQUET SB QC DP 34X4IN

## (undated) DEVICE — TUBE SET INFLOW/OUTFLOW

## (undated) DEVICE — DRAPE COLUMN DAVINCI XI

## (undated) DEVICE — SOL NS 1000CC

## (undated) DEVICE — GLOVE 8 PROTEXIS PI ORTHO

## (undated) DEVICE — SYR B-D DISP CONTROL 10CC100/C

## (undated) DEVICE — SYR 10CC LUER LOCK

## (undated) DEVICE — DRAPE STERI-DRAPE 1000 17X11IN

## (undated) DEVICE — GAUZE CNFRM STRL 4INX4.1YD

## (undated) DEVICE — DRAPE STERI U-SHAPED 47X51IN

## (undated) DEVICE — DRAPE PLASTIC U 60X72

## (undated) DEVICE — STAPLER SKIN PROXIMATE WIDE

## (undated) DEVICE — DEVICE ANC SW STAT FOLEY 6-24

## (undated) DEVICE — INSERT CUSHIONPRONE VIEW LARGE

## (undated) DEVICE — SEE MEDLINE ITEM 152622

## (undated) DEVICE — DRAPE T EXTRM SURG 121X128X90

## (undated) DEVICE — IRRIGATOR ENDOSCOPY DISP.

## (undated) DEVICE — SOL ELECTROLUBE ANTI-STIC

## (undated) DEVICE — SET TRI-LUMEN FILTERED TUBE

## (undated) DEVICE — BLADE SURG CARBON STEEL SZ11

## (undated) DEVICE — DRILL BIT

## (undated) DEVICE — SEE MEDLINE ITEM 156923

## (undated) DEVICE — BRUSH SCRUB HIBICLENS 4%

## (undated) DEVICE — DRESSING TEGADERM 4.4X5IN

## (undated) DEVICE — NDL HYPO REG 25G X 1 1/2

## (undated) DEVICE — SOL WATER STRL IRR 1000ML

## (undated) DEVICE — NDL SPINAL 18GX3.5 SPINOCAN

## (undated) DEVICE — SOL CLEARIFY VISUALIZATION LAP

## (undated) DEVICE — SEE MEDLINE ITEM 146231

## (undated) DEVICE — CLIPPER BLADE MOD 4406 (CAREF)

## (undated) DEVICE — BANDAGE ESMARK ELASTIC ST 4X9

## (undated) DEVICE — SOL NACL IRR 3000ML

## (undated) DEVICE — APPLICATOR CHLORAPREP ORN 26ML

## (undated) DEVICE — DRAPE C-ARMOR EQUIPMENT COVER

## (undated) DEVICE — MANIFOLD 4 PORT

## (undated) DEVICE — SUT ETHICON 3-0 BLK MONO PS

## (undated) DEVICE — DRAPE ARM DAVINCI XI

## (undated) DEVICE — GLOVE BIOGEL SKINSENSE PI 7.5

## (undated) DEVICE — GLOVE PROTEXIS HYDROGEL SZ6

## (undated) DEVICE — PAD PREP 50/CA

## (undated) DEVICE — DRAPE EMERALD 87X114.75X113

## (undated) DEVICE — COVER TIP CURVED SCISSORS XI

## (undated) DEVICE — OBTURATOR BLADELESS 8MM XI CLR

## (undated) DEVICE — SYR 10CC LL W/O NDL STR DISP